# Patient Record
Sex: MALE | Race: WHITE | Employment: OTHER | URBAN - METROPOLITAN AREA
[De-identification: names, ages, dates, MRNs, and addresses within clinical notes are randomized per-mention and may not be internally consistent; named-entity substitution may affect disease eponyms.]

---

## 2017-03-28 ENCOUNTER — OFFICE VISIT (OUTPATIENT)
Dept: FAMILY MEDICINE CLINIC | Age: 67
End: 2017-03-28

## 2017-03-28 VITALS
RESPIRATION RATE: 18 BRPM | TEMPERATURE: 97.9 F | DIASTOLIC BLOOD PRESSURE: 82 MMHG | HEIGHT: 71 IN | HEART RATE: 74 BPM | OXYGEN SATURATION: 97 % | WEIGHT: 222.2 LBS | SYSTOLIC BLOOD PRESSURE: 143 MMHG | BODY MASS INDEX: 31.11 KG/M2

## 2017-03-28 DIAGNOSIS — I10 ESSENTIAL HYPERTENSION: ICD-10-CM

## 2017-03-28 DIAGNOSIS — E78.00 HYPERCHOLESTEROLEMIA: ICD-10-CM

## 2017-03-28 DIAGNOSIS — I25.10 CORONARY ARTERY DISEASE INVOLVING NATIVE CORONARY ARTERY OF NATIVE HEART WITHOUT ANGINA PECTORIS: ICD-10-CM

## 2017-03-28 DIAGNOSIS — N40.1 BENIGN NODULAR PROSTATIC HYPERPLASIA WITH LOWER URINARY TRACT SYMPTOMS: Primary | ICD-10-CM

## 2017-03-28 DIAGNOSIS — R60.0 LOCALIZED EDEMA: ICD-10-CM

## 2017-03-28 DIAGNOSIS — I69.359 CVA, OLD, HEMIPARESIS (HCC): ICD-10-CM

## 2017-03-28 DIAGNOSIS — Z86.73 HISTORY OF STROKE: ICD-10-CM

## 2017-03-28 DIAGNOSIS — E11.9 CONTROLLED TYPE 2 DIABETES MELLITUS WITHOUT COMPLICATION, WITHOUT LONG-TERM CURRENT USE OF INSULIN (HCC): ICD-10-CM

## 2017-03-28 RX ORDER — ASPIRIN 81 MG/1
TABLET ORAL
COMMUNITY
End: 2017-03-28 | Stop reason: SDUPTHER

## 2017-03-28 RX ORDER — TAMSULOSIN HYDROCHLORIDE 0.4 MG/1
0.8 CAPSULE ORAL
COMMUNITY
Start: 2016-12-05 | End: 2017-03-28 | Stop reason: SDUPTHER

## 2017-03-28 RX ORDER — TAMSULOSIN HYDROCHLORIDE 0.4 MG/1
0.8 CAPSULE ORAL DAILY
Qty: 90 CAP | Refills: 0 | Status: SHIPPED | OUTPATIENT
Start: 2017-03-28 | End: 2017-04-15 | Stop reason: SDUPTHER

## 2017-03-28 RX ORDER — CARVEDILOL 12.5 MG/1
12.5 TABLET ORAL 2 TIMES DAILY WITH MEALS
COMMUNITY
Start: 2016-12-05 | End: 2017-03-28 | Stop reason: SDUPTHER

## 2017-03-28 RX ORDER — BUPROPION HYDROCHLORIDE 150 MG/1
150 TABLET, EXTENDED RELEASE ORAL 2 TIMES DAILY
COMMUNITY
Start: 2016-12-05 | End: 2017-04-13 | Stop reason: SDUPTHER

## 2017-03-28 RX ORDER — METFORMIN HYDROCHLORIDE 500 MG/1
500 TABLET ORAL 2 TIMES DAILY WITH MEALS
COMMUNITY
Start: 2016-12-05 | End: 2017-03-28 | Stop reason: SDUPTHER

## 2017-03-28 RX ORDER — FINASTERIDE 5 MG/1
5 TABLET, FILM COATED ORAL
COMMUNITY
Start: 2016-12-05 | End: 2017-03-28 | Stop reason: SDUPTHER

## 2017-03-28 RX ORDER — AMLODIPINE AND BENAZEPRIL HYDROCHLORIDE 5; 20 MG/1; MG/1
CAPSULE ORAL
Qty: 90 CAP | Refills: 0 | Status: SHIPPED | OUTPATIENT
Start: 2017-03-28 | End: 2017-05-23 | Stop reason: SDUPTHER

## 2017-03-28 RX ORDER — HYDROCHLOROTHIAZIDE 25 MG/1
25 TABLET ORAL DAILY
COMMUNITY
Start: 2016-12-05 | End: 2017-04-13 | Stop reason: SDUPTHER

## 2017-03-28 RX ORDER — ATORVASTATIN CALCIUM 20 MG/1
20 TABLET, FILM COATED ORAL
COMMUNITY
Start: 2016-12-05 | End: 2017-05-03 | Stop reason: SDUPTHER

## 2017-03-28 RX ORDER — ATORVASTATIN CALCIUM 20 MG/1
20 TABLET, FILM COATED ORAL
Qty: 90 TAB | Refills: 0 | Status: SHIPPED | OUTPATIENT
Start: 2017-03-28 | End: 2017-05-16 | Stop reason: SDUPTHER

## 2017-03-28 RX ORDER — BISMUTH SUBSALICYLATE 262 MG
TABLET,CHEWABLE ORAL
COMMUNITY
End: 2017-06-23

## 2017-03-28 RX ORDER — CARVEDILOL 12.5 MG/1
12.5 TABLET ORAL 2 TIMES DAILY WITH MEALS
Qty: 180 TAB | Refills: 0 | Status: SHIPPED | OUTPATIENT
Start: 2017-03-28 | End: 2017-05-23 | Stop reason: SDUPTHER

## 2017-03-28 RX ORDER — AMLODIPINE AND BENAZEPRIL HYDROCHLORIDE 5; 20 MG/1; MG/1
CAPSULE ORAL
COMMUNITY
Start: 2016-12-05 | End: 2017-03-28 | Stop reason: SDUPTHER

## 2017-03-28 RX ORDER — FINASTERIDE 5 MG/1
5 TABLET, FILM COATED ORAL DAILY
Qty: 90 TAB | Refills: 0 | Status: SHIPPED | OUTPATIENT
Start: 2017-03-28 | End: 2017-05-23 | Stop reason: SDUPTHER

## 2017-03-28 RX ORDER — METFORMIN HYDROCHLORIDE 500 MG/1
500 TABLET ORAL 2 TIMES DAILY WITH MEALS
Qty: 180 TAB | Refills: 1 | Status: SHIPPED | OUTPATIENT
Start: 2017-03-28 | End: 2017-04-13 | Stop reason: SDUPTHER

## 2017-03-28 RX ORDER — ASPIRIN 81 MG/1
81 TABLET ORAL DAILY
Qty: 30 TAB | Refills: 11
Start: 2017-03-28 | End: 2017-06-27

## 2017-03-28 NOTE — PROGRESS NOTES
HISTORY OF PRESENT ILLNESS  Adria Hopkins is a 74 y.o. male. HPI   States that he just moved to [place] from [employer], hx of stroke in 1997, c/o third toe pain, since 1997, it wasnot exrayed, states that he broke the gr toe in 2016, has no problem with this time,  Stroke effected the left side states that he got his strength back on the upper ext but not on the lft lower ext, state that he also feel weak on the left lower ext  DMtype II with dyslipidmea, not fasting state, no myalgia    Compliant w/ meds, currently on metformin 500mg bid, he is having no diabetic diet, and not able to do doing much of daily exercise, no home glucose monitoring ++ Rf needed for today. Denies any tingling sensation, polyurea and polydipsia, last a1c was not at target 5.9%%. HTN  Today pt present for Bp check and the patient stating that so far there has been a Compliancy w/ the bp meds, needs refill for his bp med  he is having had the low salt diet and try to the best of pt's knowledge to avoid smoked meats, the patient has been active life style and does do the daily walking,  pt states that talib does obtain the bp at home few times a week and the average of 150/85 , today the pt denies Chest Pain, has no lightheadedness,  no legs swelling  Currently on hctz daily since the summer of 2016, has no support hose,  stressed out  the pat has not been feeling anxious, and  Has not been feeling stressed out with the current meds, no S. No H, normal sleep, no Etoh use, ++refill needed  otherwise feeling better since the last visit    Current Outpatient Prescriptions   Medication Sig Dispense Refill    finasteride (PROSCAR) 5 mg tablet 5 mg.  tamsulosin (FLOMAX) 0.4 mg capsule 0.8 mg.      hydroCHLOROthiazide (HYDRODIURIL) 25 mg tablet Take 25 mg by mouth daily.  multivitamin (ONE A DAY) tablet Take by Mouth.  buPROPion SR (WELLBUTRIN SR) 150 mg SR tablet Take 150 mg by mouth two (2) times a day.       amLODIPine-benazepril (LOTREL) 5-20 mg per capsule Take 1 Cap by Mouth Once a Day.  atorvastatin (LIPITOR) 20 mg tablet 20 mg.      carvedilol (COREG) 12.5 mg tablet 12.5 mg two (2) times daily (with meals).  metFORMIN (GLUCOPHAGE) 500 mg tablet 500 mg two (2) times daily (with meals).  aspirin delayed-release 81 mg tablet Take by Mouth.  HYDROcodone-acetaminophen (NORCO) 5-325 mg per tablet Take 1 Tab by mouth every four (4) hours as needed for Pain. Max Daily Amount: 6 Tabs. 20 Tab .0    sildenafil citrate (VIAGRA) 100 mg tablet Take 1 Tab by mouth daily as needed (for erection).  6 Tab 11     No Known Allergies  Past Medical History:   Diagnosis Date    Agoraphobia with panic disorder     Anxiety     BPH (benign prostatic hypertrophy) with urinary obstruction     CAD (coronary artery disease)     Chronic edema     CVD (cardiovascular disease)     Depression     Diabetes ([address]     Erectile dysfunction     GERD (gastroesophageal reflux disease)     Hemiparesis (HCC)     Hypercholesterolemia     Hypertension     Nocturia     Panic disorder     Stroke [facility]      Past Surgical History:   Procedure Laterality Date   [facility] HX COLONOSCOPY      HX LUMBAR DISKECTOMY      HX ORTHOPAEDIC      right finger repair    HX OTHER SURGICAL      lap band    HX OTHER SURGICAL      Lap band     Family History   Problem Relation Age of Onset    Cancer Father     Stroke Father     Hypertension Father     Cancer Mother     Hypertension Sister     Heart Disease Brother      Social History   Substance Use Topics    Smoking status: Never Smoker    Smokeless tobacco: Never Used    Alcohol use Yes      No results found for: WBC, WBCLT, HGBPOC, HGB, HGBP, HGBEXT, HCTPOC, HCT, HCTEXT, PHCT, RBCH, PLT, PLTEXT, MCV, HGBEXT, HCTEXT, PLTEXT    No results found for: HBA1C, WVO9HAGW, HGBE8, GLU, GESTF, GLUCPOC, MCACR, MCA1, MCA2, MCA3, MCA4, UMCA, MCAU, LDL, DLDL, LDLC, DLDLP, CLEO, CREAPOC, MCREA, REFC7, [facility], [address] [address], [facility], REFC4, [id number]   No results found for: CHOL, CHOLX, CHLST, CHOLV, HDL, LDL, DLDL, LDLC, DLDLP, TGL, TGLX, TRIGL, [id number], TRIGP, CHHD, CHHDX      Review of Systems   Constitutional: Negative for chills and fever. HENT: Negative for ear pain and nosebleeds. Eyes: Negative for blurred vision, pain and discharge. Respiratory: Negative for shortness of breath. Cardiovascular: Negative for chest pain and leg swelling. Gastrointestinal: Negative for constipation, diarrhea, nausea and vomiting. Genitourinary: Negative for frequency. Musculoskeletal: Negative for joint pain. Skin: Negative for itching and rash. Neurological: Negative for headaches. Psychiatric/Behavioral: Negative for depression. The patient is not nervous/anxious. Physical Exam   Constitutional: He is oriented to person, place, and time. He appears well-developed and well-nourished. HENT:   Head: Normocephalic and atraumatic. Mouth/Throat: No oropharyngeal exudate. Eyes: Conjunctivae and EOM are normal.   Neck: Normal range of motion. Neck supple. Cardiovascular: Normal rate, regular rhythm and normal heart sounds. No murmur heard. Pulmonary/Chest: Effort normal and breath sounds normal. No respiratory distress. Abdominal: Soft. Bowel sounds are normal. He exhibits no distension. There is no rebound. Musculoskeletal: He exhibits tenderness. He exhibits no edema. Left foot: There is normal range of motion, no tenderness, no bony tenderness, no swelling and normal capillary refill. Nl pulses, nl visual inspection nl monoF   Neurological: He is alert and oriented to person, place, and time. Skin: Skin is warm. No erythema. Psychiatric: He has a normal mood and affect. His behavior is normal.   Nursing note and vitals reviewed. ASSESSMENT and PLAN  Brandon Watt was seen today for establish care.     Diagnoses and all orders for this visit:    Benign nodular prostatic hyperplasia with lower urinary tract symptoms  -     REFERRAL TO UROLOGY  -     CBC W/O DIFF  -     METABOLIC PANEL, COMPREHENSIVE  -     TSH 3RD GENERATION  -     CHOLESTEROL, TOTAL  -     CHOLESTEROL, HDL  -     HEMOGLOBIN A1C WITH EAG    Essential hypertension  -     REFERRAL TO UROLOGY  -     CBC W/O DIFF  -     METABOLIC PANEL, COMPREHENSIVE  -     TSH 3RD GENERATION  -     CHOLESTEROL, TOTAL  -     CHOLESTEROL, HDL  -     HEMOGLOBIN A1C WITH EAG    Hypercholesterolemia  -     REFERRAL TO UROLOGY  -     CBC W/O DIFF  -     METABOLIC PANEL, COMPREHENSIVE  -     TSH 3RD GENERATION  -     CHOLESTEROL, TOTAL  -     CHOLESTEROL, HDL  -     HEMOGLOBIN A1C WITH EAG    Controlled type 2 diabetes mellitus without complication, without long-term current use of insulin (HCC)  -     REFERRAL TO UROLOGY  -     CBC W/O DIFF  -     METABOLIC PANEL, COMPREHENSIVE  -     TSH 3RD GENERATION  -     CHOLESTEROL, TOTAL  -     CHOLESTEROL, HDL  -     HEMOGLOBIN A1C WITH EAG  -     MICROALBUMIN, UR, RAND W/ MICROALBUMIN/CREA RATIO    History of stroke  -     REFERRAL TO UROLOGY  -     CBC W/O DIFF  -     METABOLIC PANEL, COMPREHENSIVE  -     TSH 3RD GENERATION  -     CHOLESTEROL, TOTAL  -     CHOLESTEROL, HDL  -     HEMOGLOBIN A1C WITH EAG  -     MICROALBUMIN, UR, RAND W/ MICROALBUMIN/CREA RATIO    Coronary artery disease involving native coronary artery of native heart without angina pectoris  -     REFERRAL TO UROLOGY  -     REFERRAL TO CARDIOLOGY  -     CBC W/O DIFF  -     METABOLIC PANEL, COMPREHENSIVE  -     TSH 3RD GENERATION  -     CHOLESTEROL, TOTAL  -     CHOLESTEROL, HDL  -     HEMOGLOBIN A1C WITH EAG  -     MICROALBUMIN, UR, RAND W/ MICROALBUMIN/CREA RATIO    Localized edema  -     AMB SUPPLY ORDER  -     CBC W/O DIFF  -     METABOLIC PANEL, COMPREHENSIVE  -     TSH 3RD GENERATION  -     CHOLESTEROL, TOTAL  -     CHOLESTEROL, HDL  -     HEMOGLOBIN A1C WITH EAG    CVA, old, hemiparesis (HCC)  -     REFERRAL TO ORTHOPEDICS    Other orders  - finasteride (PROSCAR) 5 mg tablet; Take 1 Tab by mouth daily. -     tamsulosin (FLOMAX) 0.4 mg capsule; Take 2 Caps by mouth daily. -     amLODIPine-benazepril (LOTREL) 5-20 mg per capsule; Take 1 Cap by Mouth Once a Day. -     carvedilol (COREG) 12.5 mg tablet; Take 1 Tab by mouth two (2) times daily (with meals). -     metFORMIN (GLUCOPHAGE) 500 mg tablet; Take 1 Tab by mouth two (2) times daily (with meals). -     aspirin delayed-release 81 mg tablet; Take 1 Tab by mouth daily. -     atorvastatin (LIPITOR) 20 mg tablet; Take 1 Tab by mouth nightly.

## 2017-03-28 NOTE — PROGRESS NOTES
Chief Complaint   Patient presents with   [address] PCP Dr. Madhavi De Leon. Pt states his records were sent over. Pt saw cardiologist Dr. Alayna Hunt. B/P elevated doctor notified. Please see lab 10/3/2016.

## 2017-03-28 NOTE — MR AVS SNAPSHOT
Visit Information Date & Time Provider Department Dept. Phone Encounter #  
 3/28/2017 12:00 PM Jarrod Lagos MD [address] [facility] OFFICE-ANNEX [phone] [id number] Your Appointments 4/26/2017  3:30 PM  
ROUTINE CARE with Jarrod Lagos MD  
[address] [facility] OFFICE-ANNEX ([address]) Appt Note: New pt est; new patient establish pcp [address] [place] [address] [address] [phone] [address] [phone] Upcoming Health Maintenance Date Due Hepatitis C Screening 1950 DTaP/Tdap/Td series (1 - Tdap) 9/23/1969 FOBT Q 1 YEAR AGE 50-75 9/23/1998 ZOSTER VACCINE AGE 60> 9/23/2008 GLAUCOMA SCREENING Q2Y 9/23/2013 MEDICARE YEARLY EXAM 9/23/2013 INFLUENZA AGE 9 TO ADULT 8/1/2016 Pneumococcal 65+ Low/Medium Risk (2 of 2 - PPSV23) 3/28/2018 Allergies as of 3/28/2017  Review Complete On: 3/28/2017 By: Felicity Nieves LPN No Known Allergies Current Immunizations  Reviewed on 3/28/2017 Name Date Pneumococcal Conjugate (PCV-13) 3/17/2015 Pneumococcal Polysaccharide (PPSV-23) 2/21/2014 Tdap 8/9/2012 Zoster Vaccine, Live 10/28/2016 Reviewed by Jarrod Lagos MD on 3/28/2017 at 12:59 PM  
 Reviewed by Jarrod Lagos MD on 3/28/2017 at 12:59 PM  
You Were Diagnosed With   
  
 Codes Comments Benign nodular prostatic hyperplasia with lower urinary tract symptoms    -  Primary ICD-10-CM: N40.1 ICD-9-CM: 600.10 Essential hypertension     ICD-10-CM: I10 
ICD-9-CM: 401.9 Hypercholesterolemia     ICD-10-CM: E78.00 ICD-9-CM: 272.0 Controlled type 2 diabetes mellitus without complication, without long-term current use of insulin ([facility]     ICD-10-CM: E11.9 ICD-9-CM: 250.00 History of stroke     ICD-10-CM: Z86.73 
ICD-9-CM: V12.54  Coronary artery disease involving native coronary artery of native heart without angina pectoris     ICD-10-CM: I25.10 ICD-9-CM: 414.01 Localized edema     ICD-10-CM: R60.0 ICD-9-CM: 331. 3 Vitals BP Pulse Temp Resp Height(growth percentile) Weight(growth percentile) 143/82 (BP 1 Location: Right arm, BP Patient Position: Sitting) 74 97.9 °F (36.6 °C) (Oral) 18 5' 11\" (1.803 m) 222 lb 3.2 oz (100.8 kg) SpO2 BMI Smoking Status 97% 30.99 kg/m2 Never Smoker Vitals History BMI and BSA Data Body Mass Index Body Surface Area 30.99 kg/m 2 2.25 m 2 Preferred Pharmacy Pharmacy Name Phone 305 [facility], [address] [place] [address] [address] Your Updated Medication List  
  
   
This list is accurate as of: 3/28/17  1:01 PM.  Always use your most recent med list. amLODIPine-benazepril 5-20 mg per capsule Commonly known as:  Rusty Dubin Take 1 Cap by Mouth Once a Day. * aspirin delayed-release 81 mg tablet Take by Mouth. * aspirin delayed-release 81 mg tablet Take 1 Tab by mouth daily. * atorvastatin 20 mg tablet Commonly known as:  LIPITOR 20 mg.  
  
 * atorvastatin 20 mg tablet Commonly known as:  LIPITOR Take 1 Tab by mouth nightly. buPROPion  mg SR tablet Commonly known as:  Zuly [place] Take 150 mg by mouth two (2) times a day. carvedilol 12.5 mg tablet Commonly known as:  Layman Blackwell Take 1 Tab by mouth two (2) times daily (with meals). finasteride 5 mg tablet Commonly known as:  PROSCAR Take 1 Tab by mouth daily. hydroCHLOROthiazide 25 mg tablet Commonly known as:  HYDRODIURIL Take 25 mg by mouth daily. HYDROcodone-acetaminophen 5-325 mg per tablet Commonly known as:  Indu Petri Take 1 Tab by mouth every four (4) hours as needed for Pain. Max Daily Amount: 6 Tabs. metFORMIN 500 mg tablet Commonly known as:  GLUCOPHAGE Take 1 Tab by mouth two (2) times daily (with meals). multivitamin tablet Commonly known as:  ONE A DAY Take by Mouth.  
  
 sildenafil citrate 100 mg tablet Commonly known as:  VIAGRA Take 1 Tab by mouth daily as needed (for erection). tamsulosin 0.4 mg capsule Commonly known as:  FLOMAX Take 2 Caps by mouth daily. * Notice: This list has 4 medication(s) that are the same as other medications prescribed for you. Read the directions carefully, and ask your doctor or other care provider to review them with you. Prescriptions Sent to Pharmacy Refills  
 finasteride (PROSCAR) 5 mg tablet 0 Sig: Take 1 Tab by mouth daily. Class: Normal  
 Pharmacy: [address] [place] [address]. [address] [facility] Ph #: [phone] Route: Oral  
 tamsulosin (FLOMAX) 0.4 mg capsule 0 Sig: Take 2 Caps by mouth daily. Class: Normal  
 Pharmacy: [address] [place] [address]. [address] Ph #: [phone] Route: Oral  
 amLODIPine-benazepril (LOTREL) 5-20 mg per capsule 0 Sig: Take 1 Cap by Mouth Once a Day. Class: Normal  
 Pharmacy: [address] [place] [address]. [address] [facility]  Ph #: [phone]  
 carvedilol (COREG) 12.5 mg tablet 0 Sig: Take 1 Tab by mouth two (2) times daily (with meals). Class: Normal  
 Pharmacy: [address] [place] [address]. [address] [facility] #: [phone] Route: Oral  
 metFORMIN (GLUCOPHAGE) 500 mg tablet 1 Sig: Take 1 Tab by mouth two (2) times daily (with meals). Class: Normal  
 Pharmacy: [address] [place] [address]. [address]  Ph #: [phone] Route: Oral  
 atorvastatin (LIPITOR) 20 mg tablet 0 Sig: Take 1 Tab by mouth nightly. Class: Normal  
 Pharmacy: [address] [place] [address], . [address] [facility]  Ph #: [phone] Route: Oral  
  
We Performed the Following AMB SUPPLY ORDER [[id number] Custom] Comments:  
 Below the knees Bilaterally 20-30 mmhg to be worn daily and off nightly CBC W/O DIFF [24352 CPT(R)] CHOLESTEROL, HDL [id number] CPT(R)] CHOLESTEROL, TOTAL [73101 CPT(R)] HEMOGLOBIN A1C WITH EAG [16381 CPT(R)] METABOLIC PANEL, COMPREHENSIVE [10293 CPT(R)] MICROALBUMIN, UR, RAND W/ MICROALBUMIN/CREA RATIO [id number] CPT(R)] REFERRAL TO CARDIOLOGY [QTV42 Custom] Comments:  
 Please evaluate patient for CAD REFERRAL TO UROLOGY [[id number] Custom] Comments:  
 Please evaluate patient for BPH  
 TSH 3RD GENERATION [46926 CPT(R)] Referral Information Referral ID Referred By Referred To  
  
 [id number] Nehal Santacruz [facility] [address] [facility] [address], [address] [facility] Visits Status Start Date End Date 1 New Request 3/28/17 3/28/18 If your referral has a status of pending review or denied, additional information will be sent to support the outcome of this decision. Referral ID Referred By Referred To  
 [id number] Wejennyfer Labrador Not Available Visits Status Start Date End Date 1 New Request 3/28/17 3/28/18 If your referral has a status of pending review or denied, additional information will be sent to support the outcome of this decision. Introducing [facility]! [employer] introduces [employer] patient portal. Now you can access parts of your medical record, email your doctor's office, and request medication refills online. 1. In your internet browser, go to [email or web address] 2. Click on the First Time User? Click Here link in the Sign In box. You will see the New Member Sign Up page. 3. Enter your [employer] Access Code exactly as it appears below. You will not need to use this code after youve completed the sign-up process. If you do not sign up before the expiration date, you must request a new code. · [employer] Access Code: [email or web address] Expires: 6/26/2017  1:01 PM 
 
4.  Enter the last four digits of your Social Security Number (xxxx) and Date of Birth (mm/dd/yyyy) as indicated and click Submit. You will be taken to the next sign-up page. 5. Create a [employer] ID. This will be your [employer] login ID and cannot be changed, so think of one that is secure and easy to remember. 6. Create a [employer] password. You can change your password at any time. 7. Enter your Password Reset Question and Answer. This can be used at a later time if you forget your password. 8. Enter your e-mail address. You will receive e-mail notification when new information is available in [address] 9. Click Sign Up. You can now view and download portions of your medical record. 10. Click the Download Summary menu link to download a portable copy of your medical information. If you have questions, please visit the Frequently Asked Questions section of the [employer] website. Remember, [employer] is NOT to be used for urgent needs. For medical emergencies, dial 911. Now available from your iPhone and Android! Please provide this summary of care documentation to your next provider. Your primary care clinician is listed as Sharon Melendez. If you have any questions after today's visit, please call [phone].

## 2017-03-29 LAB
ALBUMIN SERPL-MCNC: 4.1 G/DL (ref 3.6–4.8)
ALBUMIN/CREAT UR: 82.2 MG/G CREAT (ref 0–30)
ALBUMIN/GLOB SERPL: 1.5 {RATIO} (ref 1.2–2.2)
ALP SERPL-CCNC: 61 IU/L (ref 39–117)
ALT SERPL-CCNC: 14 IU/L (ref 0–44)
AST SERPL-CCNC: 15 IU/L (ref 0–40)
BILIRUB SERPL-MCNC: 0.5 MG/DL (ref 0–1.2)
BUN SERPL-MCNC: 20 MG/DL (ref 8–27)
BUN/CREAT SERPL: 21 (ref 10–22)
CALCIUM SERPL-MCNC: 9.5 MG/DL (ref 8.6–10.2)
CHLORIDE SERPL-SCNC: 98 MMOL/L (ref 96–106)
CHOLEST SERPL-MCNC: 176 MG/DL (ref 100–199)
CO2 SERPL-SCNC: 28 MMOL/L (ref 18–29)
CREAT SERPL-MCNC: 0.96 MG/DL (ref 0.76–1.27)
CREAT UR-MCNC: 82.1 MG/DL
ERYTHROCYTE [DISTWIDTH] IN BLOOD BY AUTOMATED COUNT: 14 % (ref 12.3–15.4)
EST. AVERAGE GLUCOSE BLD GHB EST-MCNC: 120 MG/DL
GLOBULIN SER CALC-MCNC: 2.7 G/DL (ref 1.5–4.5)
GLUCOSE SERPL-MCNC: 127 MG/DL (ref 65–99)
HBA1C MFR BLD: 5.8 % (ref 4.8–5.6)
HCT VFR BLD AUTO: 38.7 % (ref 37.5–51)
HDLC SERPL-MCNC: 63 MG/DL
HGB BLD-MCNC: 13.2 G/DL (ref 12.6–17.7)
Lab: NORMAL
MCH RBC QN AUTO: 31 PG (ref 26.6–33)
MCHC RBC AUTO-ENTMCNC: 34.1 G/DL (ref 31.5–35.7)
MCV RBC AUTO: 91 FL (ref 79–97)
MICROALBUMIN UR-MCNC: 67.5 UG/ML
PLATELET # BLD AUTO: 242 X10E3/UL (ref 150–379)
POTASSIUM SERPL-SCNC: 3.9 MMOL/L (ref 3.5–5.2)
PROT SERPL-MCNC: 6.8 G/DL (ref 6–8.5)
RBC # BLD AUTO: 4.26 X10E6/UL (ref 4.14–5.8)
SODIUM SERPL-SCNC: 141 MMOL/L (ref 134–144)
TSH SERPL DL<=0.005 MIU/L-ACNC: 1.14 UIU/ML (ref 0.45–4.5)
WBC # BLD AUTO: 8 X10E3/UL (ref 3.4–10.8)

## 2017-04-13 ENCOUNTER — OFFICE VISIT (OUTPATIENT)
Dept: FAMILY MEDICINE CLINIC | Age: 67
End: 2017-04-13

## 2017-04-13 VITALS
HEART RATE: 71 BPM | DIASTOLIC BLOOD PRESSURE: 86 MMHG | TEMPERATURE: 97.3 F | RESPIRATION RATE: 20 BRPM | SYSTOLIC BLOOD PRESSURE: 155 MMHG | BODY MASS INDEX: 31.08 KG/M2 | OXYGEN SATURATION: 98 % | HEIGHT: 71 IN | WEIGHT: 222 LBS

## 2017-04-13 DIAGNOSIS — I10 ESSENTIAL HYPERTENSION: Primary | ICD-10-CM

## 2017-04-13 DIAGNOSIS — I25.10 CORONARY ARTERY DISEASE INVOLVING NATIVE CORONARY ARTERY OF NATIVE HEART WITHOUT ANGINA PECTORIS: ICD-10-CM

## 2017-04-13 RX ORDER — METFORMIN HYDROCHLORIDE 500 MG/1
250 TABLET ORAL 2 TIMES DAILY WITH MEALS
Qty: 90 TAB | Refills: 1
Start: 2017-04-13 | End: 2017-08-15 | Stop reason: ALTCHOICE

## 2017-04-13 RX ORDER — HYDROCHLOROTHIAZIDE 25 MG/1
25 TABLET ORAL DAILY
Qty: 90 TAB | Refills: 3 | Status: SHIPPED | OUTPATIENT
Start: 2017-04-13 | End: 2017-09-13 | Stop reason: SDUPTHER

## 2017-04-13 RX ORDER — BUPROPION HYDROCHLORIDE 150 MG/1
150 TABLET, EXTENDED RELEASE ORAL 2 TIMES DAILY
Qty: 180 TAB | Refills: 3 | Status: SHIPPED | OUTPATIENT
Start: 2017-04-13 | End: 2017-09-13 | Stop reason: SDUPTHER

## 2017-04-13 NOTE — MR AVS SNAPSHOT
Visit Information Date & Time Provider Department Dept. Phone Encounter #  
 4/13/2017  3:45 PM MD Lyly [place] [facility] OFFICE-ANNEX [phone] [id number] Follow-up Instructions Return in about 6 months (around 10/13/2017), or if symptoms worsen or fail to improve. Your Appointments 4/26/2017  3:30 PM  
ROUTINE CARE with MD Lyly [place] OFFICE-ANNEX ([facility]) Appt Note: New pt est; new patient establish pcp [address] [facility] [address] [phone]  
[phone] [address] [phone]  
  
    
 5/3/2017  9:30 AM  
New Patient with Amy aMlave MD  
[facility]) Appt Note: Dr. Millie Licea refd. , CAD, Katina Anthony faxing referral  [address]  
[phone] [address]  
  
    
 6/22/2017  3:45 PM  
Office Visit with Magalis Hall MD  
[facility] OFFICE-ANNEX ([facility]) Appt Note: Medicare Wellness Exam  
 [address] [facility] 7 [phone]  
[phone] [address] [phone] Upcoming Health Maintenance Date Due Hepatitis C Screening 1950 FOOT EXAM Q1 9/23/1958 EYE EXAM RETINAL OR DILATED Q1 9/23/1958 FOBT Q 1 YEAR AGE 50-75 9/23/1998 GLAUCOMA SCREENING Q2Y 9/23/2013 MEDICARE YEARLY EXAM 9/23/2013 HEMOGLOBIN A1C Q6M 9/28/2017 MICROALBUMIN Q1 3/28/2018 LIPID PANEL Q1 3/28/2018 DTaP/Tdap/Td series (2 - Td) 8/9/2022 Allergies as of 4/13/2017  Review Complete On: 3/28/2017 By: Nisha Bauman LPN No Known Allergies Current Immunizations  Reviewed on 3/28/2017 Name Date Pneumococcal Conjugate (PCV-13) 3/17/2015 Pneumococcal Polysaccharide (PPSV-23) 2/21/2014 Tdap 8/9/2012 Zoster Vaccine, Live 10/28/2016 Not reviewed this visit You Were Diagnosed With   
  
 Codes Comments Essential hypertension    -  Primary ICD-10-CM: I10 
ICD-9-CM: 401.9 Coronary artery disease involving native coronary artery of native heart without angina pectoris     ICD-10-CM: I25.10 ICD-9-CM: 414.01 Vitals BP Pulse Temp Resp Height(growth percentile) Weight(growth percentile) 155/86 71 97.3 °F (36.3 °C) (Oral) 20 5' 11\" (1.803 m) 222 lb (100.7 kg) SpO2 BMI Smoking Status 98% 30.96 kg/m2 Never Smoker BMI and BSA Data Body Mass Index Body Surface Area 30.96 kg/m 2 2.25 m 2 Preferred Pharmacy Pharmacy Name Phone 305 [facility], [address] [place] [address] [address] Your Updated Medication List  
  
   
This list is accurate as of: 4/13/17  4:26 PM.  Always use your most recent med list. amLODIPine-benazepril 5-20 mg per capsule Commonly known as:  Ezra Fry Take 1 Cap by Mouth Once a Day. * aspirin delayed-release 81 mg tablet Take by Mouth. * aspirin delayed-release 81 mg tablet Take 1 Tab by mouth daily. * atorvastatin 20 mg tablet Commonly known as:  LIPITOR 20 mg.  
  
 * atorvastatin 20 mg tablet Commonly known as:  LIPITOR Take 1 Tab by mouth nightly. buPROPion  mg SR tablet Commonly known as:  Dominik Comer Take 1 Tab by mouth two (2) times a day. carvedilol 12.5 mg tablet Commonly known as:  Jody Bosworth Take 1 Tab by mouth two (2) times daily (with meals). finasteride 5 mg tablet Commonly known as:  PROSCAR Take 1 Tab by mouth daily. hydroCHLOROthiazide 25 mg tablet Commonly known as:  HYDRODIURIL Take 1 Tab by mouth daily. HYDROcodone-acetaminophen 5-325 mg per tablet Commonly known as:  [place] Take 1 Tab by mouth every four (4) hours as needed for Pain. Max Daily Amount: 6 Tabs. metFORMIN 500 mg tablet Commonly known as:  GLUCOPHAGE Take 0.5 Tabs by mouth two (2) times daily (with meals). multivitamin tablet Commonly known as:  ONE A DAY Take by Mouth.  
  
 sildenafil citrate 100 mg tablet Commonly known as:  VIAGRA Take 1 Tab by mouth daily as needed (for erection). tamsulosin 0.4 mg capsule Commonly known as:  FLOMAX Take 2 Caps by mouth daily. * Notice: This list has 4 medication(s) that are the same as other medications prescribed for you. Read the directions carefully, and ask your doctor or other care provider to review them with you. Prescriptions Sent to Pharmacy Refills  
 hydroCHLOROthiazide (HYDRODIURIL) 25 mg tablet 3 Sig: Take 1 Tab by mouth daily. Class: Normal  
 Pharmacy: [address] [place] [address] Ave[address] [facility]  #: [phone] Route: Oral  
 buPROPion SR (WELLBUTRIN SR) 150 mg SR tablet 3 Sig: Take 1 Tab by mouth two (2) times a day. Class: Normal  
 Pharmacy: [facility] Sarina Hoyos [address] Ph #: [phone] Route: Oral  
  
Follow-up Instructions Return in about 6 months (around 10/13/2017), or if symptoms worsen or fail to improve. Introducing [facility]! Dear Fiona Mins: Thank you for requesting a Endomondo account. Our records indicate that you already have an active [employer]om[employer] account. You can access your account anytime at [email or web address] Did you know that you can access your hospital and ER discharge instructions at any time in Endomondo? You can also review all of your test results from your hospital stay or ER visit. Additional Information If you have questions, please visit the Frequently Asked Questions section of the [employer] website at [email or web address] Remember, [employer] is NOT to be used for urgent needs. For medical emergencies, dial 911. Now available from your iPhone and Android! Please provide this summary of care documentation to your next provider. Your primary care clinician is listed as Nadia Love. If you have any questions after today's visit, please call [phone].

## 2017-04-13 NOTE — PROGRESS NOTES
HISTORY OF PRESENT ILLNESS  Marcia Jarrell is a 74 y.o. male. HPI   DMtype II    Compliant w/ meds, currently on 500mg of metformin bid, he is having diabetic diet, and does some walking with his cane, obtains home glucose monitoring averaging  140's  . No Rf needed for today. Denies any tingling sensation, polyurea and polydipsia, last a1c was not at target 5.8%    . Last podiatry visit 2016   And last eye exam was 2016 Last urine microalbumin 2017 and was abnormal  . Feeling better since the last visit. Current Outpatient Prescriptions   Medication Sig Dispense Refill    hydroCHLOROthiazide (HYDRODIURIL) 25 mg tablet Take 1 Tab by mouth daily. 90 Tab 3    buPROPion SR (WELLBUTRIN SR) 150 mg SR tablet Take 1 Tab by mouth two (2) times a day. 180 Tab 3    metFORMIN (GLUCOPHAGE) 500 mg tablet Take 0.5 Tabs by mouth two (2) times daily (with meals). 90 Tab 1    multivitamin (ONE A DAY) tablet Take by Mouth.  atorvastatin (LIPITOR) 20 mg tablet 20 mg.      finasteride (PROSCAR) 5 mg tablet Take 1 Tab by mouth daily. 90 Tab 0    tamsulosin (FLOMAX) 0.4 mg capsule Take 2 Caps by mouth daily. 90 Cap 0    amLODIPine-benazepril (LOTREL) 5-20 mg per capsule Take 1 Cap by Mouth Once a Day. 90 Cap 0    carvedilol (COREG) 12.5 mg tablet Take 1 Tab by mouth two (2) times daily (with meals). 180 Tab 0    aspirin delayed-release 81 mg tablet Take 1 Tab by mouth daily. 30 Tab 11    atorvastatin (LIPITOR) 20 mg tablet Take 1 Tab by mouth nightly. 90 Tab 0    HYDROcodone-acetaminophen (NORCO) 5-325 mg per tablet Take 1 Tab by mouth every four (4) hours as needed for Pain. Max Daily Amount: 6 Tabs. 20 Tab .0    aspirin delayed-release 81 mg tablet Take by Mouth.  sildenafil citrate (VIAGRA) 100 mg tablet Take 1 Tab by mouth daily as needed (for erection).  6 Tab 11     No Known Allergies  Past Medical History:   Diagnosis Date    Agoraphobia with panic disorder     Anxiety     BPH (benign prostatic hypertrophy) with urinary obstruction     CAD (coronary artery disease)     Chronic edema     Coronary artery disease involving native coronary artery without angina pectoris 3/28/2017    CVD (cardiovascular disease)     Depression     Diabetes ([address]     Erectile dysfunction     Essential hypertension 3/28/2017    GERD (gastroesophageal reflux disease)     Hemiparesis (HCC)     History of stroke 3/28/2017    Hypercholesterolemia     Hypercholesterolemia 3/28/2017    Hypertension     Nocturia     Panic disorder     Stroke [facility])      Past Surgical History:   Procedure Laterality Date    HX COLONOSCOPY      HX LUMBAR DISKECTOMY      HX ORTHOPAEDIC      right finger repair    HX OTHER SURGICAL      lap band    HX OTHER SURGICAL      Lap band     Family History   Problem Relation Age of Onset    Cancer Father     Stroke Father     Hypertension Father     Cancer Mother     Hypertension Sister     Heart Disease Brother      Social History   Substance Use Topics    Smoking status: Never Smoker    Smokeless tobacco: Never Used    Alcohol use Yes      Lab Results  Component Value Date/Time   WBC 8.0 03/28/2017 01:08 PM   HGB 13.2 03/28/2017 01:08 PM   HCT 38.7 03/28/2017 01:08 PM   PLATELET 422 56/52/1301 01:08 PM   MCV 91 03/28/2017 01:08 PM       Lab Results  Component Value Date/Time   Hemoglobin A1c 5.8 03/28/2017 01:08 PM   Glucose 127 03/28/2017 01:08 PM   Microalb/Creat ratio (ug/mg creat.) 82.2 03/28/2017 01:09 PM   Creatinine 0.96 03/28/2017 01:08 PM      Lab Results  Component Value Date/Time   Cholesterol, total 176 03/28/2017 01:08 PM   HDL Cholesterol 63 03/28/2017 01:08 PM       Lab Results  Component Value Date/Time   ALT (SGPT) 14 03/28/2017 01:08 PM   AST (SGOT) 15 03/28/2017 01:08 PM   Alk.  phosphatase 61 03/28/2017 01:08 PM   Bilirubin, total 0.5 03/28/2017 01:08 PM       Lab Results  Component Value Date/Time   GFR est AA 94 03/28/2017 01:08 PM   GFR est non-AA 81 03/28/2017 01:08 PM   Creatinine 0.96 03/28/2017 01:08 PM   BUN 20 03/28/2017 01:08 PM   Sodium 141 03/28/2017 01:08 PM   Potassium 3.9 03/28/2017 01:08 PM   Chloride 98 03/28/2017 01:08 PM   CO2 28 03/28/2017 01:08 PM      Lab Results  Component Value Date/Time   TSH 1.140 03/28/2017 01:08 PM         Review of Systems   Constitutional: Negative for chills and fever. HENT: Negative for ear pain and nosebleeds. Eyes: Negative for blurred vision, pain and discharge. Respiratory: Negative for shortness of breath. Cardiovascular: Negative for chest pain and leg swelling. Gastrointestinal: Negative for constipation, diarrhea, nausea and vomiting. Genitourinary: Negative for frequency. Musculoskeletal: Negative for joint pain. Skin: Negative for itching and rash. Neurological: Negative for headaches. Psychiatric/Behavioral: Negative for depression. The patient is not nervous/anxious. Physical Exam   Constitutional: He is oriented to person, place, and time. He appears well-developed and well-nourished. HENT:   Head: Normocephalic and atraumatic. Mouth/Throat: No oropharyngeal exudate. Eyes: Conjunctivae and EOM are normal.   Neck: Normal range of motion. Neck supple. Cardiovascular: Normal rate, regular rhythm and normal heart sounds. No murmur heard. Pulmonary/Chest: Effort normal and breath sounds normal. No respiratory distress. Abdominal: Soft. Bowel sounds are normal. He exhibits no distension. There is no rebound. Musculoskeletal: He exhibits no edema or tenderness. Neurological: He is alert and oriented to person, place, and time. Skin: Skin is warm. No erythema. Psychiatric: He has a normal mood and affect. His behavior is normal.   Nursing note and vitals reviewed. ASSESSMENT and PLAN  Anice All was seen today for physical.    Diagnoses and all orders for this visit:    Essential hypertension  -     hydroCHLOROthiazide (HYDRODIURIL) 25 mg tablet;  Take 1 Tab by mouth daily.  -     buPROPion SR (WELLBUTRIN SR) 150 mg SR tablet; Take 1 Tab by mouth two (2) times a day. Coronary artery disease involving native coronary artery of native heart without angina pectoris  -     hydroCHLOROthiazide (HYDRODIURIL) 25 mg tablet; Take 1 Tab by mouth daily. -     buPROPion SR (WELLBUTRIN SR) 150 mg SR tablet; Take 1 Tab by mouth two (2) times a day. Other orders  -     metFORMIN (GLUCOPHAGE) 500 mg tablet; Take 0.5 Tabs by mouth two (2) times daily (with meals).

## 2017-04-17 RX ORDER — TAMSULOSIN HYDROCHLORIDE 0.4 MG/1
CAPSULE ORAL
Qty: 90 CAP | Refills: 1 | Status: SHIPPED | OUTPATIENT
Start: 2017-04-17 | End: 2017-06-23

## 2017-05-03 ENCOUNTER — OFFICE VISIT (OUTPATIENT)
Dept: CARDIOLOGY CLINIC | Age: 67
End: 2017-05-03

## 2017-05-03 VITALS
RESPIRATION RATE: 16 BRPM | HEART RATE: 78 BPM | OXYGEN SATURATION: 98 % | WEIGHT: 214.1 LBS | SYSTOLIC BLOOD PRESSURE: 130 MMHG | HEIGHT: 71 IN | DIASTOLIC BLOOD PRESSURE: 80 MMHG | BODY MASS INDEX: 29.97 KG/M2

## 2017-05-03 DIAGNOSIS — E78.00 HYPERCHOLESTEROLEMIA: ICD-10-CM

## 2017-05-03 DIAGNOSIS — I25.10 CORONARY ARTERY DISEASE INVOLVING NATIVE CORONARY ARTERY OF NATIVE HEART WITHOUT ANGINA PECTORIS: ICD-10-CM

## 2017-05-03 DIAGNOSIS — I10 ESSENTIAL HYPERTENSION: Primary | ICD-10-CM

## 2017-05-03 DIAGNOSIS — Z86.73 HISTORY OF STROKE: ICD-10-CM

## 2017-05-03 DIAGNOSIS — E11.9 CONTROLLED TYPE 2 DIABETES MELLITUS WITHOUT COMPLICATION, WITHOUT LONG-TERM CURRENT USE OF INSULIN (HCC): ICD-10-CM

## 2017-05-03 NOTE — PROGRESS NOTES
Subjective/HPI:     Johnna Mcneal is a 74 y.o. male is here for new patient consultation. The patient has medical hx significant for HTN, Hypercholesterolemia, DM, and CVA. The pt presents today to establish care. He moved from [facility]. He has hx of CVA with residual left leg weakness. He had a stress test about 8 years ago that he he was told he had a blockage that had made other blood flow to compensate. He never had a cardiac cath or other investigation of this issue. He denies currently having CP, SOB, palpitations, edema, orthopnea, PND, dizziness, or syncope. Family med hx significant for cancer, HTN, DM, and heart disease.     Patient Active Problem List    Diagnosis Date Noted    Essential hypertension 03/28/2017    Hypercholesterolemia 03/28/2017    Controlled type 2 diabetes mellitus without complication ([facility] 82/72/2061    History of stroke 03/28/2017    Coronary artery disease involving native coronary artery without angina pectoris 03/28/2017    Left low back pain 08/15/2016    Gross hematuria 08/15/2016    Calculus of kidney 04/18/2016    Erectile dysfunction 04/17/2016    Nocturia 07/30/2014    Slowing of urinary stream 07/30/2014    Benign prostatic hyperplasia with lower urinary tract symptoms 06/22/2014    Urinary frequency 06/22/2014    Obesity 06/22/2014      Leisa Arias MD  Past Medical History:   Diagnosis Date    Agoraphobia with panic disorder     Anxiety     BPH (benign prostatic hypertrophy) with urinary obstruction     CAD (coronary artery disease)     no previous MI or stents    Chronic edema     Coronary artery disease involving native coronary artery without angina pectoris 3/28/2017    CVD (cardiovascular disease)     Depression     Diabetes ([address]     Erectile dysfunction     Essential hypertension 3/28/2017    GERD (gastroesophageal reflux disease)     Hemiparesis ([address]     History of stroke 3/28/2017    Hypercholesterolemia     Hypercholesterolemia 3/28/2017    Hypertension     Nocturia     Panic disorder     Stroke [facility])       Past Surgical History:   Procedure Laterality Date    HX COLONOSCOPY      HX LUMBAR DISKECTOMY      HX ORTHOPAEDIC      right finger repair    HX OTHER SURGICAL      lap band    HX OTHER SURGICAL      Lap band     No Known Allergies   Family History   Problem Relation Age of Onset    Cancer Father     Stroke Father     Hypertension Father     Cancer Mother     Hypertension Sister     Heart Disease Brother       Current Outpatient Prescriptions   Medication Sig    tamsulosin (FLOMAX) 0.4 mg capsule Take 2 capsules by mouth  daily    hydroCHLOROthiazide (HYDRODIURIL) 25 mg tablet Take 1 Tab by mouth daily.  buPROPion SR (WELLBUTRIN SR) 150 mg SR tablet Take 1 Tab by mouth two (2) times a day.  metFORMIN (GLUCOPHAGE) 500 mg tablet Take 0.5 Tabs by mouth two (2) times daily (with meals).  multivitamin (ONE A DAY) tablet Take by Mouth.  finasteride (PROSCAR) 5 mg tablet Take 1 Tab by mouth daily.  amLODIPine-benazepril (LOTREL) 5-20 mg per capsule Take 1 Cap by Mouth Once a Day.  carvedilol (COREG) 12.5 mg tablet Take 1 Tab by mouth two (2) times daily (with meals).  aspirin delayed-release 81 mg tablet Take 1 Tab by mouth daily.  atorvastatin (LIPITOR) 20 mg tablet Take 1 Tab by mouth nightly.  HYDROcodone-acetaminophen (NORCO) 5-325 mg per tablet Take 1 Tab by mouth every four (4) hours as needed for Pain. Max Daily Amount: 6 Tabs.  sildenafil citrate (VIAGRA) 100 mg tablet Take 1 Tab by mouth daily as needed (for erection). No current facility-administered medications for this visit.        Vitals:    05/03/17 0915 05/03/17 0930   BP: 130/80 130/80   Pulse: 78    Resp: 16    SpO2: 98%    Weight: 214 lb 1.6 oz (97.1 kg)    Height: 5' 11\" (1.803 m)        I have reviewed the nurses notes, vitals, problem list, allergy list, medical history, family, social history and medications. Review of Symptoms:  General: Pt denies excessive weight gain or loss. Pt is able to conduct ADL's  HEENT: Denies blurred vision, headaches, epistaxis and difficulty swallowing. Respiratory: Denies shortness of breath, YAN, wheezing or stridor. Cardiovascular: Denies precordial pain, palpitations, edema or PND  Gastrointestinal: Denies poor appetite, indigestion, abdominal pain or blood in stool  Urinary: Denies dysuria, pyuria  Musculoskeletal: Denies pain or swelling from muscles or joints  Neurologic: Denies tremor, paresthesias, or sensory motor disturbance  Skin: Denies rash, itching or texture change. Psych: Denies depression        Physical Exam:      General: Well developed, cooperative, alert in no acute distress, appears states age. HEENT: Supple, No carotid bruits, no JVD, trach is midline. PERRL, EOM intact  Heart:  Normal S1/S2 negative S3 or S4. Regular, no murmur, gallop or rub.   Respiratory: Clear bilaterally x 4, no wheezing or rales  Abdomen:   Soft, non-tender, no masses, bowel sounds are active.   Extremities:  No edema, normal cap refill, no cyanosis, atraumatic. Neuro: A&Ox3, speech clear, gait stable. Skin: Skin color is normal. No rashes or lesions.  Non diaphoretic  Vascular: 2+ pulses symmetric in all extremities    Cardiographics    ECG: Sinus  Rhythm  -Short AL syndrome   Prominent R(V1) and left axis   Nonspecific T-abnormality    Cardiology Labs:  Lab Results   Component Value Date/Time    Cholesterol, total 176 03/28/2017 01:08 PM    HDL Cholesterol 63 03/28/2017 01:08 PM       Lab Results   Component Value Date/Time    Sodium 141 03/28/2017 01:08 PM    Potassium 3.9 03/28/2017 01:08 PM    Chloride 98 03/28/2017 01:08 PM    CO2 28 03/28/2017 01:08 PM    Glucose 127 03/28/2017 01:08 PM    BUN 20 03/28/2017 01:08 PM    Creatinine 0.96 03/28/2017 01:08 PM    BUN/Creatinine ratio 21 03/28/2017 01:08 PM    GFR est AA 94 03/28/2017 01:08 PM    GFR est non-AA 81 03/28/2017 01:08 PM    Calcium 9.5 03/28/2017 01:08 PM    AST (SGOT) 15 03/28/2017 01:08 PM    Alk. phosphatase 61 03/28/2017 01:08 PM    Protein, total 6.8 03/28/2017 01:08 PM    Albumin 4.1 03/28/2017 01:08 PM    A-G Ratio 1.5 03/28/2017 01:08 PM    ALT (SGPT) 14 03/28/2017 01:08 PM           Assessment:     Assessment:      Karen Sam was seen today for new patient. Diagnoses and all orders for this visit:    Essential hypertension  -     AMB POC EKG ROUTINE W/ 12 LEADS, INTER & REP    Coronary artery disease involving native coronary artery of native heart without angina pectoris    Hypercholesterolemia    Controlled type 2 diabetes mellitus without complication, without long-term current use of insulin (HCC)    History of stroke      Specialty Problems        Cardiology Problems    Coronary artery disease involving native coronary artery without angina pectoris        Essential hypertension        Hypercholesterolemia              ICD-10-CM ICD-9-CM    1. Essential hypertension I10 401.9 AMB POC EKG ROUTINE W/ 12 LEADS, INTER & REP   2. Coronary artery disease involving native coronary artery of native heart without angina pectoris I25.10 414.01    3. Hypercholesterolemia E78.00 272.0    4. Controlled type 2 diabetes mellitus without complication, without long-term current use of insulin (HCC) E11.9 250.00    5. History of stroke Z86.73 V12.54      Orders Placed This Encounter    AMB POC EKG ROUTINE W/ 12 LEADS, INTER & REP     Order Specific Question:   Reason for Exam:     Answer:   routine       PLAN:    Patient presents today for establishment of cardiac care. He currently denies cardiac complaints. He is in SR, no previous ekg to compare to. BP is normotensive. Lipids and labs managed by PCP. Pt is asymptomatic, return for follow up in a year.     Asher Chua MD

## 2017-05-03 NOTE — PROGRESS NOTES
Marcia Jarrell is a 74 y.o. male  Chief Complaint   Patient presents with    New Patient     recent move to [facility]. PCP referred to cardiologist r/t cardiac history: stroke 1997 Left side weakness Lower Extremity.  Denies chest pain or SOB

## 2017-05-03 NOTE — MR AVS SNAPSHOT
Visit Information Date & Time Provider Department Dept. Phone Encounter #  
 5/3/2017  9:30 AM Michael Calix, [facility] [phone] Your Appointments 6/22/2017  3:45 PM  
Office Visit with Martha Curtis MD  
[facility] ([address]) Appt Note: Medicare Wellness Exam  
 [phone] [phone]  
  
    
 10/16/2017  2:30 PM  
ROUTINE CARE with Martha Curtis MD  
[facility] Appt Note: 6 mnth fu  
 [address] [phone] [phone] Upcoming Health Maintenance Date Due Hepatitis C Screening 1950 EYE EXAM RETINAL OR DILATED Q1 9/23/1958 FOBT Q 1 YEAR AGE 50-75 9/23/1998 GLAUCOMA SCREENING Q2Y 9/23/2013 MEDICARE YEARLY EXAM 9/23/2013 INFLUENZA AGE 9 TO ADULT 8/1/2017 HEMOGLOBIN A1C Q6M 9/28/2017 MICROALBUMIN Q1 3/28/2018 LIPID PANEL Q1 3/28/2018 FOOT EXAM Q1 4/14/2018 DTaP/Tdap/Td series (2 - Td) 8/9/2022 Allergies as of 5/3/2017  Review Complete On: 5/3/2017 By: Kendell Erwin NP No Known Allergies Current Immunizations  Reviewed on 3/28/2017 Name Date Pneumococcal Conjugate (PCV-13) 3/17/2015 Pneumococcal Polysaccharide (PPSV-23) 2/21/2014 Tdap 8/9/2012 Zoster Vaccine, Live 10/28/2016 Not reviewed this visit You Were Diagnosed With   
  
 Codes Comments Essential hypertension    -  Primary ICD-10-CM: I10 
ICD-9-CM: 401.9 Coronary artery disease involving native coronary artery of native heart without angina pectoris     ICD-10-CM: I25.10 ICD-9-CM: 414.01 Hypercholesterolemia     ICD-10-CM: E78.00 ICD-9-CM: 272.0 Controlled type 2 diabetes mellitus without complication, without long-term current use of insulin [facility]     ICD-10-CM: E11.9 ICD-9-CM: 250.00 History of stroke     ICD-10-CM: Z86.73 
ICD-9-CM: V12.54 Vitals BP Pulse Resp Height(growth percentile) Weight(growth percentile) SpO2  
 130/80 (BP 1 Location: Right arm, BP Patient Position: Sitting) 78 16 5' 11\" (1.803 m) 214 lb 1.6 oz (97.1 kg) 98% BMI Smoking Status 29.86 kg/m2 Never Smoker Vitals History BMI and BSA Data Body Mass Index Body Surface Area  
 29.86 kg/m 2 2.21 m 2 Preferred Pharmacy Pharmacy Name Phone [address] [facility], [address] [place] [address] [address] Your Updated Medication List  
  
   
This list is accurate as of: 5/3/17 10:32 AM.  Always use your most recent med list. amLODIPine-benazepril 5-20 mg per capsule Commonly known as:  Brian Malling Take 1 Cap by Mouth Once a Day. aspirin delayed-release 81 mg tablet Take 1 Tab by mouth daily. atorvastatin 20 mg tablet Commonly known as:  LIPITOR Take 1 Tab by mouth nightly. buPROPion  mg SR tablet Commonly known as:  Earma Landau Take 1 Tab by mouth two (2) times a day. carvedilol 12.5 mg tablet Commonly known as:  Schumacher Brod Take 1 Tab by mouth two (2) times daily (with meals). finasteride 5 mg tablet Commonly known as:  PROSCAR Take 1 Tab by mouth daily. hydroCHLOROthiazide 25 mg tablet Commonly known as:  HYDRODIURIL Take 1 Tab by mouth daily. HYDROcodone-acetaminophen 5-325 mg per tablet Commonly known as:  Simmie Blonder Take 1 Tab by mouth every four (4) hours as needed for Pain. Max Daily Amount: 6 Tabs. metFORMIN 500 mg tablet Commonly known as:  GLUCOPHAGE Take 0.5 Tabs by mouth two (2) times daily (with meals). multivitamin tablet Commonly known as:  ONE A DAY Take by Mouth. sildenafil citrate 100 mg tablet Commonly known as:  VIAGRA Take 1 Tab by mouth daily as needed (for erection). tamsulosin 0.4 mg capsule Commonly known as:  FLOMAX Take 2 capsules by mouth  daily We Performed the Following AMB POC EKG ROUTINE W/ 12 LEADS, INTER & REP [98318 CPT(R)] Introducing [facility] SERVICES! Dear Monica Angeles: Thank you for requesting a [employer] account. Our records indicate that you already have an active Get[employer] account. You can access your account anytime at [email or web address] Did you know that you can access your hospital and ER discharge instructions at any time in GetFresh? You can also review all of your test results from your hospital stay or ER visit. Additional Information If you have questions, please visit the Frequently Asked Questions section of the GetFresh website at [email or web address] Remember, GetFresh is NOT to be used for urgent needs. For medical emergencies, dial 911. Now available from your iPhone and Android! Please provide this summary of care documentation to your next provider. Your primary care clinician is listed as Janine Grewal. If you have any questions after today's visit, please call [phone].

## 2017-05-19 ENCOUNTER — HOSPITAL ENCOUNTER (OUTPATIENT)
Dept: GENERAL RADIOLOGY | Age: 67
Discharge: HOME | End: 2017-05-19
Payer: MEDICARE

## 2017-05-19 DIAGNOSIS — N20.0 KIDNEY STONE: ICD-10-CM

## 2017-05-19 PROCEDURE — 74000 XR ABD (KUB): CPT

## 2017-05-20 RX ORDER — ATORVASTATIN CALCIUM 20 MG/1
TABLET, FILM COATED ORAL
Qty: 90 TAB | Refills: 3 | Status: ON HOLD | OUTPATIENT
Start: 2017-05-20 | End: 2017-06-27

## 2017-05-23 RX ORDER — FINASTERIDE 5 MG/1
TABLET, FILM COATED ORAL
Qty: 90 TAB | Refills: 3 | Status: SHIPPED | OUTPATIENT
Start: 2017-05-23 | End: 2017-06-23

## 2017-05-23 RX ORDER — AMLODIPINE AND BENAZEPRIL HYDROCHLORIDE 5; 20 MG/1; MG/1
CAPSULE ORAL
Qty: 90 CAP | Refills: 3 | Status: SHIPPED | OUTPATIENT
Start: 2017-05-23 | End: 2017-08-15 | Stop reason: ALTCHOICE

## 2017-05-23 RX ORDER — CARVEDILOL 12.5 MG/1
TABLET ORAL
Qty: 180 TAB | Refills: 3 | Status: SHIPPED | OUTPATIENT
Start: 2017-05-23 | End: 2017-06-22 | Stop reason: SDUPTHER

## 2017-06-22 ENCOUNTER — OFFICE VISIT (OUTPATIENT)
Dept: FAMILY MEDICINE CLINIC | Age: 67
End: 2017-06-22

## 2017-06-22 VITALS
SYSTOLIC BLOOD PRESSURE: 120 MMHG | HEIGHT: 71 IN | WEIGHT: 217.6 LBS | DIASTOLIC BLOOD PRESSURE: 75 MMHG | HEART RATE: 69 BPM | RESPIRATION RATE: 14 BRPM | TEMPERATURE: 97.1 F | OXYGEN SATURATION: 97 % | BODY MASS INDEX: 30.46 KG/M2

## 2017-06-22 DIAGNOSIS — Z71.89 ADVANCED DIRECTIVES, COUNSELING/DISCUSSION: ICD-10-CM

## 2017-06-22 DIAGNOSIS — Z12.11 SCREEN FOR COLON CANCER: ICD-10-CM

## 2017-06-22 DIAGNOSIS — Z13.39 SCREENING FOR ALCOHOLISM: ICD-10-CM

## 2017-06-22 DIAGNOSIS — Z13.5 SCREENING FOR GLAUCOMA: ICD-10-CM

## 2017-06-22 DIAGNOSIS — Z00.00 ROUTINE GENERAL MEDICAL EXAMINATION AT A HEALTH CARE FACILITY: Primary | ICD-10-CM

## 2017-06-22 DIAGNOSIS — Z23 ENCOUNTER FOR IMMUNIZATION: ICD-10-CM

## 2017-06-22 RX ORDER — CARVEDILOL 12.5 MG/1
TABLET ORAL
Qty: 180 TAB | Refills: 3 | Status: SHIPPED | OUTPATIENT
Start: 2017-06-22 | End: 2017-09-13 | Stop reason: SDUPTHER

## 2017-06-22 NOTE — PATIENT INSTRUCTIONS
Prostate Cancer Screening: Care Instructions  Your Care Instructions    The prostate gland is an organ found just below a man's bladder. It is the size and shape of a walnut. It surrounds the tube that carries urine from the bladder out of the body through the penis. This tube is called the urethra. Prostate cancer is the abnormal growth of cells in the prostate. It is the second most common type of cancer in men. (Skin cancer is the most common.)  Most cases of prostate cancer occur in men older than 72. The disease runs in families. And it's more common in -American men. When it's found and treated early, prostate cancer may be cured. But it is not always treated. This is because prostate cancer may not shorten your life, especially if you are older and the cancer is growing slowly. Follow-up care is a key part of your treatment and safety. Be sure to make and go to all appointments, and call your doctor if you are having problems. It's also a good idea to know your test results and keep a list of the medicines you take. What are the screening tests for prostate cancer? The main screening test for prostate cancer is the prostate-specific antigen (PSA) test. This is a blood test that measures how much PSA is in your blood. A high level may mean that you have an enlargement, an infection, or cancer. Along with the PSA test, you may have a digital rectal exam. The digital (finger) rectal exam checks for anything abnormal in your prostate. To do the exam, the doctor puts a lubricated, gloved finger into your rectum. If these tests suggest cancer, you may need a prostate biopsy. How is prostate cancer diagnosed? In a biopsy, the doctor takes small tissue samples from your prostate gland. Another doctor then looks at the tissue under a microscope to see if there are cancer cells, signs of infection, or other problems. The results help diagnose prostate cancer.   What are the pros and cons of screening? Neither a PSA test nor a digital rectal exam can tell you for sure that you do or do not have cancer. But they can help you decide if you need more tests, such as a prostate biopsy. Screening tests may be useful because most men with prostate cancer don't have symptoms. It can be hard to know if you have cancer until it is more advanced. And then it's harder to treat. But having a PSA test can also cause harm. The test may show high levels of PSA that aren't caused by cancer. So you could have a prostate biopsy you didn't need. Or the PSA test might be normal when there is cancer, so a cancer might not be found early. The test can also find cancers that would never have caused a problem during your lifetime. So you might have treatment that was not needed. Prostate cancer usually develops late in life and grows slowly. For many men, it does not shorten their lives. Some experts advise screening only for men who are at high risk. Talk with your doctor to see if screening is right for you. Where can you learn more? Go to http://jean-pierre-miller.info/. Enter R550 in the search box to learn more about \"Prostate Cancer Screening: Care Instructions. \"  Current as of: July 26, 2016  Content Version: 11.3  © 1891-1117 [employer]. Care instructions adapted under license by [facility] (which disclaims liability or warranty for this information). If you have questions about a medical condition or this instruction, always ask your healthcare professional. [place] [address] any warranty or liability for your use of this information. Learning About Colonoscopy  What is a colonoscopy? A colonoscopy is a test (also called a procedure) that lets a doctor look inside your large intestine. The doctor uses a thin, lighted tube called a colonoscope.  The doctor uses it to look for small growths called polyps, colon or rectal cancer (colorectal cancer), or other problems like bleeding. During the procedure, the doctor can take samples of tissue. The samples can then be checked for cancer or other conditions. The doctor can also take out polyps. How is colonoscopy done? This procedure is done in a doctor's office or a clinic or hospital. You will get medicine to help you relax and not feel pain. Some people find that they do not remember having the test because of the medicine. The doctor gently moves the colonoscope, or scope, through the colon. The scope is also a small video camera. It lets the doctor see the colon and take pictures. A colonoscopy usually takes 30 to 45 minutes. It may take longer if the doctor has to remove polyps. How do you prepare for the procedure? You need to clean out your colon before the procedure so the doctor can see all of your colon. You may start the cleaning process a day or two before the test. This depends on which \"colon prep\" your doctor recommends. To clean your colon, you stop eating solid foods and drink only clear liquids. You can have water, tea, coffee, clear juices, clear broths, flavored ice pops, and gelatin (such as Jell-O). Do not drink anything red or purple, such as grape juice or fruit punch. And do not eat red or purple foods, such as grape ice pops or cherry gelatin. The day or night before the procedure, you drink a large amount of a special liquid. This causes loose, frequent stools. You will go to the bathroom a lot. It is very important to drink all of the colon prep liquid. If you have problems drinking the liquid, call your doctor. For many people, the prep is worse than the test. It may be uncomfortable, and you may feel hungry on the clear liquid diet. Some people do not go to work or do their usual activities on the day of the prep. Arrange to have someone take you home after the test.  What can you expect after a colonoscopy?   The nurses will watch you for 1 to 2 hours until the medicines wear off. Then you can go home. You will need a ride. Your doctor will tell you when you can eat and do your usual activities. Your doctor will talk to you about when you will need your next colonoscopy. The results of your test and your risk for colorectal cancer will help your doctor decide how often you need to be checked. Follow-up care is a key part of your treatment and safety. Be sure to make and go to all appointments, and call your doctor if you are having problems. It's also a good idea to know your test results and keep a list of the medicines you take. Where can you learn more? Go to http://jean-pierre-miller.info/. Enter I042 in the search box to learn more about \"Learning About Colonoscopy. \"  Current as of: July 26, 2016  Content Version: 11.3  © 2211-1429 [employer]. Care instructions adapted under license by [employer] (which disclaims liability or warranty for this information). If you have questions about a medical condition or this instruction, always ask your healthcare professional. [place] [address] any warranty or liability for your use of this information. Statins: Care Instructions  Your Care Instructions  Statins are medicines that lower your cholesterol and your risk for a heart attack and stroke. Cholesterol is a type of fat in your blood. If you have too much cholesterol, it can build up in blood vessels. This raises your risk of heart disease, heart attack, and stroke. Statins lower cholesterol by blocking how much cholesterol your body makes. This prevents cholesterol from building up in your blood vessels. This is called hardening of the arteries. It is the starting point for some heart and blood flow problems, such as heart disease. Statins may also reduce inflammation around the buildup (called plaque). This can lower the risk that the plaque will break apart and lead to a heart attack or stroke.   A heart-healthy lifestyle is important for lowering your risk whether you take statins or not. This includes eating healthy foods, being active, staying at a healthy weight, and not smoking. You must take statins regularly for them to work well. If you stop, your cholesterol and your risk will go back up. Examples of statins include:  · Atorvastatin (Lipitor). · Lovastatin (Mevacor). · Pravastatin (Pravachol). · Simvastatin (Zocor). Statins interact with many medicines. So tell your doctor all of the other medicines that you take. These include prescription medicines, over-the-counter medicines, dietary supplements, and herbal products. Follow-up care is a key part of your treatment and safety. Be sure to make and go to all appointments, and call your doctor if you are having problems. It's also a good idea to know your test results and keep a list of the medicines you take. How can you care for yourself at home? · Take statins exactly as your doctor tells you. High cholesterol has no symptoms. So it is easy to forget to take the pills. Try to make a system that reminds you to take them. · Do not take two or more medicines at the same time unless the doctor told you to. Statins can interact with other medicines. · Always tell your doctor if you think you are having a side effect. If side effects are a problem with one medicine, a different one may be used. · Keep making the lifestyle changes your doctor suggests. Eat heart-healthy foods, be active, don't smoke, and stay at a healthy weight. · Talk to your doctor about avoiding grapefruit juice if you take statins. Grapefruit juice can raise the level of this medicine in your blood. This could increase side effects. When should you call for help? Watch closely for changes in your health, and be sure to contact your doctor if:  · You have side effects of statins. These include:  ¨ Fatigue. ¨ Upset stomach. ¨ Gas. ¨ Constipation.   ¨ Pain or cramps in the belly.  ¨ Muscle aches. · You have any new symptoms or side effects. Where can you learn more? Go to http://jean-peirre-miller.info/. Enter R358 in the search box to learn more about \"Statins: Care Instructions. \"  Current as of: April 3, 2017  Content Version: 11.3  © 2006-2017 [employer]. Care instructions adapted under license by [employer] (which disclaims liability or warranty for this information). If you have questions about a medical condition or this instruction, always ask your healthcare professional. [place] [address] any warranty or liability for your use of this information. Heart-Healthy Diet: Care Instructions  Your Care Instructions    A heart-healthy diet has lots of vegetables, fruits, nuts, beans, and whole grains, and is low in salt. It limits foods that are high in saturated fat, such as meats, cheeses, and fried foods. It may be hard to change your diet, but even small changes can lower your risk of heart attack and heart disease. Follow-up care is a key part of your treatment and safety. Be sure to make and go to all appointments, and call your doctor if you are having problems. It's also a good idea to know your test results and keep a list of the medicines you take. How can you care for yourself at home? Watch your portions  · Learn what a serving is. A \"serving\" and a \"portion\" are not always the same thing. Make sure that you are not eating larger portions than are recommended. For example, a serving of pasta is ½ cup. A serving size of meat is 2 to 3 ounces. A 3-ounce serving is about the size of a deck of cards. Measure serving sizes until you are good at [place]" them. Keep in mind that restaurants often serve portions that are 2 or 3 times the size of one serving. · To keep your energy level up and keep you from feeling hungry, eat often but in smaller portions.   · Eat only the number of calories you need to stay at a healthy weight. If you need to lose weight, eat fewer calories than your body burns (through exercise and other physical activity). Eat more fruits and vegetables  · Eat a variety of fruit and vegetables every day. Dark green, deep orange, red, or yellow fruits and vegetables are especially good for you. Examples include spinach, carrots, peaches, and berries. · Keep carrots, celery, and other veggies handy for snacks. Buy fruit that is in season and store it where you can see it so that you will be tempted to eat it. · Cook dishes that have a lot of veggies in them, such as stir-fries and soups. Limit saturated and trans fat  · Read food labels, and try to avoid saturated and trans fats. They increase your risk of heart disease. Trans fat is found in many processed foods such as cookies and crackers. · Use olive or canola oil when you cook. Try cholesterol-lowering spreads, such as Benecol or Take Control. · Bake, broil, grill, or steam foods instead of frying them. · Choose lean meats instead of high-fat meats such as hot dogs and sausages. Cut off all visible fat when you prepare meat. · Eat fish, skinless poultry, and meat alternatives such as soy products instead of high-fat meats. Soy products, such as tofu, may be especially good for your heart. · Choose low-fat or fat-free milk and dairy products. Eat fish  · Eat at least two servings of fish a week. Certain fish, such as salmon and tuna, contain omega-3 fatty acids, which may help reduce your risk of heart attack. Eat foods high in fiber  · Eat a variety of grain products every day. Include whole-grain foods that have lots of fiber and nutrients. Examples of whole-grain foods include oats, whole wheat bread, and brown rice. · Buy whole-grain breads and cereals, instead of white bread or pastries. Limit salt and sodium  · Limit how much salt and sodium you eat to help lower your blood pressure. · Taste food before you salt it.  Add only a little salt when you think you need it. With time, your taste buds will adjust to less salt. · Eat fewer snack items, fast foods, and other high-salt, processed foods. Check food labels for the amount of sodium in packaged foods. · Choose low-sodium versions of canned goods (such as soups, vegetables, and beans). Limit sugar  · Limit drinks and foods with added sugar. These include candy, desserts, and soda pop. Limit alcohol  · Limit alcohol to no more than 2 drinks a day for men and 1 drink a day for women. Too much alcohol can cause health problems. When should you call for help? Watch closely for changes in your health, and be sure to contact your doctor if:  · You would like help planning heart-healthy meals. Where can you learn more? Go to http://jean-pierre-miller.info/. Enter V137 in the search box to learn more about \"Heart-Healthy Diet: Care Instructions. \"  Current as of: April 3, 2017  Content Version: 11.3  © 2393-6805 [employer]. Care instructions adapted under license by [employer] (which disclaims liability or warranty for this information). If you have questions about a medical condition or this instruction, always ask your healthcare professional. [address] any warranty or liability for your use of this information. Advance Directives: Care Instructions  Your Care Instructions  An advance directive is a legal way to state your wishes at the end of your life. It tells your family and your doctor what to do if you can no longer say what you want. There are two main types of advance directives. You can change them any time that your wishes change. · A living will tells your family and your doctor your wishes about life support and other treatment. · A durable power of  for health care lets you name a person to make treatment decisions for you when you can't speak for yourself.  This person is called a health care agent. If you do not have an advance directive, decisions about your medical care may be made by a doctor or a  who doesn't know you. It may help to think of an advance directive as a gift to the people who care for you. If you have one, they won't have to make tough decisions by themselves. Follow-up care is a key part of your treatment and safety. Be sure to make and go to all appointments, and call your doctor if you are having problems. It's also a good idea to know your test results and keep a list of the medicines you take. How can you care for yourself at home? · Discuss your wishes with your loved ones and your doctor. This way, there are no surprises. · Many states have a unique form. Or you might use a universal form that has been approved by many states. This kind of form can sometimes be completed and stored online. Your electronic copy will then be available wherever you have a connection to the Internet. In most cases, doctors will respect your wishes even if you have a form from a different state. · You don't need a  to do an advance directive. But you may want to get legal advice. · Think about these questions when you prepare an advance directive:  ¨ Who do you want to make decisions about your medical care if you are not able to? Many people choose a family member or close friend. ¨ Do you know enough about life support methods that might be used? If not, talk to your doctor so you understand. ¨ What are you most afraid of that might happen? You might be afraid of having pain, losing your independence, or being kept alive by machines. ¨ Where would you prefer to die? Choices include your home, a hospital, or a nursing home. ¨ Would you like to have information about hospice care to support you and your family? ¨ Do you want to donate organs when you die? ¨ Do you want certain Denominational practices performed before you die?  If so, put your wishes in the advance directive. · Read your advance directive every year, and make changes as needed. When should you call for help? Be sure to contact your doctor if you have any questions. Where can you learn more? Go to http://jean-pierre-miller.info/. Enter R264 in the search box to learn more about \"Advance Directives: Care Instructions. \"  Current as of: November 17, 2016  Content Version: 11.3  © 6181-1845 [employer]. Care instructions adapted under license by [employer] (which disclaims liability or warranty for this information). If you have questions about a medical condition or this instruction, always ask your healthcare professional. [address] any warranty or liability for your use of this information.

## 2017-06-22 NOTE — PROGRESS NOTES
Leilani Santacruz        Name and  verified          Chief Complaint   Patient presents with   [facility] Annual Wellness Visit         Health Maintenance reviewed-discussed with patient. Advance Directives Care Planning Information given, patient acknowledged understanding.

## 2017-06-22 NOTE — MR AVS SNAPSHOT
Visit Information Date & Time Provider Department Dept. Phone Encounter #  
 6/22/2017  3:45 PM Niko Frankel MD  [facility] [phone] [id number] Follow-up Instructions Return in about 6 months (around 12/22/2017). Your Appointments 10/16/2017  2:30 PM  
ROUTINE CARE with Niko Frankel MD  
[facility] ([address]) Appt Note: 6 [address] [place] [address] [phone]  
[phone] [id number] Upcoming Health Maintenance Date Due Hepatitis C Screening 1950 EYE EXAM RETINAL OR DILATED Q1 9/23/1958 FOBT Q 1 YEAR AGE 50-75 9/23/1998 GLAUCOMA SCREENING Q2Y 9/23/2013 MEDICARE YEARLY EXAM 9/23/2013 INFLUENZA AGE 9 TO ADULT 8/1/2017 HEMOGLOBIN A1C Q6M 9/28/2017 MICROALBUMIN Q1 3/28/2018 LIPID PANEL Q1 3/28/2018 FOOT EXAM Q1 4/14/2018 DTaP/Tdap/Td series (2 - Td) 8/9/2022 Allergies as of 6/22/2017  Review Complete On: 5/3/2017 By: Brittaney Jay MD  
 No Known Allergies Current Immunizations  Reviewed on 6/22/2017 Name Date Influenza Vaccine 11/18/2015 12:00 AM  
 Pneumococcal Conjugate (PCV-13) 3/17/2015 Pneumococcal Polysaccharide (PPSV-23) 2/21/2014 Tdap 8/9/2012 Zoster Vaccine, Live 10/28/2016 Reviewed by Niko Frankel MD on 6/22/2017 at  4:02 PM  
 Reviewed by Niko Frankel MD on 6/22/2017 at  4:50 PM  
 Reviewed by Niko Frankel MD on 6/22/2017 at  4:50 PM  
 Reviewed by Niko Frankel MD on 6/22/2017 at  4:51 PM  
 Reviewed by Niko Frankel MD on 6/22/2017 at  4:51 PM  
 Reviewed by Niko Frankel MD on 6/22/2017 at  4:52 PM  
You Were Diagnosed With   
  
 Codes Comments Routine general medical examination at a health care facility    -  Primary ICD-10-CM: Z00.00 ICD-9-CM: V70.0 Screening for alcoholism     ICD-10-CM: Z13.89 ICD-9-CM: V79.1 Encounter for immunization     ICD-10-CM: T70 ICD-9-CM: V03.89 Advanced directives, counseling/discussion     ICD-10-CM: Z71.89 ICD-9-CM: V65.49 Screen for colon cancer     ICD-10-CM: Z12.11 ICD-9-CM: V76.51 Screening for glaucoma     ICD-10-CM: Z13.5 ICD-9-CM: V80.1 Vitals BP Pulse Temp Resp Height(growth percentile) Weight(growth percentile) 120/75 (BP 1 Location: Left arm, BP Patient Position: At rest) 69 97.1 °F (36.2 °C) (Oral) 14 5' 11\" (1.803 m) 217 lb 9.6 oz (98.7 kg) SpO2 BMI Smoking Status 97% 30.35 kg/m2 Never Smoker Vitals History BMI and BSA Data Body Mass Index Body Surface Area  
 30.35 kg/m 2 2.22 m 2 Preferred Pharmacy Pharmacy Name Phone 305 [facility], [address] [place] [address] [address] [facility] 134 Your Updated Medication List  
  
   
This list is accurate as of: 6/22/17  5:00 PM.  Always use your most recent med list. amLODIPine-benazepril 5-20 mg per capsule Commonly known as:  Rosatanesha Mayor Take 1 capsule by mouth  once a day  
  
 aspirin delayed-release 81 mg tablet Take 1 Tab by mouth daily. atorvastatin 20 mg tablet Commonly known as:  LIPITOR Take 1 tablet by mouth  nightly buPROPion  mg SR tablet Commonly known as:  [place] Solar Take 1 Tab by mouth two (2) times a day. carvedilol 12.5 mg tablet Commonly known as:  Karis Vaca Take 1 tablet by mouth two  times daily with meals  
  
 finasteride 5 mg tablet Commonly known as:  PROSCAR Take 1 tablet by mouth  daily  
  
 hydroCHLOROthiazide 25 mg tablet Commonly known as:  HYDRODIURIL Take 1 Tab by mouth daily. HYDROcodone-acetaminophen 5-325 mg per tablet Commonly known as:  Maria Dolores Cruise Take 1 Tab by mouth every four (4) hours as needed for Pain. Max Daily Amount: 6 Tabs. metFORMIN 500 mg tablet Commonly known as:  GLUCOPHAGE  
 Take 0.5 Tabs by mouth two (2) times daily (with meals). multivitamin tablet Commonly known as:  ONE A DAY Take by Mouth.  
  
 sildenafil citrate 100 mg tablet Commonly known as:  VIAGRA Take 1 Tab by mouth daily as needed (for erection). tamsulosin 0.4 mg capsule Commonly known as:  FLOMAX Take 2 capsules by mouth  daily Prescriptions Sent to Pharmacy Refills  
 carvedilol (COREG) 12.5 mg tablet 3 Sig: Take 1 tablet by mouth two  times daily with meals Class: Normal  
 Pharmacy: [facility] Ph #: [phone] We Performed the Following ADVANCE CARE PLANNING FIRST 30 MINS [31253 CPT(R)] HEMOGLOBIN A1C WITH EAG [93257 CPT(R)] HEPATITIS C AB [25555 CPT(R)] OCCULT BLOOD, IMMUNOASSAY (FIT) [id number] CPT(R)] REFERRAL FOR COLONOSCOPY [ZQW006 Custom] Comments:  
 Please evaluate patient for screening REFERRAL TO OPTOMETRY [id number] Custom] Comments:  
 Please evaluate patient for glaucoma. Patient stated will schedule Eye Exam.  
  
Follow-up Instructions Return in about 6 months (around 12/22/2017). Referral Information Referral ID Referred By Referred To  
  
 [id number] YOAN COSTA MD   
   [address] [place], [address] Phone: [phone] Fax: [phone] Visits Status Start Date End Date 1 New Request 6/22/17 6/22/18 If your referral has a status of pending review or denied, additional information will be sent to support the outcome of this decision. Referral ID Referred By Referred To  
 [id number] Wilfred Pfeiffer Not Available Visits Status Start Date End Date 1 New Request 6/22/17 6/22/18 If your referral has a status of pending review or denied, additional information will be sent to support the outcome of this decision. Patient Instructions Prostate Cancer Screening: Care Instructions Your Care Instructions The prostate gland is an organ found just below a man's bladder. It is the size and shape of a walnut. It surrounds the tube that carries urine from the bladder out of the body through the penis. This tube is called the urethra. Prostate cancer is the abnormal growth of cells in the prostate. It is the second most common type of cancer in men. (Skin cancer is the most common.) Most cases of prostate cancer occur in men older than 72. The disease runs in families. And it's more common in -American men. When it's found and treated early, prostate cancer may be cured. But it is not always treated. This is because prostate cancer may not shorten your life, especially if you are older and the cancer is growing slowly. Follow-up care is a key part of your treatment and safety. Be sure to make and go to all appointments, and call your doctor if you are having problems. It's also a good idea to know your test results and keep a list of the medicines you take. What are the screening tests for prostate cancer? The main screening test for prostate cancer is the prostate-specific antigen (PSA) test. This is a blood test that measures how much PSA is in your blood. A high level may mean that you have an enlargement, an infection, or cancer. Along with the PSA test, you may have a digital rectal exam. The digital (finger) rectal exam checks for anything abnormal in your prostate. To do the exam, the doctor puts a lubricated, gloved finger into your rectum. If these tests suggest cancer, you may need a prostate biopsy. How is prostate cancer diagnosed? In a biopsy, the doctor takes small tissue samples from your prostate gland. Another doctor then looks at the tissue under a microscope to see if there are cancer cells, signs of infection, or other problems. The results help diagnose prostate cancer. What are the pros and cons of screening? Neither a PSA test nor a digital rectal exam can tell you for sure that you do or do not have cancer. But they can help you decide if you need more tests, such as a prostate biopsy. Screening tests may be useful because most men with prostate cancer don't have symptoms. It can be hard to know if you have cancer until it is more advanced. And then it's harder to treat. But having a PSA test can also cause harm. The test may show high levels of PSA that aren't caused by cancer. So you could have a prostate biopsy you didn't need. Or the PSA test might be normal when there is cancer, so a cancer might not be found early. The test can also find cancers that would never have caused a problem during your lifetime. So you might have treatment that was not needed. Prostate cancer usually develops late in life and grows slowly. For many men, it does not shorten their lives. Some experts advise screening only for men who are at high risk. Talk with your doctor to see if screening is right for you. Where can you learn more? Go to http://jean-pierre-miller.info/. Enter R550 in the search box to learn more about \"Prostate Cancer Screening: Care Instructions. \" Current as of: July 26, 2016 Content Version: 11.3 © 4420-0409 [employer]. Care instructions adapted under license by [employer] (which disclaims liability or warranty for this information). If you have questions about a medical condition or this instruction, always ask your healthcare professional. [place] [address] any warranty or liability for your use of this information. Learning About Colonoscopy What is a colonoscopy? A colonoscopy is a test (also called a procedure) that lets a doctor look inside your large intestine. The doctor uses a thin, lighted tube called a colonoscope.  The doctor uses it to look for small growths called polyps, colon or rectal cancer (colorectal cancer), or other problems like bleeding. During the procedure, the doctor can take samples of tissue. The samples can then be checked for cancer or other conditions. The doctor can also take out polyps. How is colonoscopy done? This procedure is done in a doctor's office or a clinic or hospital. You will get medicine to help you relax and not feel pain. Some people find that they do not remember having the test because of the medicine. The doctor gently moves the colonoscope, or scope, through the colon. The scope is also a small video camera. It lets the doctor see the colon and take pictures. A colonoscopy usually takes 30 to 45 minutes. It may take longer if the doctor has to remove polyps. How do you prepare for the procedure? You need to clean out your colon before the procedure so the doctor can see all of your colon. You may start the cleaning process a day or two before the test. This depends on which \"colon prep\" your doctor recommends. To clean your colon, you stop eating solid foods and drink only clear liquids. You can have water, tea, coffee, clear juices, clear broths, flavored ice pops, and gelatin (such as Jell-O). Do not drink anything red or purple, such as grape juice or fruit punch. And do not eat red or purple foods, such as grape ice pops or cherry gelatin. The day or night before the procedure, you drink a large amount of a special liquid. This causes loose, frequent stools. You will go to the bathroom a lot. It is very important to drink all of the colon prep liquid. If you have problems drinking the liquid, call your doctor. For many people, the prep is worse than the test. It may be uncomfortable, and you may feel hungry on the clear liquid diet. Some people do not go to work or do their usual activities on the day of the prep. Arrange to have someone take you home after the test. 
What can you expect after a colonoscopy? The nurses will watch you for 1 to 2 hours until the medicines wear off. Then you can go home. You will need a ride. Your doctor will tell you when you can eat and do your usual activities. Your doctor will talk to you about when you will need your next colonoscopy. The results of your test and your risk for colorectal cancer will help your doctor decide how often you need to be checked. Follow-up care is a key part of your treatment and safety. Be sure to make and go to all appointments, and call your doctor if you are having problems. It's also a good idea to know your test results and keep a list of the medicines you take. Where can you learn more? Go to http://jean-pierre-miller.info/. Enter K117 in the search box to learn more about \"Learning About Colonoscopy. \" Current as of: July 26, 2016 Content Version: 11.3 © 6951-4508 [employer]. Care instructions adapted under license by "[employer] (which disclaims liability or warranty for this information). If you have questions about a medical condition or this instruction, always ask your healthcare professional. [address] any warranty or liability for your use of this information. Statins: Care Instructions Your Care Instructions Statins are medicines that lower your cholesterol and your risk for a heart attack and stroke. Cholesterol is a type of fat in your blood. If you have too much cholesterol, it can build up in blood vessels. This raises your risk of heart disease, heart attack, and stroke. Statins lower cholesterol by blocking how much cholesterol your body makes. This prevents cholesterol from building up in your blood vessels. This is called hardening of the arteries. It is the starting point for some heart and blood flow problems, such as heart disease. Statins may also reduce inflammation around the buildup (called plaque).  This can lower the risk that the plaque will break apart and lead to a heart attack or stroke. A heart-healthy lifestyle is important for lowering your risk whether you take statins or not. This includes eating healthy foods, being active, staying at a healthy weight, and not smoking. You must take statins regularly for them to work well. If you stop, your cholesterol and your risk will go back up. Examples of statins include: · Atorvastatin (Lipitor). · Lovastatin (Mevacor). · Pravastatin (Pravachol). · Simvastatin (Zocor). Statins interact with many medicines. So tell your doctor all of the other medicines that you take. These include prescription medicines, over-the-counter medicines, dietary supplements, and herbal products. Follow-up care is a key part of your treatment and safety. Be sure to make and go to all appointments, and call your doctor if you are having problems. It's also a good idea to know your test results and keep a list of the medicines you take. How can you care for yourself at home? · Take statins exactly as your doctor tells you. High cholesterol has no symptoms. So it is easy to forget to take the pills. Try to make a system that reminds you to take them. · Do not take two or more medicines at the same time unless the doctor told you to. Statins can interact with other medicines. · Always tell your doctor if you think you are having a side effect. If side effects are a problem with one medicine, a different one may be used. · Keep making the lifestyle changes your doctor suggests. Eat heart-healthy foods, be active, don't smoke, and stay at a healthy weight. · Talk to your doctor about avoiding grapefruit juice if you take statins. Grapefruit juice can raise the level of this medicine in your blood. This could increase side effects. When should you call for help? Watch closely for changes in your health, and be sure to contact your doctor if: · You have side effects of statins. These include: ¨ Fatigue. ¨ Upset stomach. ¨ Gas. ¨ Constipation. ¨ Pain or cramps in the belly. ¨ Muscle aches. · You have any new symptoms or side effects. Where can you learn more? Go to http://jean-pierre-miller.info/. Enter R358 in the search box to learn more about \"Statins: Care Instructions. \" Current as of: April 3, 2017 Content Version: 11.3 © 3125-5911 "[employer] a [employer]". Care instructions adapted under license by [employer] (which disclaims liability or warranty for this information). If you have questions about a medical condition or this instruction, always ask your healthcare professional. [address] any warranty or liability for your use of this information. Heart-Healthy Diet: Care Instructions Your Care Instructions A heart-healthy diet has lots of vegetables, fruits, nuts, beans, and whole grains, and is low in salt. It limits foods that are high in saturated fat, such as meats, cheeses, and fried foods. It may be hard to change your diet, but even small changes can lower your risk of heart attack and heart disease. Follow-up care is a key part of your treatment and safety. Be sure to make and go to all appointments, and call your doctor if you are having problems. It's also a good idea to know your test results and keep a list of the medicines you take. How can you care for yourself at home? Watch your portions · Learn what a serving is. A \"serving\" and a \"portion\" are not always the same thing. Make sure that you are not eating larger portions than are recommended. For example, a serving of pasta is ½ cup. A serving size of meat is 2 to 3 ounces. A 3-ounce serving is about the size of a deck of cards. Measure serving sizes until you are good at [place]" them. Keep in mind that restaurants often serve portions that are 2 or 3 times the size of one serving. · To keep your energy level up and keep you from feeling hungry, eat often but in smaller portions. · Eat only the number of calories you need to stay at a healthy weight. If you need to lose weight, eat fewer calories than your body burns (through exercise and other physical activity). Eat more fruits and vegetables · Eat a variety of fruit and vegetables every day. Dark green, deep orange, red, or yellow fruits and vegetables are especially good for you. Examples include spinach, carrots, peaches, and berries. · Keep carrots, celery, and other veggies handy for snacks. Buy fruit that is in season and store it where you can see it so that you will be tempted to eat it. · Cook dishes that have a lot of veggies in them, such as stir-fries and soups. Limit saturated and trans fat · Read food labels, and try to avoid saturated and trans fats. They increase your risk of heart disease. Trans fat is found in many processed foods such as cookies and crackers. · Use olive or canola oil when you cook. Try cholesterol-lowering spreads, such as Benecol or Take Control. · Bake, broil, grill, or steam foods instead of frying them. · Choose lean meats instead of high-fat meats such as hot dogs and sausages. Cut off all visible fat when you prepare meat. · Eat fish, skinless poultry, and meat alternatives such as soy products instead of high-fat meats. Soy products, such as tofu, may be especially good for your heart. · Choose low-fat or fat-free milk and dairy products. Eat fish · Eat at least two servings of fish a week. Certain fish, such as salmon and tuna, contain omega-3 fatty acids, which may help reduce your risk of heart attack. Eat foods high in fiber · Eat a variety of grain products every day. Include whole-grain foods that have lots of fiber and nutrients. Examples of whole-grain foods include oats, whole wheat bread, and brown rice. · Buy whole-grain breads and cereals, instead of white bread or pastries. Limit salt and sodium · Limit how much salt and sodium you eat to help lower your blood pressure. · Taste food before you salt it. Add only a little salt when you think you need it. With time, your taste buds will adjust to less salt. · Eat fewer snack items, fast foods, and other high-salt, processed foods. Check food labels for the amount of sodium in packaged foods. · Choose low-sodium versions of canned goods (such as soups, vegetables, and beans). Limit sugar · Limit drinks and foods with added sugar. These include candy, desserts, and soda pop. Limit alcohol · Limit alcohol to no more than 2 drinks a day for men and 1 drink a day for women. Too much alcohol can cause health problems. When should you call for help? Watch closely for changes in your health, and be sure to contact your doctor if: 
· You would like help planning heart-healthy meals. Where can you learn more? Go to http://jean-pierre-miller.info/. Enter V137 in the search box to learn more about \"Heart-Healthy Diet: Care Instructions. \" Current as of: April 3, 2017 Content Version: 11.3 © 9662-9022 [employer]. Care instructions adapted under license by Fastmobile (which disclaims liability or warranty for this information). If you have questions about a medical condition or this instruction, always ask your healthcare professional. [place] [address] any warranty or liability for your use of this information. Advance Directives: Care Instructions Your Care Instructions An advance directive is a legal way to state your wishes at the end of your life. It tells your family and your doctor what to do if you can no longer say what you want. There are two main types of advance directives. You can change them any time that your wishes change. · A living will tells your family and your doctor your wishes about life support and other treatment. · A durable power of  for health care lets you name a person to make treatment decisions for you when you can't speak for yourself. This person is called a health care agent. If you do not have an advance directive, decisions about your medical care may be made by a doctor or a  who doesn't know you. It may help to think of an advance directive as a gift to the people who care for you. If you have one, they won't have to make tough decisions by themselves. Follow-up care is a key part of your treatment and safety. Be sure to make and go to all appointments, and call your doctor if you are having problems. It's also a good idea to know your test results and keep a list of the medicines you take. How can you care for yourself at home? · Discuss your wishes with your loved ones and your doctor. This way, there are no surprises. · Many states have a unique form. Or you might use a universal form that has been approved by many states. This kind of form can sometimes be completed and stored online. Your electronic copy will then be available wherever you have a connection to the Internet. In most cases, doctors will respect your wishes even if you have a form from a different state. · You don't need a  to do an advance directive. But you may want to get legal advice. · Think about these questions when you prepare an advance directive: ¨ Who do you want to make decisions about your medical care if you are not able to? Many people choose a family member or close friend. ¨ Do you know enough about life support methods that might be used? If not, talk to your doctor so you understand. ¨ What are you most afraid of that might happen? You might be afraid of having pain, losing your independence, or being kept alive by machines. ¨ Where would you prefer to die? Choices include your home, a hospital, or a nursing home. ¨ Would you like to have information about hospice care to support you and your family? ¨ Do you want to donate organs when you die? ¨ Do you want certain Presybeterian practices performed before you die? If so, put your wishes in the advance directive. · Read your advance directive every year, and make changes as needed. When should you call for help? Be sure to contact your doctor if you have any questions. Where can you learn more? Go to http://jean-pierre-miller.info/. Enter R264 in the search box to learn more about \"Advance Directives: Care Instructions. \" Current as of: November 17, 2016 Content Version: 11.3 © 1460-0865 [email or web address]. Care instructions adapted under license by Splitforce (which disclaims liability or warranty for this information). If you have questions about a medical condition or this instruction, always ask your healthcare professional. [place] [address] any warranty or liability for your use of this information. Introducing [facility]! Dear Bashir Meléndez: Thank you for requesting a Lopoly account. Our records indicate that you already have an active Lopoly account. You can access your account anytime at [email or web address]. [email or web address] Did you know that you can access your hospital and ER discharge instructions at any time in Lopoly? You can also review all of your test results from your hospital stay or ER visit. Additional Information If you have questions, please visit the Frequently Asked Questions section of the Lopoly website at [email or web address] Remember, Lopoly is NOT to be used for urgent needs. For medical emergencies, dial 911. Now available from your iPhone and Android! Please provide this summary of care documentation to your next provider. Your primary care clinician is listed as Jenny Domínguez. If you have any questions after today's visit, please call [phone].

## 2017-06-22 NOTE — PROGRESS NOTES
This is an Initial Medicare Annual Wellness Exam (AWV) (Performed 12 months after IPPE or effective date of Medicare Part B enrollment, Once in a lifetime)    I have reviewed the patient's medical history in detail and updated the computerized patient record. History     Present for CPE, last Complete Physical exam was few yrs ago , not Up todate w/ all vaccination, last tetanus vaccine was in <5yrs ago,  Last psa exam was abnormal and was in 2017 biopsied with nl results  last colonoscopy was abnormal with few polyps and was 5yrs Ago,   No past surgical hx,  last bone dexa scan was never,    No family hx of breast cancer in a male  no family hx of prostate cancer   no family hx of colon cancer, father 75 yo  of lung cancer, mother yo  of lung cancer as well, ++fhx of no early heart Dz , +++sexaully active and uses Safe sex, +++physically active,  compliant w/ meds, ++Rf needed for today for his meds.      Not depressed no hx of fall, walking with the cane  His A1c of 5.8 % in March his med changed,  Past Medical History:   Diagnosis Date    Agoraphobia with panic disorder     Anxiety     BPH (benign prostatic hypertrophy) with urinary obstruction     CAD (coronary artery disease)     no previous MI or stents    Chronic edema     Coronary artery disease involving native coronary artery without angina pectoris 3/28/2017    CVD (cardiovascular disease)     Depression     Diabetes ([address]     Erectile dysfunction     Essential hypertension 3/28/2017    GERD (gastroesophageal reflux disease)     [facility] [address] [address] [facility]     History of stroke 3/28/2017    Hypercholesterolemia     Hypercholesterolemia 3/28/2017    Hypertension     Nocturia     Panic disorder     Stroke [facility])       Past Surgical History:   Procedure Laterality Date    HX COLONOSCOPY      HX LUMBAR DISKECTOMY      HX ORTHOPAEDIC      right finger repair    HX OTHER SURGICAL      lap band    HX OTHER SURGICAL      Lap band     Current Outpatient Prescriptions   Medication Sig Dispense Refill    amLODIPine-benazepril (LOTREL) 5-20 mg per capsule Take 1 capsule by mouth  once a day 90 Cap 3    finasteride (PROSCAR) 5 mg tablet Take 1 tablet by mouth  daily 90 Tab 3    carvedilol (COREG) 12.5 mg tablet Take 1 tablet by mouth two  times daily with meals 180 Tab 3    atorvastatin (LIPITOR) 20 mg tablet Take 1 tablet by mouth  nightly 90 Tab 3    tamsulosin (FLOMAX) 0.4 mg capsule Take 2 capsules by mouth  daily 90 Cap 1    hydroCHLOROthiazide (HYDRODIURIL) 25 mg tablet Take 1 Tab by mouth daily. 90 Tab 3    buPROPion SR (WELLBUTRIN SR) 150 mg SR tablet Take 1 Tab by mouth two (2) times a day. 180 Tab 3    metFORMIN (GLUCOPHAGE) 500 mg tablet Take 0.5 Tabs by mouth two (2) times daily (with meals). 90 Tab 1    multivitamin (ONE A DAY) tablet Take by Mouth.  aspirin delayed-release 81 mg tablet Take 1 Tab by mouth daily. 30 Tab 11    HYDROcodone-acetaminophen (NORCO) 5-325 mg per tablet Take 1 Tab by mouth every four (4) hours as needed for Pain. Max Daily Amount: 6 Tabs. 20 Tab .0    sildenafil citrate (VIAGRA) 100 mg tablet Take 1 Tab by mouth daily as needed (for erection).  6 Tab 11     No Known Allergies  Family History   Problem Relation Age of Onset    Cancer Father     Stroke Father     Hypertension Father     Cancer Mother     Hypertension Sister     Heart Disease Brother      Social History   Substance Use Topics    Smoking status: Never Smoker    Smokeless tobacco: Never Used    Alcohol use Yes     Patient Active Problem List   Diagnosis Code    Benign prostatic hyperplasia with lower urinary tract symptoms N40.1    Urinary frequency R35.0    Obesity E66.9    Nocturia R35.1    Slowing of urinary stream R39.198    Erectile dysfunction N52.9    Calculus of kidney N20.0    Left low back pain M54.5    Gross hematuria R31.0    Essential hypertension I10    Hypercholesterolemia E78.00    Controlled type 2 diabetes mellitus without complication ([facility]) G88.5    History of stroke Z86.73    Coronary artery disease involving native coronary artery without angina pectoris I25.10         Depression Risk Factor Screening:     PHQ over the last two weeks 3/28/2017   PHQ Not Done Active Diagnosis of Depression or Bipolar Disorder     Alcohol Risk Factor Screening: On any occasion during the past 3 months, have you had more than 4 drinks containing alcohol? No    Do you average more than 14 drinks per week? No      Functional Ability and Level of Safety:     Hearing Loss   normal-to-mild    Activities of Daily Living   Self-care. Requires assistance with: no ADLs    Fall Risk   Fall Risk Assessment, last 12 mths 4/13/2017   Able to walk? Yes   Fall in past 12 months? No   Fall with injury? -   Number of falls in past 12 months -   Fall Risk Score -     Abuse Screen   Patient is not abused    Review of Systems   Review of Systems    Constitutional: Negative for chills and fever, not obese okay body mass index for his age. HENT: Negative for ear head pain and nosebleeds. Eyes: Negative for blurred vision, pain and discharge. Respiratory: Negative for shortness of breath, wheezing cough sore throat. Cardiovascular: Negative for chest pain and leg swelling, racing heart . Gastrointestinal: Negative for constipation, diarrhea, nausea and vomiting. Genitourinary: Negative for frequency. Musculoskeletal: Negative for joint pain. Skin: Negative for itching, pimples or acne rash. Neurological: Negative for headaches. Psychiatric/Behavioral: Negative for depression has normal interest to do things and not depressed the patient is not nervous/anxious.       Physical Examination     No exam data present    Evaluation of Cognitive Function:  Mood/affect:  happy  Appearance: age appropriate, well dressed and within normal Limits  Family member/caregiver input: none        General:  Alert, cooperative, no distress, appears stated age. Head:  Normocephalic, without obvious abnormality, atraumatic. Eyes:  Conjunctivae/corneas clear. PERRL, EOMs intact. Fundi benign   Ears:  Normal TMs and external ear canals both ears. Nose: Nares normal. Septum midline. Mucosa normal. No drainage or sinus tenderness. Throat: Lips, mucosa, and tongue normal. Teeth and gums normal.   Neck: Supple, symmetrical, trachea midline, no adenopathy, thyroid: no enlargement/tenderness/nodules, no carotid bruit and no JVD. Back:   Symmetric, no curvature. ROM normal. No CVA tenderness. Lungs:   Clear to auscultation bilaterally. Chest wall:  No tenderness or deformity. Heart:  Regular rate and rhythm, S1, S2 normal, no murmur, click, rub or gallop. Abdomen:   Soft, non-tender. Bowel sounds normal. No masses,  No organomegaly. Genitalia:  Normal male without lesion, discharge . Rectal:  Pt denies incontinence  Guaiac P stool. Extremities: Extremities normal, atraumatic, no cyanosis or edema. Pulses: 2+ and symmetric all extremities. Skin: Skin color, texture, turgor normal. No rashes or lesions   Lymph nodes: Cervical, supraclavicular, and axillary nodes normal.   Neurologic: CNII-XII intact. Normal strength, sensation and reflexes throughout.        Patient Care Team:  Geovanni Webb MD as PCP - General (Family Practice)  Jeremi Adame MD (Urology)    Advice/Referrals/Counseling   Education and counseling provided:  Are appropriate based on today's review and evaluation  End-of-Life planning (with patient's consent): Initial ACP discussion, pt wants the  Wife as SDM,  Pneumococcal Vaccuptodateine, uptodate  Influenza Vaccine, up todate  Hepatitis B Vaccine, declined  Prostate cancer screening tests not indicated  Colorectal cancer screening tests, ordered today  Cardiovascular screening blood test, addressed  Bone mass measurement (DEXA), not ordered await for approval  Screening for glaucoma, done  Diabetes screening test, done today, ++ hx of it  Medical nutrition therapy for individuals with diabetes or renal disease, addressed info given  Diabetes outpatient self-management training services, informed        Assessment/Plan   Jack Sprague was seen today for annual wellness visit. Diagnoses and all orders for this visit:    Routine general medical examination at a health care facility  -     Cancel: HEPATITIS C AB  -     Cancel: OCCULT BLOOD, IMMUNOASSAY (FIT)  -     REFERRAL TO OPTOMETRY  -     carvedilol (COREG) 12.5 mg tablet; Take 1 tablet by mouth two  times daily with meals  -     Cancel: HEMOGLOBIN A1C WITH EAG    Screening for alcoholism    Encounter for immunization    Advanced directives, counseling/discussion  -     ADVANCE CARE PLANNING FIRST 27 MINS    Screen for colon cancer  -     Cancel: OCCULT BLOOD, IMMUNOASSAY (FIT)  -     REFERRAL FOR COLONOSCOPY    Screening for glaucoma  -     REFERRAL TO OPTOMETRY    . Patient was advised to stay at a healthy weight, which would lower the risk for many problems, such as the current obesity, less chance of worsening the uncontrolled diabetic state, depressing the progress of heart disease, maintaining the target level of a stable blood pressure. In addition patient was also told try to get at least 30 minutes of physical activity on most days of the week, was told a daily brisk walking would be a good choice to start with, and then later one can add and do other harder activities, such as running, swimming, cycling, or playing tennis or team sports for a better outcome. Also informed regarding the risk of sexually transmitted disease at any age was told to try to be in a protective mode as much as possible.     The continuity of the care  Pt was told if for any reason, the pt's future Office appointments, medication refills or any of the patient concerns not addressed, pt should obtain the name of the corresponding tel representatives and call us back or send us a letter for future investigation,     Patient was also for informed regarding the recommended dosage of alcohol intake, and Not to use any of the tobacco smoke, not allowing others to smoke around the patient. Iin addition, lab results and schedule of future lab studies reviewed with patient,    A lean diet also advised in addition of avoiding fast foods, the current medications and their side effects reviewed with the patient, patient acknowledged all understanding and today the patient agreed with all the recommendations.

## 2017-06-22 NOTE — ACP (ADVANCE CARE PLANNING)
Advance Care Planning    Advance Care Planning        Authorized Decision Maker (if patient is incapable of making informed decisions): This person is: Other Legally Authorized Decision Maker which would be his current wife     For Patients with Decision Making Capacity:   Values/Goals: Exploration of values, goals, and preferences if recovery is not expected, even with continued medical treatment in the event of:  Imminent death, Severe, permanent brain injury  \"In these circumstances, what matters most to you? \" patient in the presence of familymember stated that care focused more on comfort and the quality of life than Care focused more on quantity (length) of life and wants to be DNR form given will sign and bring us a copy on the later date

## 2017-06-23 ENCOUNTER — HOSPITAL ENCOUNTER (INPATIENT)
Age: 67
LOS: 4 days | Discharge: NURSING, REHAB OR LONG-TERM CARE | DRG: 065 | End: 2017-06-27
Attending: EMERGENCY MEDICINE | Admitting: INTERNAL MEDICINE
Payer: MEDICARE

## 2017-06-23 ENCOUNTER — APPOINTMENT (OUTPATIENT)
Dept: CT IMAGING | Age: 67
DRG: 065 | End: 2017-06-23
Attending: EMERGENCY MEDICINE
Payer: MEDICARE

## 2017-06-23 DIAGNOSIS — I67.82 CHRONIC CEREBRAL ISCHEMIA: ICD-10-CM

## 2017-06-23 DIAGNOSIS — R29.898 WEAKNESS OF LEFT LOWER EXTREMITY: Primary | ICD-10-CM

## 2017-06-23 DIAGNOSIS — G45.1 HEMISPHERIC CAROTID ARTERY SYNDROME: ICD-10-CM

## 2017-06-23 PROBLEM — G45.9 TIA (TRANSIENT ISCHEMIC ATTACK): Status: ACTIVE | Noted: 2017-06-23

## 2017-06-23 LAB
ALBUMIN SERPL BCP-MCNC: 3.5 G/DL (ref 3.5–5)
ALBUMIN/GLOB SERPL: 0.9 {RATIO} (ref 1.1–2.2)
ALP SERPL-CCNC: 74 U/L (ref 45–117)
ALT SERPL-CCNC: 32 U/L (ref 12–78)
ANION GAP BLD CALC-SCNC: 7 MMOL/L (ref 5–15)
APPEARANCE UR: CLEAR
APTT PPP: 27.8 SEC (ref 22.1–32.5)
AST SERPL W P-5'-P-CCNC: 15 U/L (ref 15–37)
BASOPHILS # BLD AUTO: 0 K/UL (ref 0–0.1)
BASOPHILS # BLD: 1 % (ref 0–1)
BILIRUB SERPL-MCNC: 0.7 MG/DL (ref 0.2–1)
BILIRUB UR QL: NEGATIVE
BUN SERPL-MCNC: 23 MG/DL (ref 6–20)
BUN/CREAT SERPL: 18 (ref 12–20)
CALCIUM SERPL-MCNC: 9.1 MG/DL (ref 8.5–10.1)
CHLORIDE SERPL-SCNC: 105 MMOL/L (ref 97–108)
CO2 SERPL-SCNC: 31 MMOL/L (ref 21–32)
COLOR UR: NORMAL
CREAT SERPL-MCNC: 1.25 MG/DL (ref 0.7–1.3)
EOSINOPHIL # BLD: 0.3 K/UL (ref 0–0.4)
EOSINOPHIL NFR BLD: 3 % (ref 0–7)
ERYTHROCYTE [DISTWIDTH] IN BLOOD BY AUTOMATED COUNT: 13.3 % (ref 11.5–14.5)
GLOBULIN SER CALC-MCNC: 3.9 G/DL (ref 2–4)
GLUCOSE BLD STRIP.AUTO-MCNC: 114 MG/DL (ref 65–100)
GLUCOSE SERPL-MCNC: 105 MG/DL (ref 65–100)
GLUCOSE UR STRIP.AUTO-MCNC: NEGATIVE MG/DL
HCT VFR BLD AUTO: 39.5 % (ref 36.6–50.3)
HGB BLD-MCNC: 13.3 G/DL (ref 12.1–17)
HGB UR QL STRIP: NEGATIVE
INR BLD: <0.9 (ref 0.9–1.2)
INR PPP: 1 (ref 0.9–1.1)
KETONES UR QL STRIP.AUTO: NEGATIVE MG/DL
LEUKOCYTE ESTERASE UR QL STRIP.AUTO: NEGATIVE
LYMPHOCYTES # BLD AUTO: 20 % (ref 12–49)
LYMPHOCYTES # BLD: 1.7 K/UL (ref 0.8–3.5)
MCH RBC QN AUTO: 31.2 PG (ref 26–34)
MCHC RBC AUTO-ENTMCNC: 33.7 G/DL (ref 30–36.5)
MCV RBC AUTO: 92.7 FL (ref 80–99)
MONOCYTES # BLD: 0.8 K/UL (ref 0–1)
MONOCYTES NFR BLD AUTO: 9 % (ref 5–13)
NEUTS SEG # BLD: 5.8 K/UL (ref 1.8–8)
NEUTS SEG NFR BLD AUTO: 67 % (ref 32–75)
NITRITE UR QL STRIP.AUTO: NEGATIVE
PH UR STRIP: 7 [PH] (ref 5–8)
PLATELET # BLD AUTO: 233 K/UL (ref 150–400)
POTASSIUM SERPL-SCNC: 3.3 MMOL/L (ref 3.5–5.1)
PROT SERPL-MCNC: 7.4 G/DL (ref 6.4–8.2)
PROT UR STRIP-MCNC: NEGATIVE MG/DL
PROTHROMBIN TIME: 10.5 SEC (ref 9–11.1)
RBC # BLD AUTO: 4.26 M/UL (ref 4.1–5.7)
SERVICE CMNT-IMP: ABNORMAL
SODIUM SERPL-SCNC: 143 MMOL/L (ref 136–145)
SP GR UR REFRACTOMETRY: 1.02 (ref 1–1.03)
THERAPEUTIC RANGE,PTTT: NORMAL SECS (ref 58–77)
UROBILINOGEN UR QL STRIP.AUTO: 0.2 EU/DL (ref 0.2–1)
WBC # BLD AUTO: 8.6 K/UL (ref 4.1–11.1)

## 2017-06-23 PROCEDURE — 65270000032 HC RM SEMIPRIVATE

## 2017-06-23 PROCEDURE — 93005 ELECTROCARDIOGRAM TRACING: CPT

## 2017-06-23 PROCEDURE — 36415 COLL VENOUS BLD VENIPUNCTURE: CPT | Performed by: EMERGENCY MEDICINE

## 2017-06-23 PROCEDURE — 81003 URINALYSIS AUTO W/O SCOPE: CPT | Performed by: INTERNAL MEDICINE

## 2017-06-23 PROCEDURE — 99285 EMERGENCY DEPT VISIT HI MDM: CPT

## 2017-06-23 PROCEDURE — 85730 THROMBOPLASTIN TIME PARTIAL: CPT | Performed by: EMERGENCY MEDICINE

## 2017-06-23 PROCEDURE — 74011250637 HC RX REV CODE- 250/637: Performed by: INTERNAL MEDICINE

## 2017-06-23 PROCEDURE — 85025 COMPLETE CBC W/AUTO DIFF WBC: CPT | Performed by: EMERGENCY MEDICINE

## 2017-06-23 PROCEDURE — 82962 GLUCOSE BLOOD TEST: CPT

## 2017-06-23 PROCEDURE — 85610 PROTHROMBIN TIME: CPT | Performed by: EMERGENCY MEDICINE

## 2017-06-23 PROCEDURE — 74011250636 HC RX REV CODE- 250/636: Performed by: INTERNAL MEDICINE

## 2017-06-23 PROCEDURE — 80053 COMPREHEN METABOLIC PANEL: CPT | Performed by: EMERGENCY MEDICINE

## 2017-06-23 PROCEDURE — 70450 CT HEAD/BRAIN W/O DYE: CPT

## 2017-06-23 PROCEDURE — 85610 PROTHROMBIN TIME: CPT

## 2017-06-23 RX ORDER — ACETAMINOPHEN 650 MG/1
650 SUPPOSITORY RECTAL
Status: DISCONTINUED | OUTPATIENT
Start: 2017-06-23 | End: 2017-06-27 | Stop reason: HOSPADM

## 2017-06-23 RX ORDER — THERA TABS 400 MCG
1 TAB ORAL DAILY
COMMUNITY
End: 2018-01-23 | Stop reason: SDUPTHER

## 2017-06-23 RX ORDER — TAMSULOSIN HYDROCHLORIDE 0.4 MG/1
0.8 CAPSULE ORAL
COMMUNITY
End: 2017-08-02 | Stop reason: SDUPTHER

## 2017-06-23 RX ORDER — BUPROPION HYDROCHLORIDE 150 MG/1
150 TABLET, EXTENDED RELEASE ORAL 2 TIMES DAILY
Status: DISCONTINUED | OUTPATIENT
Start: 2017-06-23 | End: 2017-06-27 | Stop reason: HOSPADM

## 2017-06-23 RX ORDER — GUAIFENESIN 100 MG/5ML
81 LIQUID (ML) ORAL DAILY
Status: DISCONTINUED | OUTPATIENT
Start: 2017-06-24 | End: 2017-06-23 | Stop reason: SDUPTHER

## 2017-06-23 RX ORDER — SODIUM CHLORIDE 0.9 % (FLUSH) 0.9 %
5-10 SYRINGE (ML) INJECTION AS NEEDED
Status: DISCONTINUED | OUTPATIENT
Start: 2017-06-23 | End: 2017-06-27 | Stop reason: HOSPADM

## 2017-06-23 RX ORDER — CARVEDILOL 12.5 MG/1
12.5 TABLET ORAL 2 TIMES DAILY WITH MEALS
Status: DISCONTINUED | OUTPATIENT
Start: 2017-06-23 | End: 2017-06-27 | Stop reason: HOSPADM

## 2017-06-23 RX ORDER — FINASTERIDE 5 MG/1
5 TABLET, FILM COATED ORAL
Status: DISCONTINUED | OUTPATIENT
Start: 2017-06-23 | End: 2017-06-27 | Stop reason: HOSPADM

## 2017-06-23 RX ORDER — TAMSULOSIN HYDROCHLORIDE 0.4 MG/1
0.4 CAPSULE ORAL DAILY
Status: DISCONTINUED | OUTPATIENT
Start: 2017-06-24 | End: 2017-06-23

## 2017-06-23 RX ORDER — SODIUM CHLORIDE 9 MG/ML
75 INJECTION, SOLUTION INTRAVENOUS CONTINUOUS
Status: DISCONTINUED | OUTPATIENT
Start: 2017-06-23 | End: 2017-06-23

## 2017-06-23 RX ORDER — ATORVASTATIN CALCIUM 20 MG/1
20 TABLET, FILM COATED ORAL
Status: DISCONTINUED | OUTPATIENT
Start: 2017-06-23 | End: 2017-06-24

## 2017-06-23 RX ORDER — FINASTERIDE 5 MG/1
5 TABLET, FILM COATED ORAL
COMMUNITY
End: 2017-09-13 | Stop reason: SDUPTHER

## 2017-06-23 RX ORDER — LISINOPRIL 20 MG/1
20 TABLET ORAL DAILY
Status: DISCONTINUED | OUTPATIENT
Start: 2017-06-24 | End: 2017-06-24

## 2017-06-23 RX ORDER — HYDROCODONE BITARTRATE AND ACETAMINOPHEN 5; 325 MG/1; MG/1
1 TABLET ORAL
Status: DISCONTINUED | OUTPATIENT
Start: 2017-06-23 | End: 2017-06-27 | Stop reason: HOSPADM

## 2017-06-23 RX ORDER — THERA TABS 400 MCG
1 TAB ORAL DAILY
Status: DISCONTINUED | OUTPATIENT
Start: 2017-06-24 | End: 2017-06-27 | Stop reason: HOSPADM

## 2017-06-23 RX ORDER — ACETAMINOPHEN 325 MG/1
650 TABLET ORAL
Status: DISCONTINUED | OUTPATIENT
Start: 2017-06-23 | End: 2017-06-27 | Stop reason: HOSPADM

## 2017-06-23 RX ORDER — ASPIRIN 81 MG/1
81 TABLET ORAL DAILY
Status: DISCONTINUED | OUTPATIENT
Start: 2017-06-24 | End: 2017-06-25

## 2017-06-23 RX ORDER — METFORMIN HYDROCHLORIDE 500 MG/1
250 TABLET ORAL 2 TIMES DAILY WITH MEALS
Status: DISCONTINUED | OUTPATIENT
Start: 2017-06-24 | End: 2017-06-27 | Stop reason: HOSPADM

## 2017-06-23 RX ORDER — TAMSULOSIN HYDROCHLORIDE 0.4 MG/1
0.8 CAPSULE ORAL
Status: DISCONTINUED | OUTPATIENT
Start: 2017-06-23 | End: 2017-06-27 | Stop reason: HOSPADM

## 2017-06-23 RX ORDER — HYDROCHLOROTHIAZIDE 25 MG/1
25 TABLET ORAL DAILY
Status: DISCONTINUED | OUTPATIENT
Start: 2017-06-24 | End: 2017-06-24

## 2017-06-23 RX ORDER — POTASSIUM CHLORIDE 750 MG/1
10 TABLET, FILM COATED, EXTENDED RELEASE ORAL
Status: DISCONTINUED | OUTPATIENT
Start: 2017-06-23 | End: 2017-06-24

## 2017-06-23 RX ORDER — AMLODIPINE BESYLATE 5 MG/1
5 TABLET ORAL DAILY
Status: DISCONTINUED | OUTPATIENT
Start: 2017-06-24 | End: 2017-06-24

## 2017-06-23 RX ADMIN — POTASSIUM CHLORIDE 10 MEQ: 750 TABLET, FILM COATED, EXTENDED RELEASE ORAL at 22:14

## 2017-06-23 RX ADMIN — BUPROPION HYDROCHLORIDE 150 MG: 150 TABLET, EXTENDED RELEASE ORAL at 22:14

## 2017-06-23 RX ADMIN — ATORVASTATIN CALCIUM 20 MG: 20 TABLET, FILM COATED ORAL at 22:14

## 2017-06-23 RX ADMIN — TAMSULOSIN HYDROCHLORIDE 0.8 MG: 0.4 CAPSULE ORAL at 22:14

## 2017-06-23 RX ADMIN — FINASTERIDE 5 MG: 5 TABLET, FILM COATED ORAL at 22:14

## 2017-06-23 RX ADMIN — CARVEDILOL 12.5 MG: 12.5 TABLET, FILM COATED ORAL at 22:18

## 2017-06-23 RX ADMIN — SODIUM CHLORIDE 75 ML/HR: 900 INJECTION, SOLUTION INTRAVENOUS at 22:14

## 2017-06-23 NOTE — PROGRESS NOTES
Admission Medication Reconciliation:    Information obtained from: patient    Significant PMH/Disease States:   Past Medical History:   Diagnosis Date    Agoraphobia with panic disorder     Anxiety     BPH (benign prostatic hypertrophy) with urinary obstruction     CAD (coronary artery disease)     no previous MI or stents    Chronic edema     Coronary artery disease involving native coronary artery without angina pectoris 3/28/2017    CVD (cardiovascular disease)     Depression     [facility]     Erectile dysfunction     Essential hypertension 3/28/2017    GERD (gastroesophageal reflux disease)     [facility]is [facility]     History of stroke 3/28/2017    Hypercholesterolemia     Hypercholesterolemia 3/28/2017    Hypertension     Nocturia     Panic disorder     Stroke [facility])        Chief Complaint for this Admission:  weakness 3    Allergies:  Review of patient's allergies indicates no known allergies. Prior to Admission Medications:   Prior to Admission Medications   Prescriptions Last Dose Informant Patient Reported? Taking? amLODIPine-benazepril (LOTREL) 5-20 mg per capsule 6/23/2017 at am  No Yes   Sig: Take 1 capsule by mouth  once a day   aspirin delayed-release 81 mg tablet 6/23/2017 at am  No Yes   Sig: Take 1 Tab by mouth daily. atorvastatin (LIPITOR) 20 mg tablet 6/22/2017 at hs  No Yes   Sig: Take 1 tablet by mouth  nightly   buPROPion SR (WELLBUTRIN SR) 150 mg SR tablet 6/23/2017 at am  No Yes   Sig: Take 1 Tab by mouth two (2) times a day. carvedilol (COREG) 12.5 mg tablet 6/23/2017 at am  No Yes   Sig: Take 1 tablet by mouth two  times daily with meals   finasteride (PROSCAR) 5 mg tablet 6/22/2017 at hs  Yes Yes   Sig: Take 5 mg by mouth nightly. hydroCHLOROthiazide (HYDRODIURIL) 25 mg tablet 6/23/2017 at am  No Yes   Sig: Take 1 Tab by mouth daily.    metFORMIN (GLUCOPHAGE) 500 mg tablet 6/23/2017 at am  No Yes   Sig: Take 0.5 Tabs by mouth two (2) times daily (with meals). tamsulosin (FLOMAX) 0.4 mg capsule 6/22/2017 at hs  Yes Yes   Sig: Take 0.8 mg by mouth nightly. therapeutic multivitamin (THERA) tablet 6/23/2017 at am  Yes Yes   Sig: Take 1 Tab by mouth daily. Facility-Administered Medications: None         Comments/Recommendations: This medication history was obtained from the patient; (s)he appears to be a decent historian. An RX Query is available. Inpatient orders were reviewed and updated to reflect home administration times. Medications added: none  Medications deleted: hydrocodone-APAP, sildenafil    Thank you for allowing me to participate in the care of this patient. Please contact the pharmacy () or the medication reconciliation pharmacy () with any questions. Adelaida Kaur, Pharm. D., BCPS, BCPPS

## 2017-06-23 NOTE — ED PROVIDER NOTES
HPI Comments: 74 y.o. male with past medical history significant for diabetes, HTN, panic disorder, nocturia, depression, anxiety, chronic edema, stroke, CAD, erectile dysfunction, hemiparesis, CVD, GERD, hypercholesterolemia, BPH, and stroke who presents from home with chief complaint of weakness. Pt complains of increased weakness and numbness in his left leg while he was pumping gas around 1230 this afternoon (4 hours ago). He states that he could not support his weight on his left leg because \"it felt like it was going to give out\". He also reports having trouble forming words and states that he is not \"clear-headed\". Pt reportedly had a stroke in 1997 which left residual left leg numbness. Pt had a general checkup at his PCP yesterday and was told everything was normal except for some \"slight kidney damage\". He denies having any headache, visual disturbance, or trouble swallowing. There are no other acute medical concerns at this time. Social hx: nonsmoker, + EtOH use, no drug use  PCP: Betito Bob MD    Note written by Margarita Kauffman, as dictated by Madhavi Stokes MD 4:22 PM        The history is provided by the patient. No  was used.         Past Medical History:   Diagnosis Date    Agoraphobia with panic disorder     Anxiety     BPH (benign prostatic hypertrophy) with urinary obstruction     CAD (coronary artery disease)     no previous MI or stents    Chronic edema     Coronary artery disease involving native coronary artery without angina pectoris 3/28/2017    CVD (cardiovascular disease)     Depression     [facility]     Erectile dysfunction     Essential hypertension 3/28/2017    GERD (gastroesophageal reflux disease)     [facility]is [facility] [facility]     History of stroke 3/28/2017    Hypercholesterolemia     Hypercholesterolemia 3/28/2017    Hypertension     Nocturia     Panic disorder     [facility]roke [facility])        Past Surgical History:   Procedure Laterality Date    HX COLONOSCOPY      HX LUMBAR DISKECTOMY      HX ORTHOPAEDIC      right finger repair    HX OTHER SURGICAL      lap band    HX OTHER SURGICAL      Lap band         Family History:   Problem Relation Age of Onset    Cancer Father     Stroke Father     Hypertension Father     Cancer Mother     Hypertension Sister     Heart Disease Brother        Social History     Social History    Marital status:      Spouse name: N/A    Number of children: N/A    Years of education: N/A     Occupational History    Not on file. Social History Main Topics    Smoking status: Never Smoker    Smokeless tobacco: Never Used    Alcohol use Yes    Drug use: No    Sexual activity: Yes     Partners: Female     Other Topics Concern    Not on file     Social History Narrative         ALLERGIES: Review of patient's allergies indicates no known allergies. Review of Systems   Constitutional: Negative for activity change, appetite change and fatigue. HENT: Negative for ear pain, facial swelling, sore throat and trouble swallowing. Eyes: Negative for pain, discharge and visual disturbance. Respiratory: Negative for chest tightness, shortness of breath and wheezing. Cardiovascular: Negative for chest pain and palpitations. Gastrointestinal: Negative for abdominal pain, blood in stool, nausea and vomiting. Genitourinary: Negative for difficulty urinating, flank pain and hematuria. Musculoskeletal: Negative for arthralgias, joint swelling, myalgias and neck pain. Skin: Negative for color change and rash. Neurological: Positive for weakness and numbness. Negative for dizziness and headaches. Hematological: Negative for adenopathy. Does not bruise/bleed easily. Psychiatric/Behavioral: Negative for behavioral problems, confusion and sleep disturbance. All other systems reviewed and are negative.       Vitals:    06/23/17 1556   BP: 150/90   Pulse: 80   Resp: 15   Temp: 97.7 °F (36.5 °C)   SpO2: 99%   Weight: 98.4 kg (217 lb)   Height: 5' 11\" (1.803 m)            Physical Exam   Constitutional: He is oriented to person, place, and time. He appears well-developed and well-nourished. No distress. HENT:   Head: Normocephalic and atraumatic. Nose: Nose normal.   Mouth/Throat: Oropharynx is clear and moist.   Eyes: Conjunctivae and EOM are normal. Pupils are equal, round, and reactive to light. No scleral icterus. Neck: Normal range of motion. Neck supple. No JVD present. No tracheal deviation present. No thyromegaly present. No carotid bruits noted. Cardiovascular: Normal rate, regular rhythm, normal heart sounds and intact distal pulses. Exam reveals no gallop and no friction rub. No murmur heard. Pulmonary/Chest: Effort normal and breath sounds normal. No respiratory distress. He has no wheezes. He has no rales. He exhibits no tenderness. Abdominal: Soft. Bowel sounds are normal. He exhibits no distension and no mass. There is no tenderness. There is no rebound and no guarding. Musculoskeletal: Normal range of motion. He exhibits no edema or tenderness. Lymphadenopathy:     He has no cervical adenopathy. Neurological: He is alert and oriented to person, place, and time. He has normal reflexes. No cranial nerve deficit. Coordination normal.   Left leg weakness   Skin: Skin is warm and dry. No rash noted. No erythema. Psychiatric: He has a normal mood and affect. His behavior is normal. Judgment and thought content normal.   Nursing note and vitals reviewed.   Note written by Margarita Bar, as dictated by Shawn Fitch MD 4:22 PM    MDM  Number of Diagnoses or Management Options  Weakness of left lower extremity: new and requires workup     Amount and/or Complexity of Data Reviewed  Clinical lab tests: ordered and reviewed  Tests in the radiology section of CPT®: ordered and reviewed  Decide to obtain previous medical records or to obtain history from someone other than the patient: yes  Review and summarize past medical records: yes  Discuss the patient with other providers: yes  Independent visualization of images, tracings, or specimens: yes    Risk of Complications, Morbidity, and/or Mortality  Presenting problems: high  Diagnostic procedures: high  Management options: high    Patient Progress  Patient progress: stable    ED Course       Procedures  CONSULT NOTE:  5:01 PM Cande English MD spoke with Dr. All Flores, Consult for Neurology. Discussed available diagnostic tests and clinical findings. He is in agreement with care plans as outlined. Dr. All Flores states that patient is outside window for TPA but recommends admission. ED EKG interpretation:  Rhythm: normal sinus rhythm; and regular . Rate (approx.): 69; No ectopy, no ischemic change. Note written by Margarita Lovett, as dictated by Cande English MD 5:22 PM    PROGRESS NOTE:  6:04 PM Discussed and reviewed results with patient and family. Will plan to admit.  Patient does have lacuna infarct

## 2017-06-23 NOTE — ED NOTES
Called lab regarding blood specimens sent up >20 min ago that are not in process. Have been transferred twice in the lab to two departments.  Placed on hold

## 2017-06-23 NOTE — ED NOTES
When questioning of when left leg weakness started he said ~1230.   Will question provider about initiating Code S.

## 2017-06-23 NOTE — ED TRIAGE NOTES
FAUSTO NOTE:  Patient has had a stroke 1997. He has left sided deficients. He has been able to get around with a walker for years just fine and he drives a car. Today , he was pumping gas and states that he had to lean against the car to support his weight because he felt like his left leg was rubber. Patient denies any other symptoms. He went to his PCP yesterday for a check up and got a clean bill of health.

## 2017-06-24 ENCOUNTER — APPOINTMENT (OUTPATIENT)
Dept: MRI IMAGING | Age: 67
DRG: 065 | End: 2017-06-24
Attending: HOSPITALIST
Payer: MEDICARE

## 2017-06-24 ENCOUNTER — APPOINTMENT (OUTPATIENT)
Dept: MRI IMAGING | Age: 67
DRG: 065 | End: 2017-06-24
Attending: PSYCHIATRY & NEUROLOGY
Payer: MEDICARE

## 2017-06-24 PROBLEM — R29.898 LEFT LEG WEAKNESS: Status: ACTIVE | Noted: 2017-06-24

## 2017-06-24 LAB
ALBUMIN SERPL BCP-MCNC: 3.3 G/DL (ref 3.5–5)
ALBUMIN/GLOB SERPL: 1.1 {RATIO} (ref 1.1–2.2)
ALP SERPL-CCNC: 64 U/L (ref 45–117)
ALT SERPL-CCNC: 26 U/L (ref 12–78)
ANION GAP BLD CALC-SCNC: 7 MMOL/L (ref 5–15)
AST SERPL W P-5'-P-CCNC: 13 U/L (ref 15–37)
ATRIAL RATE: 68 BPM
BILIRUB SERPL-MCNC: 0.6 MG/DL (ref 0.2–1)
BUN SERPL-MCNC: 22 MG/DL (ref 6–20)
BUN/CREAT SERPL: 20 (ref 12–20)
CALCIUM SERPL-MCNC: 8.3 MG/DL (ref 8.5–10.1)
CALCULATED P AXIS, ECG09: 31 DEGREES
CALCULATED R AXIS, ECG10: -12 DEGREES
CALCULATED T AXIS, ECG11: -15 DEGREES
CHLORIDE SERPL-SCNC: 104 MMOL/L (ref 97–108)
CHOLEST SERPL-MCNC: 173 MG/DL
CO2 SERPL-SCNC: 28 MMOL/L (ref 21–32)
CREAT SERPL-MCNC: 1.12 MG/DL (ref 0.7–1.3)
DIAGNOSIS, 93000: NORMAL
ERYTHROCYTE [DISTWIDTH] IN BLOOD BY AUTOMATED COUNT: 13 % (ref 11.5–14.5)
EST. AVERAGE GLUCOSE BLD GHB EST-MCNC: 128 MG/DL
GLOBULIN SER CALC-MCNC: 3.1 G/DL (ref 2–4)
GLUCOSE BLD STRIP.AUTO-MCNC: 110 MG/DL (ref 65–100)
GLUCOSE SERPL-MCNC: 118 MG/DL (ref 65–100)
HBA1C MFR BLD: 6.1 % (ref 4.2–6.3)
HCT VFR BLD AUTO: 37 % (ref 36.6–50.3)
HDLC SERPL-MCNC: 59 MG/DL
HDLC SERPL: 2.9 {RATIO} (ref 0–5)
HGB BLD-MCNC: 12.5 G/DL (ref 12.1–17)
LDLC SERPL CALC-MCNC: 81.6 MG/DL (ref 0–100)
LIPID PROFILE,FLP: ABNORMAL
MCH RBC QN AUTO: 31 PG (ref 26–34)
MCHC RBC AUTO-ENTMCNC: 33.8 G/DL (ref 30–36.5)
MCV RBC AUTO: 91.8 FL (ref 80–99)
P-R INTERVAL, ECG05: 122 MS
PLATELET # BLD AUTO: 222 K/UL (ref 150–400)
POTASSIUM SERPL-SCNC: 3.2 MMOL/L (ref 3.5–5.1)
PROT SERPL-MCNC: 6.4 G/DL (ref 6.4–8.2)
Q-T INTERVAL, ECG07: 420 MS
QRS DURATION, ECG06: 90 MS
QTC CALCULATION (BEZET), ECG08: 446 MS
RBC # BLD AUTO: 4.03 M/UL (ref 4.1–5.7)
SERVICE CMNT-IMP: ABNORMAL
SODIUM SERPL-SCNC: 139 MMOL/L (ref 136–145)
TRIGL SERPL-MCNC: 162 MG/DL (ref ?–150)
VENTRICULAR RATE, ECG03: 68 BPM
VLDLC SERPL CALC-MCNC: 32.4 MG/DL
WBC # BLD AUTO: 7.7 K/UL (ref 4.1–11.1)

## 2017-06-24 PROCEDURE — 77030021566 MRA NECK W CONT

## 2017-06-24 PROCEDURE — 74011250637 HC RX REV CODE- 250/637: Performed by: INTERNAL MEDICINE

## 2017-06-24 PROCEDURE — 82962 GLUCOSE BLOOD TEST: CPT

## 2017-06-24 PROCEDURE — 80053 COMPREHEN METABOLIC PANEL: CPT | Performed by: INTERNAL MEDICINE

## 2017-06-24 PROCEDURE — A9585 GADOBUTROL INJECTION: HCPCS | Performed by: HOSPITALIST

## 2017-06-24 PROCEDURE — 83036 HEMOGLOBIN GLYCOSYLATED A1C: CPT | Performed by: INTERNAL MEDICINE

## 2017-06-24 PROCEDURE — 74011250637 HC RX REV CODE- 250/637: Performed by: HOSPITALIST

## 2017-06-24 PROCEDURE — 36415 COLL VENOUS BLD VENIPUNCTURE: CPT | Performed by: INTERNAL MEDICINE

## 2017-06-24 PROCEDURE — 80061 LIPID PANEL: CPT | Performed by: INTERNAL MEDICINE

## 2017-06-24 PROCEDURE — 97161 PT EVAL LOW COMPLEX 20 MIN: CPT

## 2017-06-24 PROCEDURE — G8979 MOBILITY GOAL STATUS: HCPCS

## 2017-06-24 PROCEDURE — 85027 COMPLETE CBC AUTOMATED: CPT | Performed by: INTERNAL MEDICINE

## 2017-06-24 PROCEDURE — 74011250636 HC RX REV CODE- 250/636: Performed by: HOSPITALIST

## 2017-06-24 PROCEDURE — 97116 GAIT TRAINING THERAPY: CPT

## 2017-06-24 PROCEDURE — 70553 MRI BRAIN STEM W/O & W/DYE: CPT

## 2017-06-24 PROCEDURE — 70544 MR ANGIOGRAPHY HEAD W/O DYE: CPT

## 2017-06-24 PROCEDURE — G8978 MOBILITY CURRENT STATUS: HCPCS

## 2017-06-24 PROCEDURE — 65270000029 HC RM PRIVATE

## 2017-06-24 PROCEDURE — 74011000258 HC RX REV CODE- 258: Performed by: HOSPITALIST

## 2017-06-24 RX ORDER — POTASSIUM CHLORIDE 750 MG/1
40 TABLET, FILM COATED, EXTENDED RELEASE ORAL DAILY
Status: COMPLETED | OUTPATIENT
Start: 2017-06-24 | End: 2017-06-26

## 2017-06-24 RX ORDER — ATORVASTATIN CALCIUM 40 MG/1
40 TABLET, FILM COATED ORAL
Status: DISCONTINUED | OUTPATIENT
Start: 2017-06-24 | End: 2017-06-27 | Stop reason: HOSPADM

## 2017-06-24 RX ADMIN — TAMSULOSIN HYDROCHLORIDE 0.8 MG: 0.4 CAPSULE ORAL at 21:42

## 2017-06-24 RX ADMIN — GADOBUTROL 10 ML: 604.72 INJECTION INTRAVENOUS at 15:58

## 2017-06-24 RX ADMIN — SODIUM CHLORIDE 100 ML: 900 INJECTION, SOLUTION INTRAVENOUS at 15:59

## 2017-06-24 RX ADMIN — METFORMIN HYDROCHLORIDE 250 MG: 500 TABLET, FILM COATED ORAL at 06:53

## 2017-06-24 RX ADMIN — ASPIRIN 81 MG: 81 TABLET, COATED ORAL at 09:21

## 2017-06-24 RX ADMIN — CARVEDILOL 12.5 MG: 12.5 TABLET, FILM COATED ORAL at 06:53

## 2017-06-24 RX ADMIN — METFORMIN HYDROCHLORIDE 250 MG: 500 TABLET, FILM COATED ORAL at 17:00

## 2017-06-24 RX ADMIN — METFORMIN HYDROCHLORIDE 250 MG: 500 TABLET, FILM COATED ORAL at 08:00

## 2017-06-24 RX ADMIN — CARVEDILOL 12.5 MG: 12.5 TABLET, FILM COATED ORAL at 08:00

## 2017-06-24 RX ADMIN — BUPROPION HYDROCHLORIDE 150 MG: 150 TABLET, EXTENDED RELEASE ORAL at 09:21

## 2017-06-24 RX ADMIN — FINASTERIDE 5 MG: 5 TABLET, FILM COATED ORAL at 21:42

## 2017-06-24 RX ADMIN — CARVEDILOL 12.5 MG: 12.5 TABLET, FILM COATED ORAL at 17:00

## 2017-06-24 RX ADMIN — POTASSIUM CHLORIDE 10 MEQ: 750 TABLET, FILM COATED, EXTENDED RELEASE ORAL at 11:40

## 2017-06-24 RX ADMIN — BUPROPION HYDROCHLORIDE 150 MG: 150 TABLET, EXTENDED RELEASE ORAL at 21:42

## 2017-06-24 RX ADMIN — POTASSIUM CHLORIDE 40 MEQ: 750 TABLET, FILM COATED, EXTENDED RELEASE ORAL at 17:00

## 2017-06-24 RX ADMIN — ATORVASTATIN CALCIUM 40 MG: 40 TABLET, FILM COATED ORAL at 21:42

## 2017-06-24 RX ADMIN — THERA TABS 1 TABLET: TAB at 09:21

## 2017-06-24 NOTE — PROGRESS NOTES
Primary Nurse Tania Lee RN and Tierney Dailey RN performed a dual skin assessment on this patient No impairment noted  Ilya score is 22.

## 2017-06-24 NOTE — CONSULTS
NEUROLOGY  6/24/2017     Consulted by: Rafael Griggs MD        Patient ID:  Ranjan Magana  [id number]  74 y.o.  1950    Chief Complaint   Patient presents with    Leg Pain       HPI    Mr. Ranjan Magana is a 78-year-old gentleman with hypertension, diabetes, and a history of stroke in 1997 with residual left leg weakness here for worsening left leg weakness. He tells me yesterday he was at the gas station and noticed that when he got out of the car he had to lean on the vehicle for support as his left leg felt weaker than normal.  He denies any upper extremity symptoms or vision change or mentation change. Symptoms continue to persist and he came to the hospital.  He is on aspirin 81 mg and Lipitor daily. Head CT showed an old right MCA infarct with bilateral basal ganglia infarcts as well. Today he thinks he is approaching his baseline. Still no additional symptoms which is reassuring. No headache. No recent changes. Review of Systems   Neurological: Positive for focal weakness. All other systems reviewed and are negative.       Past Medical History:   Diagnosis Date    Agoraphobia with panic disorder     Anxiety     BPH (benign prostatic hypertrophy) with urinary obstruction     CAD (coronary artery disease)     no previous MI or stents    Chronic edema     Coronary artery disease involving native coronary artery without angina pectoris 3/28/2017    CVD (cardiovascular disease)     Depression     Diabetes ([address]     Erectile dysfunction     Essential hypertension 3/28/2017    GERD (gastroesophageal reflux disease)     Hemiparesis (HCC)     History of stroke 3/28/2017    Hypercholesterolemia     Hypercholesterolemia 3/28/2017    Hypertension     Nocturia     Panic disorder     Stroke [facility])      Family History   Problem Relation Age of Onset    Cancer Father     Stroke Father     Hypertension Father     Cancer Mother     Hypertension Sister     Heart Disease Brother Social History     Social History    Marital status:      Spouse name: N/A    Number of children: N/A    Years of education: N/A     Occupational History    Not on file. Social History Main Topics    Smoking status: Never Smoker    Smokeless tobacco: Never Used    Alcohol use Yes    Drug use: No    Sexual activity: Yes     Partners: Female     Other Topics Concern    Not on file     Social History Narrative     Current Facility-Administered Medications   Medication Dose Route Frequency    atorvastatin (LIPITOR) tablet 40 mg  40 mg Oral QHS    carvedilol (COREG) tablet 12.5 mg  12.5 mg Oral BID WITH MEALS    finasteride (PROSCAR) tablet 5 mg  5 mg Oral QHS    buPROPion SR (WELLBUTRIN SR) tablet 150 mg  150 mg Oral BID    metFORMIN (GLUCOPHAGE) tablet 250 mg  250 mg Oral BID WITH MEALS    aspirin delayed-release tablet 81 mg  81 mg Oral DAILY    therapeutic multivitamin (THERAGRAN) tablet 1 Tab  1 Tab Oral DAILY    HYDROcodone-acetaminophen (NORCO) 5-325 mg per tablet 1 Tab  1 Tab Oral Q4H PRN    tamsulosin (FLOMAX) capsule 0.8 mg  0.8 mg Oral QHS    sodium chloride (NS) flush 5-10 mL  5-10 mL IntraVENous PRN    acetaminophen (TYLENOL) tablet 650 mg  650 mg Oral Q4H PRN    Or    acetaminophen (TYLENOL) solution 650 mg  650 mg Per NG tube Q4H PRN    Or    acetaminophen (TYLENOL) suppository 650 mg  650 mg Rectal Q4H PRN    meperidine (DEMEROL) injection 12.5 mg  12.5 mg IntraVENous Q4H PRN    potassium chloride SR (KLOR-CON 10) tablet 10 mEq  10 mEq Oral DAILY AFTER BREAKFAST     No Known Allergies    Visit Vitals    /81 (BP 1 Location: Left arm, BP Patient Position: At rest)    Pulse 60    Temp 97.5 °F (36.4 °C)    Resp 18    Ht 5' 11\" (1.803 m)    Wt 98.4 kg (217 lb)    SpO2 94%    BMI 30.27 kg/m2     Physical Exam   Constitutional: He is oriented to person, place, and time. He appears well-developed and well-nourished. Cardiovascular: Normal rate. Pulmonary/Chest: Effort normal.   Neurological: He is oriented to person, place, and time. Skin: Skin is warm and dry. Psychiatric: His speech is normal.   Vitals reviewed. Neurologic Exam     Mental Status   Oriented to person, place, and time. Attention: normal.   Speech: speech is normal   Level of consciousness: alert    Cranial Nerves   Cranial nerves II through XII intact. CN VII   Left facial weakness: central    Motor Exam   Muscle bulk: normal  Left arm tone: normal  Right arm pronator drift: absent  Left arm pronator drift: absent  Left leg tone: increased       Left leg with chronic atrophy secondary to weakness. 4+ motor strength     Sensory Exam   Light touch normal.     Gait, Coordination, and Reflexes     Gait  Gait: (Wide and cautious)    Tremor   Resting tremor: absent           Lab Results  Component Value Date/Time   WBC 7.7 06/24/2017 05:59 AM   HGB 12.5 06/24/2017 05:59 AM   HCT 37.0 06/24/2017 05:59 AM   PLATELET 995 96/52/7401 05:59 AM   MCV 91.8 06/24/2017 05:59 AM     Lab Results  Component Value Date/Time   Hemoglobin A1c 6.1 06/24/2017 05:59 AM   Hemoglobin A1c 5.8 03/28/2017 01:08 PM   Glucose 118 06/24/2017 05:59 AM   Glucose (POC) 110 06/24/2017 06:39 AM   Microalb/Creat ratio (ug/mg creat.) 82.2 03/28/2017 01:09 PM   LDL, calculated 81.6 06/24/2017 05:59 AM   Creatinine 1.12 06/24/2017 05:59 AM             CT Results (maximum last 3): Results from [place] encounter on 06/23/17   CT HEAD WO CONT   Narrative EXAM:  CT HEAD WITHOUT CONTRAST  INDICATION: Leg weakness. COMPARISON: None. CONTRAST: None. TECHNIQUE: Unenhanced CT of the head was performed using 5 mm images. Brain and  bone windows were generated. Sagittal and coronal reformations were generated. CT dose reduction was achieved through use of a standardized protocol tailored  for this examination and automatic exposure control for dose modulation.  CT dose  reduction was achieved through use of a standardized protocol tailored for this  examination and automatic exposure control for dose modulation. Adaptive  statistical iterative reconstruction (ASIR) was utilized for this examination. FINDINGS:  The ventricles and sulci are normal in size, shape and configuration and  midline. There is arthroscopic calcification of the vertebral and carotid  arteries and there is patchy decreased attenuation in the periventricular white  matter which is nonspecific but consistent with small vessel disease. There are  small low attenuation basal ganglia lacunar infarcts. There is an area of  encephalomalacia in the right frontoparietal white matter consistent with an old  infarct. There is no intracranial hemorrhage. There is no extra-axial  collection, mass, mass effect or midline shift. The basilar cisterns are open. No acute infarct is identified. The bone windows demonstrate no abnormalities. The visualized portions of the paranasal sinuses and mastoid air cells are  clear. Impression IMPRESSION: Atherosclerosis with microvascular disease, small basal ganglia  lacunar infarcts and old right frontoparietal infarct. No acute intracranial  normality. Results from [facility] encounter on 08/15/16   CT ABD PELV WO CONT   Narrative [id number] [facility] II: Cat Scan  [place] [id number]  [phone]      Imaging Result     Name:     Joana Rowley ([id number])  Sex: Althea Tarangonox :  1950  77year old    Patient Class: Outpatient Private  Diagnosis:  Low back pain [M54.5 (ICD-10-CM)]  Kidney stone [N20.0 (ICD-10-CM)]               Procedures Performed:  Exam Date/Time:  Reason for Exam:   CT ABDOMEN/PELVIS W/O CONTRAST  [id number]   08/15/2016  3:06 PM    None Specified             INDICATION: Low back pain; kidney stone    .  COMPARISON: Renal ultrasound 2016. No prior CT is available.   .  TECHNIQUE: Thin-section images were obtained of the abdomen and pelvis without oral or intravenous contrast. Supplemental 2D reformatted images were generated and reviewed as needed.       Automated exposure control adjustment of mA and Kv according to patient size and/or iterative reconstruction technique were used for this exam. DLP = 1635.54 mGy-cm     LIMITATIONS: Lack of intravenous contrast reduces the sensitivity of CT for the detection of abnormalities in all organs and vascular structures. GI tract evaluation limited by lack of enteric contrast.     FINDINGS:  LOWER CHEST:  The lung bases are clear except for a bandlike scar or atelectasis in the left lower lobe. No basilar pleural effusions or pericardial effusion. Normal heart size. Calcifications of the coronary arteries and included descending thoracic aorta.     ABDOMEN:  .  Liver: No acute findings. Normal attenuation and contours. Numerous subcentimeter hypodensities in both hepatic lobes are too small to accurately characterize. A larger well circumscribed hypoattenuating lesion in the posterior right hepatic lobe on image 27 measures 1.5 x 1.4 cm and 6 Hounsfield units. While suboptimally characterized without IV contrast, this is most likely a benign cyst.  .  Gallbladder/biliary: Within normal limits. .  Pancreas: Within normal limits. .  Spleen: Within normal limits. .  Adrenals: No masses. .  Kidneys: Hypoattenuating lesions in the lateral interpolar left kidney correlating with immediately adjacent cysts seen on the ultrasound. The more inferior of these may have a tiny calcification on image 52. There is also a small cyst in the left lower pole that was also seen on the previous ultrasound. No hydronephrosis. Bilateral renal vascular calcifications. There also a couple of punctate nonobstructing stones in the left kidney. Larger stone burden in the right kidney, especially in the lower pole.  Partially branching dominant calculus in this region about 1.9 x 1.2 cm with a couple adjacent smaller fragments. 4 x 6 mm stone also seen in posterior interpolar right kidney. .  Peritoneum/mesenteries: Within normal limits. .  Extraperitoneum: Within normal limits.    .  Gastrointestinal tract: No acute findings. No evidence of acute appendicitis. Tiny appendicolith. Gastric band is unremarkable. Saida Prows PELVIS:  .  Peritoneum: Within normal limits. .  Extraperitoneum: Within normal limits. .  Ureters: No stones, dilatation, or significant stranding. .  Bladder: Tiny calcification along posterior aspect of the bladder image 104. .  Reproductive System: Prostate calcifications. Prostate borderline enlarged. .  MSK: Severe disc space narrowing at L4-5 with slight retrolisthesis. Mild to moderate (about 40%) height loss of L2 superior endplate with central and anterior depression. Correlating with coronal reformats this is probably acute or subacute. A compression fracture of L1 superior endplate also noted without the same degree of height loss. No bony retropulsion or significant kyphotic deformity. No posterior element fractures. There is a minimal concave depression of the L3 inferior endplate which is age indeterminate. No suspicious osteolytic or blastic lesions. Osteoarthritis of the bilateral hips. Possible calcified loose body medial aspect left hip joint.     VASCULAR: Moderate atherosclerotic calcification without abdominal aortic aneurysm. Ectasia of the celiac artery.     Additional comments: Results called to Dr. Erlin Priest. Patient was discharged home per his instruction and instructed to followup with his primary care physician. Dr. Christophe Shannon (covering for Dr. Ruelas Or, his PCP) was notified about the pertinent findings as well. IMPRESSION  IMPRESSION:  1. No ureteral stone or hydronephrosis. Bilateral nephrolithiasis, with dominant partially branching calculus right lower pole.   2. Tiny calcification along dependent aspect of urinary bladder in midline.    3. L1 and L2 compression fractures, favor acute or subacute given history of recent fall and back pain. Minimal depression of L3 inferior endplate also noted that is age-indeterminate. MRI could further evaluate acuity if clinically indicated.       Dictated by: Litzy Fisher on Mon Aug 15, 2016  3:19:39 PM EDT     Signed By:Raoul Chavarria DO 8/15/2016  3:49 PM        ~~This report was copied and pasted from the servicing EHR system. ~~                Assessment and Plan        53-year-old gentleman with history of stroke and residual left leg weakness having worsening symptoms transiently. Unclear etiology as typically new ischemic events do not occur with the same exact presentation. Nonetheless given his risk factors and presentation, it is appropriate to obtain the MRI and MRA. Head CT does show old infarcts bilaterally. Continue aspirin and Lipitor for now. Rehab needs. Complete the stroke workup. If his imaging is benign I anticipate discharge with continued physical therapy. Neurology will follow up on the imaging if it is abnormal.     During this evaluation, we also discussed stroke education to include signs and symptoms of stroke and TIA.        [address] [place] [address]  NEUROLOGIST  Diplomate ABPN  6/24/2017

## 2017-06-24 NOTE — PROGRESS NOTES
Hospitalist Progress Note  Sis Cruz MD  Office: [phone]        Date of Service:  2017  NAME:  Elis Sahu  :  1950  MRN:  [id number]      Admission Summary: This is a 53-year-old male with a history of hypertension, diabetes,hemorrhagic stroke with residual left sided hemiparesis,legs more than legs presented with worsening weakness and incoordination with having trouble with gait on the left side. Otherwise negative for dysarthria,facial droop,diplopia,headache. Past Medical History:   Diagnosis Date    Agoraphobia with panic disorder     Anxiety     BPH (benign prostatic hypertrophy) with urinary obstruction     CAD (coronary artery disease)     no previous MI or stents    Chronic edema     Coronary artery disease involving native coronary artery without angina pectoris 3/28/2017    Depression     Diabetes ([address]     Erectile dysfunction     Essential hypertension 3/28/2017    GERD (gastroesophageal reflux disease)     Hemiparesis (HCC)     History of stroke 3/28/2017    Hypercholesterolemia      Interval history / Subjective:     Left leg still weaker than usual.     Assessment & Plan:   Worsening weakness and incoordiantion of the left leg concerning for a new event.  -History of hemorrhagic stroke with residual left hemiparesis in . He normally walks with or without cane  -Head CT negative  -On asa,increased Lipitor  -MRI brain,MRA H&N pending. Seen by neurologist.  -Physical therapy evaluated and recommended rehab. Patient would prefer to return home to continue with his out patient PT he is doing now. Muscular atrophy of the left leg: due to old stroke. Hypertension:fair. Continue current regimen  DM II on metformin. A1c is excellent. Continue  -Accucheks and Humalog SS while inpatient. H/o CAD: on asa, Lipitor,coreg,benazepril  H/o depression: on Wellbutrin    Hypokalemia: replace. Monitor in Am Code status: full  DVT prophylaxis: scd  Care Plan discussed with: Patient/Family  Disposition: TBD    Hospital Problems  Date Reviewed: 5/3/2017          Codes Class Noted POA    Left leg weakness ICD-10-CM: R29.898  ICD-9-CM: 729.89  6/24/2017 Unknown        * (Principal)TIA (transient ischemic attack) ICD-10-CM: G45.9  ICD-9-CM: 435.9  6/23/2017 Unknown                Review of Systems:   A comprehensive review of systems was negative except for that written in the HPI. Vital Signs:    Last 24hrs VS reviewed since prior progress note. Most recent are:  Visit Vitals    /85 (BP 1 Location: Left arm, BP Patient Position: Sitting)    Pulse 72    Temp 98 °F (36.7 °C)    Resp 16    Ht 5' 11\" (1.803 m)    Wt 98.4 kg (217 lb)    SpO2 97%    BMI 30.27 kg/m2         Intake/Output Summary (Last 24 hours) at 06/24/17 1454  Last data filed at 06/24/17 1217   Gross per 24 hour   Intake              550 ml   Output                0 ml   Net              550 ml        Physical Examination:             Constitutional:  No acute distress, cooperative, pleasant    ENT:  Oral mucous moist, oropharynx benign. Neck supple,    Resp:  CTA bilaterally. No wheezing/rhonchi/rales. No accessory muscle use   CV:  Regular rhythm, normal rate, no murmurs, gallops, rubs    GI:  Soft, non distended, non tender. normoactive bowel sounds, no hepatosplenomegaly     Musculoskeletal:  No edema, warm, 2+ pulses throughout    Neurologic:    ·  Mental status: alert and oriented x4  · Cranial nerves grossly normal  · Motor exam: LUE 4+/5,LLE 3/5  · Sensation : light touch sensation symmetrical  · Gait not tested:discoordinated per physical therapist  · Meningeal signs negative. Psych:  Good insight, Not anxious nor agitated.        Data Review:    Review and/or order of clinical lab test  Review and/or order of tests in the radiology section of CPT  Review and/or order of tests in the medicine section of CPT      Labs:     Recent Labs      06/24/17   0559  06/23/17   1737   WBC  7.7  8.6   HGB  12.5  13.3   HCT  37.0  39.5   PLT  222  233     Recent Labs      06/24/17   0559  06/23/17   1737   NA  139  143   K  3.2*  3.3*   CL  104  105   CO2  28  31   BUN  22*  23*   CREA  1.12  1.25   GLU  118*  105*   CA  8.3*  9.1     Recent Labs      06/24/17   0559  06/23/17   1737   SGOT  13*  15   ALT  26  32   AP  64  74   TBILI  0.6  0.7   TP  6.4  7.4   ALB  3.3*  3.5   GLOB  3.1  3.9     Recent Labs      06/23/17   1737  06/23/17   1650   INR  1.0  <0.9   PTP  10.5   --    APTT  27.8   --       No results for input(s): FE, TIBC, PSAT, FERR in the last 72 hours. No results found for: FOL, RBCF   No results for input(s): PH, PCO2, PO2 in the last 72 hours. No results for input(s): CPK, CKNDX, TROIQ in the last 72 hours.     No lab exists for component: CPKMB  Lab Results   Component Value Date/Time    Cholesterol, total 173 06/24/2017 05:59 AM    HDL Cholesterol 59 06/24/2017 05:59 AM    LDL, calculated 81.6 06/24/2017 05:59 AM    Triglyceride 162 06/24/2017 05:59 AM    CHOL/HDL Ratio 2.9 06/24/2017 05:59 AM     Lab Results   Component Value Date/Time    Glucose (POC) 110 06/24/2017 06:39 AM    Glucose (POC) 114 06/23/2017 04:47 PM     Lab Results   Component Value Date/Time    Color YELLOW/STRAW 06/23/2017 11:19 PM    Appearance CLEAR 06/23/2017 11:19 PM    Specific gravity 1.018 06/23/2017 11:19 PM    pH (UA) 7.0 06/23/2017 11:19 PM    Protein NEGATIVE  06/23/2017 11:19 PM    Glucose NEGATIVE  06/23/2017 11:19 PM    Ketone NEGATIVE  06/23/2017 11:19 PM    Bilirubin NEGATIVE  06/23/2017 11:19 PM    Urobilinogen 0.2 06/23/2017 11:19 PM    Nitrites NEGATIVE  06/23/2017 11:19 PM    Leukocyte Esterase NEGATIVE  06/23/2017 11:19 PM         Medications Reviewed:     Current Facility-Administered Medications   Medication Dose Route Frequency    atorvastatin (LIPITOR) tablet 40 mg  40 mg Oral QHS    carvedilol (COREG) tablet 12.5 mg  12.5 mg Oral BID WITH MEALS    finasteride (PROSCAR) tablet 5 mg  5 mg Oral QHS    buPROPion SR (WELLBUTRIN SR) tablet 150 mg  150 mg Oral BID    metFORMIN (GLUCOPHAGE) tablet 250 mg  250 mg Oral BID WITH MEALS    aspirin delayed-release tablet 81 mg  81 mg Oral DAILY    therapeutic multivitamin (THERAGRAN) tablet 1 Tab  1 Tab Oral DAILY    HYDROcodone-acetaminophen (NORCO) 5-325 mg per tablet 1 Tab  1 Tab Oral Q4H PRN    tamsulosin (FLOMAX) capsule 0.8 mg  0.8 mg Oral QHS    sodium chloride (NS) flush 5-10 mL  5-10 mL IntraVENous PRN    acetaminophen (TYLENOL) tablet 650 mg  650 mg Oral Q4H PRN    Or    acetaminophen (TYLENOL) solution 650 mg  650 mg Per NG tube Q4H PRN    Or    acetaminophen (TYLENOL) suppository 650 mg  650 mg Rectal Q4H PRN    meperidine (DEMEROL) injection 12.5 mg  12.5 mg IntraVENous Q4H PRN    potassium chloride SR (KLOR-CON 10) tablet 10 mEq  10 mEq Oral DAILY AFTER BREAKFAST     ______________________________________________________________________  EXPECTED LENGTH OF STAY: - - -  ACTUAL LENGTH OF STAY:          1                 Johnita Cockayne, MD

## 2017-06-24 NOTE — PROGRESS NOTES
Spiritual Care Partner Volunteer visited patient in 2N on 6.24.17.     Documented by:  Isidro Del Castillo, Staff   Please call [phone] to page  if needed

## 2017-06-24 NOTE — ED NOTES
TRANSFER - OUT REPORT:    Verbal report given to Theresa Story RN on Dago Acevedo  being transferred to [address] [place] [address] (unit) for routine progression of care       Report consisted of patients Situation, Background, Assessment and   Recommendations(SBAR). Information from the following report(s) ED Summary was reviewed with the receiving nurse. Lines:   Peripheral IV 06/23/17 Right Antecubital (Active)        Opportunity for questions and clarification was provided.       Patient transported with:  Transport

## 2017-06-24 NOTE — H&P
[address] [facility] [place] [address]   HISTORY AND PHYSICAL       Name:  Carlee Ferreira   MR#:  [id number]   :  1950   Account #:  [de-identified]        Date of Adm:  2017       CHIEF COMPLAINT: Weakness of the left lower extremity since this   a.m. HISTORY OF PRESENT ILLNESS: The patient is a 40-year-old male   with a history of hypertension, diabetes, also has a history of stroke in    which has got paresis in the left lower extremity. The patient was   doing fine until this morning when tried to get out of his car, felt   increased heaviness and weakness of the left leg and also had gait   instability. In the patient's left foot the weakness was more pronounced   than Rt. . The patient drove to the home and was brought to the ER by   his spouse. At this time, the patient claims the slight weakness he   had experienced this morning has persisted. The CT scan that was   done in the ER of the brain did not show any bleed or new   stroke. Denies complaints of any chest pain, chest pressure, shortness   of breath. Denies having any dizziness, nausea, vomiting, diarrhea. Denies   having urgency, frequency or incontinence of urination. PAST MEDICAL HISTORY: History of TIA as mentioned above. History of BPH, history of erectile dysfunction. Past medical history of   renal stones, history of diabetes, history of hypertension, . PAST SURGICAL HISTORY: History of lumbar diskectomy. Also has a   history of Lap-Band, history of colonoscopy. ALLERGIES: NO KNOWN ALLERGIES. MEDICATION LIST   As follows, the patient is on:    1. Lotrel 5/20 mg p.o. daily. 2. Aspirin 81 mg p.o. daily. 3. Lipitor 20 mg p.o. daily. 4. Wellbutrin  mg 2 twice a day. 5. Carvedilol 12.5 mg b.i.d.    6. Proscar 5 mg p.o. at bedtime. 7. Hydrochlorothiazide 25 mg p.o. daily. 8. Metformin 500 mg tablet 1/2 tablet p.o. b.i.d.    9. Flomax 0.4 mg p.o. daily.     10. Multivitamin. SOCIAL HISTORY: Nonsmoker, no alcohol use. FAMILY HISTORY: The patient's brother has had a history of coronary   artery disease, father  of cancer and had hypertension and stroke. Mother also had cancer. REVIEW OF SYSTEMS   CONSTITUTIONAL: Negative fevers, negative chills, negative body   aches, negative weight loss. HEENT: Negative visual disturbance, negative photophobia, negative   phonophobia, negative headache. PULMONARY: Negative wheezing. Negative pleuritic chest pain. CARDIOVASCULAR: Negative chest pain. Negative shortness of   breath. Negative paroxysmal nocturnal dyspnea. GASTROINTESTINAL: Negative vomiting. Negative diarrhea. Negative   hematochezia. GENITOURINARY: Positive frequency of urination. SKIN: Negative rash. MUSCULOSKELETAL: Negative joint aches. Negative arthralgias. PHYSICAL EXAMINATION   VITAL SIGNS: The latest vitals were as follows: Pulse rate of 61, blood   pressure of 150/78, respirations of 15, saturation of 98%. HEENT: Pupils were equal, react to light. No pallor, no icterus. Extraocular movement was intact. NECK: No JVD was noted, neck was supple. LUNGS: Clear. CARDIOVASCULAR: S1, S2 audible. No S3 or S4. ABDOMEN: Soft and nontender. No guarding, no rigidity, no rebound. CRANIAL NERVES: Cranial nerves 2 through 12 was intact. Strength   5/5 bilateral upper extremities. On the left lower extremity, the strength   was about 3/5; right lower extremity 5/5. Reflexes was mildly +2   refluxes all 4 extremities. Plantars were bilaterally downgoing. LABS: Sodium of 143, potassium of 3.3, chloride of 105, anion gap of   7, glucose of 105, BUN of 23, creatinine 1.25, calcium of 9.1. ALT,   AST, alkaline phosphatase was within normal limits. Albumin and   globulin was also within normal limits. INR was normal, APPT was   normal. CBC was within normal limits.  CT scan of the head without   contrast showed atherosclerosis with microvascular disease; there is a   small basal ganglia lacunar infarct; and old right frontoparietal infarct;   no acute intracranial abnormality was noted. ASSESSMENT AND PLAN   1. Possible transient ischemic attack. The patient will be   observed, aspirin will be continued. Neurology consult has been placed. Will also order MRI and MRA scan in the morning. 2. Prior history of coronary artery disease. No history of  CABG or stents. Continue with the carvedilol, statins at this time. 3. History of depression. Continue with bupropion. 4. Diabetes. Overall well-controlled. Continue with the metformin. 5. Erectile dysfunction. Currently on Viagra, will hold for now. 6. Hypertension. Continue with the Lotrel and the carvedilol for now. 7. History of panic disorder. Continue with the bupropion for now.         MD JEN Benton / LEON   D:  06/23/2017   19:43   T:  06/23/2017   22:38   Job #:  [id number]

## 2017-06-24 NOTE — PROGRESS NOTES
Bedside shift change report given to Sean Rosado RN (oncoming nurse) by Ginette Castellanos RN (offgoing nurse). Report included the following information SBAR, Kardex, Intake/Output, MAR and Recent Results.

## 2017-06-24 NOTE — PROGRESS NOTES
Problem: Mobility Impaired (Adult and Pediatric)  Goal: *Acute Goals and Plan of Care (Insert Text)  Physical Therapy Goals  Initiated 6/24/2017  1. Patient will move from supine to sit and sit to supine , scoot up and down and roll side to side in bed with independence within 7 day(s). 2. Patient will transfer from bed to chair and chair to bed with modified independence using the least restrictive device within 7 day(s). 3. Patient will perform sit to stand with modified independence within 7 day(s). 4. Patient will ambulate with supervision/set-up for 100 feet with the least restrictive device within 7 day(s). 5. Patient will improve Haas Balance score by 7 points within 7 days. PHYSICAL THERAPY EVALUATION- NEURO POPULATION     Patient: Nancy Garvey (2 y.o. male)  Date: 6/24/2017  Primary Diagnosis: TIA (transient ischemic attack)  Precautions:   Fall (old CVAs) with L sided weakness      ASSESSMENT :  Based on the objective data described below, the patient presents with impaired balance, increased fall risk, decreased insight into safety and deficits, baseline L LE weakness, poor coordination of L LE, and need for increased assist with gait. Patient received supine in bed, agreeable to therapy. Upon MMT of L LE, patient ~3+/5, with increased weakness proximally and inability to DF L LE (although he states this is baseline). Need for increased assist for sit>stand from low bed surface and increased posterior lean on standing. During gait trial with his SPC, patient required mod-max A to steady and was essentially unable to clear L LE from floor during swing phase. Able to use RW for gait, however still required mod A to steady and max cueing for sequencing and slowing pace for safety.  Tendency to take narrowed steps, close to scissoring with L LE and able to briefly follow cues to over exaggerate widening SHADIA when stepping with L. Gait is highly impaired at this time, therefore recommend urinal use and stand pivot with assist x2 to [facility]. Patient is at [address] as evidenced by Junior Foster Balance Test score 5/56. Highly recommend inpatient rehab for continued balance and gait training to increase patient's likelihood of returning to baseline. Not safe for d/c home at this time as he would require moderate assist x1 person to assist with gait to prevent falls. Patient will benefit from skilled intervention to address the above impairments. Patients rehabilitation potential is considered to be Good  Factors which may influence rehabilitation potential include:   [ ]           None noted  [ ]           Mental ability/status  [X]           Medical condition  [ ]           Home/family situation and support systems  [X]           Safety awareness  [ ]           Pain tolerance/management  [ ]           Other:        PLAN :  Recommendations and Planned Interventions:  [X]             Bed Mobility Training             [X]      Neuromuscular Re-Education  [X]             Transfer Training                   [ ]      Orthotic/Prosthetic Training  [X]             Gait Training                         [ ]      Modalities  [X]             Therapeutic Exercises           [ ]      Edema Management/Control  [X]             Therapeutic Activities            [X]      Patient and Family Training/Education  [ ]             Other (comment):  Frequency/Duration: Patient will be followed by physical therapy 5 times a week to address goals. Discharge Recommendations: Inpatient Rehab  Further Equipment Recommendations for Discharge: TBD at rehab       SUBJECTIVE:   Patient stated I don't really think I need that.  re: inpatient rehab      OBJECTIVE DATA SUMMARY:   HISTORY:    Past Medical History:   Diagnosis Date    Agoraphobia with panic disorder      Anxiety      BPH (benign prostatic hypertrophy) with urinary obstruction      CAD (coronary artery disease)       no previous MI or stents    Chronic edema      Coronary artery disease involving native coronary artery without angina pectoris 3/28/2017    CVD (cardiovascular disease)      Depression      Diabetes ([facility] [address]      Erectile dysfunction      Essential hypertension 3/28/2017    GERD (gastroesophageal reflux disease)      Hemiparesis (HCC)      History of stroke 3/28/2017    Hypercholesterolemia      Hypercholesterolemia 3/28/2017    Hypertension      Nocturia      Panic disorder      Stroke [facility])       Past Surgical History:   Procedure Laterality Date    HX COLONOSCOPY        HX LUMBAR DISKECTOMY        HX ORTHOPAEDIC         right finger repair    HX OTHER SURGICAL         lap band    HX OTHER SURGICAL         Lap band     Prior Level of Function/Home Situation: Ambulates with cane; was going to outpatient PT PTA for L LE strengthening and gait mechanics (knee hyperextension, and increased supination on L); driving; mod I; lives with wife  Personal factors and/or comorbidities impacting plan of care: PMH     Home Situation  Home Environment: Apartment  # Steps to Enter: 0  One/Two Story Residence: Two story  # of Interior Steps: 0  Living Alone: No  Support Systems: Spouse/Significant Other/Partner  Patient Expects to be Discharged to[de-identified] Apartment  Current DME Used/Available at Home: Cane, straight     EXAMINATION/PRESENTATION/DECISION MAKING:   Critical Behavior:  Neurologic State: Alert  Orientation Level: Oriented X4  Cognition: Appropriate decision making, Appropriate for age attention/concentration, Appropriate safety awareness, Follows commands  Hearing: Auditory  Auditory Impairment: None  Range Of Motion:  AROM: Within functional limits  Strength:    Strength: Generally decreased, functional (L LE> R)  Tone & Sensation:   Tone: Normal  Sensation: Intact  Coordination:  Coordination: Generally decreased, functional (L LE)  Functional Mobility:  Bed Mobility:  Supine to Sit: Supervision; Additional time  Scooting: Supervision  Transfers:  Sit to Stand: Moderate assistance;Assist x1;Additional time  Stand to Sit: Moderate assistance;Assist x1;Additional time  Balance:   Sitting: Impaired; Without support  Sitting - Static: Good (unsupported)  Sitting - Dynamic: Fair (occasional)  Standing: Impaired; With support  Standing - Static: Fair  Standing - Dynamic : Fair  Ambulation/Gait Training:  Distance (ft): 35 Feet (ft)  Assistive Device: Walker, rolling;Gait belt;Cane, straight (attempted gait x3 ft with SPC)  Ambulation - Level of Assistance: Minimal assistance; Moderate assistance;Assist x1  Gait Abnormalities: Decreased step clearance; Step to gait;Scissoring (L LE; foot flat, stiff LE gait on L; poor coordination of L )  Base of Support: Shift to right;Narrowed  Stance: Left decreased;Right increased  Speed/Alexia: Slow  Step Length: Left shortened  Swing Pattern: Left asymmetrical     Functional Measure  Haas Balance Test:      Sitting to Standin  Standing Unsupported: 0  Sitting with Back Unsupported: 4  Standing to Sittin  Transfers: 1  Standing Unsupported with Eyes Closed: 0  Standing Unsupported with Feet Together: 0  Reach Forward with Outstretched Arm: 0   Object: 0  Turn to Look Over Shoulders: 0  Turn 360 Degrees: 0  Alternate Foot on Step/Stool: 0  Standing Unsupported One Foot in Front: 0  Stand on One Le  Total: 5             56=Maximum possible score;   0-20=High fall risk  21-40=Moderate fall risk   41-56=Low fall risk      Haas Balance Test and G-code impairment scale:  Percentage of Impairment CH     0%    CI     1-19% CJ     20-39% CK     40-59% CL     60-79% CM     80-99% CN      100%   Haas   Score 0-56 56 45-55 34-44 23-33 12-22 1-11 0         G codes: In compliance with CMSs Claims Based Outcome Reporting, the following G-code set was chosen for this patient based on their primary functional limitation being treated:      The outcome measure chosen to determine the severity of the functional limitation was the Maybeury Balance Test with a score of 5/56 which was correlated with the impairment scale. · Mobility - Walking and Moving Around:               - CURRENT STATUS:    CM - 80%-99% impaired, limited or restricted               - GOAL STATUS:           CL - 60%-79% impaired, limited or restricted               - D/C STATUS:                       ---------------To be determined---------------       Physical Therapy Evaluation Charge Determination   History Examination Presentation Decision-Making   HIGH Complexity :3+ comorbidities / personal factors will impact the outcome/ POC  MEDIUM Complexity : 3 Standardized tests and measures addressing body structure, function, activity limitation and / or participation in recreation  LOW Complexity : Stable, uncomplicated  Other outcome measures Haas Balance Test  HIGH       Based on the above components, the patient evaluation is determined to be of the following complexity level: LOW   Pain:  Pain Scale 1: Numeric (0 - 10)  Pain Intensity 1: 0  Activity Tolerance:   Good  Please refer to the flowsheet for vital signs taken during this treatment. After treatment:   [ ]     Patient left in no apparent distress sitting up in chair  [X]     Patient left in no apparent distress in bed  [X]     Call bell left within reach  [X]     Nursing notified  [ ]     Caregiver present  [ ]     Bed alarm activated      COMMUNICATION/EDUCATION:   The patients plan of care was discussed with: Registered Nurse and Physician. Patient was educated regarding His deficit(s) of impaired coordination of L LE as this relates to His diagnosis of CVA? TIA. He demonstrated Fair understanding as evidenced by discussion about d/c planning. Patient and/or family was verbally educated on the BE FAST acronym for signs/symptoms of CVA and TIA. BE FAST was written on patient's communication board  for visual education and reinforcement.   All questions answered with patient indicating good understanding. Written on patient's whiteboard     [X]  Fall prevention education was provided and the patient/caregiver indicated understanding. [X]  Patient/family have participated as able in goal setting and plan of care. [X]  Patient/family agree to work toward stated goals and plan of care. [ ]  Patient understands intent and goals of therapy, but is neutral about his/her participation. [ ]  Patient is unable to participate in goal setting and plan of care.      Thank you for this referral.  Fritz Fritz, PT, DPT   Time Calculation: 22 mins

## 2017-06-25 PROBLEM — I67.82 CHRONIC CEREBRAL ISCHEMIA: Status: ACTIVE | Noted: 2017-06-25

## 2017-06-25 PROBLEM — G45.1 HEMISPHERIC CAROTID ARTERY SYNDROME: Status: ACTIVE | Noted: 2017-06-23

## 2017-06-25 LAB
ANION GAP BLD CALC-SCNC: 7 MMOL/L (ref 5–15)
BUN SERPL-MCNC: 19 MG/DL (ref 6–20)
BUN/CREAT SERPL: 18 (ref 12–20)
CALCIUM SERPL-MCNC: 8.7 MG/DL (ref 8.5–10.1)
CHLORIDE SERPL-SCNC: 108 MMOL/L (ref 97–108)
CK SERPL-CCNC: 76 U/L (ref 39–308)
CO2 SERPL-SCNC: 27 MMOL/L (ref 21–32)
CREAT SERPL-MCNC: 1.06 MG/DL (ref 0.7–1.3)
GLUCOSE SERPL-MCNC: 119 MG/DL (ref 65–100)
POTASSIUM SERPL-SCNC: 3.6 MMOL/L (ref 3.5–5.1)
SODIUM SERPL-SCNC: 142 MMOL/L (ref 136–145)
TSH SERPL DL<=0.05 MIU/L-ACNC: 1.75 UIU/ML (ref 0.36–3.74)

## 2017-06-25 PROCEDURE — 97165 OT EVAL LOW COMPLEX 30 MIN: CPT

## 2017-06-25 PROCEDURE — 80048 BASIC METABOLIC PNL TOTAL CA: CPT | Performed by: HOSPITALIST

## 2017-06-25 PROCEDURE — 82550 ASSAY OF CK (CPK): CPT | Performed by: HOSPITALIST

## 2017-06-25 PROCEDURE — 84443 ASSAY THYROID STIM HORMONE: CPT | Performed by: HOSPITALIST

## 2017-06-25 PROCEDURE — 74011250637 HC RX REV CODE- 250/637: Performed by: INTERNAL MEDICINE

## 2017-06-25 PROCEDURE — 74011250637 HC RX REV CODE- 250/637: Performed by: HOSPITALIST

## 2017-06-25 PROCEDURE — 65270000029 HC RM PRIVATE

## 2017-06-25 PROCEDURE — 36415 COLL VENOUS BLD VENIPUNCTURE: CPT | Performed by: HOSPITALIST

## 2017-06-25 RX ORDER — CLOPIDOGREL BISULFATE 75 MG/1
75 TABLET ORAL DAILY
Status: DISCONTINUED | OUTPATIENT
Start: 2017-06-26 | End: 2017-06-27 | Stop reason: HOSPADM

## 2017-06-25 RX ADMIN — CARVEDILOL 12.5 MG: 12.5 TABLET, FILM COATED ORAL at 17:17

## 2017-06-25 RX ADMIN — CARVEDILOL 12.5 MG: 12.5 TABLET, FILM COATED ORAL at 06:35

## 2017-06-25 RX ADMIN — BUPROPION HYDROCHLORIDE 150 MG: 150 TABLET, EXTENDED RELEASE ORAL at 22:27

## 2017-06-25 RX ADMIN — Medication 10 ML: at 06:36

## 2017-06-25 RX ADMIN — METFORMIN HYDROCHLORIDE 250 MG: 500 TABLET, FILM COATED ORAL at 17:17

## 2017-06-25 RX ADMIN — THERA TABS 1 TABLET: TAB at 09:17

## 2017-06-25 RX ADMIN — POTASSIUM CHLORIDE 40 MEQ: 750 TABLET, FILM COATED, EXTENDED RELEASE ORAL at 09:16

## 2017-06-25 RX ADMIN — ATORVASTATIN CALCIUM 40 MG: 40 TABLET, FILM COATED ORAL at 22:27

## 2017-06-25 RX ADMIN — TAMSULOSIN HYDROCHLORIDE 0.8 MG: 0.4 CAPSULE ORAL at 22:27

## 2017-06-25 RX ADMIN — METFORMIN HYDROCHLORIDE 250 MG: 500 TABLET, FILM COATED ORAL at 08:00

## 2017-06-25 RX ADMIN — METFORMIN HYDROCHLORIDE 250 MG: 500 TABLET, FILM COATED ORAL at 06:35

## 2017-06-25 RX ADMIN — FINASTERIDE 5 MG: 5 TABLET, FILM COATED ORAL at 22:27

## 2017-06-25 RX ADMIN — BUPROPION HYDROCHLORIDE 150 MG: 150 TABLET, EXTENDED RELEASE ORAL at 09:16

## 2017-06-25 RX ADMIN — ASPIRIN 81 MG: 81 TABLET, COATED ORAL at 09:16

## 2017-06-25 RX ADMIN — Medication 10 ML: at 22:27

## 2017-06-25 RX ADMIN — CARVEDILOL 12.5 MG: 12.5 TABLET, FILM COATED ORAL at 08:00

## 2017-06-25 NOTE — ROUTINE PROCESS
Bedside and Verbal shift change report given to NAYA [facility] (oncoming nurse) by Liza Barry RN (offgoing nurse). Report included the following information SBAR, Kardex and MAR.

## 2017-06-25 NOTE — PROGRESS NOTES
Spiritual Care Assessment/Progress Notes    Leilani Santacruz [id number]  [id number]    1950  74 y.o.  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    Essential hypertension 03/28/2017    Hypercholesterolemia 03/28/2017    Controlled type 2 diabetes mellitus without complication ([facility] [address] 19/46/2256    History of stroke 03/28/2017    Coronary artery disease involving native coronary artery without angina pectoris 03/28/2017    Left low back pain 08/15/2016    Gross hematuria 08/15/2016    Calculus of kidney 04/18/2016    Erectile dysfunction 04/17/2016    Nocturia 07/30/2014    Slowing of urinary stream 07/30/2014    Benign prostatic hyperplasia with lower urinary tract symptoms 06/22/2014    Urinary frequency 06/22/2014    Obesity 06/22/2014        Date: 6/25/2017       Level of Sabianism/Spiritual Activity:  [x]         Involved in jonathan tradition/spiritual practice    []         Not involved in jonathan tradition/spiritual practice  []         Spiritually oriented    []         Claims no spiritual orientation    []         seeking spiritual identity  []         Feels alienated from Amish practice/tradition  []         Feels angry about Amish practice/tradition  []         Spirituality/Amish tradition may be a resource for coping at this time.   []         Not able to assess due to medical condition    Services Provided Today:  []         crisis intervention    []         reading Scriptures  [x]         spiritual assessment    []         prayer  [x]         empathic listening/emotional support  [] rites and rituals (cite in comments)  []         life review     []         Amish support  []         theological development   []         advocacy  []         ethical dialog     []         blessing  []         bereavement support    []         support to family  []         anticipatory grief support   []         help with AMD  []         spiritual guidance    []         meditation      Spiritual Care Needs  []         Emotional Support  []         Spiritual/Jewish Care  []         Loss/Adjustment  []         Advocacy/Referral                /Ethics  [x]         No needs expressed at               this time  []         Other: (note in               comments)  [address]  []         Follow up visits with               pt/family  []         Provide materials  []         Schedule sacraments  []         Contact Community               Clergy  [x]         Follow up as needed  []         Other: (note in               comments)     Comments: Initial visit with patient in 217 per referral of Spiritual Care Partner (SCP). Provided empathetic listening as patient vented and expressed his frustration with his hospital stay as related to logistics such as his lack of privacy when previously in a shared room and feeling as though the unit he was on was \"old and broken. \" Helped patient to process how these feelings impacted his overall experience and emotions related to the medical event. Patient also shared his feelings about his upcoming discharge to inpatient rehab and his future goals. Patient remains hopeful and is coping by reminding himself that he has recovered from a similar experience before and believes he can again. Visit concluded when patient's physician entered with a discharge plan. Assured patient of continued support as needed and desired. Chaplain Jeffy Catalan M.Div.    Paging Service [phone])

## 2017-06-25 NOTE — PROGRESS NOTES
Bedside shift change report given to Jose Angel Bunch RN (oncoming nurse) by Elo Price RN (offgoing nurse). Report included the following information SBAR, Kardex, Intake/Output, MAR and Recent Results.

## 2017-06-25 NOTE — PROGRESS NOTES
Hospitalist Progress Note  Rafael Griggs MD  Office: [phone]        Date of Service:  2017  NAME:  Ranjan Magana  :  1950  MRN:  [id number]      Admission Summary: This is a 78-year-old male with a history of hypertension, diabetes,hemorrhagic stroke with residual left sided hemiparesis,legs more than legs presented with worsening weakness and incoordination with having trouble with gait on the left side. Otherwise negative for dysarthria,facial droop,diplopia,headache. Past Medical History:   Diagnosis Date    Agoraphobia with panic disorder     Anxiety     BPH (benign prostatic hypertrophy) with urinary obstruction     CAD (coronary artery disease)     no previous MI or stents    Chronic edema     Coronary artery disease involving native coronary artery without angina pectoris 3/28/2017    Depression     Diabetes ([address]     Erectile dysfunction     Essential hypertension 3/28/2017    GERD (gastroesophageal reflux disease)     Hemiparesis ([address]     History of stroke 3/28/2017    Hypercholesterolemia      Interval history / Subjective:     He said he worked with PT and did better     Assessment & Plan:   Worsening weakness and incoordiantion of the left leg concerning for a new event.  -History of hemorrhagic stroke with residual left hemiparesis in . He normally walks with or without cane  -Head CT negative  -Switch asa to plavix,increased Lipitor  -MRI brain,                                        Tiny focus of restricted diffusion in the left basal ganglia may represent an acute infarct? infarct                      Old hemorrhagic right parietal infarct with encephalomalacia. Severe supratentorial white matter hyperintensity consistent with microvascular changes related to aging. Multifocal old basal ganglia lacunar infarcts versus prominent perivascular spaces.   -MRA H&N:                                     Stenotic left posterior cerebral artery.       -Physical therapy evaluated and recommended rehab    Muscular atrophy of the left leg: due to old stroke. Hypertension:fair. Continue current regimen  DM II on metformin. A1c is excellent. Continue  -Accucheks and Humalog SS while inpatient. H/o CAD: on asa, Lipitor,coreg,benazepril  H/o depression: on Wellbutrin    Hypokalemia: replace. Monitor in Am     Code status: full  DVT prophylaxis: scd  Care Plan discussed with: Patient/Family  Disposition:inpatient rehab    Hospital Problems  Date Reviewed: 5/3/2017          Codes Class Noted POA    Chronic cerebral ischemia ICD-10-CM: I67.82  ICD-9-CM: 437.1  6/25/2017 Unknown        Left leg weakness ICD-10-CM: R29.898  ICD-9-CM: 729.89  6/24/2017 Unknown        * (Principal)Hemispheric carotid artery syndrome ICD-10-CM: G45.1  ICD-9-CM: 435.8  6/23/2017 Unknown                Review of Systems:   A comprehensive review of systems was negative except for that written in the HPI. Vital Signs:    Last 24hrs VS reviewed since prior progress note. Most recent are:  Visit Vitals    BP (!) 175/93 (BP 1 Location: Right arm, BP Patient Position: Sitting)    Pulse 69    Temp 98.2 °F (36.8 °C)    Resp 20    Ht 5' 11\" (1.803 m)    Wt 98.4 kg (217 lb)    SpO2 98%    BMI 30.27 kg/m2         Intake/Output Summary (Last 24 hours) at 06/25/17 1710  Last data filed at 06/25/17 1208   Gross per 24 hour   Intake              720 ml   Output                0 ml   Net              720 ml        Physical Examination:             Constitutional:  No acute distress, cooperative, pleasant    ENT:  Oral mucous moist, oropharynx benign. Neck supple,    Resp:  CTA bilaterally. No wheezing/rhonchi/rales. No accessory muscle use   CV:  Regular rhythm, normal rate, no murmurs, gallops, rubs    GI:  Soft, non distended, non tender.  normoactive bowel sounds, no hepatosplenomegaly     Musculoskeletal: No edema, warm, 2+ pulses throughout    Neurologic:    ·  Mental status: alert and oriented x4  · Cranial nerves grossly normal  · Motor exam: LUE 4+/5,LLE 3/5  · Sensation : light touch sensation symmetrical  · Gait not tested:discoordinated per physical therapist  · Meningeal signs negative. Psych:  Good insight, Not anxious nor agitated. Data Review:    Review and/or order of clinical lab test  Review and/or order of tests in the radiology section of CPT  Review and/or order of tests in the medicine section of CPT      Labs:     Recent Labs      06/24/17   0559  06/23/17   1737   WBC  7.7  8.6   HGB  12.5  13.3   HCT  37.0  39.5   PLT  222  233     Recent Labs      06/25/17   0639  06/24/17   0559  06/23/17   1737   NA  142  139  143   K  3.6  3.2*  3.3*   CL  108  104  105   CO2  27  28  31   BUN  19  22*  23*   CREA  1.06  1.12  1.25   GLU  119*  118*  105*   CA  8.7  8.3*  9.1     Recent Labs      06/24/17   0559  06/23/17   1737   SGOT  13*  15   ALT  26  32   AP  64  74   TBILI  0.6  0.7   TP  6.4  7.4   ALB  3.3*  3.5   GLOB  3.1  3.9     Recent Labs      06/23/17   1737  06/23/17   1650   INR  1.0  <0.9   PTP  10.5   --    APTT  27.8   --       No results for input(s): FE, TIBC, PSAT, FERR in the last 72 hours. No results found for: FOL, RBCF   No results for input(s): PH, PCO2, PO2 in the last 72 hours.   Recent Labs      06/25/17   0639   CPK  76     Lab Results   Component Value Date/Time    Cholesterol, total 173 06/24/2017 05:59 AM    HDL Cholesterol 59 06/24/2017 05:59 AM    LDL, calculated 81.6 06/24/2017 05:59 AM    Triglyceride 162 06/24/2017 05:59 AM    CHOL/HDL Ratio 2.9 06/24/2017 05:59 AM     Lab Results   Component Value Date/Time    Glucose (POC) 110 06/24/2017 06:39 AM    Glucose (POC) 114 06/23/2017 04:47 PM     Lab Results   Component Value Date/Time    Color YELLOW/STRAW 06/23/2017 11:19 PM    Appearance CLEAR 06/23/2017 11:19 PM    Specific gravity 1.018 06/23/2017 11:19 PM pH (UA) 7.0 06/23/2017 11:19 PM    Protein NEGATIVE  06/23/2017 11:19 PM    Glucose NEGATIVE  06/23/2017 11:19 PM    Ketone NEGATIVE  06/23/2017 11:19 PM    Bilirubin NEGATIVE  06/23/2017 11:19 PM    Urobilinogen 0.2 06/23/2017 11:19 PM    Nitrites NEGATIVE  06/23/2017 11:19 PM    Leukocyte Esterase NEGATIVE  06/23/2017 11:19 PM         Medications Reviewed:     Current Facility-Administered Medications   Medication Dose Route Frequency    atorvastatin (LIPITOR) tablet 40 mg  40 mg Oral QHS    potassium chloride SR (KLOR-CON 10) tablet 40 mEq  40 mEq Oral DAILY    carvedilol (COREG) tablet 12.5 mg  12.5 mg Oral BID WITH MEALS    finasteride (PROSCAR) tablet 5 mg  5 mg Oral QHS    buPROPion SR (WELLBUTRIN SR) tablet 150 mg  150 mg Oral BID    metFORMIN (GLUCOPHAGE) tablet 250 mg  250 mg Oral BID WITH MEALS    aspirin delayed-release tablet 81 mg  81 mg Oral DAILY    therapeutic multivitamin (THERAGRAN) tablet 1 Tab  1 Tab Oral DAILY    HYDROcodone-acetaminophen (NORCO) 5-325 mg per tablet 1 Tab  1 Tab Oral Q4H PRN    tamsulosin (FLOMAX) capsule 0.8 mg  0.8 mg Oral QHS    sodium chloride (NS) flush 5-10 mL  5-10 mL IntraVENous PRN    acetaminophen (TYLENOL) tablet 650 mg  650 mg Oral Q4H PRN    Or    acetaminophen (TYLENOL) solution 650 mg  650 mg Per NG tube Q4H PRN    Or    acetaminophen (TYLENOL) suppository 650 mg  650 mg Rectal Q4H PRN    meperidine (DEMEROL) injection 12.5 mg  12.5 mg IntraVENous Q4H PRN     ______________________________________________________________________  EXPECTED LENGTH OF STAY: - - -  ACTUAL LENGTH OF STAY:          2                 Artemio Branch MD

## 2017-06-25 NOTE — CONSULTS
Neurology Progress Note    Patient ID:  Sari Allen  [id number]  54 y.o.  1950    Chief Complaint: Left leg weakness    Subjective:     55-year-old gentleman with a history of remote stroke and chronic left leg weakness who presented yesterday with worsening left leg weakness. Today he feels continued improvement but not his baseline. MRI brain was done and there is question of a small left basal ganglia infarct. Objective:       ROS:  Per HPI  All other 12 pt ROS negative    Meds:  Current Facility-Administered Medications   Medication Dose Route Frequency    atorvastatin (LIPITOR) tablet 40 mg  40 mg Oral QHS    potassium chloride SR (KLOR-CON 10) tablet 40 mEq  40 mEq Oral DAILY    carvedilol (COREG) tablet 12.5 mg  12.5 mg Oral BID WITH MEALS    finasteride (PROSCAR) tablet 5 mg  5 mg Oral QHS    buPROPion SR (WELLBUTRIN SR) tablet 150 mg  150 mg Oral BID    metFORMIN (GLUCOPHAGE) tablet 250 mg  250 mg Oral BID WITH MEALS    aspirin delayed-release tablet 81 mg  81 mg Oral DAILY    therapeutic multivitamin (THERAGRAN) tablet 1 Tab  1 Tab Oral DAILY    HYDROcodone-acetaminophen (NORCO) 5-325 mg per tablet 1 Tab  1 Tab Oral Q4H PRN    tamsulosin (FLOMAX) capsule 0.8 mg  0.8 mg Oral QHS    sodium chloride (NS) flush 5-10 mL  5-10 mL IntraVENous PRN    acetaminophen (TYLENOL) tablet 650 mg  650 mg Oral Q4H PRN    Or    acetaminophen (TYLENOL) solution 650 mg  650 mg Per NG tube Q4H PRN    Or    acetaminophen (TYLENOL) suppository 650 mg  650 mg Rectal Q4H PRN    meperidine (DEMEROL) injection 12.5 mg  12.5 mg IntraVENous Q4H PRN       MRI Results (maximum last 3): Results from [place] encounter on 06/23/17   MRA NECK W CONT   Narrative *PRELIMINARY REPORT*    No acute thrombosis or significant stenosis. Preliminary report was provided by Dr. Marcelo Fitzgerald, the on-call radiologist, at 5:20  PM.    Final report to follow. *END PRELIMINARY REPORT*    HISTORY: Stroke.     PROCEDURE: MRA was performed to evaluate the cervical arterial vasculature. Intravenous gadolinium was administered. FINDINGS: Images of the major branch vessels of the thoracic aortic arch, reveal  patency of the right innominate, left common carotid, and left subclavian  arteries. The common carotid arteries are patent bilaterally. The vertebral arteries appear to be grossly patent. The left vertebral artery is  dominant. There is mild stenosis at the origin of the right vertebral artery. Images of the carotid bifurcations reveal no significant flow-limiting stenosis  involving the cervical internal carotid arteries bilaterally. Percentage of stenosis of right internal carotid artery:  10 %. Percentage of stenosis of left internal carotid artery:  10 %. The external carotid arteries are patent. Impression IMPRESSION: Unremarkable MRA of the cervical vasculature. MRA BRAIN WO CONT   Narrative *PRELIMINARY REPORT*    No acute thrombosis or significant intracranial stenosis. Preliminary report was provided by Dr. Saúl Silva, the on-call radiologist, at 5:19  PM.    Final report to follow. *END PRELIMINARY REPORT*    INDICATION: Stroke. EXAM: MRA of the intracranial vasculature was performed. FINDINGS:    The intracranial internal carotid arteries demonstrate normal signal. The middle  and anterior cerebral vessels demonstrate normal signal without major occlusive  change or obvious aneurysm or vascular malformation. The vertebrobasilar system is patent. The right posterior cerebral artery demonstrates normal signal. The left  posterior cerebral artery is highly stenotic. Impression IMPRESSION:    No definite major occlusive changes involving the intracranial vasculature. No definite aneurysm or vascular malformation. Stenotic left posterior cerebral artery. MRI BRAIN W WO CONT   Addendum Addendum: Nurse Melvi Best notified by telephone at [phone]. Erika Shah MD 6/24/2017  5:38 PM             Narrative *PRELIMINARY REPORT*    No acute infarct, acute intracranial hemorrhage, or intracranial mass. Old  hemorrhagic infarct in the posterior right parietal lobe. Preliminary report was provided by Dr. Kishor Troncoso, the on-call radiologist, at 5:18  PM.    Final report to follow. *END PRELIMINARY REPORT*    INDICATION: Ataxia. EXAM: Sagittal and axial and coronal images were obtained through the brain in  varying sequences. T1-weighted, T2-weighted, FLAIR, GRE, Diffusion-weighted  sequences may be utilized. Images were obtained before and after IV contrast administration. FINDINGS:    Comparison study: None available. Sagittal images demonstrate signal void to be present in the cavernous carotid  arteries. The cerebellar tonsils are in normal position. Axial and coronal images demonstrate multifocal and confluent hyperintensity in  the periventricular deep white matter and centrum semiovale of both cerebral  hemispheres. Multiple old lacunar infarcts versus prominent perivascular spaces  in the basal ganglion bilaterally. .    No confluent infarct or hemorrhage or mass effect. Old hemorrhagic parietal infarct with residual hemosiderin and encephalomalacia. Postcontrast images demonstrate no specific abnormal enhancement    Diffusion-weighted images demonstrate tiny focus of restricted diffusion in the  left basal ganglia. Impression IMPRESSION:    Tiny focus of restricted diffusion in the left basal ganglia may represent an  acute infarct. Old hemorrhagic right parietal infarct with encephalomalacia. Severe supratentorial white matter hyperintensity consistent with microvascular  changes related to aging. Multifocal old basal ganglia lacunar infarcts versus prominent perivascular  spaces.                   Lab Review   Recent Results (from the past 24 hour(s))   METABOLIC PANEL, BASIC    Collection Time: 06/25/17  6:39 AM   Result Value Ref Range    Sodium 142 136 - 145 mmol/L    Potassium 3.6 3.5 - 5.1 mmol/L    Chloride 108 97 - 108 mmol/L    CO2 27 21 - 32 mmol/L    Anion gap 7 5 - 15 mmol/L    Glucose 119 (H) 65 - 100 mg/dL    BUN 19 6 - 20 MG/DL    Creatinine 1.06 0.70 - 1.30 MG/DL    BUN/Creatinine ratio 18 12 - 20      GFR est AA >60 >60 ml/min/1.73m2    GFR est non-AA >60 >60 ml/min/1.73m2    Calcium 8.7 8.5 - 10.1 MG/DL   TSH 3RD GENERATION    Collection Time: 06/25/17  6:39 AM   Result Value Ref Range    TSH 1.75 0.36 - 3.74 uIU/mL   CK    Collection Time: 06/25/17  6:39 AM   Result Value Ref Range    CK 76 39 - 308 U/L       Additional comments:I reviewed the patients new imaging test results. Patient Vitals for the past 8 hrs:   BP Temp Pulse Resp SpO2   06/25/17 0710 158/82 98.6 °F (37 °C) 86 17 96 %   06/25/17 0425 129/77 97.3 °F (36.3 °C) 79 17 96 %          06/23 1901 - 06/25 0700  In: 550 [P.O.:550]  Out: -     Exam:  Visit Vitals    /82 (BP 1 Location: Left arm, BP Patient Position: At rest)    Pulse 86    Temp 98.6 °F (37 °C)    Resp 17    Ht 5' 11\" (1.803 m)    Wt 98.4 kg (217 lb)    SpO2 96%    BMI 30.27 kg/m2     Gen: Well developed  CV: RRR  Lungs: non labored breathing  Abd: soft, non distended  Neuro: A&O x 3, no dysarthria or aphasia  CN II-XII: PERRL, EOMI, face symmetric, tongue/palate midline  Motor: Left leg atrophic which is baseline.   4+ strength  Sensory: intact to LT  DTRs: symmetric  COOR: no limb/truncal ataxia  Gait: wide    PROBLEM LIST:     Patient Active Problem List   Diagnosis Code    Benign prostatic hyperplasia with lower urinary tract symptoms N40.1    Urinary frequency R35.0    Obesity E66.9    Nocturia R35.1    Slowing of urinary stream R39.198    Erectile dysfunction N52.9    Calculus of kidney N20.0    Left low back pain M54.5    Gross hematuria R31.0    Essential hypertension I10    Hypercholesterolemia E78.00    Controlled type 2 diabetes mellitus without complication ([facility]) E96.5    History of stroke Z86.73    Coronary artery disease involving native coronary artery without angina pectoris I25.10    Hemispheric carotid artery syndrome G45.1    Left leg weakness R29.898    Chronic cerebral ischemia I67.82       Assessment/Plan:      67-year-old gentleman with history of stroke and risk factors for stroke who has an MRI suggested for a new small infarct. I personally reviewed imaging. Difficult to know if this is artifact or not. It does not correlate with his presentation. Regardless, I do think it is reasonable to escalate aspirin to Plavix monotherapy. Physical therapy is recommending inpatient rehabilitation which I think is appropriate. He does have significant gait impairment. Please consult [facility] liaison tomorrow. I discussed the MRI findings with him And the plan discussed above. Signing off. Please call with any questions. During this evaluation, we also discussed stroke education to include signs and symptoms of stroke and TIA.        Signed:  [address] [facility] [place],   6/25/2017  10:59 AM

## 2017-06-26 PROCEDURE — 97112 NEUROMUSCULAR REEDUCATION: CPT

## 2017-06-26 PROCEDURE — 97116 GAIT TRAINING THERAPY: CPT

## 2017-06-26 PROCEDURE — 74011250637 HC RX REV CODE- 250/637: Performed by: HOSPITALIST

## 2017-06-26 PROCEDURE — 65270000029 HC RM PRIVATE

## 2017-06-26 PROCEDURE — 74011250637 HC RX REV CODE- 250/637: Performed by: INTERNAL MEDICINE

## 2017-06-26 RX ADMIN — BUPROPION HYDROCHLORIDE 150 MG: 150 TABLET, EXTENDED RELEASE ORAL at 08:50

## 2017-06-26 RX ADMIN — METFORMIN HYDROCHLORIDE 250 MG: 500 TABLET, FILM COATED ORAL at 17:18

## 2017-06-26 RX ADMIN — BUPROPION HYDROCHLORIDE 150 MG: 150 TABLET, EXTENDED RELEASE ORAL at 21:00

## 2017-06-26 RX ADMIN — Medication 10 ML: at 21:01

## 2017-06-26 RX ADMIN — TAMSULOSIN HYDROCHLORIDE 0.8 MG: 0.4 CAPSULE ORAL at 21:00

## 2017-06-26 RX ADMIN — THERA TABS 1 TABLET: TAB at 08:50

## 2017-06-26 RX ADMIN — CARVEDILOL 12.5 MG: 12.5 TABLET, FILM COATED ORAL at 17:18

## 2017-06-26 RX ADMIN — CLOPIDOGREL BISULFATE 75 MG: 75 TABLET ORAL at 08:50

## 2017-06-26 RX ADMIN — POTASSIUM CHLORIDE 40 MEQ: 750 TABLET, FILM COATED, EXTENDED RELEASE ORAL at 08:50

## 2017-06-26 RX ADMIN — CARVEDILOL 12.5 MG: 12.5 TABLET, FILM COATED ORAL at 07:21

## 2017-06-26 RX ADMIN — METFORMIN HYDROCHLORIDE 250 MG: 500 TABLET, FILM COATED ORAL at 07:20

## 2017-06-26 RX ADMIN — FINASTERIDE 5 MG: 5 TABLET, FILM COATED ORAL at 21:00

## 2017-06-26 RX ADMIN — ATORVASTATIN CALCIUM 40 MG: 40 TABLET, FILM COATED ORAL at 21:00

## 2017-06-26 NOTE — PROGRESS NOTES
Bedside shift change report given to [address] [place] [address] Avenue (oncoming nurse) by Uma Pineda RN (offgoing nurse). Report included the following information SBAR, Kardex, Intake/Output, MAR and Recent Results.

## 2017-06-26 NOTE — PROGRESS NOTES
Bedside and Verbal shift change report given to Lenard (oncoming nurse) by Katelin Ventura (offgoing nurse). Report included the following information SBAR, Kardex and MAR.

## 2017-06-26 NOTE — PROGRESS NOTES
Problem: Mobility Impaired (Adult and Pediatric)  Goal: *Acute Goals and Plan of Care (Insert Text)  Physical Therapy Goals  Initiated 6/24/2017  1. Patient will move from supine to sit and sit to supine , scoot up and down and roll side to side in bed with independence within 7 day(s). 2. Patient will transfer from bed to chair and chair to bed with modified independence using the least restrictive device within 7 day(s). 3. Patient will perform sit to stand with modified independence within 7 day(s). 4. Patient will ambulate with supervision/set-up for 100 feet with the least restrictive device within 7 day(s). 5. Patient will improve Haas Balance score by 7 points within 7 days. PHYSICAL THERAPY TREATMENT  Patient: Elmer Macias (8 y.o. male)  Date: 6/26/2017  Diagnosis: TIA (transient ischemic attack) Hemispheric carotid artery syndrome  Precautions: Fall      ASSESSMENT:  Patient received sitting up on EOB with wife present. Motivated for therapy and hopeful to d/c to rehab. Patient participated in gait training, various dynamic balance exercises, weightshifting, and strengthening exercises as below. Gait improved from assessment on Saturday, as today he was able to ambulate with his cane and CGA-min Ax1 for dynamic balance. Patient required assist to correct trunk sway to R, as he was hesitant to weightshift to L. He also uses increased trunk rotation to advance his L LE, likely due to proximal weakness at hip. Reported recent falls on stairs and that he struggles with completing stairs when handrail is not on the R. Wife present and supports this statement, as well as voices her concerns about his balance recently and ability to stand from a chair. Patient is highly motivated, progressing well with acute therapy and will be an excellent inpatient rehab candidate.    Progression toward goals:  [X]       Improving appropriately and progressing toward goals  [ ]       Improving slowly and progressing toward goals  [ ]       Not making progress toward goals and plan of care will be adjusted       PLAN:  Patient continues to benefit from skilled intervention to address the above impairments. Continue treatment per established plan of care. Discharge Recommendations:  Inpatient Rehab  Further Equipment Recommendations for Discharge:  TBD at rehab       SUBJECTIVE:   Patient stated I always don't know where to put my hands when the railing is on the left.  re: stairs      OBJECTIVE DATA SUMMARY:   Critical Behavior:  Neurologic State: Alert  Orientation Level: Appropriate for age  Cognition: Appropriate decision making  Safety/Judgement: Awareness of environment  Functional Mobility Training:  Transfers:  Sit to Stand: Contact guard assistance;Minimum assistance (min A to steady upon standing)  Stand to Sit: Contact guard assistance;Minimum assistance (verbal cues for hand placement)  Balance:  Sitting: Intact  Sitting - Static: Good (unsupported)  Sitting - Dynamic: Good (unsupported)  Standing: Impaired  Standing - Static: Good  Standing - Dynamic : Fair  Ambulation/Gait Training:  Distance (ft): 60 Feet (ft) (x2)  Assistive Device: Gait belt;Cane, straight  Ambulation - Level of Assistance: Minimal assistance;Assist x1;Additional time (CGA at best)  Gait Abnormalities: Ataxic;Decreased step clearance; Step to gait (L LE foot flat gait; increased trunk rotation on L )  Base of Support: Shift to right;Narrowed  Stance: Left decreased;Right increased  Speed/Alexia: Slow  Step Length: Left shortened  Swing Pattern: Left asymmetrical  Neuro Re-Education:  Toe tapping to target on the ground B LEs x10; emphasis on stability at hip and active hip abduction during stance with proper posture  Mini squats at counter with emphasis to weight shift to L before squat to increase WB through L LE  Reaching outside SHADIA for object in standing x15 various directions  Pain:  Pain Scale 1: Numeric (0 - 10)  Pain Intensity 1: 0  Activity Tolerance:   good  Please refer to the flowsheet for vital signs taken during this treatment. After treatment:   [X] Patient left in no apparent distress sitting up EOB  [ ] Patient left in no apparent distress in bed  [X] Call bell left within reach  [X] Nursing notified  [X] Caregiver present  [ ] Bed alarm activated      COMMUNICATION/EDUCATION:   The patients plan of care was discussed with: Registered Nurse and . Patient was educated regarding His deficit(s) of L LE ataxia and weakness as this relates to His diagnosis of CVA? [place] He demonstrated Good understanding as evidenced by discussion about need for rehab. [X]  Fall prevention education was provided and the patient/caregiver indicated understanding. [X]  Patient/family have participated as able in goal setting and plan of care. [X]  Patient/family agree to work toward stated goals and plan of care. [ ]  Patient understands intent and goals of therapy, but is neutral about his/her participation. [ ]  Patient is unable to participate in goal setting and plan of care.      Thank you for this referral.  Saji Cruz, PT, DPT   Time Calculation: 33 mins

## 2017-06-26 NOTE — PROGRESS NOTES
Problem: Self Care Deficits Care Plan (Adult)  Goal: *Acute Goals and Plan of Care (Insert Text)  Occupational Therapy Goals  Initiated 6/25/2017  1. Patient will perform ADLs standing 5 mins without fatigue or LOB with modified independence within 7 day(s). 2. Patient will perform lower body ADLs with modified independence within 7 day(s). 3. Patient will perform bathing with modified independence within 7 day(s). 4. Patient will perform toilet transfers with modified independence within 7 day(s). 5. Patient will perform all aspects of toileting with independence within 7 day(s). 6. Patient will participate in upper extremity therapeutic exercise/activities to increase independence with ADLs with independence for 5 minutes within 7 day(s). OCCUPATIONAL THERAPY TREATMENT  Patient: Leilani Santacruz (6 y.o. male)  Date: 6/26/2017  Diagnosis: TIA (transient ischemic attack) Hemispheric carotid artery syndrome       Precautions: Fall (old CVAs)      ASSESSMENT:  Pt continues to present with L LE weakness and impaired sensation in distal LE. In addition pt is limited in completing ADLs/IADLs due to impaired dynamic standing. Pt cleared by nursing for therapy and received sitting EOB with wife present. Pt alert and agreeable to work with therapy. Pt completed multiple dynamic standing exercises UE/LE to work on weight-shifting and neuro re-eduction. Recommend continuing UE/LE functional exercises for next session to increase dynamic standing balance and tolerance needed for completing funtional tasks. Pt left in NAD sitting EOB with wife present and nursing notified. Patient completed balance tasks over 10 mins continuous in standing. Patient educated on neuroplasticity with excellent understanding from previous stroke with rehab over 20 years ago. Pt continues to make good progress and is able to tolerate 3 hours of therapy in inpatient rehab.      Progression toward goals:  [X]       Improving appropriately and progressing toward goals  [ ]       Improving slowly and progressing toward goals  [ ]       Not making progress toward goals and plan of care will be adjusted       PLAN:  Patient continues to benefit from skilled intervention to address the above impairments. Continue treatment per established plan of care. Discharge Recommendations:  Inpatient Rehab  Further Equipment Recommendations for Discharge:  TBD rehab       SUBJECTIVE:   Patient stated Yeah that sounds good.       OBJECTIVE DATA SUMMARY:   Cognitive/Behavioral Status:  Neurologic State: Alert  Orientation Level: Appropriate for age  Cognition: Appropriate decision making  Perception: Appears intact  Perseveration: No perseveration noted  Safety/Judgement: Awareness of environment     Functional Mobility and Transfers for ADLs:     Transfers:  Sit to Stand: Contact guard assistance        Balance:  Sitting: Intact  Sitting - Static: Good (unsupported)  Sitting - Dynamic: Good (unsupported)  Standing: Impaired  Standing - Static: Good  Standing - Dynamic : Fair     ADL Intervention:     Cognitive Retraining  Safety/Judgement: Awareness of environment     Neuro Re-Education:  Completed multiple ROM UE/LE exercises standing in front of the bed to focus on increasing dynamic balance. Completed multiple UE ROM stretches in static standing with good static balance and CGA. Noted good bilateral  strength and generally good symmetrical bilateral arm movement during shoulder exercises. Facilitated dynamic weight-shifting, balance in standing to facilitate muscle activation, fall prevention, and stability needed to complete functional tasks. Facilitated repetition with improvement noted throughout reps. Noted L foot drop with pt aware and demonstrating compensation technique of additional support from cane and hip and knee pattern. Pt stated that he used to use an AFO, but no longer wears it.       Therapeutic Exercises:     EXERCISE   Sets Reps   Active Active Assist   Passive   Comments   Shoulder flexion 1 10 [X]           [ ]           [ ]           Static standing, CGA    Shoulder abduction 1 10 [X]           [ ]           [ ]           Static standing, CGA   Bicep curls  1 10 [X]           [ ]           [ ]           Static standing, CGA    Anterior reciprocal stepping BLE  1 10 [X]           [ ]           [ ]           Dynamic standing, min A with SPC    Lateral stepping BLE  1 10 [X]           [ ]           [ ]           Dynamic standing, min A with SPC             Pain:  Pain Scale 1: Numeric (0 - 10)  Pain Intensity 1: 0      Activity Tolerance:   Pt motivated and tolerated session well. Pt reported no c/o increased pain or dizziness throughout session and completed all exercises with NAD. Please refer to the flowsheet for vital signs taken during this treatment. After treatment:   [X] Patient left in no apparent distress sitting EOB  [ ] Patient left in no apparent distress in bed  [X] Call bell left within reach  [X] Nursing notified  [X] Caregiver present  [ ] Bed alarm activated      COMMUNICATION/COLLABORATION:   The patients plan of care was discussed with: Registered Nurse, Patient and wife present in [facility].  Luna  Time Calculation: 20 mins

## 2017-06-26 NOTE — PROGRESS NOTES
Care Management Interventions  PCP Verified by CM: Yes  Mode of Transport at Discharge: Other (see comment) (private vehicle)  Discharge Durable Medical Equipment: No  Physical Therapy Consult: No  Occupational Therapy Consult: No  Speech Therapy Consult: No  Current Support Network: Lives with Spouse  Confirm Follow Up Transport: Family  Plan discussed with Pt/Family/Caregiver: Yes  Freedom of Choice Offered: Yes  Discharge Location  Discharge Placement: Rehab hospital/unit acute    CM reviewed chart and received inpatient rehab orders. CM met with patient to introduce self and discuss discharge planning. Pt lives with his wife in a private residence. Pt has a cane that he uses to assist with ambulation. Pt's PCP is Dr. Rhiannon Orr, whom he just saw the previous week, and he gets his prescriptions filled at his [email or web address] and through [facility] Order. CM discussed inpatient rehabs and provided a list of rehabs to view. Pt stated that he prefers [address] in [facility]. CM sent referral through [employer] to [address] Pt's family will be able to transport. CM will continue to follow. [facility] is out of Network with [facility], pt's next choice is [facility], Bharat Matrimony sent referral through [employer] to [facility].  CM will continue to follow.   Yovany Freedman, JUAREZ, ACM

## 2017-06-27 VITALS
TEMPERATURE: 98.7 F | DIASTOLIC BLOOD PRESSURE: 87 MMHG | SYSTOLIC BLOOD PRESSURE: 160 MMHG | WEIGHT: 217 LBS | RESPIRATION RATE: 16 BRPM | BODY MASS INDEX: 30.38 KG/M2 | OXYGEN SATURATION: 99 % | HEART RATE: 76 BPM | HEIGHT: 71 IN

## 2017-06-27 PROBLEM — G45.1 HEMISPHERIC CAROTID ARTERY SYNDROME: Status: RESOLVED | Noted: 2017-06-23 | Resolved: 2017-06-27

## 2017-06-27 PROBLEM — R29.898 LEFT LEG WEAKNESS: Status: RESOLVED | Noted: 2017-06-24 | Resolved: 2017-06-27

## 2017-06-27 PROBLEM — I67.82 CHRONIC CEREBRAL ISCHEMIA: Status: RESOLVED | Noted: 2017-06-25 | Resolved: 2017-06-27

## 2017-06-27 PROCEDURE — 97116 GAIT TRAINING THERAPY: CPT

## 2017-06-27 PROCEDURE — 74011250637 HC RX REV CODE- 250/637: Performed by: INTERNAL MEDICINE

## 2017-06-27 PROCEDURE — 97535 SELF CARE MNGMENT TRAINING: CPT

## 2017-06-27 PROCEDURE — 74011250637 HC RX REV CODE- 250/637: Performed by: HOSPITALIST

## 2017-06-27 RX ORDER — FINASTERIDE 5 MG/1
5 TABLET, FILM COATED ORAL
Status: DISCONTINUED | OUTPATIENT
Start: 2017-06-27 | End: 2017-06-27 | Stop reason: SDUPTHER

## 2017-06-27 RX ORDER — THERA TABS 400 MCG
1 TAB ORAL DAILY
Status: DISCONTINUED | OUTPATIENT
Start: 2017-06-28 | End: 2017-06-27 | Stop reason: SDUPTHER

## 2017-06-27 RX ORDER — CLOPIDOGREL BISULFATE 75 MG/1
75 TABLET ORAL DAILY
Qty: 30 TAB | Refills: 0 | Status: SHIPPED | OUTPATIENT
Start: 2017-06-27 | End: 2017-08-07 | Stop reason: SDUPTHER

## 2017-06-27 RX ORDER — TAMSULOSIN HYDROCHLORIDE 0.4 MG/1
0.8 CAPSULE ORAL
Status: DISCONTINUED | OUTPATIENT
Start: 2017-06-27 | End: 2017-06-27 | Stop reason: SDUPTHER

## 2017-06-27 RX ORDER — ATORVASTATIN CALCIUM 20 MG/1
40 TABLET, FILM COATED ORAL DAILY
Qty: 90 TAB | Refills: 3 | Status: SHIPPED | OUTPATIENT
Start: 2017-06-27 | End: 2017-09-13 | Stop reason: SDUPTHER

## 2017-06-27 RX ORDER — HYDROCHLOROTHIAZIDE 25 MG/1
25 TABLET ORAL DAILY
Status: DISCONTINUED | OUTPATIENT
Start: 2017-06-27 | End: 2017-06-27 | Stop reason: HOSPADM

## 2017-06-27 RX ORDER — AMLODIPINE BESYLATE 5 MG/1
5 TABLET ORAL DAILY
Status: DISCONTINUED | OUTPATIENT
Start: 2017-06-27 | End: 2017-06-27 | Stop reason: HOSPADM

## 2017-06-27 RX ORDER — LISINOPRIL 20 MG/1
20 TABLET ORAL DAILY
Status: DISCONTINUED | OUTPATIENT
Start: 2017-06-27 | End: 2017-06-27 | Stop reason: HOSPADM

## 2017-06-27 RX ADMIN — METFORMIN HYDROCHLORIDE 250 MG: 500 TABLET, FILM COATED ORAL at 16:45

## 2017-06-27 RX ADMIN — CLOPIDOGREL BISULFATE 75 MG: 75 TABLET ORAL at 10:07

## 2017-06-27 RX ADMIN — METFORMIN HYDROCHLORIDE 250 MG: 500 TABLET, FILM COATED ORAL at 07:26

## 2017-06-27 RX ADMIN — AMLODIPINE BESYLATE 5 MG: 5 TABLET ORAL at 14:43

## 2017-06-27 RX ADMIN — BUPROPION HYDROCHLORIDE 150 MG: 150 TABLET, EXTENDED RELEASE ORAL at 10:07

## 2017-06-27 RX ADMIN — LISINOPRIL 20 MG: 20 TABLET ORAL at 14:43

## 2017-06-27 RX ADMIN — HYDROCHLOROTHIAZIDE 25 MG: 25 TABLET ORAL at 14:43

## 2017-06-27 RX ADMIN — CARVEDILOL 12.5 MG: 12.5 TABLET, FILM COATED ORAL at 16:45

## 2017-06-27 RX ADMIN — CARVEDILOL 12.5 MG: 12.5 TABLET, FILM COATED ORAL at 07:25

## 2017-06-27 RX ADMIN — THERA TABS 1 TABLET: TAB at 10:07

## 2017-06-27 NOTE — PROGRESS NOTES
Problem: Self Care Deficits Care Plan (Adult)  Goal: *Acute Goals and Plan of Care (Insert Text)  Occupational Therapy Goals  Initiated 6/25/2017  1. Patient will perform ADLs standing 5 mins without fatigue or LOB with modified independence within 7 day(s). 2. Patient will perform lower body ADLs with modified independence within 7 day(s). 3. Patient will perform bathing with modified independence within 7 day(s). 4. Patient will perform toilet transfers with modified independence within 7 day(s). 5. Patient will perform all aspects of toileting with independence within 7 day(s). 6. Patient will participate in upper extremity therapeutic exercise/activities to increase independence with ADLs with independence for 5 minutes within 7 day(s). OCCUPATIONAL THERAPY TREATMENT: NEURO  Patient: Best Islas (68 y.o. male)  Date: 6/27/2017  Diagnosis: TIA (transient ischemic attack) Hemispheric carotid artery syndrome       Precautions: Fall; L foot drop x 20 yrs \"My ankle rolls. \"      ASSESSMENT: Chart reviewed; cleared for tx; received in chair with wife present for training. Patient trained in adaptations for L LE dressing to maximize fuctional I with self care, decrease fall risks with foot drop and rebuild functional endurance post TIA. Willing to work despite feeling frustrated regarding disposition. Mod/Min A functional mobility limited by L LE weakness/hemiparesis and foot drop increasing fall risks. Patient demonstrated limitations in L LE dressing including hemiparesis and visual apraxia: \"It's hard to tell which side of my leg my arm is on so it doesn't know what to do very well. Good ability to follow 1 step commands and safety recommendations. Patient will benefit from inpatient rehab prior to discharge to home to maximize safety and I with self care and decrease burden of care on caregiver.      Progression toward goals:  [X]          Improving appropriately and progressing toward goals  [ ] Improving slowly and progressing toward goals  [ ]          Not making progress toward goals and plan of care will be adjusted       PLAN:  Patient continues to benefit from skilled intervention to address the above impairments. Continue treatment per established plan of care. Discharge Recommendations:  Inpatient Rehab  Further Equipment Recommendations for Discharge:  TBA       SUBJECTIVE:   Patient stated I am about 30-50% different in my walking than what is normal for me since my stroke 20 yrs ago.       OBJECTIVE DATA SUMMARY:   Cognitive/Behavioral Status:  Neurologic State: Alert  Orientation Level: Oriented X4  Cognition: Appropriate decision making; Appropriate for age attention/concentration; Appropriate safety awareness; Follows commands (recommend MoCA)  Perception: Cues to attend to left side of body;Verbal  Perseveration: No perseveration noted  Safety/Judgement: Fall prevention;Home safety; Insight into deficits  Functional Mobility and Transfers for ADLs:  Bed Mobility:     Supine to Sit: Supervision; Additional time           Transfers:  Sit to Stand: Contact guard assistance  Functional Transfers  Bathroom Mobility: Minimum assistance; Moderate assistance     Balance:  Sitting: Impaired  Sitting - Static: Good (unsupported)  Sitting - Dynamic: Fair (occasional)  Standing: Impaired  Standing - Static: Good  Standing - Dynamic : Fair  ADL Intervention:        Grooming  Grooming Assistance: Supervision/set up (not tolerated in standing; increased time)                 Upper Body Dressing Assistance  Dressing Assistance: Supervision/set-up (increased time and difficulty with fasteners)     Lower Body Dressing Assistance  Dressing Assistance: Minimum assistance  Socks: Minimum assistance  Shoes with Cloth Laces: Minimum assistance  Leg Crossed Method Used: No  Position Performed: Seated in chair  Adaptive Equipment Used:  (may benefit from elastic laces)  May benefit from Klaus-med foot drop brace which was demonstrated to patient and spouse     Toileting  Toileting Assistance: Minimum assistance; Moderate assistance     Cognitive Retraining  Attention to Task: Distractibility; Single task  Maintains Attention For (Time): Greater than 10 minutes  Following Commands: Follows one step commands/directions  Safety/Judgement: Fall prevention;Home safety; Insight into deficits                       Pain:  Pain Scale 1: Numeric (0 - 10)  Pain Intensity 1: 0              Activity Tolerance:   improving  Please refer to the flowsheet for vital signs taken during this treatment. After treatment:   [X] Patient left in no apparent distress sitting up in chair  [ ] Patient left in no apparent distress in bed  [X] Call bell left within reach  [X] Nursing notified  [X] Caregiver present  [ ] Bed alarm activated      COMMUNICATION/COLLABORATION:   The patients plan of care was discussed with: Physical Therapist, Registered Nurse and   Patient was educated regarding His deficit(s) of L UE and L LE hemiparesis as this relates to His diagnosis of TIA. He demonstrated Good understanding as evidenced by ability to follow directions, functional endurance level tolerant of therapy, supportive family present.      Oli Rodriguez OTR/L  Time Calculation: 19 mins

## 2017-06-27 NOTE — PROGRESS NOTES
Hospitalist Progress Note  Endy Almonte MD  Office: [phone]        Date of Service:  2017  NAME:  Mikey Christy  :  1950  MRN:  [id number]      Admission Summary: This is a 38-year-old male with a history of hypertension, diabetes,hemorrhagic stroke with residual left sided hemiparesis,legs more than legs presented with worsening weakness and incoordination with having trouble with gait on the left side. Otherwise negative for dysarthria,facial droop,diplopia,headache. Past Medical History:   Diagnosis Date    Agoraphobia with panic disorder     Anxiety     BPH (benign prostatic hypertrophy) with urinary obstruction     CAD (coronary artery disease)     no previous MI or stents    Chronic edema     Coronary artery disease involving native coronary artery without angina pectoris 3/28/2017    Depression     Diabetes ([address]     Erectile dysfunction     Essential hypertension 3/28/2017    GERD (gastroesophageal reflux disease)     Hemiparesis (HCC)     History of stroke 3/28/2017    Hypercholesterolemia      Interval history / Subjective:     He is showing progress physical therapy. Assessment & Plan:   Worsening weakness and incoordiantion of the left leg concerning for a new event.  -History of hemorrhagic stroke with residual left hemiparesis in . He normally walks with or without cane  -Head CT negative  -Switch asa to plavix,increased Lipitor  -MRI brain,                                        Tiny focus of restricted diffusion in the left basal ganglia may represent an acute infarct? infarct                      Old hemorrhagic right parietal infarct with encephalomalacia. Severe supratentorial white matter hyperintensity consistent with microvascular changes related to aging. Multifocal old basal ganglia lacunar infarcts versus prominent perivascular spaces.   -MRA H&N:                                     Stenotic left posterior cerebral artery.       -Physical therapy evaluated and recommended rehab    Muscular atrophy of the left leg: due to old stroke. Hypertension:fair. Continue current regimen  DM II on metformin. A1c is excellent. Continue  -Accucheks and Humalog SS while inpatient. H/o CAD: on asa, Lipitor,coreg,benazepril  H/o depression: on Wellbutrin    Hypokalemia: replaced. Improved. Code status: full  DVT prophylaxis: scd  Care Plan discussed with: Patient/Family  Disposition:inpatient rehab,ready for discharge,awaiitng acceptance. Hospital Problems  Date Reviewed: 5/3/2017          Codes Class Noted POA    Chronic cerebral ischemia ICD-10-CM: I67.82  ICD-9-CM: 437.1  6/25/2017 Unknown        Left leg weakness ICD-10-CM: R29.898  ICD-9-CM: 729.89  6/24/2017 Unknown        * (Principal)Hemispheric carotid artery syndrome ICD-10-CM: G45.1  ICD-9-CM: 435.8  6/23/2017 Unknown                Review of Systems:   A comprehensive review of systems was negative except for that written in the HPI. Vital Signs:    Last 24hrs VS reviewed since prior progress note. Most recent are:  Visit Vitals    /84 (BP 1 Location: Left arm)    Pulse 67    Temp 98.2 °F (36.8 °C)    Resp 16    Ht 5' 11\" (1.803 m)    Wt 98.4 kg (217 lb)    SpO2 97%    BMI 30.27 kg/m2         Intake/Output Summary (Last 24 hours) at 06/26/17 0172  Last data filed at 06/26/17 1456   Gross per 24 hour   Intake              240 ml   Output                0 ml   Net              240 ml        Physical Examination:             Constitutional:  No acute distress, cooperative, pleasant    ENT:  Oral mucous moist, oropharynx benign. Neck supple,    Resp:  CTA bilaterally. No wheezing/rhonchi/rales. No accessory muscle use   CV:  Regular rhythm, normal rate, no murmurs, gallops, rubs    GI:  Soft, non distended, non tender.  normoactive bowel sounds, no hepatosplenomegaly     Musculoskeletal: No edema, warm, 2+ pulses throughout    Neurologic:    ·  Mental status: alert and oriented x4  · Cranial nerves grossly normal  · Motor exam: LUE 4+/5,LLE 3/5  · Sensation : light touch sensation symmetrical  · Gait not tested:discoordinated per physical therapist  · Meningeal signs negative. Psych:  Good insight, Not anxious nor agitated. Data Review:    Review and/or order of clinical lab test  Review and/or order of tests in the radiology section of CPT  Review and/or order of tests in the medicine section of CPT      Labs:     Recent Labs      06/24/17   0559   WBC  7.7   HGB  12.5   HCT  37.0   PLT  222     Recent Labs      06/25/17   0639  06/24/17   0559   NA  142  139   K  3.6  3.2*   CL  108  104   CO2  27  28   BUN  19  22*   CREA  1.06  1.12   GLU  119*  118*   CA  8.7  8.3*     Recent Labs      06/24/17   0559   SGOT  13*   ALT  26   AP  64   TBILI  0.6   TP  6.4   ALB  3.3*   GLOB  3.1     No results for input(s): INR, PTP, APTT in the last 72 hours. No lab exists for component: INREXT, INREXT   No results for input(s): FE, TIBC, PSAT, FERR in the last 72 hours. No results found for: FOL, RBCF   No results for input(s): PH, PCO2, PO2 in the last 72 hours.   Recent Labs      06/25/17   0639   CPK  76     Lab Results   Component Value Date/Time    Cholesterol, total 173 06/24/2017 05:59 AM    HDL Cholesterol 59 06/24/2017 05:59 AM    LDL, calculated 81.6 06/24/2017 05:59 AM    Triglyceride 162 06/24/2017 05:59 AM    CHOL/HDL Ratio 2.9 06/24/2017 05:59 AM     Lab Results   Component Value Date/Time    Glucose (POC) 110 06/24/2017 06:39 AM    Glucose (POC) 114 06/23/2017 04:47 PM     Lab Results   Component Value Date/Time    Color YELLOW/STRAW 06/23/2017 11:19 PM    Appearance CLEAR 06/23/2017 11:19 PM    Specific gravity 1.018 06/23/2017 11:19 PM    pH (UA) 7.0 06/23/2017 11:19 PM    Protein NEGATIVE  06/23/2017 11:19 PM    Glucose NEGATIVE  06/23/2017 11:19 PM    Ketone NEGATIVE 06/23/2017 11:19 PM    Bilirubin NEGATIVE  06/23/2017 11:19 PM    Urobilinogen 0.2 06/23/2017 11:19 PM    Nitrites NEGATIVE  06/23/2017 11:19 PM    Leukocyte Esterase NEGATIVE  06/23/2017 11:19 PM         Medications Reviewed:     Current Facility-Administered Medications   Medication Dose Route Frequency    clopidogrel (PLAVIX) tablet 75 mg  75 mg Oral DAILY    atorvastatin (LIPITOR) tablet 40 mg  40 mg Oral QHS    carvedilol (COREG) tablet 12.5 mg  12.5 mg Oral BID WITH MEALS    finasteride (PROSCAR) tablet 5 mg  5 mg Oral QHS    buPROPion SR (WELLBUTRIN SR) tablet 150 mg  150 mg Oral BID    metFORMIN (GLUCOPHAGE) tablet 250 mg  250 mg Oral BID WITH MEALS    therapeutic multivitamin (THERAGRAN) tablet 1 Tab  1 Tab Oral DAILY    HYDROcodone-acetaminophen (NORCO) 5-325 mg per tablet 1 Tab  1 Tab Oral Q4H PRN    tamsulosin (FLOMAX) capsule 0.8 mg  0.8 mg Oral QHS    sodium chloride (NS) flush 5-10 mL  5-10 mL IntraVENous PRN    acetaminophen (TYLENOL) tablet 650 mg  650 mg Oral Q4H PRN    Or    acetaminophen (TYLENOL) solution 650 mg  650 mg Per NG tube Q4H PRN    Or    acetaminophen (TYLENOL) suppository 650 mg  650 mg Rectal Q4H PRN     ______________________________________________________________________  EXPECTED LENGTH OF STAY: 3d 7h  ACTUAL LENGTH OF STAY:          3                 Rufino Dexter MD

## 2017-06-27 NOTE — PROGRESS NOTES
Bedside and Verbal shift change report given to Lenard (oncoming nurse) by Joseph Brink (offgoing nurse). Report included the following information SBAR, Kardex and MAR.

## 2017-06-27 NOTE — PROGRESS NOTES
Bedside shift change report given to [address] [place] [address] Avenue (oncoming nurse) by Kinga De Leon RN (offgoing nurse). Report included the following information SBAR, Kardex, Intake/Output, MAR and Recent Results.

## 2017-06-27 NOTE — PROGRESS NOTES
Problem: Mobility Impaired (Adult and Pediatric)  Goal: *Acute Goals and Plan of Care (Insert Text)  Physical Therapy Goals  Initiated 6/24/2017  1. Patient will move from supine to sit and sit to supine , scoot up and down and roll side to side in bed with independence within 7 day(s). 2. Patient will transfer from bed to chair and chair to bed with modified independence using the least restrictive device within 7 day(s). 3. Patient will perform sit to stand with modified independence within 7 day(s). 4. Patient will ambulate with supervision/set-up for 100 feet with the least restrictive device within 7 day(s). 5. Patient will improve Haas Balance score by 7 points within 7 days. PHYSICAL THERAPY TREATMENT  Patient: Martinez Tong (51 y.o. male)  Date: 6/27/2017  Diagnosis: TIA (transient ischemic attack) Hemispheric carotid artery syndrome       Precautions: Fall  Chart, physical therapy assessment, plan of care and goals were reviewed. ASSESSMENT:  Pt eager to improve mobility. Pt discussed PTA unsteadiness with + falls and \"catching\" toe. Pt does not utilize AFO due to comfort. Pt needs shoes to ambulate increase fall risk with bare feet. Pt does have decrease knee and ankle stability. During gait pt had one LOB requiring min A to recover. Pt is below baseline and a fall risk. Pt can tolerate 3hrs of therapy. Pt could benefit from inpt rehab to progress mobility, balance and safety with upright mobility   Progression toward goals:  [X]      Improving appropriately and progressing toward goals  [ ]      Improving slowly and progressing toward goals  [ ]      Not making progress toward goals and plan of care will be adjusted       PLAN:  Patient continues to benefit from skilled intervention to address the above impairments. Continue treatment per established plan of care.   Discharge Recommendations:  Inpatient Rehab  Further Equipment Recommendations for Discharge:  TBD       SUBJECTIVE:   Patient stated What do you want to do.       OBJECTIVE DATA SUMMARY:   Critical Behavior:  Neurologic State: Alert  Orientation Level: Oriented X4  Cognition: Appropriate decision making, Appropriate for age attention/concentration, Appropriate safety awareness, Follows commands  Safety/Judgement: Awareness of environment  Functional Mobility Training:  Bed Mobility:                                Transfers:  Sit to Stand: Contact guard assistance  Stand to Sit: Contact guard assistance                             Balance:  Sitting: Intact  Standing: Impaired  Standing - Static: Good  Standing - Dynamic : Fair  Ambulation/Gait Training:  Distance (ft): 80 Feet (ft)  Assistive Device: Cane, straight;Gait belt  Ambulation - Level of Assistance: Contact guard assistance;Minimal assistance + LOB        Gait Abnormalities: Decreased step clearance; Foot drop        Base of Support: Center of gravity altered;Shift to right     Speed/Alexia: Pace decreased (<100 feet/min)                                Decrease L quad control with hyperextention in stance phase. Pt ambulates on lateral aspect of left foot foot  with periodic \"catching \" left toes. Stairs:              Neuro Re-Education:     Therapeutic Exercises:      Pain:  Pain Scale 1: Numeric (0 - 10)  Pain Intensity 1: 0              Activity Tolerance:   limited   Please refer to the flowsheet for vital signs taken during this treatment.   After treatment:   [X] Patient left in no apparent distress sitting up in chair  [ ] Patient left in no apparent distress in bed  [X] Call bell left within reach  [X] Nursing notified  [X] Caregiver present  [ ] Bed alarm activated      COMMUNICATION/COLLABORATION:   The patients plan of care was discussed with: Registered Nurse     Mihaela Pearson PTA   Time Calculation: 16 mins

## 2017-06-27 NOTE — DISCHARGE SUMMARY
Discharge Summary         PATIENT ID: Sohan Flowers  MRN: [id number]   YOB: 1950    DATE OF ADMISSION: 6/23/2017  3:58 PM    DATE OF DISCHARGE: 6/27/2017    PRIMARY CARE PROVIDER: Lilliam Arzola MD       ATTENDING PHYSICIAN: Melany Hester MD  DISCHARGING PROVIDER: Melany Hester MD     To contact this individual call [phone] and ask the  to page. If unavailable ask to be transferred the Adult Hospitalist Department. CONSULTATIONS: ED CONSULT TO SENIOR SERVICES PHYSICAL THERAPY  IP CONSULT TO NEUROLOGY    PROCEDURES/SURGERIES: * No surgery found *    ADMITTING [address] [place] [address] COURSE:      Admission Summary: This is a 78-year-old male with a history of hypertension, diabetes,hemorrhagic stroke with residual left sided hemiparesis,legs more than legs presented with worsening weakness and incoordination with having trouble with gait on the left side. Otherwise negative for dysarthria,facial droop,diplopia,headache. Past Medical History:   Diagnosis Date    Agoraphobia with panic disorder      Anxiety      BPH (benign prostatic hypertrophy) with urinary obstruction      CAD (coronary artery disease)       no previous MI or stents    Chronic edema      Coronary artery disease involving native coronary artery without angina pectoris 3/28/2017    Depression      Diabetes ([address]      Erectile dysfunction      Essential hypertension 3/28/2017    GERD (gastroesophageal reflux disease)      Hemiparesis (HCC)      History of stroke 3/28/2017    Hypercholesterolemia              Assessment & Plan:   Worsening weakness and incoordiantion of the left leg concerning for a new event.  -History of hemorrhagic stroke with residual left hemiparesis in 1997. He normally walks with or without cane  -Head CT negative  -Switch asa to plavix,increased Lipitor  -MRI brain,                          Tiny focus of restricted diffusion in the left basal ganglia may represent an acute infarct? infarct                      Old hemorrhagic right parietal infarct with encephalomalacia. Severe supratentorial white matter hyperintensity consistent with microvascular changes related to aging. Multifocal old basal ganglia lacunar infarcts versus prominent perivascular spaces. -MRA H&N:                     Stenotic left posterior cerebral artery.        -Physical therapy evaluated and recommended rehab     Muscular atrophy of the left leg: due to old stroke. Hypertension:fair. Continue current regimen,BP medications were held initially for permissive hypertension as acute stroke was suspected. DM II on metformin. A1c is excellent. Continue  -Accucheks and Humalog SS while inpatient. H/o CAD: on plavix, Lipitor,coreg,benazepril  H/o depression: on Wellbutrin     Hypokalemia: replaced. Improved. Discharged in stable condition. DISCHARGE DIAGNOSES / PLAN:      See above       PENDING TEST RESULTS:   At the time of discharge the following test results are still pending: none    FOLLOW UP APPOINTMENTS:    Follow-up Information     Follow up With Details Comments [address], [place]   [address] [place] [address]  [phone]             ADDITIONAL CARE RECOMMENDATIONS:     DIET: Cardiac Diet and Diabetic Diet    TUBE FEEDING INSTRUCTIONS: NA    OXYGEN / BiPAP SETTINGS: NA    ACTIVITY: Activity as tolerated and PT/OT Eval and Treat    WOUND CARE: NA    EQUIPMENT needed: NA      DISCHARGE MEDICATIONS:   See Medication Reconciliation Form      NOTIFY YOUR PHYSICIAN FOR ANY OF THE FOLLOWING:   Fever over 101 degrees for 24 hours. Chest pain, shortness of breath, fever, chills, nausea, vomiting, diarrhea, change in mentation, falling, weakness, bleeding. Severe pain or pain not relieved by medications. Or, any other signs or symptoms that you may have questions about.     DISPOSITION:    Home With:   [facility]      x Inpatient Rehab:[facility]    Independent/assisted living    Hospice    Other:       PATIENT CONDITION AT DISCHARGE:     Functional status   x Poor    x Deconditioned     Independent      Cognition    x Lucid     Forgetful     Dementia      Catheters/lines (plus indication)    Hamm     PICC     PEG    x None      Code status    x Full code     DNR      PHYSICAL EXAMINATION AT DISCHARGE:        Constitutional:  No acute distress, cooperative, pleasant    ENT:  Oral mucous moist, oropharynx benign. Neck supple,    Resp:  CTA bilaterally. No wheezing/rhonchi/rales. No accessory muscle use   CV:  Regular rhythm, normal rate, no murmurs, gallops, rubs    GI:  Soft, non distended, non tender. normoactive bowel sounds, no hepatosplenomegaly     Musculoskeletal:  No edema, warm, 2+ pulses throughout    Neurologic:     ·  Mental status: alert and oriented x4  · Cranial nerves grossly normal  · Motor exam: LUE 4+/5,LLE 3/5  · Sensation : light touch sensation symmetrical  · Gait not tested:discoordinated per physical therapist  · Meningeal signs negative. Psych:  Good insight, Not anxious nor agitated.             CHRONIC MEDICAL DIAGNOSES:  Problem List as of 6/27/2017  Date Reviewed: 5/3/2017          Codes Class Noted - Resolved    Essential hypertension ICD-10-CM: I10  ICD-9-CM: 401.9  3/28/2017 - Present        Hypercholesterolemia ICD-10-CM: E78.00  ICD-9-CM: 272.0  3/28/2017 - Present        Controlled type 2 diabetes mellitus without complication ([address] [facility] CME-82-NC: E11.9  ICD-9-CM: 250.00  3/28/2017 - Present        History of stroke ICD-10-CM: Z86.73  ICD-9-CM: V12.54  3/28/2017 - Present        Coronary artery disease involving native coronary artery without angina pectoris ICD-10-CM: I25.10  ICD-9-CM: 414.01  3/28/2017 - Present        Left low back pain ICD-10-CM: M54.5  ICD-9-CM: 724.2  8/15/2016 - Present        Gross hematuria ICD-10-CM: R31.0  ICD-9-CM: 599.71  8/15/2016 - Present        Calculus of kidney ICD-10-CM: N20.0  ICD-9-CM: 592.0  4/18/2016 - Present        Erectile dysfunction ICD-10-CM: N52.9  ICD-9-CM: 607.84  4/17/2016 - Present        Nocturia ICD-10-CM: R35.1  ICD-9-CM: 788.43  7/30/2014 - Present        Slowing of urinary stream ICD-10-CM: R39.198  ICD-9-CM: 788.62  7/30/2014 - Present        Benign prostatic hyperplasia with lower urinary tract symptoms ICD-10-CM: N40.1  ICD-9-CM: 600.01  6/22/2014 - Present        Urinary frequency ICD-10-CM: R35.0  ICD-9-CM: 788.41  6/22/2014 - Present        Obesity ICD-10-CM: E66.9  ICD-9-CM: 278.00  6/22/2014 - Present        RESOLVED: Chronic cerebral ischemia ICD-10-CM: I67.82  ICD-9-CM: 437.1  6/25/2017 - 6/27/2017        RESOLVED: Left leg weakness ICD-10-CM: R29.898  ICD-9-CM: 729.89  6/24/2017 - 6/27/2017        * (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 Qty: 30 Tab, Refills: 0         CONTINUE these medications which have CHANGED    Details   atorvastatin (LIPITOR) 20 mg tablet Take 2 Tabs by mouth daily. Qty: 90 Tab, Refills: 3         CONTINUE these medications which have NOT CHANGED    Details   finasteride (PROSCAR) 5 mg tablet Take 5 mg by mouth nightly. therapeutic multivitamin (THERA) tablet Take 1 Tab by mouth daily. tamsulosin (FLOMAX) 0.4 mg capsule Take 0.8 mg by mouth nightly.       carvedilol (COREG) 12.5 mg tablet Take 1 tablet by mouth two  times daily with meals  Qty: 180 Tab, Refills: 3    Associated Diagnoses: Routine general medical examination at a health care facility      amLODIPine-benazepril (LOTREL) 5-20 mg per capsule Take 1 capsule by mouth  once a day  Qty: 90 Cap, Refills: 3 hydroCHLOROthiazide (HYDRODIURIL) 25 mg tablet Take 1 Tab by mouth daily. Qty: 90 Tab, Refills: 3    Associated Diagnoses: Essential hypertension; Coronary artery disease involving native coronary artery of native heart without angina pectoris      buPROPion SR (WELLBUTRIN SR) 150 mg SR tablet Take 1 Tab by mouth two (2) times a day. Qty: 180 Tab, Refills: 3    Associated Diagnoses: Essential hypertension; Coronary artery disease involving native coronary artery of native heart without angina pectoris      metFORMIN (GLUCOPHAGE) 500 mg tablet Take 0.5 Tabs by mouth two (2) times daily (with meals). Qty: 90 Tab, Refills: 1         STOP taking these medications: These are changed or were not prior to admission emdications       aspirin delayed-release 81 mg tablet Comments:   Reason for Stopping:         multivitamin (ONE A DAY) tablet Comments:   Reason for Stopping:         HYDROcodone-acetaminophen (NORCO) 5-325 mg per tablet Comments:   Reason for Stopping:         sildenafil citrate (VIAGRA) 100 mg tablet Comments:   Reason for Stopping:               Greater than 30 minutes were spent with the patient on counseling and coordination of care    Signed:    Artemio Branch MD  6/27/2017  4:11 PM

## 2017-06-27 NOTE — DISCHARGE INSTRUCTIONS
Discharge SNF/Rehab Instructions/LTAC       PATIENT ID: Elmer Macias  MRN: [id number]   YOB: 1950    DATE OF ADMISSION: 6/23/2017  3:58 PM    DATE OF DISCHARGE: 6/27/2017    PRIMARY CARE PROVIDER: Canelo Mclaughlin MD       ATTENDING PHYSICIAN: Artemio Branch MD  DISCHARGING PROVIDER: Artemio Branch MD     To contact this individual call [phone] and ask the  to page. If unavailable ask to be transferred the Adult Hospitalist Department. CONSULTATIONS: ED CONSULT TO SENIOR SERVICES PHYSICAL THERAPY  IP CONSULT TO NEUROLOGY    PROCEDURES/SURGERIES: * No surgery found *    ADMITTING [address] [place] [address] COURSE:      Admission Summary: This is a 25-year-old male with a history of hypertension, diabetes,hemorrhagic stroke with residual left sided hemiparesis,legs more than legs presented with worsening weakness and incoordination with having trouble with gait on the left side. Otherwise negative for dysarthria,facial droop,diplopia,headache. Past Medical History:   Diagnosis Date    Agoraphobia with panic disorder      Anxiety      BPH (benign prostatic hypertrophy) with urinary obstruction      CAD (coronary artery disease)       no previous MI or stents    Chronic edema      Coronary artery disease involving native coronary artery without angina pectoris 3/28/2017    Depression      Diabetes ([facility]      Erectile dysfunction      Essential hypertension 3/28/2017    GERD (gastroesophageal reflux disease)      Hemiparesis (HCC)      History of stroke 3/28/2017    Hypercholesterolemia              Assessment & Plan:   Worsening weakness and incoordiantion of the left leg concerning for a new event.  -History of hemorrhagic stroke with residual left hemiparesis in 1997. He normally walks with or without cane  -Head CT negative  -Switch asa to plavix,increased Lipitor  -MRI brain,                          Tiny focus of restricted diffusion in the left basal ganglia may represent an acute infarct? infarct                      Old hemorrhagic right parietal infarct with encephalomalacia. Severe supratentorial white matter hyperintensity consistent with microvascular changes related to aging. Multifocal old basal ganglia lacunar infarcts versus prominent perivascular spaces. -MRA H&N:                     Stenotic left posterior cerebral artery.        -Physical therapy evaluated and recommended rehab     Muscular atrophy of the left leg: due to old stroke. Hypertension:fair. Continue current regimen,BP medications were held initially for permissive hypertension as acute stroke was suspected. DM II on metformin. A1c is excellent. Continue  -Accucheks and Humalog SS while inpatient. H/o CAD: on plavix, Lipitor,coreg,benazepril  H/o depression: on Wellbutrin     Hypokalemia: replaced. Improved. Discharged in stable condition. DISCHARGE DIAGNOSES / PLAN:      See above       PENDING TEST RESULTS:   At the time of discharge the following test results are still pending: none    FOLLOW UP APPOINTMENTS:    Follow-up Information     Follow up With Details Comments [address], [place]   [address] [place] [address]  [phone]             ADDITIONAL CARE RECOMMENDATIONS:     DIET: Cardiac Diet and Diabetic Diet    TUBE FEEDING INSTRUCTIONS: NA    OXYGEN / BiPAP SETTINGS: NA    ACTIVITY: Activity as tolerated and PT/OT Eval and Treat    WOUND CARE: NA    EQUIPMENT needed: NA      DISCHARGE MEDICATIONS:   See Medication Reconciliation Form      NOTIFY YOUR PHYSICIAN FOR ANY OF THE FOLLOWING:   Fever over 101 degrees for 24 hours. Chest pain, shortness of breath, fever, chills, nausea, vomiting, diarrhea, change in mentation, falling, weakness, bleeding. Severe pain or pain not relieved by medications. Or, any other signs or symptoms that you may have questions about.     DISPOSITION: Home With:   OT  PT  HH  RN      x [facility]patient Rehab:[facility]    Independent/assisted living    Hospice    Other:       PATIENT CONDITION AT DISCHARGE:     Functional status   x Poor    x Deconditioned     Independent      Cognition    x Lucid     Forgetful     Dementia      Catheters/lines (plus indication)    Hamm     PICC     PEG    x None      Code status    x Full code     DNR      PHYSICAL EXAMINATION AT DISCHARGE:        Constitutional:  No acute distress, cooperative, pleasant    ENT:  Oral mucous moist, oropharynx benign. Neck supple,    Resp:  CTA bilaterally. No wheezing/rhonchi/rales. No accessory muscle use   CV:  Regular rhythm, normal rate, no murmurs, gallops, rubs    GI:  Soft, non distended, non tender. normoactive bowel sounds, no hepatosplenomegaly     Musculoskeletal:  No edema, warm, 2+ pulses throughout    Neurologic:     ·  Mental status: alert and oriented x4  · Cranial nerves grossly normal  · Motor exam: LUE 4+/5,LLE 3/5  · Sensation : light touch sensation symmetrical  · Gait not tested:discoordinated per physical therapist  · Meningeal signs negative. Psych:  Good insight, Not anxious nor agitated.             CHRONIC MEDICAL DIAGNOSES:  Problem List as of 6/27/2017  Date Reviewed: 5/3/2017          Codes Class Noted - Resolved    Essential hypertension ICD-10-CM: I10  ICD-9-CM: 401.9  3/28/2017 - Present        Hypercholesterolemia ICD-10-CM: E78.00  ICD-9-CM: 272.0  3/28/2017 - Present        Controlled type 2 diabetes mellitus without complication ([facility] EIH-96-KI: E11.9  ICD-9-CM: 250.00  3/28/2017 - Present        History of stroke ICD-10-CM: Z86.73  ICD-9-CM: V12.54  3/28/2017 - Present        Coronary artery disease involving native coronary artery without angina pectoris ICD-10-CM: I25.10  ICD-9-CM: 414.01  3/28/2017 - Present        Left low back pain ICD-10-CM: M54.5  ICD-9-CM: 724.2  8/15/2016 - Present        Gross hematuria ICD-10-CM: R31.0  ICD-9-CM: 599.71  8/15/2016 - Present        Calculus of kidney ICD-10-CM: N20.0  ICD-9-CM: 592.0  4/18/2016 - Present        Erectile dysfunction ICD-10-CM: N52.9  ICD-9-CM: 607.84  4/17/2016 - Present        Nocturia ICD-10-CM: R35.1  ICD-9-CM: 788.43  7/30/2014 - Present        Slowing of urinary stream ICD-10-CM: R39.198  ICD-9-CM: 788.62  7/30/2014 - Present        Benign prostatic hyperplasia with lower urinary tract symptoms ICD-10-CM: N40.1  ICD-9-CM: 600.01  6/22/2014 - Present        Urinary frequency ICD-10-CM: R35.0  ICD-9-CM: 788.41  6/22/2014 - Present        Obesity ICD-10-CM: E66.9  ICD-9-CM: 278.00  6/22/2014 - Present        RESOLVED: Chronic cerebral ischemia ICD-10-CM: I67.82  ICD-9-CM: 437.1  6/25/2017 - 6/27/2017        RESOLVED: Left leg weakness ICD-10-CM: R29.898  ICD-9-CM: 729.89  6/24/2017 - 6/27/2017        * (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

## 2017-06-28 ENCOUNTER — PATIENT OUTREACH (OUTPATIENT)
Dept: FAMILY MEDICINE CLINIC | Age: 67
End: 2017-06-28

## 2017-07-17 ENCOUNTER — PATIENT OUTREACH (OUTPATIENT)
Dept: FAMILY MEDICINE CLINIC | Age: 67
End: 2017-07-17

## 2017-07-18 ENCOUNTER — HOSPITAL ENCOUNTER (EMERGENCY)
Age: 67
Discharge: HOME | End: 2017-07-18
Attending: EMERGENCY MEDICINE
Payer: MEDICARE

## 2017-07-18 ENCOUNTER — APPOINTMENT (OUTPATIENT)
Dept: GENERAL RADIOLOGY | Age: 67
End: 2017-07-18
Attending: EMERGENCY MEDICINE
Payer: MEDICARE

## 2017-07-18 VITALS
SYSTOLIC BLOOD PRESSURE: 121 MMHG | WEIGHT: 217 LBS | RESPIRATION RATE: 14 BRPM | HEIGHT: 71 IN | BODY MASS INDEX: 30.38 KG/M2 | HEART RATE: 68 BPM | DIASTOLIC BLOOD PRESSURE: 75 MMHG | TEMPERATURE: 97.6 F | OXYGEN SATURATION: 100 %

## 2017-07-18 DIAGNOSIS — S22.41XA CLOSED FRACTURE OF MULTIPLE RIBS OF RIGHT SIDE, INITIAL ENCOUNTER: Primary | ICD-10-CM

## 2017-07-18 LAB
APPEARANCE UR: CLEAR
BACTERIA URNS QL MICRO: NEGATIVE /HPF
BILIRUB UR QL: NEGATIVE
COLOR UR: ABNORMAL
EPITH CASTS URNS QL MICRO: ABNORMAL /LPF
GLUCOSE UR STRIP.AUTO-MCNC: NEGATIVE MG/DL
HGB UR QL STRIP: NEGATIVE
KETONES UR QL STRIP.AUTO: NEGATIVE MG/DL
LEUKOCYTE ESTERASE UR QL STRIP.AUTO: NEGATIVE
NITRITE UR QL STRIP.AUTO: NEGATIVE
PH UR STRIP: 5.5 [PH] (ref 5–8)
PROT UR STRIP-MCNC: 30 MG/DL
RBC #/AREA URNS HPF: ABNORMAL /HPF (ref 0–5)
SP GR UR REFRACTOMETRY: 1.02 (ref 1–1.03)
UROBILINOGEN UR QL STRIP.AUTO: 0.2 EU/DL (ref 0.2–1)
WBC URNS QL MICRO: ABNORMAL /HPF (ref 0–4)

## 2017-07-18 PROCEDURE — 77030005563 HC CATH URETH INT MMGH -A

## 2017-07-18 PROCEDURE — G8978 MOBILITY CURRENT STATUS: HCPCS

## 2017-07-18 PROCEDURE — 81001 URINALYSIS AUTO W/SCOPE: CPT | Performed by: PHYSICIAN ASSISTANT

## 2017-07-18 PROCEDURE — G8980 MOBILITY D/C STATUS: HCPCS

## 2017-07-18 PROCEDURE — 97116 GAIT TRAINING THERAPY: CPT

## 2017-07-18 PROCEDURE — 96374 THER/PROPH/DIAG INJ IV PUSH: CPT

## 2017-07-18 PROCEDURE — G8979 MOBILITY GOAL STATUS: HCPCS

## 2017-07-18 PROCEDURE — 74011250636 HC RX REV CODE- 250/636: Performed by: PHYSICIAN ASSISTANT

## 2017-07-18 PROCEDURE — 51701 INSERT BLADDER CATHETER: CPT

## 2017-07-18 PROCEDURE — 96376 TX/PRO/DX INJ SAME DRUG ADON: CPT

## 2017-07-18 PROCEDURE — 71100 X-RAY EXAM RIBS UNI 2 VIEWS: CPT

## 2017-07-18 PROCEDURE — 97161 PT EVAL LOW COMPLEX 20 MIN: CPT

## 2017-07-18 PROCEDURE — 99285 EMERGENCY DEPT VISIT HI MDM: CPT

## 2017-07-18 PROCEDURE — 96361 HYDRATE IV INFUSION ADD-ON: CPT

## 2017-07-18 PROCEDURE — 71020 XR CHEST PA LAT: CPT

## 2017-07-18 RX ORDER — ONDANSETRON 2 MG/ML
4 INJECTION INTRAMUSCULAR; INTRAVENOUS
Status: COMPLETED | OUTPATIENT
Start: 2017-07-18 | End: 2017-07-18

## 2017-07-18 RX ORDER — MORPHINE SULFATE 4 MG/ML
4 INJECTION, SOLUTION INTRAMUSCULAR; INTRAVENOUS
Status: COMPLETED | OUTPATIENT
Start: 2017-07-18 | End: 2017-07-18

## 2017-07-18 RX ORDER — HYDROCODONE BITARTRATE AND ACETAMINOPHEN 5; 325 MG/1; MG/1
1 TABLET ORAL
Qty: 15 TAB | Refills: 0 | Status: SHIPPED | OUTPATIENT
Start: 2017-07-18 | End: 2017-07-26 | Stop reason: SDUPTHER

## 2017-07-18 RX ORDER — HYDROMORPHONE HYDROCHLORIDE 1 MG/ML
0.5 INJECTION, SOLUTION INTRAMUSCULAR; INTRAVENOUS; SUBCUTANEOUS
Status: COMPLETED | OUTPATIENT
Start: 2017-07-18 | End: 2017-07-18

## 2017-07-18 RX ADMIN — SODIUM CHLORIDE 1000 ML: 900 INJECTION, SOLUTION INTRAVENOUS at 13:43

## 2017-07-18 RX ADMIN — Medication 4 MG: at 13:44

## 2017-07-18 RX ADMIN — HYDROMORPHONE HYDROCHLORIDE 0.5 MG: 1 INJECTION, SOLUTION INTRAMUSCULAR; INTRAVENOUS; SUBCUTANEOUS at 15:52

## 2017-07-18 RX ADMIN — SODIUM CHLORIDE 1000 ML: 900 INJECTION, SOLUTION INTRAVENOUS at 15:52

## 2017-07-18 RX ADMIN — ONDANSETRON 4 MG: 2 INJECTION INTRAMUSCULAR; INTRAVENOUS at 13:43

## 2017-07-18 NOTE — ED PROVIDER NOTES
HPI Comments: 74year old hx recent admission for CVA male presenting for fall. Pt notes that he was getting out of bed at about 8AM this morning when he lost his balance and fell, thinks he may have tripped over the cane that he was using at the time, struck his right posterior ribs on the corner of a piece of furniture. Pt reports a sharp, severe pain at the site of impact, non-radiating, worse with any movement or deep inspiration. No CP or SOB. Denies abdominal pain. Pt notes that he does not think he was dizzy before he fell. Wife notes that he also has a \"foot problem\" which has made him somewhat unsteady. Wife gave pt a hydrocodone at home with no relief. While in XR, pt notes that pain intensified with movement and he came pale, sweaty, lightheaded, and nauseated, notes that those symptoms have since resolved. No CP, SOB, HA, or other concerns. PMHx: DM, HTN, panic disorder, depression, anxiety, CVA, CAD, ED, hemiparesis, CVD, GERD, high cholesterol, BPH, HTN, BPH  Social: non-smoker    Patient is a 74 y.o. male presenting with fall. The history is provided by the patient and the spouse. Fall   Pertinent negatives include no fever, no abdominal pain and no vomiting.         Past Medical History:   Diagnosis Date    Agoraphobia with panic disorder     Anxiety     BPH (benign prostatic hypertrophy) with urinary obstruction     CAD (coronary artery disease)     no previous MI or stents    Chronic edema     Coronary artery disease involving native coronary artery without angina pectoris 3/28/2017    CVD (cardiovascular disease)     Depression     Diabetes ([facility]     Erectile dysfunction     Essential hypertension 3/28/2017    GERD (gastroesophageal reflux disease)     Hemiparesis ([facility]     History of stroke 3/28/2017    Hypercholesterolemia     Hypercholesterolemia 3/28/2017    Hypertension     Nocturia     Panic disorder     Stroke [facility])        Past Surgical History:   Procedure Laterality Date    HX COLONOSCOPY      HX LUMBAR DISKECTOMY      HX ORTHOPAEDIC      right finger repair    HX OTHER SURGICAL      lap band    HX OTHER SURGICAL      Lap band         Family History:   Problem Relation Age of Onset    Cancer Father     Stroke Father     Hypertension Father     Cancer Mother     Hypertension Sister     Heart Disease Brother        Social History     Social History    Marital status:      Spouse name: N/A    Number of children: N/A    Years of education: N/A     Occupational History    Not on file. Social History Main Topics    Smoking status: Never Smoker    Smokeless tobacco: Never Used    Alcohol use Yes    Drug use: No    Sexual activity: Yes     Partners: Female     Other Topics Concern    Not on file     Social History Narrative         ALLERGIES: Review of patient's allergies indicates no known allergies. Review of Systems   Constitutional: Negative for fever. HENT: Negative for congestion. Eyes: Negative for visual disturbance. Respiratory: Negative for cough and shortness of breath. Cardiovascular: Negative for chest pain. Gastrointestinal: Negative for abdominal pain and vomiting. Genitourinary: Positive for flank pain. Musculoskeletal: Negative for neck pain. Skin: Positive for wound (abrasion). Neurological: Negative for seizures. All other systems reviewed and are negative. Vitals:    07/18/17 1222   BP: 132/88   Pulse: 79   Resp: 20   Temp: 97.6 °F (36.4 °C)   SpO2: 95%   Weight: 98.4 kg (217 lb)   Height: 5' 11\" (1.803 m)            Physical Exam   Constitutional: He is oriented to person, place, and time. He appears well-developed and well-nourished. He appears distressed. Pleasant WM, moderate pain distress   HENT:   Head: Normocephalic and atraumatic. Right Ear: External ear normal.   Left Ear: External ear normal.   Eyes: Conjunctivae are normal. No scleral icterus. Neck: Neck supple.  No tracheal deviation present. Cardiovascular: Normal rate, regular rhythm and normal heart sounds. Exam reveals no gallop and no friction rub. No murmur heard. Pulmonary/Chest: Effort normal and breath sounds normal. No stridor. No respiratory distress. He has no wheezes. He exhibits tenderness. Abdominal: Soft. He exhibits no distension. There is no tenderness. There is no rebound and no guarding. Musculoskeletal: Normal range of motion. Neurological: He is alert and oriented to person, place, and time. Skin: Skin is warm and dry. Psychiatric: He has a normal mood and affect. His behavior is normal.   Nursing note and vitals reviewed. MDM  Number of Diagnoses or Management Options  Closed fracture of multiple ribs of right side, initial encounter:   Diagnosis management comments: 74year old male presenting to the ED for RIGHT posterior rib pain after mechanical fall at home. + abrasion, TTP over the right posterior ribs, fractures of rib 11 and 12 on XR. Pt much improved with pain medicine in the ED. Ambulated in the ED with PT. No gross hematuria c/f renal contusion. Discussed with pt use of pain medicine, IS at home. Return precautions discussed. Amount and/or Complexity of Data Reviewed  Clinical lab tests: ordered and reviewed  Tests in the radiology section of CPT®: ordered and reviewed  Discuss the patient with other providers: yes (Dr. Monika Cardenas, ED attending)      ED Course       Procedures      Pt reassessed. Overall feeling better, still unable to urinate, pain 6/10. Would like additional pain medicine.   VANCE Collins  3:45 PM

## 2017-07-18 NOTE — PROGRESS NOTES
Care Management-Patient to ED today with rib fractures as a result of a fall today. Patient was admitted to [facility] 6-23 to 6-27 for worsening weakness and incoordination of left leg. Patient was discharged to [facility] for inpatient rehab. Patient last saw his PCP 6-22-17. Met with patient and his wife in the room. Patient has [employer] Medicare Complete. Patient lives with his wife. At baseline he walks with a cane as he has residual deficit on left due to bleed. Patient said that he was at [facility] for about 10 days. He fell today because he lost his balance. Patient last saw his PCP (Dr. Christina Narayanan) on 6-22-17. His next appointment is 7-26-17. He has a prescription to go to outpatient physical therapy but he has not been yet. Before he left rehab, the physician gave him a shot in his leg to help with the pain. He said that he may have delayed pain for up to 2 weeks. Patient began with the pain last week. They had concern about his ability to get into the home with the foot and rib pain. They asked about a wheelchair. Later patient walked with his cane with PT and did well. She recommended home with no additional equipment and follow up with outpatient PT as previously ordered by inpatient rehab physician. They have access to a wheelchair if necessary. Care Management Interventions  PCP Verified by CM: Yes (Dr. Christina Narayanan)  Last Visit to PCP: 06/22/17  Mode of Transport at Discharge:  Other (see comment) (family)  Discharge Durable Medical Equipment: No  Health Maintenance Reviewed: Yes  Physical Therapy Consult: Yes  Occupational Therapy Consult: No  Speech Therapy Consult: No  Current Support Network: Lives with Spouse (Fabian Morales [phone] )  Confirm Follow Up Transport: Family (wife)  Plan discussed with Pt/Family/Caregiver: Yes  Freedom of Choice Offered: Yes  Discharge Location  Discharge Placement: [facility]

## 2017-07-18 NOTE — SENIOR SERVICES NOTE
74year old male presents to ER after fall. Wife at bedside. Pt \"grunting\" in pain. HOB adjusted and readjusted without relief. Nurse in to medicate. Wife reports that patient was in hospital recently and discharged to rehab at [facility] and set up for outpatient therapy. Wife reports that stroke was 20 years ago and patient has some right leg weakness from that. They have been managing well prior to fall today. Wife declines information about getting additional help in the home.     Would recommend  PT and nursing if patient is discharged home today

## 2017-07-18 NOTE — ED NOTES
Pt given discharge instructions and prescription, verbalized understanding. Pt wheeled out of ER to wife who is driving pt home.

## 2017-07-18 NOTE — DISCHARGE INSTRUCTIONS
We hope that we have addressed all of your medical concerns. The examination and treatment you received in the Emergency Department were for an emergent problem and were not intended as complete care. It is important that you follow up with your healthcare provider(s) for ongoing care. If your symptoms worsen or do not improve as expected, and you are unable to reach your usual health care provider(s), you should return to the Emergency Department. Today's healthcare is undergoing tremendous change, and patient satisfaction surveys are one of the many tools to assess the quality of medical care. You may receive a survey from the [employer] regarding your experience in the Emergency Department. I hope that your experience has been completely positive, particularly the medical care that I provided. As such, please participate in the survey; anything less than excellent does not meet my expectations or intentions. [facility] and [facility] participate in nationally recognized quality of care measures. If your blood pressure is greater than 120/80, as reported below, we urge that you seek medical care to address the potential of high blood pressure, commonly known as hypertension. Hypertension can be hereditary or can be caused by certain medical conditions, pain, stress, or \"white coat syndrome. \"       Please make an appointment with your health care provider(s) for follow up of your Emergency Department visit.        VITALS:   Patient Vitals for the past 8 hrs:   Temp Pulse Resp BP SpO2   07/18/17 1600 - 68 - 121/75 100 %   07/18/17 1545 - 85 - 139/84 99 %   07/18/17 1515 - 72 14 128/78 96 %   07/18/17 1505 - 75 14 - 97 %   07/18/17 1500 - 82 19 130/75 -   07/18/17 1453 - - - 136/80 -   07/18/17 1451 - 76 16 - 99 %   07/18/17 1445 - 69 17 137/85 97 %   07/18/17 1430 - 67 18 142/89 97 %   07/18/17 1415 - 70 20 139/82 97 %   07/18/17 1400 - 65 16 131/78 96 %   07/18/17 1345 - 62 24 135/83 99 %   07/18/17 1330 - (!) 58 15 120/74 98 %   07/18/17 1316 - (!) 59 21 - 96 %   07/18/17 1315 - - - 127/68 -   07/18/17 1314 - - - 130/69 -   07/18/17 1222 97.6 °F (36.4 °C) 79 20 132/88 95 %          Thank you for allowing us to provide you with medical care today. We realize that you have many choices for your emergency care needs. Please choose us in the future for any continued health care needs. [facility], [address] [place], [place] [address]y 20.   Office: [phone]            Recent Results (from the past 24 hour(s))   URINALYSIS W/MICROSCOPIC    Collection Time: 07/18/17  5:29 PM   Result Value Ref Range    Color YELLOW/STRAW      Appearance CLEAR CLEAR      Specific gravity 1.022 1.003 - 1.030      pH (UA) 5.5 5.0 - 8.0      Protein 30 (A) NEG mg/dL    Glucose NEGATIVE  NEG mg/dL    Ketone NEGATIVE  NEG mg/dL    Bilirubin NEGATIVE  NEG      Blood NEGATIVE  NEG      Urobilinogen 0.2 0.2 - 1.0 EU/dL    Nitrites NEGATIVE  NEG      Leukocyte Esterase NEGATIVE  NEG      WBC PENDING /hpf    RBC PENDING /hpf    Epithelial cells PENDING /lpf    Bacteria PENDING /hpf       Xr Chest Pa Lat    Result Date: 7/18/2017  EXAM:  CR chest PA lateral INDICATION:  Right chest pain with inspiration after fall COMPARISON: None. TECHNIQUE: Frontal and lateral chest views and 3 views of the right ribs. FINDINGS: The cardiomediastinal contours are within normal limits. The lungs and pleural spaces are clear. There is no pneumothorax. There are degenerative changes in the spine. There is an acute nondisplaced fracture of the posterior right 11th rib and possibly 12th rib. The upper abdomen is unremarkable. IMPRESSION: Acute nondisplaced fracture of the posterior right 11th rib and possibly 12th rib. No pneumothorax. Clear lungs.      Xr Ribs Rt Uni 2 V    Result Date: 7/18/2017  EXAM:  CR chest PA lateral INDICATION:  Right chest pain with inspiration after fall COMPARISON: None. TECHNIQUE: Frontal and lateral chest views and 3 views of the right ribs. FINDINGS: The cardiomediastinal contours are within normal limits. The lungs and pleural spaces are clear. There is no pneumothorax. There are degenerative changes in the spine. There is an acute nondisplaced fracture of the posterior right 11th rib and possibly 12th rib. The upper abdomen is unremarkable. IMPRESSION: Acute nondisplaced fracture of the posterior right 11th rib and possibly 12th rib. No pneumothorax. Clear lungs. Broken Rib: Care Instructions  Your Care Instructions    A broken rib is a crack or break in one of the bones of the rib cage. Breathing can be very painful because the muscles used for breathing pull on the rib. In most cases, a broken rib will heal on its own. You can take pain medicine while the rib mends. Pain relief allows you to take deep breaths. In the past, doctors recommended taping or wrapping broken ribs. This is no longer done because taping makes it hard for you to take deep breaths. You need to take deep breaths at least once an hour to prevent pneumonia or a partial collapse of a lung. Your rib will heal in about 6 weeks. You heal best when you take good care of yourself. Eat a variety of healthy foods, and don't smoke. Follow-up care is a key part of your treatment and safety. Be sure to make and go to all appointments, and call your doctor if you are having problems. It's also a good idea to know your test results and keep a list of the medicines you take. How can you care for yourself at home? · Be safe with medicines. Read and follow all instructions on the label. ¨ If the doctor gave you a prescription medicine for pain, take it as prescribed. ¨ If you are not taking a prescription pain medicine, ask your doctor if you can take an over-the-counter medicine.   · Even if it hurts, cough or take the deepest breath you can at least once every hour. This will get air deeply into your lungs and reduce your chance of getting pneumonia or a partial collapse of a lung. Hold a pillow against your chest to make this less painful. · Put ice or a cold pack on the area for 10 to 20 minutes at a time. Put a thin cloth between the ice and your skin. When should you call for help? Call 911 anytime you think you may need emergency care. For example, call if:  · You have severe trouble breathing. Call your doctor now or seek immediate medical care if:  · You have some trouble breathing. · You have a fever. · You have a new or worse cough. Watch closely for changes in your health, and be sure to contact your doctor if:  · You have pain even after taking your medicine. · You do not get better as expected. Where can you learn more? Go to http://jean-pierre-miller.info/. Enter M135 in the search box to learn more about \"Broken Rib: Care Instructions. \"  Current as of: March 21, 2017  Content Version: 11.3  © 6989-4462 [employer]. Care instructions adapted under license by [employer] (which disclaims liability or warranty for this information). If you have questions about a medical condition or this instruction, always ask your healthcare professional. [address] any warranty or liability for your use of this information.

## 2017-07-18 NOTE — SENIOR SERVICES NOTE
physical Therapy Emergency Department EVALUATION/DISCHARGE with CMS G codes  Patient: Anjel Gomez (38 y.o. male)  Date: 7/18/2017  Primary Diagnosis: There are no admission diagnoses documented for this encounter. Precautions:      ASSESSMENT :  Chart reviewed. Patient cleared to be seen by nursing staff. Pt sustained a fall this AM, falling posteriorly and hitting the dresser on his right side. X-ray reveals rib fracture of 11th and 12th rib on the right side. Patient had a previous CVA resulting in LLE weakness. Pt had a shot to address spacticity resulting in paraesthesias on the LLE. Pt receiving pain medication 40 minutes prior to treatment. Pain has resolved significantly. Pt needing min assist supine to sit, rolling to the left side. Pt sit to stand, CGA, needing min assist to steady upon initial standing. Once steadied, patient ambulating CGA out in hallway with the cane. Suspect that patient is approaching functional baseline. Pt will be using a wheelchair to get into condo this PM and has family present to assist as needed. Pt will benefit from continued neuro outpatient as Tinetti balance score is 16/28, indicating high fall risk. CM and PA notified. Further acute physical therapy is not indicated at this time. PLAN :  Discharge Recommendations:     [x]   Home with family  []   Skilled nursing facility  []   Admission to hospital with rehab likely needed  []   Inpatient rehab referral  [x]   Outpatient physical therapy referral  []   Other:    Further Equipment Recommendations for Discharge:  None has cane at home. Wife has access to a wheelchair for transport into condo today  []   Rolling walker with 5\" wheels  []   Crutches   []   [employer]   []   Wheelchair   []   Other:     COMMUNICATION/EDUCATION:   Communication/Collaboration:  [x]   Fall prevention education was provided and the patient/caregiver indicated understanding.   [x]   Patient/family have participated as able and agree with findings and recommendations. []   Patient is unable to participate in plan of care at this time. Findings and recommendations were discussed with: MD physician and Registered Nurse and        SUBJECTIVE:   Patient stated I think the medicine is working now.     OBJECTIVE DATA SUMMARY:     Past Medical History:   Diagnosis Date    Agoraphobia with panic disorder     Anxiety     BPH (benign prostatic hypertrophy) with urinary obstruction     CAD (coronary artery disease)     no previous MI or stents    Chronic edema     Coronary artery disease involving native coronary artery without angina pectoris 3/28/2017    CVD (cardiovascular disease)     Depression     Diabetes ([facility] [address]     Erectile dysfunction     Essential hypertension 3/28/2017    GERD (gastroesophageal reflux disease)     Hemiparesis ([facility] [address]     History of stroke 3/28/2017    Hypercholesterolemia     Hypercholesterolemia 3/28/2017    Hypertension     Nocturia     Panic disorder     Stroke [facility])      Past Surgical History:   Procedure Laterality Date    HX COLONOSCOPY      HX LUMBAR DISKECTOMY      HX ORTHOPAEDIC      right finger repair    HX OTHER SURGICAL      lap band    HX OTHER SURGICAL      Lap band     Prior Level of Function/Home Situation: Pt lives with his wife on a condo with elevator access. Patiend D/C from inpatient rehab x 2-3 weeks and had not set up outpatient PT yet. Pt ambulating mod I with a cane within condo. No falls prior to fall that led to admission and rib fractures.    Home Situation  Home Environment: Private residence ([facility])  # Steps to Enter: 0  One/Two Story Residence: One story  Living Alone: No  Support Systems: Spouse/Significant Other/Partner  Patient Expects to be Discharged to[de-identified] Private residence  Current DME Used/Available at Home: Cane, straight    Strength:    Strength: Generally decreased, functional (LLE weakness at baseline)        Tone & Sensation:   Tone: Abnormal (increased tone LLE)  Sensation: Intact       Coordination:  Coordination: Generally decreased, functional    Functional Mobility:  Bed Mobility:  Supine to Sit: Minimum assistance;Assist x1;Additional time     Transfers:  Sit to Stand: Contact guard assistance  Stand to Sit: Contact guard assistance  Bed to Chair: Contact guard assistance     Balance:   Sitting: Intact  Standing: Impaired  Standing - Static: Good  Standing - Dynamic : Fair  Ambulation/Gait Training:  Distance (ft): 80 Feet (ft)  Assistive Device: Gait belt;Cane, straight  Ambulation - Level of Assistance: Contact guard assistance  Gait Abnormalities: Circumduction;Trunk sway increased;Decreased step clearance  Base of Support: Narrowed  Speed/Alexia: Slow  Step Length: Right shortened;Left shortened    Functional Measure:  Tinetti test:    Sitting Balance: 1  Arises: 1  Attempts to Rise: 1  Immediate Standing Balance: 1  Standing Balance: 1  Nudged: 1  Eyes Closed: 1  Turn 360 Degrees - Continuous/Discontinuous: 1  Turn 360 Degrees - Steady/Unsteady: 1  Sitting Down: 1  Balance Score: 10  Indication of Gait: 1  R Step Length/Height: 0  L Step Length/Height: 0  R Foot Clearance: 1  L Foot Clearance: 1  Step Symmetry: 0  Step Continuity: 1  Path: 1  Trunk: 0  Walking Time: 1  Gait Score: 6  Total Score: 16       Tinetti Test and G-code impairment scale:  Percentage of Impairment CH    0%   CI    1-19% CJ    20-39% CK    40-59% CL    60-79% CM    80-99% CN     100%   Tinetti  Score 0-28 28 23-27 17-22 12-16 6-11 1-5 0       Tinetti Tool Score Risk of Falls  <19 = High Fall Risk  19-24 = Moderate Fall Risk  25-28 = Low Fall Risk  Tinetti ME. Performance-Oriented Assessment of Mobility Problems in Elderly Patients. [facility]; [id number].  (Scoring Description: PT Bulletin Feb. 10, 1993)    Older adults: Annia Chilel et al, 2009; n = 1000 [facility] elderly evaluated with ABC, KERRI, ADL, and IADL)  · Mean KERRI score for males aged 69-68 years = 26.21(3.40)  · Mean KERRI score for females age 69-68 years = 25.16(4.30)  · Mean KERRI score for males over 80 years = 23.29(6.02)  · Mean KERRI score for females over 80 years = 17.20(8.32)       In compliance with CMSs Claims Based Outcome Reporting, the following G-code set was chosen for this patient based on their primary functional limitation being treated: The outcome measure chosen to determine the severity of the functional limitation was the Tinetti with a score of 16/28 which was correlated with the impairment scale. ? Mobility - Walking and Moving Around:     - CURRENT STATUS: CK - 40%-59% impaired, limited or restricted    - GOAL STATUS: CK - 40%-59% impaired, limited or restricted    - D/C STATUS:  CK - 40%-59% impaired, limited or restricted     Pain:Pain Scale 1: Numeric (0 - 10)  Pain Intensity 1: 10  Pain Location 1: Rib cage  Pain Orientation 1: Lateral  Pain Description 1: Aching;Constant;Cramping     Activity Tolerance: On RA. BP stable   Please refer to the flowsheet for vital signs taken during this treatment.   After treatment:   [x]         Patient left in no apparent distress sitting up in chair  []         Patient left in no apparent distress in bed  [x]         Call bell left within reach  [x]         Nursing notified  [x]         Caregiver present  []         Bed alarm activated        Thank you for this referral.  Brooke Luong PT, DPT   Time Calculation: 38 mins

## 2017-07-18 NOTE — ED TRIAGE NOTES
Patient arrives from home after getting his cane caught and falling into the dresser. Reports hitting right upper flank. Reports nausea. Denies LOC. Reports pain with inspiration.

## 2017-07-18 NOTE — ED NOTES
Pt arrived from xray diaphoretic and pale, tech stated pt went out for a few seconds, pt had just completed xray

## 2017-07-26 ENCOUNTER — OFFICE VISIT (OUTPATIENT)
Dept: FAMILY MEDICINE CLINIC | Age: 67
End: 2017-07-26

## 2017-07-26 VITALS
RESPIRATION RATE: 14 BRPM | SYSTOLIC BLOOD PRESSURE: 125 MMHG | TEMPERATURE: 96.1 F | OXYGEN SATURATION: 96 % | WEIGHT: 215.2 LBS | HEIGHT: 71 IN | DIASTOLIC BLOOD PRESSURE: 79 MMHG | HEART RATE: 64 BPM | BODY MASS INDEX: 30.13 KG/M2

## 2017-07-26 DIAGNOSIS — S22.41XD CLOSED FRACTURE OF MULTIPLE RIBS OF RIGHT SIDE WITH ROUTINE HEALING, SUBSEQUENT ENCOUNTER: Primary | ICD-10-CM

## 2017-07-26 PROBLEM — S22.41XA FRACTURE OF MULTIPLE RIBS OF RIGHT SIDE: Status: ACTIVE | Noted: 2017-07-26

## 2017-07-26 RX ORDER — FINASTERIDE 5 MG/1
5 TABLET, FILM COATED ORAL
COMMUNITY
Start: 2016-12-05 | End: 2017-07-26 | Stop reason: SDUPTHER

## 2017-07-26 RX ORDER — BUPROPION HYDROCHLORIDE 150 MG/1
150 TABLET, EXTENDED RELEASE ORAL
COMMUNITY
Start: 2016-12-05 | End: 2017-08-15 | Stop reason: SDUPTHER

## 2017-07-26 RX ORDER — BISACODYL 5 MG
10 TABLET, DELAYED RELEASE (ENTERIC COATED) ORAL
Qty: 10 TAB | Refills: 0
Start: 2017-07-26 | End: 2017-07-26

## 2017-07-26 RX ORDER — TAMSULOSIN HYDROCHLORIDE 0.4 MG/1
0.8 CAPSULE ORAL
COMMUNITY
Start: 2016-12-05 | End: 2017-07-26 | Stop reason: SDUPTHER

## 2017-07-26 RX ORDER — GABAPENTIN 100 MG/1
CAPSULE ORAL
COMMUNITY
Start: 2017-07-17 | End: 2017-08-15 | Stop reason: ALTCHOICE

## 2017-07-26 RX ORDER — ASPIRIN 81 MG/1
TABLET ORAL
Refills: 0 | COMMUNITY
Start: 2017-07-06 | End: 2017-08-15 | Stop reason: ALTCHOICE

## 2017-07-26 RX ORDER — FAMOTIDINE 20 MG/1
TABLET, FILM COATED ORAL
Refills: 0 | COMMUNITY
Start: 2017-07-06 | End: 2017-08-15 | Stop reason: ALTCHOICE

## 2017-07-26 RX ORDER — HYDROCODONE BITARTRATE AND ACETAMINOPHEN 5; 325 MG/1; MG/1
1 TABLET ORAL
Qty: 40 TAB | Refills: 0 | Status: SHIPPED | OUTPATIENT
Start: 2017-07-26 | End: 2017-08-09

## 2017-07-26 RX ORDER — METFORMIN HYDROCHLORIDE 500 MG/1
500 TABLET ORAL
COMMUNITY
Start: 2016-12-05 | End: 2017-08-15 | Stop reason: ALTCHOICE

## 2017-07-26 RX ORDER — CARVEDILOL 12.5 MG/1
12.5 TABLET ORAL
COMMUNITY
Start: 2016-12-05 | End: 2017-08-15 | Stop reason: SDUPTHER

## 2017-07-26 RX ORDER — CIPROFLOXACIN 500 MG/1
TABLET ORAL
COMMUNITY
Start: 2017-05-05 | End: 2017-08-15 | Stop reason: ALTCHOICE

## 2017-07-26 RX ORDER — AMLODIPINE AND BENAZEPRIL HYDROCHLORIDE 5; 20 MG/1; MG/1
CAPSULE ORAL
COMMUNITY
Start: 2016-12-05 | End: 2017-08-15 | Stop reason: SDUPTHER

## 2017-07-26 RX ORDER — AMLODIPINE BESYLATE 5 MG/1
TABLET ORAL
Refills: 0 | COMMUNITY
Start: 2017-07-06 | End: 2017-08-15 | Stop reason: ALTCHOICE

## 2017-07-26 RX ORDER — HYDROCHLOROTHIAZIDE 25 MG/1
25 TABLET ORAL
COMMUNITY
Start: 2016-12-05 | End: 2017-08-15 | Stop reason: SDUPTHER

## 2017-07-26 RX ORDER — ATORVASTATIN CALCIUM 20 MG/1
20 TABLET, FILM COATED ORAL
COMMUNITY
Start: 2016-12-05 | End: 2017-08-15 | Stop reason: SDUPTHER

## 2017-07-26 NOTE — PROGRESS NOTES
HISTORY OF PRESENT ILLNESS  Niki Hammond is a 74 y.o. male. HPI   This is a 53-year-old male with a history of hypertension, diabetes, with hx of hemorrhagic stroke with some weakness on the left side, lost his balance and it was at dark tripped on the dresser, but no LOC, s/p fall, he was taken to ER  By his wife, was hospitalized for 3 days was sent to rehab, having a RX for outpt PT,  Otherwise has no trouble with speech and has no diplopia,headache, last time seen the cardilogist was in March of 2017, had a nl work up and was Giuseppe Ashley f/u,  Not seen a Neurologist, refusing to go see one at this time, the pain is at 9/10, worsening w/ breathing, was given some opioid based meds helped greatly but ran out, requesting to get more  xrays results are a follow:  IMPRESSION: Acute nondisplaced fracture of the posterior right 11th rib and  possibly 12th rib. No pneumothorax. Clear lungs.          Current Outpatient Prescriptions   Medication Sig Dispense Refill    amLODIPine-benazepril (LOTREL) 5-20 mg per capsule Take 1 Cap by Mouth Once a Day.  atorvastatin (LIPITOR) 20 mg tablet 20 mg.      buPROPion SR (WELLBUTRIN SR) 150 mg SR tablet 150 mg.      carvedilol (COREG) 12.5 mg tablet 12.5 mg.      hydroCHLOROthiazide (HYDRODIURIL) 25 mg tablet 25 mg.      aspirin delayed-release 81 mg tablet TK 1 T PO QD  0    famotidine (PEPCID) 20 mg tablet TK 1 T PO BID  0    gabapentin (NEURONTIN) 100 mg capsule       HYDROcodone-acetaminophen (NORCO) 5-325 mg per tablet Take 1 Tab by mouth every four (4) hours as needed for Pain. Max Daily Amount: 6 Tabs. 15 Tab 0    clopidogrel (PLAVIX) 75 mg tab Take 1 Tab by mouth daily. 30 Tab 0    atorvastatin (LIPITOR) 20 mg tablet Take 2 Tabs by mouth daily. 90 Tab 3    finasteride (PROSCAR) 5 mg tablet Take 5 mg by mouth nightly.  therapeutic multivitamin (THERA) tablet Take 1 Tab by mouth daily.       tamsulosin (FLOMAX) 0.4 mg capsule Take 0.8 mg by mouth nightly.  carvedilol (COREG) 12.5 mg tablet Take 1 tablet by mouth two  times daily with meals 180 Tab 3    amLODIPine-benazepril (LOTREL) 5-20 mg per capsule Take 1 capsule by mouth  once a day 90 Cap 3    hydroCHLOROthiazide (HYDRODIURIL) 25 mg tablet Take 1 Tab by mouth daily. 90 Tab 3    buPROPion SR (WELLBUTRIN SR) 150 mg SR tablet Take 1 Tab by mouth two (2) times a day. 180 Tab 3    metFORMIN (GLUCOPHAGE) 500 mg tablet 500 mg.      amLODIPine (NORVASC) 5 mg tablet TK 1 T PO QD  0    ciprofloxacin HCl (CIPRO) 500 mg tablet       metFORMIN (GLUCOPHAGE) 500 mg tablet Take 0.5 Tabs by mouth two (2) times daily (with meals).  80 Tab 1     No Known Allergies  Past Medical History:   Diagnosis Date    Agoraphobia with panic disorder     Anxiety     BPH (benign prostatic hypertrophy) with urinary obstruction     CAD (coronary artery disease)     no previous MI or stents    Chronic edema     Coronary artery disease involving native coronary artery without angina pectoris 3/28/2017    CVD (cardiovascular disease)     Depression     [facility] [address]     Erectile dysfunction     Essential hypertension 3/28/2017    GERD (gastroesophageal reflux disease)     Hemiparesis [facility] [address]     History of stroke 3/28/2017    Hypercholesterolemia     Hypercholesterolemia 3/28/2017    Hypertension     Nocturia     Panic disorder     Stroke [facility])      Past Surgical History:   Procedure Laterality Date    HX COLONOSCOPY      HX LUMBAR DISKECTOMY      HX ORTHOPAEDIC      right finger repair    HX OTHER SURGICAL      lap band    HX OTHER SURGICAL      Lap band     Family History   Problem Relation Age of Onset    Cancer Father     Stroke Father     Hypertension Father     Cancer Mother     Hypertension Sister     Heart Disease Brother      Social History   Substance Use Topics    Smoking status: Never Smoker    Smokeless tobacco: Never Used    Alcohol use Yes      Lab Results  Component Value Date/Time   WBC 7.7 06/24/2017 05:59 AM   HGB 12.5 06/24/2017 05:59 AM   HCT 37.0 06/24/2017 05:59 AM   PLATELET 600 24/08/8679 05:59 AM   MCV 91.8 06/24/2017 05:59 AM     Lab Results  Component Value Date/Time   GFR est non-AA >60 06/25/2017 06:39 AM   GFR est AA >60 06/25/2017 06:39 AM   Creatinine 1.06 06/25/2017 06:39 AM   BUN 19 06/25/2017 06:39 AM   Sodium 142 06/25/2017 06:39 AM   Potassium 3.6 06/25/2017 06:39 AM   Chloride 108 06/25/2017 06:39 AM   CO2 27 06/25/2017 06:39 AM        Review of Systems   Constitutional: Negative for chills and fever. HENT: Negative for ear pain and nosebleeds. Eyes: Negative for blurred vision, pain and discharge. Respiratory: Negative for shortness of breath. Cardiovascular: Negative for chest pain and leg swelling. Gastrointestinal: Negative for constipation, diarrhea, nausea and vomiting. Genitourinary: Positive for flank pain. Negative for frequency. Musculoskeletal: Positive for joint pain and myalgias. Skin: Negative for itching and rash. Neurological: Negative for headaches. Psychiatric/Behavioral: Negative for depression. The patient is not nervous/anxious. Physical Exam   Constitutional: He is oriented to person, place, and time. He appears well-developed and well-nourished. HENT:   Head: Normocephalic and atraumatic. Mouth/Throat: No oropharyngeal exudate. Eyes: Conjunctivae and EOM are normal.   Neck: Normal range of motion. Neck supple. Cardiovascular: Normal rate, regular rhythm and normal heart sounds. No murmur heard. Pulmonary/Chest: Effort normal and breath sounds normal. No respiratory distress. Abdominal: Soft. Bowel sounds are normal. He exhibits no distension. There is no rebound. Musculoskeletal: He exhibits tenderness. He exhibits no edema. Neurological: He is alert and oriented to person, place, and time. Skin: Skin is warm. No erythema. Psychiatric: He has a normal mood and affect.  His behavior is normal.   Nursing note and vitals reviewed. ASSESSMENT and PLAN  Diagnoses and all orders for this visit:    1. Closed fracture of multiple ribs of right side with routine healing, subsequent encounter  -     HYDROcodone-acetaminophen (NORCO) 5-325 mg per tablet; Take 1 Tab by mouth every eight (8) hours as needed for Pain for up to 14 days. Max Daily Amount: 3 Tabs. -     REFERRAL TO PHYSICAL THERAPY  -     bisacodyl (DULCOLAX) 5 mg EC tablet; Take 2 Tabs by mouth now for 1 dose. Patient medications were refilled after signing the contract consent and no harm documentations and again was told to lessen the amount of opioid-based medication continue with weight reduction do some massage therapy chiropractor exercise therapy such as resistance banding avoid heavy lifting heavy pushing at this time include ibuprofen and Tylenol over-the-counter topical cream for  day views of concerns patient was told to take some Tums or over-the-counter PPI if abdominal upset ice therapy he therapy side effect of current opioid-based medication significantly explained in detail patient acknowledged understanding and agreed with recommendation  in addition, pt was told to avoid machinary operation and driving while on any opioid based medications that will cause dizziness, drowsiness, and sleepiness. Dependency and tolerancy were also addressed,  meds side effects and compliancy advised,  Call or rtc if worsens, radiology results and schedule of future radiology studies reviewed with patient. Pt agreed with today's recommendations.

## 2017-07-26 NOTE — MR AVS SNAPSHOT
Visit Information Date & Time Provider Department Dept. Phone Encounter #  
 7/26/2017  9:00 AM Jose Eduardo Landaverde MD [address] [place] [address] OFFICE-ANNEX [phone] [id number] Your Appointments 10/16/2017  2:30 PM  
ROUTINE CARE with Jose Eduardo Landaverde MD  
[facility] OFFICE-ANNEX ([address]) Appt Note: 6 [facility]  
 [address] [place] [phone] [address] [place] [address] Upcoming Health Maintenance Date Due Hepatitis C Screening 1950 EYE EXAM RETINAL OR DILATED Q1 9/23/1958 FOBT Q 1 YEAR AGE 50-75 9/23/1998 GLAUCOMA SCREENING Q2Y 9/23/2013 INFLUENZA AGE 9 TO ADULT 8/1/2017 HEMOGLOBIN A1C Q6M 12/24/2017 MICROALBUMIN Q1 3/28/2018 FOOT EXAM Q1 4/14/2018 MEDICARE YEARLY EXAM 6/23/2018 LIPID PANEL Q1 6/24/2018 DTaP/Tdap/Td series (2 - Td) 8/9/2022 Allergies as of 7/26/2017  Review Complete On: 7/18/2017 By: Joey Conte RN No Known Allergies Current Immunizations  Reviewed on 6/22/2017 Name Date Influenza Vaccine 11/18/2015 12:00 AM  
 Pneumococcal Conjugate (PCV-13) 3/17/2015 Pneumococcal Polysaccharide (PPSV-23) 2/21/2014 Tdap 8/9/2012 Zoster Vaccine, Live 10/28/2016 Not reviewed this visit You Were Diagnosed With   
  
 Codes Comments Closed fracture of multiple ribs of right side with routine healing, subsequent encounter    -  Primary ICD-10-CM: S22.41XD ICD-9-CM: V54.19 Vitals BP Pulse Temp Resp Height(growth percentile) Weight(growth percentile) 125/79 (BP 1 Location: Left arm, BP Patient Position: At rest) 64 96.1 °F (35.6 °C) (Oral) 14 5' 11\" (1.803 m) 215 lb 3.2 oz (97.6 kg) SpO2 BMI Smoking Status 96% 30.01 kg/m2 Never Smoker Vitals History BMI and BSA Data Body Mass Index Body Surface Area 30.01 kg/m 2 2.21 m 2 Preferred Pharmacy Pharmacy Name Phone [address] [facility], [address] [facility] [address] Herbert Beck Your Updated Medication List  
  
   
This list is accurate as of: 7/26/17  9:59 AM.  Always use your most recent med list. amLODIPine 5 mg tablet Commonly known as:  Watson Railing TK 1 T PO QD  
  
 * amLODIPine-benazepril 5-20 mg per capsule Commonly known as:  [place] Pride Take 1 Cap by Mouth Once a Day. * amLODIPine-benazepril 5-20 mg per capsule Commonly known as:  Raymon Pride Take 1 capsule by mouth  once a day  
  
 aspirin delayed-release 81 mg tablet TK 1 T PO QD  
  
 * atorvastatin 20 mg tablet Commonly known as:  LIPITOR 20 mg.  
  
 * atorvastatin 20 mg tablet Commonly known as:  LIPITOR Take 2 Tabs by mouth daily. bisacodyl 5 mg EC tablet Commonly known as:  DULCOLAX Take 2 Tabs by mouth now for 1 dose. * buPROPion  mg SR tablet Commonly known as:  WELLBUTRIN SR  
150 mg.  
  
 * buPROPion  mg SR tablet Commonly known as:  Cherise Lack Take 1 Tab by mouth two (2) times a day. * carvedilol 12.5 mg tablet Commonly known as:  COREG  
12.5 mg.  
  
 * carvedilol 12.5 mg tablet Commonly known as:  Reynaldo Beets Take 1 tablet by mouth two  times daily with meals  
  
 ciprofloxacin HCl 500 mg tablet Commonly known as:  CIPRO clopidogrel 75 mg Tab Commonly known as:  PLAVIX Take 1 Tab by mouth daily. famotidine 20 mg tablet Commonly known as:  PEPCID TK 1 T PO BID  
  
 finasteride 5 mg tablet Commonly known as:  PROSCAR Take 5 mg by mouth nightly.  
  
 gabapentin 100 mg capsule Commonly known as:  NEURONTIN  
  
 * hydroCHLOROthiazide 25 mg tablet Commonly known as:  HYDRODIURIL  
25 mg.  
  
 * hydroCHLOROthiazide 25 mg tablet Commonly known as:  HYDRODIURIL Take 1 Tab by mouth daily. HYDROcodone-acetaminophen 5-325 mg per tablet Commonly known as:  Alfreda Cox  
 Take 1 Tab by mouth every eight (8) hours as needed for Pain for up to 14 days. Max Daily Amount: 3 Tabs. * metFORMIN 500 mg tablet Commonly known as:  GLUCOPHAGE  
500 mg.  
  
 * metFORMIN 500 mg tablet Commonly known as:  GLUCOPHAGE Take 0.5 Tabs by mouth two (2) times daily (with meals). tamsulosin 0.4 mg capsule Commonly known as:  FLOMAX Take 0.8 mg by mouth nightly. THERA tablet Generic drug:  therapeutic multivitamin Take 1 Tab by mouth daily. * Notice: This list has 12 medication(s) that are the same as other medications prescribed for you. Read the directions carefully, and ask your doctor or other care provider to review them with you. Prescriptions Printed Refills HYDROcodone-acetaminophen (NORCO) 5-325 mg per tablet 0 Sig: Take 1 Tab by mouth every eight (8) hours as needed for Pain for up to 14 days. Max Daily Amount: 3 Tabs. Class: Print Route: Oral  
  
We Performed the Following REFERRAL TO PHYSICAL THERAPY [NXT77 Custom] Comments:  
 Pt has an appointment Referral Information Referral ID Referred By Referred To  
  
 [id number] Hguo Moore Not Available Visits Status Start Date End Date 1 New Request 7/26/17 7/26/18 If your referral has a status of pending review or denied, additional information will be sent to support the outcome of this decision. Patient Instructions Preventing Falls: Care Instructions Your Care Instructions Getting around your home safely can be a challenge if you have injuries or health problems that make it easy for you to fall. Loose rugs and furniture in walkways are among the dangers for many older people who have problems walking or who have poor eyesight. People who have conditions such as arthritis, osteoporosis, or dementia also have to be careful not to fall. You can make your home safer with a few simple measures. Follow-up care is a key part of your treatment and safety. Be sure to make and go to all appointments, and call your doctor if you are having problems. It's also a good idea to know your test results and keep a list of the medicines you take. How can you care for yourself at home? Taking care of yourself · You may get dizzy if you do not drink enough water. To prevent dehydration, drink plenty of fluids, enough so that your urine is light yellow or clear like water. Choose water and other caffeine-free clear liquids. If you have kidney, heart, or liver disease and have to limit fluids, talk with your doctor before you increase the amount of fluids you drink. · Exercise regularly to improve your strength, muscle tone, and balance. Walk if you can. Swimming may be a good choice if you cannot walk easily. · Have your vision and hearing checked each year or any time you notice a change. If you have trouble seeing and hearing, you might not be able to avoid objects and could lose your balance. · Know the side effects of the medicines you take. Ask your doctor or pharmacist whether the medicines you take can affect your balance. Sleeping pills or sedatives can affect your balance. · Limit the amount of alcohol you drink. Alcohol can impair your balance and other senses. · Ask your doctor whether calluses or corns on your feet need to be removed. If you wear loose-fitting shoes because of calluses or corns, you can lose your balance and fall. · Talk to your doctor if you have numbness in your feet. Preventing falls at home · Remove raised doorway thresholds, throw rugs, and clutter. Repair loose carpet or raised areas in the floor. · Move furniture and electrical cords to keep them out of walking paths. · Use nonskid floor wax, and wipe up spills right away, especially on ceramic tile floors. · If you use a walker or cane, put rubber tips on it.  If you use crutches, clean the bottoms of them regularly with an abrasive pad, such as steel wool. · Keep your house well lit, especially [place], and outside walkways. Use night-lights in areas such as hallways and bathrooms. Add extra light switches or use remote switches (such as switches that go on or off when you clap your hands) to make it easier to turn lights on if you have to get up during the night. · Install sturdy handrails on stairways. · Move items in your cabinets so that the things you use a lot are on the lower shelves (about waist level). · Keep a cordless phone and a flashlight with new batteries by your bed. If possible, put a phone in each of the main rooms of your house, or carry a cell phone in case you fall and cannot reach a phone. Or, you can wear a device around your neck or wrist. You push a button that sends a signal for help. · Wear low-heeled shoes that fit well and give your feet good support. Use footwear with nonskid soles. Check the heels and soles of your shoes for wear. Repair or replace worn heels or soles. · Do not wear socks without shoes on wood floors. · Walk on the grass when the sidewalks are slippery. If you live in an area that gets snow and ice in the winter, sprinkle salt on slippery steps and sidewalks. Preventing falls in the bath · Install grab bars and nonskid mats inside and outside your shower or tub and near the toilet and sinks. · Use shower chairs and bath benches. · Use a hand-held shower head that will allow you to sit while showering. · Get into a tub or shower by putting the weaker leg in first. Get out of a tub or shower with your strong side first. 
· Repair loose toilet seats and consider installing a raised toilet seat to make getting on and off the toilet easier. · Keep your bathroom door unlocked while you are in the shower. Where can you learn more? Go to http://jean-pierre-miller.info/. Enter [id number] [id number] in the search box to learn more about \"Preventing Falls: Care Instructions. \" Current as of: August 4, 2016 Content Version: 11.3 © 3793-0440 [employer]. Care instructions adapted under license by [employer] (which disclaims liability or warranty for this information). If you have questions about a medical condition or this instruction, always ask your healthcare professional. [address] any warranty or liability for your use of this information. How to Get Up Safely After a Fall: Care Instructions Your Care Instructions If you have injuries, health problems, or other reasons that may make it easy for you to fall at home, it is a good idea to learn how to get up safely after a fall. Learning how to get up correctly can help you avoid making an injury worse. Also, knowing what to do if you cannot get up can help you stay safe until help arrives. Follow-up care is a key part of your treatment and safety. Be sure to make and go to all appointments, and call your doctor if you are having problems. It's also a good idea to know your test results and keep a list of the medicines you take. How can you care for yourself after a fall? If you think you can get up First lie still for a few minutes and think about how you feel. If your body feels okay and you think you can get up safely, follow the rest of the steps below: 1. Look for a chair or other piece of furniture that is close to you. 2. Roll onto your side and rest. Roll by turning your head in the direction you want to roll, move your shoulder and arm, then hip and leg in the same direction. 3. Lie still for a moment to let your blood pressure adjust. 
4. Slowly push your upper body up, lift your head, and take a moment to rest. 
5. Slowly get up on your hands and knees, and crawl to the chair or other stable piece of furniture. 6. Put your hands on the chair. 7. Move one foot forward, and place it flat on the floor. Your other leg should be bent with the knee on the floor. 8. Rise slowly, turn your body, and sit in the chair. Stay seated for a bit and think about how you feel. Call for help. Even if you feel okay, let someone know what happened to you. You might not know that you have a serious injury. If you cannot get up 1. If you think you are injured after a fall or you cannot get up, try not to panic. 2. Call out for help. 3. If you have a phone within reach or you have an emergency call device, use it to call for help. 4. If you do not have a phone within reach, try to slide yourself toward it. If you cannot get to the phone, try to slide toward a door or window or a place where you think you can be heard. 5. [place] or use an object to make noise so someone might hear you. 6. If you can reach something that you can use for a pillow, place it under your head. Try to stay warm by covering yourself with a blanket or clothing while you wait for help. When should you call for help? Call 911 anytime you think you may need emergency care. For example, call if: 
· You passed out (lost consciousness). · You cannot get up after a fall. · You have severe pain. Call your doctor now or seek immediate medical care if: 
· You have new or worse pain. · You are dizzy or lightheaded. · You hit your head. Watch closely for changes in your health, and be sure to contact your doctor if: 
· You do not get better as expected. Where can you learn more? Go to http://jean-pierre-miller.info/. Enter L690 in the search box to learn more about \"How to Get Up Safely After a Fall: Care Instructions. \" Current as of: August 4, 2016 Content Version: 11.3 © 9751-0624 [employer].  Care instructions adapted under license by [employer] (which disclaims liability or warranty for this information). If you have questions about a medical condition or this instruction, always ask your healthcare professional. [address] any warranty or liability for your use of this information. Introducing [facility]! Dear Meeta Curran: Thank you for requesting a PÃºbliKo account. Our records indicate that you already have an active PÃºbliKo account. You can access your account anytime at [email or web address] Did you know that you can access your hospital and ER discharge instructions at any time in PÃºbliKo? You can also review all of your test results from your hospital stay or ER visit. Additional Information If you have questions, please visit the Frequently Asked Questions section of the PÃºbliKo website at [email or web address] Remember, PÃºbliKo is NOT to be used for urgent needs. For medical emergencies, dial 911. Now available from your iPhone and Android! Please provide this summary of care documentation to your next provider. Your primary care clinician is listed as Silvestre Le. If you have any questions after today's visit, please call [phone].

## 2017-07-26 NOTE — PATIENT INSTRUCTIONS
Preventing Falls: Care Instructions  Your Care Instructions  Getting around your home safely can be a challenge if you have injuries or health problems that make it easy for you to fall. Loose rugs and furniture in walkways are among the dangers for many older people who have problems walking or who have poor eyesight. People who have conditions such as arthritis, osteoporosis, or dementia also have to be careful not to fall. You can make your home safer with a few simple measures. Follow-up care is a key part of your treatment and safety. Be sure to make and go to all appointments, and call your doctor if you are having problems. It's also a good idea to know your test results and keep a list of the medicines you take. How can you care for yourself at home? Taking care of yourself  · You may get dizzy if you do not drink enough water. To prevent dehydration, drink plenty of fluids, enough so that your urine is light yellow or clear like water. Choose water and other caffeine-free clear liquids. If you have kidney, heart, or liver disease and have to limit fluids, talk with your doctor before you increase the amount of fluids you drink. · Exercise regularly to improve your strength, muscle tone, and balance. Walk if you can. Swimming may be a good choice if you cannot walk easily. · Have your vision and hearing checked each year or any time you notice a change. If you have trouble seeing and hearing, you might not be able to avoid objects and could lose your balance. · Know the side effects of the medicines you take. Ask your doctor or pharmacist whether the medicines you take can affect your balance. Sleeping pills or sedatives can affect your balance. · Limit the amount of alcohol you drink. Alcohol can impair your balance and other senses. · Ask your doctor whether calluses or corns on your feet need to be removed.  If you wear loose-fitting shoes because of calluses or corns, you can lose your balance and fall. · Talk to your doctor if you have numbness in your feet. Preventing falls at home  · Remove raised doorway thresholds, throw rugs, and clutter. Repair loose carpet or raised areas in the floor. · Move furniture and electrical cords to keep them out of walking paths. · Use nonskid floor wax, and wipe up spills right away, especially on ceramic tile floors. · If you use a walker or cane, put rubber tips on it. If you use crutches, clean the bottoms of them regularly with an abrasive pad, such as steel wool. · Keep your house well lit, especially [place], and outside walkways. Use night-lights in areas such as hallways and bathrooms. Add extra light switches or use remote switches (such as switches that go on or off when you clap your hands) to make it easier to turn lights on if you have to get up during the night. · Install sturdy handrails on stairways. · Move items in your cabinets so that the things you use a lot are on the lower shelves (about waist level). · Keep a cordless phone and a flashlight with new batteries by your bed. If possible, put a phone in each of the main rooms of your house, or carry a cell phone in case you fall and cannot reach a phone. Or, you can wear a device around your neck or wrist. You push a button that sends a signal for help. · Wear low-heeled shoes that fit well and give your feet good support. Use footwear with nonskid soles. Check the heels and soles of your shoes for wear. Repair or replace worn heels or soles. · Do not wear socks without shoes on wood floors. · Walk on the grass when the sidewalks are slippery. If you live in an area that gets snow and ice in the winter, sprinkle salt on slippery steps and sidewalks. Preventing falls in the bath  · Install grab bars and nonskid mats inside and outside your shower or tub and near the toilet and sinks. · Use shower chairs and bath benches.   · Use a hand-held shower head that will allow you to sit while showering. · Get into a tub or shower by putting the weaker leg in first. Get out of a tub or shower with your strong side first.  · Repair loose toilet seats and consider installing a raised toilet seat to make getting on and off the toilet easier. · Keep your bathroom door unlocked while you are in the shower. Where can you learn more? Go to http://jean-pierre-miller.info/. Enter [id number] [id number] in the search box to learn more about \"Preventing Falls: Care Instructions. \"  Current as of: August 4, 2016  Content Version: 11.3  © 9564-5215 [employer]. Care instructions adapted under license by [employer] (which disclaims liability or warranty for this information). If you have questions about a medical condition or this instruction, always ask your healthcare professional. [place] [address] any warranty or liability for your use of this information. How to Get Up Safely After a Fall: Care Instructions  Your Care Instructions  If you have injuries, health problems, or other reasons that may make it easy for you to fall at home, it is a good idea to learn how to get up safely after a fall. Learning how to get up correctly can help you avoid making an injury worse. Also, knowing what to do if you cannot get up can help you stay safe until help arrives. Follow-up care is a key part of your treatment and safety. Be sure to make and go to all appointments, and call your doctor if you are having problems. It's also a good idea to know your test results and keep a list of the medicines you take. How can you care for yourself after a fall? If you think you can get up  First lie still for a few minutes and think about how you feel. If your body feels okay and you think you can get up safely, follow the rest of the steps below:  1. Look for a chair or other piece of furniture that is close to you.   2. Roll onto your side and rest. Roll by turning your head in the direction you want to roll, move your shoulder and arm, then hip and leg in the same direction. 3. Lie still for a moment to let your blood pressure adjust.  4. Slowly push your upper body up, lift your head, and take a moment to rest.  5. Slowly get up on your hands and knees, and crawl to the chair or other stable piece of furniture. 6. Put your hands on the chair. 7. Move one foot forward, and place it flat on the floor. Your other leg should be bent with the knee on the floor. 8. Rise slowly, turn your body, and sit in the chair. Stay seated for a bit and think about how you feel. Call for help. Even if you feel okay, let someone know what happened to you. You might not know that you have a serious injury. If you cannot get up  1. If you think you are injured after a fall or you cannot get up, try not to panic. 2. Call out for help. 3. If you have a phone within reach or you have an emergency call device, use it to call for help. 4. If you do not have a phone within reach, try to slide yourself toward it. If you cannot get to the phone, try to slide toward a door or window or a place where you think you can be heard. 5. [place] or use an object to make noise so someone might hear you. 6. If you can reach something that you can use for a pillow, place it under your head. Try to stay warm by covering yourself with a blanket or clothing while you wait for help. When should you call for help? Call 911 anytime you think you may need emergency care. For example, call if:  · You passed out (lost consciousness). · You cannot get up after a fall. · You have severe pain. Call your doctor now or seek immediate medical care if:  · You have new or worse pain. · You are dizzy or lightheaded. · You hit your head. Watch closely for changes in your health, and be sure to contact your doctor if:  · You do not get better as expected. Where can you learn more?   Go to http://jean-pierre-miller.info/. Enter W131 in the search box to learn more about \"How to Get Up Safely After a Fall: Care Instructions. \"  Current as of: August 4, 2016  Content Version: 11.3  © 8243-4051 [employer]. Care instructions adapted under license by Zmqnw.[employer]cn (which disclaims liability or warranty for this information). If you have questions about a medical condition or this instruction, always ask your healthcare professional. [place] [address] any warranty or liability for your use of this information.

## 2017-08-02 RX ORDER — METFORMIN HYDROCHLORIDE 500 MG/1
TABLET ORAL
Qty: 180 TAB | Refills: 1 | Status: SHIPPED | OUTPATIENT
Start: 2017-08-02 | End: 2017-08-15 | Stop reason: SDUPTHER

## 2017-08-02 RX ORDER — TAMSULOSIN HYDROCHLORIDE 0.4 MG/1
CAPSULE ORAL
Qty: 180 CAP | Refills: 1 | Status: SHIPPED | OUTPATIENT
Start: 2017-08-02 | End: 2017-09-13 | Stop reason: SDUPTHER

## 2017-08-07 ENCOUNTER — PATIENT OUTREACH (OUTPATIENT)
Dept: FAMILY MEDICINE CLINIC | Age: 67
End: 2017-08-07

## 2017-08-07 RX ORDER — CLOPIDOGREL BISULFATE 75 MG/1
75 TABLET ORAL DAILY
Qty: 30 TAB | Refills: 0 | Status: SHIPPED | OUTPATIENT
Start: 2017-08-07 | End: 2017-09-13 | Stop reason: SDUPTHER

## 2017-08-07 NOTE — PROGRESS NOTES
Spoke with patient after he called in asking questions about his medications -   Patient is taking both amlodipine/benazepril combo and amlodipine, pressure is normal and no symptoms. Patient is to continue all medications including the above until he comes in for appt to see Dr. Anitha Alexander on 8/15 at 7:45 - patient will call [facility] if any questions or concerns. Refill request for Plavix given to PCP staff, Lipitor will be addressed by PCP at appt.

## 2017-08-15 ENCOUNTER — OFFICE VISIT (OUTPATIENT)
Dept: FAMILY MEDICINE CLINIC | Age: 67
End: 2017-08-15

## 2017-08-15 VITALS
HEIGHT: 71 IN | DIASTOLIC BLOOD PRESSURE: 69 MMHG | OXYGEN SATURATION: 97 % | RESPIRATION RATE: 14 BRPM | HEART RATE: 67 BPM | WEIGHT: 214.2 LBS | TEMPERATURE: 97.5 F | SYSTOLIC BLOOD PRESSURE: 116 MMHG | BODY MASS INDEX: 29.99 KG/M2

## 2017-08-15 DIAGNOSIS — Z13.5 SCREENING FOR GLAUCOMA: ICD-10-CM

## 2017-08-15 DIAGNOSIS — I10 ESSENTIAL HYPERTENSION: Primary | ICD-10-CM

## 2017-08-15 DIAGNOSIS — Z12.11 SCREEN FOR COLON CANCER: ICD-10-CM

## 2017-08-15 DIAGNOSIS — Z11.59 NEED FOR HEPATITIS C SCREENING TEST: ICD-10-CM

## 2017-08-15 DIAGNOSIS — E78.00 HYPERCHOLESTEROLEMIA: ICD-10-CM

## 2017-08-15 RX ORDER — AMLODIPINE AND BENAZEPRIL HYDROCHLORIDE 5; 20 MG/1; MG/1
CAPSULE ORAL
Qty: 90 CAP | Refills: 2
Start: 2017-08-15 | End: 2017-09-13 | Stop reason: SDUPTHER

## 2017-08-15 NOTE — PROGRESS NOTES
HISTORY OF PRESENT ILLNESS  Mliss Scale ii a 74 y.o. male. HPI   Present stating that he has a lot of questions and concerns about all of his questions ie for his   Lipitor, pt wih hx of CVA with hemipareseis and using the cane for ambulation wanted to know why he has to take this med, in addition, he was also put on daily Plavix and pt has no skin discoloration no hx of nose bleed, and has no personal hx of blood in the stool, also wanted to know if he still has to take his metformin  The cardiologist put him on coreg and again wanted to know why he has to take it, his concerns are to not take any meds  Also taking amlodipine 5mg daily in addtion to lotrel 5/20mg daily stating that he has been only taking his lotrel, no leg swellings, having low salt diet  Also has other ????? About the followings  Hctz, proscar,  wellbutrin  Not taking pepcid, not metformin, amlodipine  Refusing to get lab done, pt is aware that meds have side effects, and that Is he gets his lab done but he refusing to do so, was told that if this cont on with his refusal some of his meds may not get refilled and he may have to change PCP , pt's would be responsible for meds side effects by refusing the [facility]ATHLEEN PRECIADO JOS[facility] medical advise    Current Outpatient Prescriptions   Medication Sig Dispense Refill    clopidogrel (PLAVIX) 75 mg tab Take 1 Tab by mouth daily. 30 Tab 0    tamsulosin (FLOMAX) 0.4 mg capsule Take 2 capsules by mouth  daily 180 Cap 1    atorvastatin (LIPITOR) 20 mg tablet Take 2 Tabs by mouth daily. 90 Tab 3    finasteride (PROSCAR) 5 mg tablet Take 5 mg by mouth nightly.  carvedilol (COREG) 12.5 mg tablet Take 1 tablet by mouth two  times daily with meals 180 Tab 3    hydroCHLOROthiazide (HYDRODIURIL) 25 mg tablet Take 1 Tab by mouth daily. 90 Tab 3    buPROPion SR (WELLBUTRIN SR) 150 mg SR tablet Take 1 Tab by mouth two (2) times a day. 180 Tab 3    therapeutic multivitamin (THERA) tablet Take 1 Tab by mouth daily.        No Known Allergies  Past Medical History:   Diagnosis Date    Agoraphobia with panic disorder     Anxiety     BPH (benign prostatic hypertrophy) with urinary obstruction     CAD (coronary artery disease)     no previous MI or stents    Chronic edema     Coronary artery disease involving native coronary artery without angina pectoris 3/28/2017    CVD (cardiovascular disease)     Depression     Diabetes ([facility] [address]     Erectile dysfunction     Essential hypertension 3/28/2017    Fracture of multiple ribs of right side 7/26/2017    GERD (gastroesophageal reflux disease)     Hemiparesis ([address]     History of stroke 3/28/2017    Hypercholesterolemia     Hypercholesterolemia 3/28/2017    Hypertension     Nocturia     Panic disorder     Stroke [facility])      Past Surgical History:   Procedure Laterality Date    HX COLONOSCOPY      HX LUMBAR DISKECTOMY      HX ORTHOPAEDIC      right finger repair    HX OTHER SURGICAL      lap band    HX OTHER SURGICAL      Lap band     Family History   Problem Relation Age of Onset    Cancer Father     Stroke Father     Hypertension Father     Cancer Mother     Hypertension Sister     Heart Disease Brother      Social History   Substance Use Topics    Smoking status: Never Smoker    Smokeless tobacco: Never Used    Alcohol use Yes      Lab Results  Component Value Date/Time   WBC 7.7 06/24/2017 05:59 AM   HGB 12.5 06/24/2017 05:59 AM   HCT 37.0 06/24/2017 05:59 AM   PLATELET 510 10/40/1806 05:59 AM   MCV 91.8 06/24/2017 05:59 AM     Lab Results  Component Value Date/Time   Hemoglobin A1c 6.1 06/24/2017 05:59 AM   Hemoglobin A1c 5.8 03/28/2017 01:08 PM   Glucose 119 06/25/2017 06:39 AM   Glucose (POC) 110 06/24/2017 06:39 AM   Microalb/Creat ratio (ug/mg creat.) 82.2 03/28/2017 01:09 PM   LDL, calculated 81.6 06/24/2017 05:59 AM   Creatinine 1.06 06/25/2017 06:39 AM      Lab Results  Component Value Date/Time   Cholesterol, total 173 06/24/2017 05:59 AM   HDL Cholesterol 59 06/24/2017 05:59 AM   LDL, calculated 81.6 06/24/2017 05:59 AM   Triglyceride 162 06/24/2017 05:59 AM   CHOL/HDL Ratio 2.9 06/24/2017 05:59 AM   Lab Results  Component Value Date/Time   GFR est non-AA >60 06/25/2017 06:39 AM   GFR est AA >60 06/25/2017 06:39 AM   Creatinine 1.06 06/25/2017 06:39 AM   BUN 19 06/25/2017 06:39 AM   Sodium 142 06/25/2017 06:39 AM   Potassium 3.6 06/25/2017 06:39 AM   Chloride 108 06/25/2017 06:39 AM   CO2 27 06/25/2017 06:39 AM              Review of Systems   Constitutional: Negative for chills and fever. HENT: Negative for ear pain and nosebleeds. Eyes: Negative for blurred vision, pain and discharge. Respiratory: Negative for shortness of breath. Cardiovascular: Negative for chest pain and leg swelling. Gastrointestinal: Negative for constipation, diarrhea, nausea and vomiting. Genitourinary: Negative for frequency. Musculoskeletal: Negative for joint pain. Skin: Negative for itching and rash. Neurological: Negative for headaches. Psychiatric/Behavioral: Negative for depression. The patient is not nervous/anxious. Physical Exam   Constitutional: He is oriented to person, place, and time. He appears well-developed and well-nourished. HENT:   Head: Normocephalic and atraumatic. Mouth/Throat: No oropharyngeal exudate. Eyes: Conjunctivae and EOM are normal.   Neck: Normal range of motion. Neck supple. Cardiovascular: Normal rate, regular rhythm and normal heart sounds. No murmur heard. Pulmonary/Chest: Effort normal and breath sounds normal. No respiratory distress. Abdominal: Soft. Bowel sounds are normal. He exhibits no distension. There is no rebound. Musculoskeletal: He exhibits no edema or tenderness. Neurological: He is alert and oriented to person, place, and time. Skin: Skin is warm. No erythema. Psychiatric: He has a normal mood and affect. His behavior is normal.   Nursing note and vitals reviewed.       ASSESSMENT and PLAN  Diagnoses and all orders for this visit:    1. Essential hypertension  -     amLODIPine-benazepril (LOTREL) 5-20 mg per capsule; Take 1 Cap by Mouth Once a Day. 2. Screening for glaucoma  -     REFERRAL TO OPTOMETRY    3. Screen for colon cancer    4. Need for hepatitis C screening test    5. Hypercholesterolemia  -     amLODIPine-benazepril (LOTREL) 5-20 mg per capsule; Take 1 Cap by Mouth Once a Day. At this time patient was told to lose weight, so that his body mass index would get into a normal level between 20-25,  increase physical activity, limit alcohol consumption, stop secondhand tobacco exposure    In addition the patient was told to start an active life style modifications, for which includes creating a an interesting delightful to do list,  such as start of a light physical activity with a brisk daily walking 30 minutes most days of the week, most likely to total of 150 minutes per week, then the patient was told to try to avoid fatty fast foods, have a low-fat low-cholesterol diet, include seafood such as adding fatty fish such as Wyline Costello, Mackerel, [place] to the diet, increase vegetables and fruits, nuts 3-4 times per week and finally have a low-salt and K rich food intake for a good 4-6 months possibly for ever for the best outcome,   All mentioned recommendations, have to be done at least most days of the weeks for the best result,  Routine labs ordered, and the needed abnormal labs will be discussed soon and they can be repeated in 3-6 months. In addition relevant handouts were given to the patient for a better understanding,    patient was told to call if any problems.   Patient acknowledged understanding and     Patient agreed with today's recommendation

## 2017-08-15 NOTE — PROGRESS NOTES
Sebastián Spicer          Name and  verified        Chief Complaint   Patient presents with    Follow-up     Discharged form Inpatient Rehab due to falls          Health Maintenance reviewed-discussed with patient. Patient decline retinal exam due to upcoming eye exam in .

## 2017-08-15 NOTE — MR AVS SNAPSHOT
Visit Information Date & Time Provider Department Dept. Phone Encounter #  
 8/15/2017  7:45 AM Kilo Addison MD [facility] [phone] [id number] Follow-up Instructions Return in about 3 months (around 11/15/2017), or if symptoms worsen or fail to improve. Your Appointments 10/16/2017 11:00 AM  
ROUTINE CARE with Kilo Addison MD  
[address] [facility] ([address]) Appt Note: [address]; [address] [facility] [id number]  
[phone] [address] [id number] Upcoming Health Maintenance Date Due Hepatitis C Screening 1950 EYE EXAM RETINAL OR DILATED Q1 9/23/1958 FOBT Q 1 YEAR AGE 50-75 9/23/1998 GLAUCOMA SCREENING Q2Y 9/23/2013 HEMOGLOBIN A1C Q6M 12/24/2017 MICROALBUMIN Q1 3/28/2018 FOOT EXAM Q1 4/14/2018 MEDICARE YEARLY EXAM 6/23/2018 LIPID PANEL Q1 6/24/2018 DTaP/Tdap/Td series (2 - Td) 8/9/2022 Allergies as of 8/15/2017  Review Complete On: 8/15/2017 By: Ralph Gallardo LPN No Known Allergies Current Immunizations  Reviewed on 6/22/2017 Name Date Influenza Vaccine 11/18/2015 12:00 AM  
 Pneumococcal Conjugate (PCV-13) 3/17/2015 Pneumococcal Polysaccharide (PPSV-23) 2/21/2014 Tdap 8/9/2012 Zoster Vaccine, Live 10/28/2016 Not reviewed this visit You Were Diagnosed With   
  
 Codes Comments Essential hypertension    -  Primary ICD-10-CM: I10 
ICD-9-CM: 401.9 Screening for glaucoma     ICD-10-CM: Z13.5 ICD-9-CM: V80.1 Screen for colon cancer     ICD-10-CM: Z12.11 ICD-9-CM: V76.51 Need for hepatitis C screening test     ICD-10-CM: Z11.59 
ICD-9-CM: V73.89 Hypercholesterolemia     ICD-10-CM: E78.00 ICD-9-CM: 272.0 Vitals BP Pulse Temp Resp Height(growth percentile) Weight(growth percentile) 116/69 (BP 1 Location: Left arm, BP Patient Position: At rest) 67 97.5 °F (36.4 °C) (Oral) 14 5' 11\" (1.803 m) 214 lb 3.2 oz (97.2 kg) SpO2 BMI Smoking Status 97% 29.87 kg/m2 Never Smoker Vitals History BMI and BSA Data Body Mass Index Body Surface Area  
 29.87 kg/m 2 2.21 m 2 Preferred Pharmacy Pharm[facility] [phone] Your Updated Medication List  
  
   
This list is accurate as of: 8/15/17  8:22 AM.  Always use your most recent med list. amLODIPine-benazepril 5-20 mg per capsule Commonly known as:  Emmalene Gallon Take 1 Cap by Mouth Once a Day. atorvastatin 20 mg tablet Commonly known as:  LIPITOR Take 2 Tabs by mouth daily. buPROPion  mg SR tablet Commonly known as:  Lavella Bugler Take 1 Tab by mouth two (2) times a day. carvedilol 12.5 mg tablet Commonly known as:  Janalyn Lyubov Take 1 tablet by mouth two  times daily with meals  
  
 clopidogrel 75 mg Tab Commonly known as:  PLAVIX Take 1 Tab by mouth daily. finasteride 5 mg tablet Commonly known as:  PROSCAR Take 5 mg by mouth nightly. hydroCHLOROthiazide 25 mg tablet Commonly known as:  HYDRODIURIL Take 1 Tab by mouth daily. tamsulosin 0.4 mg capsule Commonly known as:  FLOMAX Take 2 capsules by mouth  daily THERA tablet Generic drug:  therapeutic multivitamin Take 1 Tab by mouth daily. We Performed the Following HEPATITIS C AB [54187 CPT(R)] OCCULT BLOOD, IMMUNOASSAY (FIT) [id number] CPT(R)] REFERRAL TO OPTOMETRY [id number] Custom] Comments:  
 Please evaluate patient for glaucoma. Follow-up Instructions Return in about 3 months (around 11/15/2017), or if symptoms worsen or fail to improve. Referral Information  Referral ID Referred By Referred To  
  
 [id number] Lavonia Schilder, MD   
 [address], [address] Phone: [phone] Fax: [phone] Visits Status Start Date End Date 1 New Request 8/15/17 8/15/18 If your referral has a status of pending review or denied, additional information will be sent to support the outcome of this decision. Introducing [facility] [place] [facility]! Dear Asha Stover: Thank you for requesting a [employer] account. Our records indicate that you already have an active [employer] account. You can access your account anytime at [email or web address] Did you know that you can access your hospital and ER discharge instructions at any time in [employer]? You can also review all of your test results from your hospital stay or ER visit. Additional Information If you have questions, please visit the Frequently Asked Questions section of the [employer] website at [email or web address] Remember, [employer] is NOT to be used for urgent needs. For medical emergencies, dial 911. Now available from your iPhone and Android! Please provide this summary of care documentation to your next provider. Your primary care clinician is listed as Darolyn Severance. If you have any questions after today's visit, please call [phone].

## 2017-09-13 DIAGNOSIS — I10 ESSENTIAL HYPERTENSION: ICD-10-CM

## 2017-09-13 DIAGNOSIS — I25.10 CORONARY ARTERY DISEASE INVOLVING NATIVE CORONARY ARTERY OF NATIVE HEART WITHOUT ANGINA PECTORIS: ICD-10-CM

## 2017-09-13 DIAGNOSIS — Z00.00 ROUTINE GENERAL MEDICAL EXAMINATION AT A HEALTH CARE FACILITY: ICD-10-CM

## 2017-09-13 DIAGNOSIS — E78.00 HYPERCHOLESTEROLEMIA: ICD-10-CM

## 2017-09-13 NOTE — TELEPHONE ENCOUNTER
Returned call to pt. Requesting refills on all meds 90 day supply. To [facility].    meds pended and sent to Dr Christina Louise

## 2017-09-13 NOTE — TELEPHONE ENCOUNTER
Pt states that medication that should be refilled are not and there is a dispcrenpency with his meds.  Please call him at [phone] also Atorvastatin was denied

## 2017-09-14 RX ORDER — CLOPIDOGREL BISULFATE 75 MG/1
75 TABLET ORAL DAILY
Qty: 90 TAB | Refills: 0 | Status: SHIPPED | OUTPATIENT
Start: 2017-09-14 | End: 2017-11-02 | Stop reason: SDUPTHER

## 2017-09-14 RX ORDER — ATORVASTATIN CALCIUM 20 MG/1
20 TABLET, FILM COATED ORAL DAILY
Qty: 90 TAB | Refills: 3 | Status: SHIPPED | OUTPATIENT
Start: 2017-09-14 | End: 2018-01-23 | Stop reason: SDUPTHER

## 2017-09-14 RX ORDER — BUPROPION HYDROCHLORIDE 150 MG/1
150 TABLET, EXTENDED RELEASE ORAL 2 TIMES DAILY
Qty: 180 TAB | Refills: 3 | Status: SHIPPED | OUTPATIENT
Start: 2017-09-14 | End: 2018-01-23 | Stop reason: SDUPTHER

## 2017-09-14 RX ORDER — HYDROCHLOROTHIAZIDE 25 MG/1
25 TABLET ORAL DAILY
Qty: 90 TAB | Refills: 3 | Status: SHIPPED | OUTPATIENT
Start: 2017-09-14 | End: 2018-01-23 | Stop reason: SDUPTHER

## 2017-09-14 RX ORDER — CARVEDILOL 12.5 MG/1
TABLET ORAL
Qty: 180 TAB | Refills: 3 | Status: SHIPPED | OUTPATIENT
Start: 2017-09-14 | End: 2018-01-23 | Stop reason: SDUPTHER

## 2017-09-14 RX ORDER — TAMSULOSIN HYDROCHLORIDE 0.4 MG/1
CAPSULE ORAL
Qty: 180 CAP | Refills: 1 | Status: SHIPPED | OUTPATIENT
Start: 2017-09-14 | End: 2018-01-18 | Stop reason: SDUPTHER

## 2017-09-14 RX ORDER — AMLODIPINE AND BENAZEPRIL HYDROCHLORIDE 5; 20 MG/1; MG/1
CAPSULE ORAL
Qty: 90 CAP | Refills: 2 | Status: SHIPPED | OUTPATIENT
Start: 2017-09-14 | End: 2018-01-23 | Stop reason: SDUPTHER

## 2017-09-14 RX ORDER — FINASTERIDE 5 MG/1
5 TABLET, FILM COATED ORAL
Qty: 90 TAB | Refills: 0 | Status: SHIPPED | OUTPATIENT
Start: 2017-09-14 | End: 2017-11-02 | Stop reason: SDUPTHER

## 2017-11-06 RX ORDER — FINASTERIDE 5 MG/1
TABLET, FILM COATED ORAL
Qty: 90 TAB | Refills: 1 | Status: SHIPPED | OUTPATIENT
Start: 2017-11-06 | End: 2018-01-23 | Stop reason: SDUPTHER

## 2017-11-06 RX ORDER — CLOPIDOGREL BISULFATE 75 MG/1
TABLET ORAL
Qty: 90 TAB | Refills: 1 | Status: SHIPPED | OUTPATIENT
Start: 2017-11-06 | End: 2018-01-23 | Stop reason: SDUPTHER

## 2018-01-16 ENCOUNTER — LAB ONLY (OUTPATIENT)
Dept: FAMILY MEDICINE CLINIC | Age: 68
End: 2018-01-16

## 2018-01-16 DIAGNOSIS — E11.9 CONTROLLED TYPE 2 DIABETES MELLITUS WITHOUT COMPLICATION, WITHOUT LONG-TERM CURRENT USE OF INSULIN (HCC): Primary | ICD-10-CM

## 2018-01-16 DIAGNOSIS — I10 ESSENTIAL HYPERTENSION: ICD-10-CM

## 2018-01-16 DIAGNOSIS — E78.00 HYPERCHOLESTEROLEMIA: ICD-10-CM

## 2018-01-16 DIAGNOSIS — N40.1 BENIGN PROSTATIC HYPERPLASIA WITH LOWER URINARY TRACT SYMPTOMS, SYMPTOM DETAILS UNSPECIFIED: ICD-10-CM

## 2018-01-16 LAB — HBA1C MFR BLD HPLC: 6.1 %

## 2018-01-16 NOTE — PROGRESS NOTES
Order placed for routine labs, per Verbal Order from Dr. Micheal Aggarwal on 1/16/2018 due to need for routine labs

## 2018-01-17 LAB
ALBUMIN SERPL-MCNC: 4 G/DL (ref 3.6–4.8)
ALBUMIN/GLOB SERPL: 1.7 {RATIO} (ref 1.2–2.2)
ALP SERPL-CCNC: 72 IU/L (ref 39–117)
ALT SERPL-CCNC: 22 IU/L (ref 0–44)
AST SERPL-CCNC: 18 IU/L (ref 0–40)
BILIRUB SERPL-MCNC: 0.5 MG/DL (ref 0–1.2)
BUN SERPL-MCNC: 14 MG/DL (ref 8–27)
BUN/CREAT SERPL: 12 (ref 10–24)
CALCIUM SERPL-MCNC: 9.1 MG/DL (ref 8.6–10.2)
CHLORIDE SERPL-SCNC: 102 MMOL/L (ref 96–106)
CHOLEST SERPL-MCNC: 201 MG/DL (ref 100–199)
CO2 SERPL-SCNC: 27 MMOL/L (ref 18–29)
CREAT SERPL-MCNC: 1.16 MG/DL (ref 0.76–1.27)
ERYTHROCYTE [DISTWIDTH] IN BLOOD BY AUTOMATED COUNT: 13.7 % (ref 12.3–15.4)
GLOBULIN SER CALC-MCNC: 2.4 G/DL (ref 1.5–4.5)
GLUCOSE SERPL-MCNC: 143 MG/DL (ref 65–99)
HCT VFR BLD AUTO: 40.3 % (ref 37.5–51)
HDLC SERPL-MCNC: 61 MG/DL
HGB BLD-MCNC: 13.4 G/DL (ref 13–17.7)
LDLC SERPL CALC-MCNC: 116 MG/DL (ref 0–99)
MCH RBC QN AUTO: 31.5 PG (ref 26.6–33)
MCHC RBC AUTO-ENTMCNC: 33.3 G/DL (ref 31.5–35.7)
MCV RBC AUTO: 95 FL (ref 79–97)
PLATELET # BLD AUTO: 252 X10E3/UL (ref 150–379)
POTASSIUM SERPL-SCNC: 4.2 MMOL/L (ref 3.5–5.2)
PROT SERPL-MCNC: 6.4 G/DL (ref 6–8.5)
RBC # BLD AUTO: 4.25 X10E6/UL (ref 4.14–5.8)
SODIUM SERPL-SCNC: 143 MMOL/L (ref 134–144)
TRIGL SERPL-MCNC: 119 MG/DL (ref 0–149)
VLDLC SERPL CALC-MCNC: 24 MG/DL (ref 5–40)
WBC # BLD AUTO: 7.4 X10E3/UL (ref 3.4–10.8)

## 2018-01-18 RX ORDER — TAMSULOSIN HYDROCHLORIDE 0.4 MG/1
CAPSULE ORAL
Qty: 180 CAP | Refills: 2 | Status: SHIPPED | OUTPATIENT
Start: 2018-01-18 | End: 2018-01-23 | Stop reason: SDUPTHER

## 2018-01-23 ENCOUNTER — OFFICE VISIT (OUTPATIENT)
Dept: FAMILY MEDICINE CLINIC | Age: 68
End: 2018-01-23

## 2018-01-23 VITALS
OXYGEN SATURATION: 98 % | WEIGHT: 215.6 LBS | BODY MASS INDEX: 30.18 KG/M2 | SYSTOLIC BLOOD PRESSURE: 121 MMHG | TEMPERATURE: 96.9 F | HEIGHT: 71 IN | DIASTOLIC BLOOD PRESSURE: 76 MMHG | HEART RATE: 67 BPM | RESPIRATION RATE: 16 BRPM

## 2018-01-23 DIAGNOSIS — I25.10 CORONARY ARTERY DISEASE INVOLVING NATIVE CORONARY ARTERY OF NATIVE HEART WITHOUT ANGINA PECTORIS: ICD-10-CM

## 2018-01-23 DIAGNOSIS — Z86.73 HISTORY OF STROKE: ICD-10-CM

## 2018-01-23 DIAGNOSIS — Z00.00 ROUTINE GENERAL MEDICAL EXAMINATION AT A HEALTH CARE FACILITY: ICD-10-CM

## 2018-01-23 DIAGNOSIS — N40.1 BENIGN PROSTATIC HYPERPLASIA (BPH) WITH STRAINING ON URINATION: ICD-10-CM

## 2018-01-23 DIAGNOSIS — I10 ESSENTIAL HYPERTENSION: Primary | ICD-10-CM

## 2018-01-23 DIAGNOSIS — E78.00 HYPERCHOLESTEROLEMIA: ICD-10-CM

## 2018-01-23 DIAGNOSIS — R39.198 SLOWING OF URINARY STREAM: ICD-10-CM

## 2018-01-23 DIAGNOSIS — R39.16 BENIGN PROSTATIC HYPERPLASIA (BPH) WITH STRAINING ON URINATION: ICD-10-CM

## 2018-01-23 PROBLEM — E11.21 TYPE 2 DIABETES MELLITUS WITH NEPHROPATHY (HCC): Status: ACTIVE | Noted: 2018-01-23

## 2018-01-23 RX ORDER — AMLODIPINE AND BENAZEPRIL HYDROCHLORIDE 5; 20 MG/1; MG/1
CAPSULE ORAL
Qty: 90 CAP | Refills: 2 | Status: SHIPPED | OUTPATIENT
Start: 2018-01-23 | End: 2018-10-03 | Stop reason: SDUPTHER

## 2018-01-23 RX ORDER — CLOPIDOGREL BISULFATE 75 MG/1
TABLET ORAL
Qty: 30 TAB | Refills: 0 | Status: SHIPPED | OUTPATIENT
Start: 2018-01-23 | End: 2018-02-12

## 2018-01-23 RX ORDER — CARVEDILOL 12.5 MG/1
TABLET ORAL
Qty: 180 TAB | Refills: 3 | Status: SHIPPED | OUTPATIENT
Start: 2018-01-23 | End: 2018-10-03 | Stop reason: SDUPTHER

## 2018-01-23 RX ORDER — TAMSULOSIN HYDROCHLORIDE 0.4 MG/1
CAPSULE ORAL
Qty: 30 CAP | Refills: 1 | Status: SHIPPED | OUTPATIENT
Start: 2018-01-23 | End: 2018-05-17 | Stop reason: SDUPTHER

## 2018-01-23 RX ORDER — THERA TABS 400 MCG
1 TAB ORAL DAILY
Qty: 90 TAB | Refills: 1 | Status: SHIPPED | OUTPATIENT
Start: 2018-01-23

## 2018-01-23 RX ORDER — BUPROPION HYDROCHLORIDE 150 MG/1
150 TABLET, EXTENDED RELEASE ORAL 2 TIMES DAILY
Qty: 180 TAB | Refills: 1 | Status: SHIPPED | OUTPATIENT
Start: 2018-01-23 | End: 2018-07-09 | Stop reason: SDUPTHER

## 2018-01-23 RX ORDER — HYDROCHLOROTHIAZIDE 25 MG/1
25 TABLET ORAL DAILY
Qty: 90 TAB | Refills: 3 | Status: SHIPPED | OUTPATIENT
Start: 2018-01-23 | End: 2018-10-03 | Stop reason: SDUPTHER

## 2018-01-23 RX ORDER — FINASTERIDE 5 MG/1
TABLET, FILM COATED ORAL
Qty: 30 TAB | Refills: 0 | Status: SHIPPED | OUTPATIENT
Start: 2018-01-23 | End: 2018-04-13 | Stop reason: SDUPTHER

## 2018-01-23 RX ORDER — ATORVASTATIN CALCIUM 40 MG/1
40 TABLET, FILM COATED ORAL DAILY
Qty: 90 TAB | Refills: 1 | Status: SHIPPED | OUTPATIENT
Start: 2018-01-23 | End: 2018-07-09 | Stop reason: SDUPTHER

## 2018-01-23 NOTE — PROGRESS NOTES
HISTORY OF PRESENT ILLNESS  Bri Garrett is a 69 y.o. male.   HPI    Patient present with significant comorbid medical history of coronary artery disease cerebrovascular accident benign prostatic hypertrophy with obstruction depression and panic attack compliant with all her medication requesting a refill for all of his medication in a 90 day supply patient has not seen a cardiologist for a while and also has not been seen a prostate doctor as well not compliant with follow-up regarding his specialist visit requesting primary care physician to refill all the other physicians prescribed medication for which patient was told  he is being better off to get those medication from the prescribed doctor secondary to unknown duration of the therapy patient denies any complaint at this time, walking with a cane has difficulty maintaining his balance has not seen a neurologist as well since the stroke patient was put on Plavix since then last visit he was told that this is not a correct medication for him regardless patient requesting a continued refill for Plavix and does not want to see a neurologist at this time patient was told that current primary care physician will give you medication for 30 day supply regardless for the refill need to be prescribed by the neurologist urologist and his cardiologist so that patient can go and follow-up with them as well, denies any nitroglycerin use stating that his depression and anxiety state is better with the current medication denies any suicidal homicidal ideation has no illusion hallucination patient has a better night of sleep and better concentration is very alert and oriented and currently functional except for residual ataxia since the stroke that he had, otherwise patient was informed regarding his current BMI which is >30 which could add another risk factor to his current medical condition, patient state that he is having a low-salt diet and is trying to be high as active as possible, with the current BPH medication he denies any urinary problem at this time  Current Outpatient Prescriptions   Medication Sig Dispense Refill    tamsulosin (FLOMAX) 0.4 mg capsule TAKE 2 CAPSULES BY MOUTH  DAILY 180 Cap 2    finasteride (PROSCAR) 5 mg tablet TAKE 1 TABLET BY MOUTH  NIGHTLY 90 Tab 1    clopidogrel (PLAVIX) 75 mg tab TAKE 1 TABLET BY MOUTH  DAILY 90 Tab 1    atorvastatin (LIPITOR) 20 mg tablet Take 1 Tab by mouth daily. 90 Tab 3    carvedilol (COREG) 12.5 mg tablet Take 1 tablet by mouth two  times daily with meals 180 Tab 3    buPROPion SR (WELLBUTRIN SR) 150 mg SR tablet Take 1 Tab by mouth two (2) times a day. 180 Tab 3    hydroCHLOROthiazide (HYDRODIURIL) 25 mg tablet Take 1 Tab by mouth daily. 90 Tab 3    amLODIPine-benazepril (LOTREL) 5-20 mg per capsule Take 1 Cap by Mouth Once a Day. 90 Cap 2    therapeutic multivitamin (THERA) tablet Take 1 Tab by mouth daily.        No Known Allergies  Past Medical History:   Diagnosis Date    Agoraphobia with panic disorder     Anxiety     BPH (benign prostatic hypertrophy) with urinary obstruction     CAD (coronary artery disease)     no previous MI or stents    Chronic edema     Coronary artery disease involving native coronary artery without angina pectoris 3/28/2017    CVD (cardiovascular disease)     Depression     Diabetes ([facility] [address]     Erectile dysfunction     Essential hypertension 3/28/2017    Fracture of multiple ribs of right side 7/26/2017    GERD (gastroesophageal reflux disease)     Hemiparesis ([facility])     History of stroke 3/28/2017    Hypercholesterolemia     Hypercholesterolemia 3/28/2017    Hypertension     Nocturia     Panic disorder     Stroke [facility])      Past Surgical History:   Procedure Laterality Date    HX COLONOSCOPY      HX LUMBAR DISKECTOMY      HX ORTHOPAEDIC      right finger repair    HX OTHER SURGICAL      lap band    HX OTHER SURGICAL      Lap band     Family History   Problem Relation Age of Onset    Cancer Father     Stroke Father     Hypertension Father     Cancer Mother     Hypertension Sister     Heart Disease Brother      Social History   Substance Use Topics    Smoking status: Never Smoker    Smokeless tobacco: Never Used    Alcohol use Yes      Lab Results  Component Value Date/Time   WBC 7.4 01/16/2018 08:53 AM   HGB 13.4 01/16/2018 08:53 AM   HCT 40.3 01/16/2018 08:53 AM   PLATELET 308 32/37/1077 08:53 AM   MCV 95 01/16/2018 08:53 AM     Lab Results  Component Value Date/Time   TSH 1.75 06/25/2017 06:39 AM         Review of Systems   Constitutional: Negative for chills and fever. HENT: Negative for nosebleeds. Eyes: Negative for pain. Respiratory: Negative for cough and wheezing. Cardiovascular: Negative for chest pain and leg swelling. Gastrointestinal: Negative for constipation, diarrhea and nausea. Genitourinary: Negative for frequency. Musculoskeletal: Negative for joint pain and myalgias. Skin: Negative for rash. Neurological: Negative for loss of consciousness. Endo/Heme/Allergies: Does not bruise/bleed easily. Psychiatric/Behavioral: Negative for depression. The patient is not nervous/anxious and does not have insomnia. All other systems reviewed and are negative. Physical Exam   Constitutional: He is oriented to person, place, and time. He appears well-developed and well-nourished. HENT:   Head: Normocephalic and atraumatic. Mouth/Throat: No oropharyngeal exudate. Eyes: Conjunctivae and EOM are normal.   Neck: Normal range of motion. Neck supple. Cardiovascular: Normal rate, regular rhythm and normal heart sounds. No murmur heard. Pulmonary/Chest: Effort normal and breath sounds normal. No respiratory distress. Abdominal: Soft. Bowel sounds are normal. He exhibits no distension. There is no rebound. Musculoskeletal: He exhibits no edema or tenderness. Neurological: He is alert and oriented to person, place, and time. Skin: Skin is warm. No erythema. Psychiatric: He has a normal mood and affect. His behavior is normal.   Nursing note and vitals reviewed. ASSESSMENT and PLAN  Diagnoses and all orders for this visit:    1. Essential hypertension  -     buPROPion SR (WELLBUTRIN SR) 150 mg SR tablet; Take 1 Tab by mouth two (2) times a day. -     hydroCHLOROthiazide (HYDRODIURIL) 25 mg tablet; Take 1 Tab by mouth daily. -     amLODIPine-benazepril (LOTREL) 5-20 mg per capsule; Take 1 Cap by Mouth Once a Day. 2. Routine general medical examination at a health care facility  -     carvedilol (COREG) 12.5 mg tablet; Take 1 tablet by mouth two  times daily with meals    3. Coronary artery disease involving native coronary artery of native heart without angina pectoris  -     buPROPion SR (WELLBUTRIN SR) 150 mg SR tablet; Take 1 Tab by mouth two (2) times a day. -     hydroCHLOROthiazide (HYDRODIURIL) 25 mg tablet; Take 1 Tab by mouth daily.  -     REFERRAL TO CARDIOLOGY    4. Hypercholesterolemia  -     amLODIPine-benazepril (LOTREL) 5-20 mg per capsule; Take 1 Cap by Mouth Once a Day. 5. Benign prostatic hyperplasia (BPH) with straining on urination  -     REFERRAL TO UROLOGY    6. History of stroke  -     [facility]    7. Slowing of urinary stream  -     REFERRAL TO UROLOGY    Other orders  -     tamsulosin (FLOMAX) 0.4 mg capsule; TAKE 2 CAPSULES BY MOUTH  DAILY  -     finasteride (PROSCAR) 5 mg tablet; TAKE 1 TABLET BY MOUTH  NIGHTLY  -     clopidogrel (PLAVIX) 75 mg tab; TAKE 1 TABLET BY MOUTH  DAILY  -     atorvastatin (LIPITOR) 40 mg tablet; Take 1 Tab by mouth daily. -     therapeutic multivitamin (THERA) tablet; Take 1 Tab by mouth daily.       At this time patient was told to lose weight, so that his body mass index would get into a normal level between 20-25,  increase physical activity, limit alcohol consumption, stop secondhand tobacco exposure    In addition the patient was told to start an active life style modifications, for which includes creating a an interesting delightful to do list,  such as start of a light physical activity with a brisk daily walking 30 minutes most days of the week, most likely to total of 150 minutes per week, then the patient was told to try to avoid fatty fast foods, have a low-fat low-cholesterol diet, include seafood such as adding fatty fish such as Severo Sil[place], Mackerel, [place] to the diet, increase vegetables and fruits, nuts 3-4 times per week and finally have a low-salt and K rich food intake for a good 4-6 months possibly for ever for the best outcome,   All mentioned recommendations, have to be done at least most days of the weeks for the best result,  Routine labs ordered, and the needed abnormal labs will be discussed soon and they can be repeated in 3-6 months. In addition relevant handouts were given to the patient for a better understanding,    patient was told to call if any problems.   Patient acknowledged understanding and     Patient agreed with today's recommendation

## 2018-01-23 NOTE — PATIENT INSTRUCTIONS

## 2018-01-23 NOTE — MR AVS SNAPSHOT
[facility] [address] 
[phone] Patient: Randy Johnson MRN: [id number] Visit Information Date & Time Provider Department Dept. Phone Encounter #  
 1/23/2018  4:30 PM Julianne Nur MD [address] [place] [address] OFFICE-ANNEX [phone] [id number] Upcoming Health Maintenance Date Due Hepatitis C Screening 1950 FOBT Q 1 YEAR AGE 50-75 9/23/1998 MICROALBUMIN Q1 3/28/2018 FOOT EXAM Q1 4/14/2018 MEDICARE YEARLY EXAM 6/23/2018 HEMOGLOBIN A1C Q6M 7/16/2018 EYE EXAM RETINAL OR DILATED Q1 9/7/2018 LIPID PANEL Q1 1/16/2019 GLAUCOMA SCREENING Q2Y 9/7/2019 DTaP/Tdap/Td series (2 - Td) 8/9/2022 Allergies as of 1/23/2018  Review Complete On: 1/23/2018 By: Linda Magana LPN No Known Allergies Current Immunizations  Reviewed on 6/22/2017 Name Date Influenza High Dose Vaccine PF 11/7/2017 Influenza Vaccine 11/18/2015 12:00 AM  
 Pneumococcal Conjugate (PCV-13) 3/17/2015 Pneumococcal Polysaccharide (PPSV-23) 2/21/2014 12:00 AM  
 Tdap 8/9/2012 Zoster Vaccine, Live 10/28/2016 Not reviewed this visit You Were Diagnosed With   
  
 Codes Comments Essential hypertension    -  Primary ICD-10-CM: I10 
ICD-9-CM: 401.9 Routine general medical examination at a health care facility     ICD-10-CM: Z00.00 ICD-9-CM: V70.0 Coronary artery disease involving native coronary artery of native heart without angina pectoris     ICD-10-CM: I25.10 ICD-9-CM: 414.01 Hypercholesterolemia     ICD-10-CM: E78.00 ICD-9-CM: 272.0 Benign prostatic hyperplasia (BPH) with straining on urination     ICD-10-CM: N40.1, R39.16 ICD-9-CM: 600.01, 788.65 History of stroke     ICD-10-CM: Z86.73 
ICD-9-CM: V12.54 Slowing of urinary stream     ICD-10-CM: R39.198 ICD-9-CM: 171.30 Vitals BP Pulse Temp Resp Height(growth percentile) Weight(growth percentile) 121/76 (BP 1 Location: Left arm, BP Patient Position: Sitting) 67 96.9 °F (36.1 °C) (Oral) 16 5' 11\" (1.803 m) 215 lb 9.6 oz (97.8 kg) SpO2 BMI Smoking Status 98% 30.07 kg/m2 Never Smoker Vitals History BMI and BSA Data Body Mass Index Body Surface Area 30.07 kg/m 2 2.21 m 2 Preferred Pharmacy Pharmacy Name Phone [id number] [address] [phone] Your Updated Medication List  
  
   
This list is accurate as of: 1/23/18  5:12 PM.  Always use your most recent med list. amLODIPine-benazepril 5-20 mg per capsule Commonly known as:  [place] Brawn Take 1 Cap by Mouth Once a Day. atorvastatin 40 mg tablet Commonly known as:  LIPITOR Take 1 Tab by mouth daily. buPROPion  mg SR tablet Commonly known as:  Sohan Jayleen Take 1 Tab by mouth two (2) times a day. carvedilol 12.5 mg tablet Commonly known as:  [place] Shaggy Take 1 tablet by mouth two  times daily with meals  
  
 clopidogrel 75 mg Tab Commonly known as:  PLAVIX TAKE 1 TABLET BY MOUTH  DAILY  
  
 finasteride 5 mg tablet Commonly known as:  PROSCAR  
TAKE 1 TABLET BY MOUTH  NIGHTLY  
  
 hydroCHLOROthiazide 25 mg tablet Commonly known as:  HYDRODIURIL Take 1 Tab by mouth daily. tamsulosin 0.4 mg capsule Commonly known as:  FLOMAX TAKE 2 CAPSULES BY MOUTH  DAILY therapeutic multivitamin tablet Commonly known as:  THERA Take 1 Tab by mouth daily. Prescriptions Sent to Pharmacy Refills  
 tamsulosin (FLOMAX) 0.4 mg capsule 1 Sig: TAKE 2 CAPSULES BY MOUTH  DAILY Class: Normal  
 Pharmacy: [facility] [id number] [facility] [address]  #: [phone]  
 finasteride (PROSCAR) 5 mg tablet 0 Sig: TAKE 1 TABLET BY MOUTH  NIGHTLY Class: Normal  
 Pharmacy: [address] Ph #: [phone]  
 clopidogrel (PLAVIX) 75 mg tab 0 Sig: TAKE 1 TABLET BY MOUTH  DAILY Class: Normal  
 Pharmacy: [address] Ph #: [phone]  
 atorvastatin (LIPITOR) 40 mg tablet 1 Sig: Take 1 Tab by mouth daily. Class: Normal  
 Pharmacy: [address] Ph #: [phone] Route: Oral  
 carvedilol (COREG) 12.5 mg tablet 3 Sig: Take 1 tablet by mouth two  times daily with meals Class: Normal  
 Pharmacy: [address] Ph #: [phone]  
 buPROPion SR [facility] SR) 150 mg SR tablet 1 Sig: Take 1 Tab by mouth two (2) times a day. Class: Normal  
 Pharmacy: [address] Ph #: [phone] Route: Oral  
 hydroCHLOROthiazide (HYDRODIURIL) 25 mg tablet 3 Sig: Take 1 Tab by mouth daily. Class: Normal  
 Pharmacy: [address] Ph #: [phone] Route: Oral  
 amLODIPine-benazepril (LOTREL) 5-20 mg per capsule 2 Sig: Take 1 Cap by Mouth Once a Day. Class: Normal  
 Pharmacy: [address] Ph #: [phone] therapeutic multivitamin (THERA) tablet 1 Sig: Take 1 Tab by mouth daily. Class: Normal  
 Pharmacy: [address] Ph #: [phone] Route: Oral  
  
We Performed the Following REFERRAL TO CARDIOLOGY [CDY44 Custom] Comments:  
 Please evaluate patient for CAD REFERRAL TO NEUROLOGY [TMV85 Custom] Comments: S/p stroke still on plavix REFERRAL TO UROLOGY [PYN221 Custom] Referral Information Referral ID Referred By Referred To  
  
 [id number] YOAN COSTA MD   
   [address], [address] Phone: [phone] Fax: [phone] Visits Status Start Date End Date 1 New Request 1/23/18 1/23/19 If your referral has a status of pending review or denied, additional information will be sent to support the outcome of this decision. Referral ID Referred By Referred To  
 [id number] Beverley Pollard Not Available Visits Status Start Date End Date 1 New Request 1/23/18 1/23/19 If your referral has a status of pending review or denied, additional information will be sent to support the outcome of this decision. Referral ID Referred By Referred To  
 [id number] Beverley Pollard Not Available Visits Status Start Date End Date 1 New Request 1/23/18 1/23/19 If your referral has a status of pending review or denied, additional information will be sent to support the outcome of this decision. Patient Instructions Heart-Healthy Diet: Care Instructions Your Care Instructions A heart-healthy diet has lots of vegetables, fruits, nuts, beans, and whole grains, and is low in salt. It limits foods that are high in saturated fat, such as meats, cheeses, and fried foods. It may be hard to change your diet, but even small changes can lower your risk of heart attack and heart disease. Follow-up care is a key part of your treatment and safety. Be sure to make and go to all appointments, and call your doctor if you are having problems. It's also a good idea to know your test results and keep a list of the medicines you take. How can you care for yourself at home? Watch your portions · Learn what a serving is. A \"serving\" and a \"portion\" are not always the same thing.  Make sure that you are not eating larger portions than are recommended. For example, a serving of pasta is ½ cup. A serving size of meat is 2 to 3 ounces. A 3-ounce serving is about the size of a deck of cards. Measure serving sizes until you are good at [place]" them. Keep in mind that restaurants often serve portions that are 2 or 3 times the size of one serving. · To keep your energy level up and keep you from feeling hungry, eat often but in smaller portions. · Eat only the number of calories you need to stay at a healthy weight. If you need to lose weight, eat fewer calories than your body burns (through exercise and other physical activity). Eat more fruits and vegetables · Eat a variety of fruit and vegetables every day. Dark green, deep orange, red, or yellow fruits and vegetables are especially good for you. Examples include spinach, carrots, peaches, and berries. · Keep carrots, celery, and other veggies handy for snacks. Buy fruit that is in season and store it where you can see it so that you will be tempted to eat it. · Cook dishes that have a lot of veggies in them, such as stir-fries and soups. Limit saturated and trans fat · Read food labels, and try to avoid saturated and trans fats. They increase your risk of heart disease. Trans fat is found in many processed foods such as cookies and crackers. · Use olive or canola oil when you cook. Try cholesterol-lowering spreads, such as Benecol or Take Control. · Bake, broil, grill, or steam foods instead of frying them. · Choose lean meats instead of high-fat meats such as hot dogs and sausages. Cut off all visible fat when you prepare meat. · Eat fish, skinless poultry, and meat alternatives such as soy products instead of high-fat meats. Soy products, such as tofu, may be especially good for your heart. · Choose low-fat or fat-free milk and dairy products. Eat fish · Eat at least two servings of fish a week.  Certain fish, such as salmon and tuna, contain omega-3 fatty acids, which may help reduce your risk of heart attack. Eat foods high in fiber · Eat a variety of grain products every day. Include whole-grain foods that have lots of fiber and nutrients. Examples of whole-grain foods include oats, whole wheat bread, and brown rice. · Buy whole-grain breads and cereals, instead of white bread or pastries. Limit salt and sodium · Limit how much salt and sodium you eat to help lower your blood pressure. · Taste food before you salt it. Add only a little salt when you think you need it. With time, your taste buds will adjust to less salt. · Eat fewer snack items, fast foods, and other high-salt, processed foods. Check food labels for the amount of sodium in packaged foods. · Choose low-sodium versions of canned goods (such as soups, vegetables, and beans). Limit sugar · Limit drinks and foods with added sugar. These include candy, desserts, and soda pop. Limit alcohol · Limit alcohol to no more than 2 drinks a day for men and 1 drink a day for women. Too much alcohol can cause health problems. When should you call for help? Watch closely for changes in your health, and be sure to contact your doctor if: 
? · You would like help planning heart-healthy meals. Where can you learn more? Go to http://jean-pierre-miller.info/. Enter V137 in the search box to learn more about \"Heart-Healthy Diet: Care Instructions. \" Current as of: September 21, 2016 Content Version: 11.4 © 7247-3554 Healthwise, [employer]. Care instructions adapted under license by [employer] (which disclaims liability or warranty for this information). If you have questions about a medical condition or this instruction, always ask your healthcare professional. [place] [address] any warranty or liability for your use of this information. Introducing [facility]! Dear Enoc Torrez: Thank you for requesting a [employer] account. Our records indicate that you already have an active [employer] account. You can access your account anytime at [email or web address] Did you know that you can access your hospital and ER discharge instructions at any time in [employer]? You can also review all of your test results from your hospital stay or ER visit. Additional Information If you have questions, please visit the Frequently Asked Questions section of the [employer] website at [email or web address] Remember, [employer] is NOT to be used for urgent needs. For medical emergencies, dial 911. Now available from your iPhone and Android! Please provide this summary of care documentation to your next provider. Your primary care clinician is listed as Rajesh Arredondo. If you have any questions after today's visit, please call [phone].

## 2018-01-29 LAB — HEMOCCULT STL QL IA: POSITIVE

## 2018-02-05 ENCOUNTER — DOCUMENTATION ONLY (OUTPATIENT)
Dept: FAMILY MEDICINE CLINIC | Age: 68
End: 2018-02-05

## 2018-02-08 ENCOUNTER — OFFICE VISIT (OUTPATIENT)
Dept: NEUROLOGY | Age: 68
End: 2018-02-08

## 2018-02-08 VITALS
HEIGHT: 71 IN | HEART RATE: 79 BPM | OXYGEN SATURATION: 97 % | BODY MASS INDEX: 30.66 KG/M2 | SYSTOLIC BLOOD PRESSURE: 142 MMHG | DIASTOLIC BLOOD PRESSURE: 80 MMHG | WEIGHT: 219 LBS

## 2018-02-08 DIAGNOSIS — I25.10 CORONARY ARTERY DISEASE INVOLVING NATIVE CORONARY ARTERY OF NATIVE HEART WITHOUT ANGINA PECTORIS: ICD-10-CM

## 2018-02-08 DIAGNOSIS — I10 ESSENTIAL HYPERTENSION: ICD-10-CM

## 2018-02-08 DIAGNOSIS — E78.01 FAMILIAL HYPERCHOLESTEROLEMIA: ICD-10-CM

## 2018-02-08 DIAGNOSIS — F34.1 DYSTHYMIA: ICD-10-CM

## 2018-02-08 DIAGNOSIS — Z86.79 HISTORY OF INTRACRANIAL HEMORRHAGE: Primary | ICD-10-CM

## 2018-02-08 NOTE — PATIENT INSTRUCTIONS
[facility]   Statement to Patients  April 1, 2014      In an effort to ensure the large volume of patient prescription refills is processed in the most efficient and expeditious manner, we are asking our patients to assist us by calling your Pharmacy for all prescription refills, this will include also your  Mail Order Pharmacy. The pharmacy will contact our office electronically to continue the refill process. Please do not wait until the last minute to call your pharmacy. We need at least 48 hours (2days) to fill prescriptions. We also encourage you to call your pharmacy before going to  your prescription to make sure it is ready. With regard to controlled substance prescription refill requests (narcotic refills) that need to be picked up at our office, we ask your cooperation by providing us with at least 72 hours (3days) notice that you will need a refill. We will not refill narcotic prescription refill requests after 4:00pm on any weekday, Monday through Thursday, or after 2:00pm on Fridays, or on the weekends. We encourage everyone to explore another way of getting your prescription refill request processed using [employer], our patient web portal through our electronic medical record system. [employer] is an efficient and effective way to communicate your medication request directly to the office and  downloadable as an jeannette on your smart phone . [employer] also features a review functionality that allows you to view your medication list as well as leave messages for your physician. Are you ready to get connected? If so please review the attatched instructions or speak to any of our staff to get you set up right away! Thank you so much for your cooperation. Should you have any questions please contact our Practice Administrator.     The Physicians and Staff,  [address] [place] [address] Neurology Clinic     Please be advised there is a $25 fee for all paperwork to be completed from our  providers. This is to be paid by the patient prior to picking up the completed forms. A Healthy Lifestyle: Care Instructions  Your Care Instructions    A healthy lifestyle can help you feel good, stay at a healthy weight, and have plenty of energy for both work and play. A healthy lifestyle is something you can share with your whole family. A healthy lifestyle also can lower your risk for serious health problems, such as high blood pressure, heart disease, and diabetes. You can follow a few steps listed below to improve your health and the health of your family. Follow-up care is a key part of your treatment and safety. Be sure to make and go to all appointments, and call your doctor if you are having problems. It's also a good idea to know your test results and keep a list of the medicines you take. How can you care for yourself at home? · Do not eat too much sugar, fat, or fast foods. You can still have dessert and treats now and then. The goal is moderation. · Start small to improve your eating habits. Pay attention to portion sizes, drink less juice and soda pop, and eat more fruits and vegetables. ¨ Eat a healthy amount of food. A 3-ounce serving of meat, for example, is about the size of a deck of cards. Fill the rest of your plate with vegetables and whole grains. ¨ Limit the amount of soda and sports drinks you have every day. Drink more water when you are thirsty. ¨ Eat at least 5 servings of fruits and vegetables every day. It may seem like a lot, but it is not hard to reach this goal. A serving or helping is 1 piece of fruit, 1 cup of vegetables, or 2 cups of leafy, raw vegetables. Have an apple or some carrot sticks as an afternoon snack instead of a candy bar. Try to have fruits and/or vegetables at every meal.  · Make exercise part of your daily routine. You may want to start with simple activities, such as walking, bicycling, or slow swimming.  Try to be active 30 to 60 minutes every day. You do not need to do all 30 to 60 minutes all at once. For example, you can exercise 3 times a day for 10 or 20 minutes. Moderate exercise is safe for most people, but it is always a good idea to talk to your doctor before starting an exercise program.  · Keep moving. Tracie Nam the lawn, work in the garden, or [place]. Take the stairs instead of the elevator at work. · If you smoke, quit. People who smoke have an increased risk for heart attack, stroke, cancer, and other lung illnesses. Quitting is hard, but there are ways to boost your chance of quitting tobacco for good. ¨ Use nicotine gum, patches, or lozenges. ¨ Ask your doctor about stop-smoking programs and medicines. ¨ Keep trying. In addition to reducing your risk of diseases in the future, you will notice some benefits soon after you stop using tobacco. If you have shortness of breath or asthma symptoms, they will likely get better within a few weeks after you quit. · Limit how much alcohol you drink. Moderate amounts of alcohol (up to 2 drinks a day for men, 1 drink a day for women) are okay. But drinking too much can lead to liver problems, high blood pressure, and other health problems. Family health  If you have a family, there are many things you can do together to improve your health. · Eat meals together as a family as often as possible. · Eat healthy foods. This includes fruits, vegetables, lean meats and dairy, and whole grains. · Include your family in your fitness plan. Most people think of activities such as jogging or tennis as the way to fitness, but there are many ways you and your family can be more active. Anything that makes you breathe hard and gets your heart pumping is exercise. Here are some tips:  ¨ Walk to do errands or to take your child to school or the bus. ¨ Go for a family bike ride after dinner instead of watching TV. Where can you learn more?   Go to http://jean-pierre-miller.info/. Enter H287 in the search box to learn more about \"A Healthy Lifestyle: Care Instructions. \"  Current as of: May 12, 2017  Content Version: 11.4  © 1668-6663 Healthwise, [employer]. Care instructions adapted under license by [employer] (which disclaims liability or warranty for this information). If you have questions about a medical condition or this instruction, always ask your healthcare professional. [address] any warranty or liability for your use of this information.

## 2018-02-08 NOTE — PROGRESS NOTES
NEUROLOGY HISTORY AND PHYSICAL    Name Sola Biggs Age 69 y.o. MRN [id number]  1950     Referring Physician:   Chief Complaint:  Brain bleed. This is a 69 y.o. male with a medical history of hyperlipidemia. He experienced a spontaneous brain bleed. He is unsure of the mechanism of the bleed. We discussed the risk of stroke and heart attacks and the importance of mitigating these risks. We also discussed the risk of bleeding associated with anticoagulants and until I see the imaging study I advised against anticoagulation. Assessment and Plan   1. History of intracranial hemorrhage  Will get the information  Dr. Katheryn Corcoran at [address] [facility] in [facility]  Stop plavix   Start aspirin  Discussed rationale with patient and wife    2. Depression  continue wellbutrin    3. Essential hypertension  Continue HCTZ    4. Coronary artery disease involving native coronary artery of native heart without angina pectoris  continue carvedilol     5. Hyperlipidemia  contineu atorvastatin        Patient Allergies    Review of patient's allergies indicates no known allergies. Current Outpatient Prescriptions   Medication Sig    tamsulosin (FLOMAX) 0.4 mg capsule TAKE 2 CAPSULES BY MOUTH  DAILY    finasteride (PROSCAR) 5 mg tablet TAKE 1 TABLET BY MOUTH  NIGHTLY    clopidogrel (PLAVIX) 75 mg tab TAKE 1 TABLET BY MOUTH  DAILY    atorvastatin (LIPITOR) 40 mg tablet Take 1 Tab by mouth daily.  carvedilol (COREG) 12.5 mg tablet Take 1 tablet by mouth two  times daily with meals    buPROPion SR (WELLBUTRIN SR) 150 mg SR tablet Take 1 Tab by mouth two (2) times a day.  hydroCHLOROthiazide (HYDRODIURIL) 25 mg tablet Take 1 Tab by mouth daily.  amLODIPine-benazepril (LOTREL) 5-20 mg per capsule Take 1 Cap by Mouth Once a Day.  therapeutic multivitamin (THERA) tablet Take 1 Tab by mouth daily. No current facility-administered medications for this visit.         Past Medical History:   Diagnosis Date  Agoraphobia with panic disorder     Anxiety     BPH (benign prostatic hypertrophy) with urinary obstruction     CAD (coronary artery disease)     no previous MI or stents    Chronic edema     Coronary artery disease involving native coronary artery without angina pectoris 3/28/2017    CVD (cardiovascular disease)     Depression     Diabetes ([address]     Erectile dysfunction     Essential hypertension 3/28/2017    Falls     Fracture of multiple ribs of right side 7/26/2017    GERD (gastroesophageal reflux disease)     Hemiparesis (HCC)     History of stroke 3/28/2017    Hypercholesterolemia     Hypercholesterolemia 3/28/2017    Hypertension     Joint pain     Muscle pain     Muscle weakness     Nocturia     Panic disorder     Ringing in the ears     Stroke [facility])        Social History   Substance Use Topics    Smoking status: Never Smoker    Smokeless tobacco: Never Used    Alcohol use Yes       Family History   Problem Relation Age of Onset    Cancer Father     Stroke Father     Hypertension Father     Cancer Mother     Hypertension Sister     Heart Disease Brother      Review of Systems   Constitutional: Negative for chills and fever. HENT: Negative for ear pain. Eyes: Negative for pain and discharge. Respiratory: Negative for cough and hemoptysis. Cardiovascular: Negative for chest pain and claudication. Gastrointestinal: Negative for constipation and diarrhea. Genitourinary: Negative for flank pain and hematuria. Musculoskeletal: Positive for back pain, joint pain and myalgias. Skin: Negative for itching and rash. Neurological: Positive for speech change and focal weakness. Negative for headaches. Endo/Heme/Allergies: Negative for environmental allergies. Does not bruise/bleed easily. Psychiatric/Behavioral: Positive for depression. Negative for hallucinations.          Visit Vitals    /80    Pulse 79    Ht 5' 11\" (1.803 m)    Wt 219 lb (99.3 kg)  SpO2 97%    BMI 30.54 kg/m2      General: Well developed, well nourished. Patient in no apparent distress   Head: Normocephalic, atraumatic, anicteric sclera   Neck Normal ROM, No thyromegally   Lungs:  Clear to auscultation bilaterally, No wheezes or rubs   Cardiac: Regular rate and rhythm with no murmurs. Abd: Bowel sounds were audible. No tenderness on palpation   Ext: No pedal edema   Skin: Supple no rash     NeurologicExam:  Mental Status: Alert and oriented to person place and time   Speech: Fluent with some word finding difficulties. Cranial Nerves:  II - XII Intact   Motor:  Full and symmetric strength of upper and lower proximal and distal muscles. Normal bulk and tone with notable right hemiparesis more leg than arm. Reflexes:   Deep tendon reflexes brisk on left   Sensory:   Symmetric and intact with no perceived deficits modalities involving small or large fibers. Gait:  Gait is balanced circumducts left leg. Tremor:   No tremor noted. Cerebellar:  Coordination intact. Neurovascular: No carotid bruits. No JVD       Imaging    MRI Results (most recent):    Results from Hospital Encounter encounter on 06/23/17   MRA NECK W CONT   Narrative *PRELIMINARY REPORT*    No acute thrombosis or significant stenosis. Preliminary report was provided by Dr. Lisa Alexandre, the on-call radiologist, at 5:20  PM.    Final report to follow. *END PRELIMINARY REPORT*    HISTORY: Stroke. PROCEDURE: MRA was performed to evaluate the cervical arterial vasculature. Intravenous gadolinium was administered. FINDINGS: Images of the major branch vessels of the thoracic aortic arch, reveal  patency of the right innominate, left common carotid, and left subclavian  arteries. The common carotid arteries are patent bilaterally. The vertebral arteries appear to be grossly patent. The left vertebral artery is  dominant. There is mild stenosis at the origin of the right vertebral artery.         Images of the carotid bifurcations reveal no significant flow-limiting stenosis  involving the cervical internal carotid arteries bilaterally. Percentage of stenosis of right internal carotid artery:  10 %. Percentage of stenosis of left internal carotid artery:  10 %. The external carotid arteries are patent. Impression IMPRESSION: Unremarkable MRA of the cervical vasculature. CT Results (most recent):    Results from Hospital Encounter encounter on 06/23/17   CT HEAD WO CONT   Narrative EXAM:  CT HEAD WITHOUT CONTRAST  INDICATION: Leg weakness. COMPARISON: None. CONTRAST: None. TECHNIQUE: Unenhanced CT of the head was performed using 5 mm images. Brain and  bone windows were generated. Sagittal and coronal reformations were generated. CT dose reduction was achieved through use of a standardized protocol tailored  for this examination and automatic exposure control for dose modulation. CT dose  reduction was achieved through use of a standardized protocol tailored for this  examination and automatic exposure control for dose modulation. Adaptive  statistical iterative reconstruction (ASIR) was utilized for this examination. FINDINGS:  The ventricles and sulci are normal in size, shape and configuration and  midline. There is arthroscopic calcification of the vertebral and carotid  arteries and there is patchy decreased attenuation in the periventricular white  matter which is nonspecific but consistent with small vessel disease. There are  small low attenuation basal ganglia lacunar infarcts. There is an area of  encephalomalacia in the right frontoparietal white matter consistent with an old  infarct. There is no intracranial hemorrhage. There is no extra-axial  collection, mass, mass effect or midline shift. The basilar cisterns are open. No acute infarct is identified. The bone windows demonstrate no abnormalities.    The visualized portions of the paranasal sinuses and mastoid air cells are  clear. Impression IMPRESSION: Atherosclerosis with microvascular disease, small basal ganglia  lacunar infarcts and old right frontoparietal infarct. No acute intracranial  normality.           Lab Review  Lab Results   Component Value Date/Time    WBC 7.4 01/16/2018 08:53 AM    HCT 40.3 01/16/2018 08:53 AM    HGB 13.4 01/16/2018 08:53 AM    PLATELET 458 86/80/1649 08:53 AM     Lab Results   Component Value Date/Time    Sodium 143 01/16/2018 08:53 AM    Potassium 4.2 01/16/2018 08:53 AM    Chloride 102 01/16/2018 08:53 AM    CO2 27 01/16/2018 08:53 AM    Glucose 143 (H) 01/16/2018 08:53 AM    BUN 14 01/16/2018 08:53 AM    Creatinine 1.16 01/16/2018 08:53 AM    Calcium 9.1 01/16/2018 08:53 AM       Lab Results   Component Value Date/Time    LDL, calculated 116 (H) 01/16/2018 08:53 AM     Lab Results   Component Value Date/Time    Hemoglobin A1c 6.1 06/24/2017 05:59 AM    Hemoglobin A1c (POC) 6.1 01/16/2018 08:53 AM           60  minutes spent more than 50% spent in face to face  examination and discussion of treatment options and approach

## 2018-02-08 NOTE — MR AVS SNAPSHOT
[address], 
[id number], [address] [facility] 
[phone] Patient: Dallin Raymundo MRN: [id number] AYA:[id number] Visit Information Date & Time Provider Department Dept. Phone Encounter #  
 2/8/2018  2:00 PM Jesse Rodriguez MD Neurology Clinic at [facility] [phone] [id number] Follow-up Instructions Return if symptoms worsen or fail to improve. Your Appointments 2/12/2018  9:15 AM  
ESTABLISHED PATIENT with Benedicto De Leon MD  
[facility]) Appt Note: Dr. Castellanos, Dr. Rosita Luque asked for pt to see Dr. Castellanos again for same [address] [facility]  
[phone] [address] [facility] [address] [id number]  
  
    
 7/23/2018  4:00 PM  
ROUTINE CARE with Otilia June MD  
[facility] OFFICE-ANNEX ([address]) Appt Note: 6 mn fu  
 [address] [facility] [address] [id number]  
[phone] [address] [place] [address] Upcoming Health Maintenance Date Due Hepatitis C Screening 1950 MICROALBUMIN Q1 3/28/2018 FOOT EXAM Q1 4/14/2018 MEDICARE YEARLY EXAM 6/23/2018 HEMOGLOBIN A1C Q6M 7/16/2018 EYE EXAM RETINAL OR DILATED Q1 9/7/2018 LIPID PANEL Q1 1/16/2019 FOBT Q 1 YEAR AGE 50-75 1/23/2019 GLAUCOMA SCREENING Q2Y 9/7/2019 DTaP/Tdap/Td series (2 - Td) 8/9/2022 Allergies as of 2/8/2018  Review Complete On: 2/8/2018 By: Jesse Rodriguez MD  
 No Known Allergies Current Immunizations  Reviewed on 6/22/2017 Name Date Influenza High Dose Vaccine PF 11/7/2017 Influenza Vaccine 11/18/2015 12:00 AM  
 Pneumococcal Conjugate (PCV-13) 3/17/2015 Pneumococcal Polysaccharide (PPSV-23) 2/21/2014 12:00 AM  
 Tdap 8/9/2012 Zoster Vaccine, Live 10/28/2016 Not reviewed this visit You Were Diagnosed With   
 Codes Comments History of intracranial hemorrhage    -  Primary ICD-10-CM: Z86.79 
ICD-9-CM: V12.59 Type 2 diabetes mellitus with nephropathy (HCC)     ICD-10-CM: E11.21 
ICD-9-CM: 250.40, 583.81 Essential hypertension     ICD-10-CM: I10 
ICD-9-CM: 401.9 Coronary artery disease involving native coronary artery of native heart without angina pectoris     ICD-10-CM: I25.10 ICD-9-CM: 414.01 Acute left-sided low back pain without sciatica     ICD-10-CM: M54.5 ICD-9-CM: 724.2 Vitals BP Pulse Height(growth percentile) Weight(growth percentile) SpO2 BMI  
 142/80 79 5' 11\" (1.803 m) 219 lb (99.3 kg) 97% 30.54 kg/m2 Smoking Status Never Smoker Vitals History BMI and BSA Data Body Mass Index Body Surface Area 30.54 kg/m 2 2.23 m 2 Preferred Pharmacy Pharmacy Name Phone [id number], [place] - [address] [phone] Your Updated Medication List  
  
   
This list is accurate as of: 2/8/18  3:02 PM.  Always use your most recent med list. amLODIPine-benazepril 5-20 mg per capsule Commonly known as:  Wanna Sartorius Take 1 Cap by Mouth Once a Day. atorvastatin 40 mg tablet Commonly known as:  LIPITOR Take 1 Tab by mouth daily. buPROPion  mg SR tablet Commonly known as:  Luann Delacruz Take 1 Tab by mouth two (2) times a day. carvedilol 12.5 mg tablet Commonly known as:  Daryle Rubins Take 1 tablet by mouth two  times daily with meals  
  
 clopidogrel 75 mg Tab Commonly known as:  PLAVIX TAKE 1 TABLET BY MOUTH  DAILY  
  
 finasteride 5 mg tablet Commonly known as:  PROSCAR  
TAKE 1 TABLET BY MOUTH  NIGHTLY  
  
 hydroCHLOROthiazide 25 mg tablet Commonly known as:  HYDRODIURIL Take 1 Tab by mouth daily. tamsulosin 0.4 mg capsule Commonly known as:  FLOMAX TAKE 2 CAPSULES BY MOUTH  DAILY therapeutic multivitamin tablet Commonly known as:  THERA Take 1 Tab by mouth daily. Follow-up Instructions Return if symptoms worsen or fail to improve. Patient Instructions PRESCRIPTION REFILL POLICY [facility] Statement to Patients April 1, 2014 In an effort to ensure the large volume of patient prescription refills is processed in the most efficient and expeditious manner, we are asking our patients to assist us by calling your Pharmacy for all prescription refills, this will include also your  Mail Order Pharmacy. The pharmacy will contact our office electronically to continue the refill process. Please do not wait until the last minute to call your pharmacy. We need at least 48 hours (2days) to fill prescriptions. We also encourage you to call your pharmacy before going to  your prescription to make sure it is ready. With regard to controlled substance prescription refill requests (narcotic refills) that need to be picked up at our office, we ask your cooperation by providing us with at least 72 hours (3days) notice that you will need a refill. We will not refill narcotic prescription refill requests after 4:00pm on any weekday, Monday through Thursday, or after 2:00pm on Fridays, or on the weekends. We encourage everyone to explore another way of getting your prescription refill request processed using [employer], our patient web portal through our electronic medical record system. [employer] is an efficient and effective way to communicate your medication request directly to the office and  downloadable as an jeannette on your smart phone . [employer] also features a review functionality that allows you to view your medication list as well as leave messages for your physician. Are you ready to get connected? If so please review the attatched instructions or speak to any of our staff to get you set up right away! Thank you so much for your cooperation. Should you have any questions please contact our Practice Administrator. The Physicians and Staff,  [facility] Please be advised there is a $25 fee for all paperwork to be completed from our  providers. This is to be paid by the patient prior to picking up the completed forms. A Healthy Lifestyle: Care Instructions Your Care Instructions A healthy lifestyle can help you feel good, stay at a healthy weight, and have plenty of energy for both work and play. A healthy lifestyle is something you can share with your whole family. A healthy lifestyle also can lower your risk for serious health problems, such as high blood pressure, heart disease, and diabetes. You can follow a few steps listed below to improve your health and the health of your family. Follow-up care is a key part of your treatment and safety. Be sure to make and go to all appointments, and call your doctor if you are having problems. It's also a good idea to know your test results and keep a list of the medicines you take. How can you care for yourself at home? · Do not eat too much sugar, fat, or fast foods. You can still have dessert and treats now and then. The goal is moderation. · Start small to improve your eating habits. Pay attention to portion sizes, drink less juice and soda pop, and eat more fruits and vegetables. ¨ Eat a healthy amount of food. A 3-ounce serving of meat, for example, is about the size of a deck of cards. Fill the rest of your plate with vegetables and whole grains. ¨ Limit the amount of soda and sports drinks you have every day. Drink more water when you are thirsty. ¨ Eat at least 5 servings of fruits and vegetables every day. It may seem like a lot, but it is not hard to reach this goal. A serving or helping is 1 piece of fruit, 1 cup of vegetables, or 2 cups of leafy, raw vegetables. Have an apple or some carrot sticks as an afternoon snack instead of a candy bar. Try to have fruits and/or vegetables at every meal. 
· Make exercise part of your daily routine. You may want to start with simple activities, such as walking, bicycling, or slow swimming. Try to be active 30 to 60 minutes every day. You do not need to do all 30 to 60 minutes all at once. For example, you can exercise 3 times a day for 10 or 20 minutes. Moderate exercise is safe for most people, but it is always a good idea to talk to your doctor before starting an exercise program. 
· Keep moving. Asuncion Nila the lawn, work in the garden, or [employer]. Take the stairs instead of the elevator at work. · If you smoke, quit. People who smoke have an increased risk for heart attack, stroke, cancer, and other lung illnesses. Quitting is hard, but there are ways to boost your chance of quitting tobacco for good. ¨ Use nicotine gum, patches, or lozenges. ¨ Ask your doctor about stop-smoking programs and medicines. ¨ Keep trying. In addition to reducing your risk of diseases in the future, you will notice some benefits soon after you stop using tobacco. If you have shortness of breath or asthma symptoms, they will likely get better within a few weeks after you quit. · Limit how much alcohol you drink. Moderate amounts of alcohol (up to 2 drinks a day for men, 1 drink a day for women) are okay. But drinking too much can lead to liver problems, high blood pressure, and other health problems. Family health If you have a family, there are many things you can do together to improve your health. · Eat meals together as a family as often as possible. · Eat healthy foods. This includes fruits, vegetables, lean meats and dairy, and whole grains. · Include your family in your fitness plan.  Most people think of activities such as jogging or tennis as the way to fitness, but there are many ways you and your family can be more active. Anything that makes you breathe hard and gets your heart pumping is exercise. Here are some tips: 
¨ Walk to do errands or to take your child to school or the bus. ¨ Go for a family bike ride after dinner instead of watching TV. Where can you learn more? Go to http://jean-pierre-miller.info/. Enter V998 in the search box to learn more about \"A Healthy Lifestyle: Care Instructions. \" Current as of: May 12, 2017 Content Version: 11.4 © 5105-0564 [employer]. Care instructions adapted under license by [employer] (which disclaims liability or warranty for this information). If you have questions about a medical condition or this instruction, always ask your healthcare professional. [address] any warranty or liability for your use of this information. Introducing [facility]! Dear Braden Christian: Thank you for requesting a [employer] account. Our records indicate that you already have an active [employer] account. You can access your account anytime at [email or web address] [email or web address] Did you know that you can access your hospital and ER discharge instructions at any time in [employer]? You can also review all of your test results from your hospital stay or ER visit. Additional Information If you have questions, please visit the Frequently Asked Questions section of the [employer] website at [email or web address] Remember, [employer] is NOT to be used for urgent needs. For medical emergencies, dial 911. Now available from your iPhone and Android! Please provide this summary of care documentation to your next provider. Your primary care clinician is listed as Nick Tubbs. If you have any questions after today's visit, please call [phone].

## 2018-02-12 ENCOUNTER — OFFICE VISIT (OUTPATIENT)
Dept: CARDIOLOGY CLINIC | Age: 68
End: 2018-02-12

## 2018-02-12 VITALS
HEART RATE: 81 BPM | SYSTOLIC BLOOD PRESSURE: 130 MMHG | DIASTOLIC BLOOD PRESSURE: 70 MMHG | HEIGHT: 71 IN | WEIGHT: 221.4 LBS | BODY MASS INDEX: 31 KG/M2 | OXYGEN SATURATION: 96 % | RESPIRATION RATE: 20 BRPM

## 2018-02-12 DIAGNOSIS — I25.10 CORONARY ARTERY DISEASE INVOLVING NATIVE CORONARY ARTERY OF NATIVE HEART WITHOUT ANGINA PECTORIS: Primary | ICD-10-CM

## 2018-02-12 DIAGNOSIS — E78.00 HYPERCHOLESTEROLEMIA: ICD-10-CM

## 2018-02-12 DIAGNOSIS — I10 ESSENTIAL HYPERTENSION: ICD-10-CM

## 2018-02-12 RX ORDER — EZETIMIBE 10 MG/1
10 TABLET ORAL DAILY
Qty: 90 TAB | Refills: 3 | Status: SHIPPED | OUTPATIENT
Start: 2018-02-12 | End: 2018-11-09 | Stop reason: SDUPTHER

## 2018-02-12 RX ORDER — ASPIRIN 81 MG/1
TABLET ORAL DAILY
COMMUNITY
End: 2019-04-23

## 2018-02-12 NOTE — PROGRESS NOTES
NAME:  Argenis Tomlinson   :   1950   MRN:   [id number]   PCP:  Pepe Rizzo MD           Subjective: The patient is a 69y.o. year old male  who returns for a routine follow-up with hx significant for HTN, Hypercholesterolemia, DM, and CVA. Since the last visit, patient reports no change in exercise tolerance, chest pain, edema, medication intolerance, palpitations, shortness of breath, PND/orthopnea wheezing, sputum, syncope, dizziness or light headedness. Doing well. Past Medical History:   Diagnosis Date    Agoraphobia with panic disorder     Anxiety     BPH (benign prostatic hypertrophy) with urinary obstruction     CAD (coronary artery disease)     no previous MI or stents    Chronic edema     Coronary artery disease involving native coronary artery without angina pectoris 3/28/2017    CVD (cardiovascular disease)     Depression     Diabetes ([address]     Erectile dysfunction     Essential hypertension 3/28/2017    Falls     Fracture of multiple ribs of right side 2017    GERD (gastroesophageal reflux disease)     Hemiparesis (HCC)     History of stroke 3/28/2017    Hypercholesterolemia     Hypercholesterolemia 3/28/2017    Hypertension     Joint pain     Muscle pain     Muscle weakness     Nocturia     Panic disorder     Ringing in the ears     Stroke [facility])        Social History   Substance Use Topics    Smoking status: Never Smoker    Smokeless tobacco: Never Used    Alcohol use Yes      Family History   Problem Relation Age of Onset    Cancer Father     Stroke Father     Hypertension Father     Cancer Mother     Hypertension Sister     Heart Disease Brother         Review of Systems  Constitutional: Negative for fever, chills, and diaphoresis. Respiratory: Negative for cough, hemoptysis, sputum production, shortness of breath and wheezing. Cardiovascular: Negative for chest pain, palpitations, orthopnea, claudication, leg swelling and PND. Gastrointestinal: Negative for heartburn, nausea, vomiting, blood in stool and melena. Genitourinary: Negative for dysuria and flank pain. Musculoskeletal: Negative for joint pain and back pain. Skin: Negative for rash. Neurological: Negative for focal weakness, seizures, loss of consciousness, weakness and headaches. Endo/Heme/Allergies: Does not bruise/bleed easily. Psychiatric/Behavioral: Negative for memory loss. The patient does not have insomnia. Objective:       Vitals:    02/12/18 0913 02/12/18 0924   BP: 110/72 130/70   Pulse: 81    Resp: 20    SpO2: 96%    Weight: 221 lb 6.4 oz (100.4 kg)    Height: 5' 11\" (1.803 m)     Body mass index is 30.88 kg/(m^2). General PE    Gen: NAD     Mental Status - Alert. General Appearance - Not in acute distress. Neck - no JVD     Chest and Lung Exam     Inspection: Accessory muscles - No use of accessory muscles in breathing. Auscultation:   Breath sounds: - Normal.     Cardiovascular   Inspection: Jugular vein - Bilateral - Inspection Normal.   Palpation/Percussion:   Apical Impulse: - Normal.   Auscultation: Rhythm - Regular. Heart Sounds - S1 WNL and S2 WNL. No S3 or S4. Murmurs & Other Heart Sounds: Auscultation of the heart reveals - No Murmurs. Peripheral Vascular   Upper Extremity: Inspection - Bilateral - No Cyanotic nailbeds or Digital clubbing. Lower Extremity:   Palpation: Edema - Bilateral - No edema. Abdomen: Soft, non-tender, bowel sounds are active. Neuro: A&O times 3, CN and motor grossly WNL      Data Review:     EKG -  Sinus  Rhythm  -Short MT syndrome   Tiago = 110  -Prominent R(V1) and left axis -nonspecific  -Seen with pulmonary disease -possible anterior fascicular block. Allergies reviewed  No Known Allergies    Medications reviewed  Current Outpatient Prescriptions   Medication Sig    aspirin delayed-release 81 mg tablet Take  by mouth daily.     ezetimibe (ZETIA) 10 mg tablet Take 1 Tab by mouth daily.    tamsulosin (FLOMAX) 0.4 mg capsule TAKE 2 CAPSULES BY MOUTH  DAILY    finasteride (PROSCAR) 5 mg tablet TAKE 1 TABLET BY MOUTH  NIGHTLY    atorvastatin (LIPITOR) 40 mg tablet Take 1 Tab by mouth daily.  carvedilol (COREG) 12.5 mg tablet Take 1 tablet by mouth two  times daily with meals    buPROPion SR (WELLBUTRIN SR) 150 mg SR tablet Take 1 Tab by mouth two (2) times a day.  hydroCHLOROthiazide (HYDRODIURIL) 25 mg tablet Take 1 Tab by mouth daily.  amLODIPine-benazepril (LOTREL) 5-20 mg per capsule Take 1 Cap by Mouth Once a Day.  therapeutic multivitamin (THERA) tablet Take 1 Tab by mouth daily. No current facility-administered medications for this visit. Assessment:       ICD-10-CM ICD-9-CM    1. Coronary artery disease involving native coronary artery of native heart without angina pectoris I25.10 414.01 AMB POC EKG ROUTINE W/ 12 LEADS, INTER & REP      2D ECHO COMPLETE ADULT (TTE) W OR WO CONTR   2. Hypercholesterolemia E78.00 272.0 2D ECHO COMPLETE ADULT (TTE) W OR WO CONTR   3. Essential hypertension I10 401.9 2D ECHO COMPLETE ADULT (TTE) W OR WO CONTR   4. BMI 30.0-30.9,adult Z68.30 V85.30 2D ECHO COMPLETE ADULT (TTE) W OR WO CONTR        Orders Placed This Encounter    AMB POC EKG ROUTINE W/ 12 LEADS, INTER & REP     Order Specific Question:   Reason for Exam:     Answer:   routine    2D ECHO COMPLETE ADULT (TTE) W OR WO CONTR     Standing Status:   Future     Standing Expiration Date:   8/12/2018     Order Specific Question:   Reason for Exam:     Answer:   HTN, CAD, edema     Order Specific Question:   Contrast Enhancement (Bubble Study, Definity, Optison) may be used if criteria listed in established evidence-based protocol has been identified. Answer: Yes    aspirin delayed-release 81 mg tablet     Sig: Take  by mouth daily.  ezetimibe (ZETIA) 10 mg tablet     Sig: Take 1 Tab by mouth daily.      Dispense:  90 Tab     Refill:  3       Patient Active Problem List   Diagnosis Code    Benign prostatic hyperplasia with lower urinary tract symptoms N40.1    Urinary frequency R35.0    Obesity E66.9    Nocturia R35.1    Slowing of urinary stream R39.198    Erectile dysfunction N52.9    Calculus of kidney N20.0    Left low back pain M54.5    Gross hematuria R31.0    Essential hypertension I10    Hypercholesterolemia E78.00    Controlled type 2 diabetes mellitus without complication (HCC) U86.7    History of stroke Z86.73    Coronary artery disease involving native coronary artery without angina pectoris I25.10    Fracture of multiple ribs of right side S22.41XA    Type 2 diabetes mellitus with nephropathy ([facility] [address] E11.21       Plan:     Patient presents for follow up, feeling well and stable from cardiac standpoint. Hx of 95% occlusion on circumflex with collaterals per cath many years ago. His last LDL was 116 (goal  < 70). Add zetia. May need PCSK9. Labs in 3 mo. Will check echo. Follow up in 6 mo.      Diana Bush MD

## 2018-02-12 NOTE — MR AVS SNAPSHOT
[address] [facility] 
[phone] Patient: Cameron Gautam MRN: [id number] MIREILLE:[birth year] Visit Information Date & Time Provider Department Dept. Phone Encounter #  
 2/12/2018  9:15 AM Niels Schuler, [address] [facility] [id number] Your Appointments 3/7/2018 11:00 AM  
ECHO CARDIOGRAMS 2D with ECHO, [facility] [address] [place] [address]) Appt Note: Jolene Acosta Self  2D ECHO COMPLETE ADULT (TTE) W OR WO CONTR [[id number]] (Order [id number]) ,[facility]  
[phone] [address] [address]  
  
    
 7/23/2018  4:00 PM  
ROUTINE CARE with Minus Oppenheim, MD  
[facility] OFFICE-ANNEX ([address] [facility]) Appt Note: 6 [facility]  
 [address] [facility] [id number] [phone] [phone]  
  
    
 7/25/2018 10:15 AM  
ESTABLISHED PATIENT with Niels Schuler MD  
[facility] [address] [place] [address]) Appt Note: Dr Andrés Scott  
 [address]  
[phone] [address] Upcoming Health Maintenance Date Due Hepatitis C Screening 1950 MICROALBUMIN Q1 3/28/2018 FOOT EXAM Q1 4/14/2018 MEDICARE YEARLY EXAM 6/23/2018 HEMOGLOBIN A1C Q6M 7/16/2018 EYE EXAM RETINAL OR DILATED Q1 9/7/2018 LIPID PANEL Q1 1/16/2019 FOBT Q 1 YEAR AGE 50-75 1/23/2019 GLAUCOMA SCREENING Q2Y 9/7/2019 DTaP/Tdap/Td series (2 - Td) 8/9/2022 Allergies as of 2/12/2018  Review Complete On: 2/12/2018 By: Dequan Elliott LPN No Known Allergies Current Immunizations  Reviewed on 6/22/2017 Name Date Influenza High Dose Vaccine PF 11/7/2017  Influenza Vaccine 11/18/2015 12:00 AM  
 Pneumococcal Conjugate (PCV-13) 3/17/2015 Pneumococcal Polysaccharide (PPSV-23) 2/21/2014 12:00 AM  
 Tdap 8/9/2012 Zoster Vaccine, Live 10/28/2016 Not reviewed this visit You Were Diagnosed With   
  
 Codes Comments Coronary artery disease involving native coronary artery of native heart without angina pectoris    -  Primary ICD-10-CM: I25.10 ICD-9-CM: 414.01 Hypercholesterolemia     ICD-10-CM: E78.00 ICD-9-CM: 272.0 Essential hypertension     ICD-10-CM: I10 
ICD-9-CM: 401.9 BMI 30.0-30.9,adult     ICD-10-CM: Z68.30 ICD-9-CM: V85.30 Vitals BP Pulse Resp Height(growth percentile) Weight(growth percentile) SpO2  
 130/70 (BP 1 Location: Right arm, BP Patient Position: Sitting) 81 20 5' 11\" (1.803 m) 221 lb 6.4 oz (100.4 kg) 96% BMI Smoking Status 30.88 kg/m2 Never Smoker Vitals History BMI and BSA Data Body Mass Index Body Surface Area  
 30.88 kg/m 2 2.24 m 2 Preferred Pharmacy Pharmacy Name Phone [id number]- [place], [place] - [address] [phone] Your Updated Medication List  
  
   
This list is accurate as of: 2/12/18 10:17 AM.  Always use your most recent med list. amLODIPine-benazepril 5-20 mg per capsule Commonly known as:  Wyvonne Satchel Take 1 Cap by Mouth Once a Day. aspirin delayed-release 81 mg tablet Take  by mouth daily. atorvastatin 40 mg tablet Commonly known as:  LIPITOR Take 1 Tab by mouth daily. buPROPion  mg SR tablet Commonly known as:  Lena Bran Take 1 Tab by mouth two (2) times a day. carvedilol 12.5 mg tablet Commonly known as:  [place] Dine Take 1 tablet by mouth two  times daily with meals  
  
 ezetimibe 10 mg tablet Commonly known as:  Idelle Lev Take 1 Tab by mouth daily. finasteride 5 mg tablet Commonly known as:  PROSCAR  
TAKE 1 TABLET BY MOUTH  NIGHTLY hydroCHLOROthiazide 25 mg tablet Commonly known as:  HYDRODIURIL Take 1 Tab by mouth daily. tamsulosin 0.4 mg capsule Commonly known as:  FLOMAX TAKE 2 CAPSULES BY MOUTH  DAILY therapeutic multivitamin tablet Commonly known as:  THERA Take 1 Tab by mouth daily. Prescriptions Sent to Pharmacy Refills  
 ezetimibe (ZETIA) 10 mg tablet 3 Sig: Take 1 Tab by mouth daily. Class: Normal  
 Pharmacy: [facility]/pharmacy [address] [facility] [address] Ph #: [phone] Route: Oral  
  
We Performed the Following AMB POC EKG ROUTINE W/ 12 LEADS, INTER & REP [86898 CPT(R)] To-Do List   
 Around 02/12/2018 ECHO:  2D ECHO COMPLETE ADULT (TTE) W OR WO CONTR Introducing [facility]! Dear Tripp Montiel: Thank you for requesting a [employer] account. Our records indicate that you already have an active [employer] account. You can access your account anytime at [email or web address] Did you know that you can access your hospital and ER discharge instructions at any time in [employer]? You can also review all of your test results from your hospital stay or ER visit. Additional Information If you have questions, please visit the Frequently Asked Questions section of the [employer] website at [email or web address] Remember, [employer] is NOT to be used for urgent needs. For medical emergencies, dial 911. Now available from your iPhone and Android! Please provide this summary of care documentation to your next provider. Your primary care clinician is listed as Julianne Nur. If you have any questions after today's visit, please call [phone].

## 2018-02-12 NOTE — PROGRESS NOTES
1. Have you been to the ER, urgent care clinic since your last visit? Hospitalized since your last visit? YES, [facility]. 2. Have you seen or consulted any other health care providers outside of the [address] [place] [address] since your last visit? Include any pap smears or colon screening. NO    C/O SWELLING IN LEFT LEG, SOB WITH EXERTION.

## 2018-02-13 ENCOUNTER — DOCUMENTATION ONLY (OUTPATIENT)
Dept: FAMILY MEDICINE CLINIC | Age: 68
End: 2018-02-13

## 2018-02-13 NOTE — PROGRESS NOTES
. Birdie Logan called to my phone today asking for assistance re: insurance. He stated he has been seeing Dr. Timothy Linares but he now has [employer] and noted that Dr. Timothy Linares was not listed as provider and he was concerned. I let him know I would send his call to one of our staff who could assist with what insurances our providers do cover to see if there might be an issue with that particular payor.

## 2018-02-20 ENCOUNTER — DOCUMENTATION ONLY (OUTPATIENT)
Dept: FAMILY MEDICINE CLINIC | Age: 68
End: 2018-02-20

## 2018-02-20 DIAGNOSIS — Z12.11 SCREEN FOR COLON CANCER: Primary | ICD-10-CM

## 2018-02-20 NOTE — PROGRESS NOTES
Patient phoned requested Referral for GI for colonoscopy. Order placed for Referral GI per Verbal Order from Dr. Natali Benson on 2/20/2018 due to colonoscopy.

## 2018-03-07 ENCOUNTER — CLINICAL SUPPORT (OUTPATIENT)
Dept: CARDIOLOGY CLINIC | Age: 68
End: 2018-03-07

## 2018-03-07 DIAGNOSIS — I25.10 CORONARY ARTERY DISEASE INVOLVING NATIVE CORONARY ARTERY OF NATIVE HEART WITHOUT ANGINA PECTORIS: ICD-10-CM

## 2018-03-07 DIAGNOSIS — I10 ESSENTIAL HYPERTENSION: ICD-10-CM

## 2018-03-07 DIAGNOSIS — E78.00 HYPERCHOLESTEROLEMIA: ICD-10-CM

## 2018-04-16 RX ORDER — FINASTERIDE 5 MG/1
TABLET, FILM COATED ORAL
Qty: 90 TAB | Refills: 0 | Status: SHIPPED | OUTPATIENT
Start: 2018-04-16 | End: 2020-03-18 | Stop reason: SDUPTHER

## 2018-04-18 ENCOUNTER — OFFICE VISIT (OUTPATIENT)
Dept: FAMILY MEDICINE CLINIC | Age: 68
End: 2018-04-18

## 2018-04-18 VITALS
BODY MASS INDEX: 31.05 KG/M2 | WEIGHT: 221.8 LBS | TEMPERATURE: 97.2 F | OXYGEN SATURATION: 96 % | HEART RATE: 69 BPM | RESPIRATION RATE: 16 BRPM | SYSTOLIC BLOOD PRESSURE: 119 MMHG | HEIGHT: 71 IN | DIASTOLIC BLOOD PRESSURE: 63 MMHG

## 2018-04-18 DIAGNOSIS — E11.9 DIABETES MELLITUS WITHOUT COMPLICATION (HCC): ICD-10-CM

## 2018-04-18 DIAGNOSIS — Z13.5 SCREENING FOR GLAUCOMA: ICD-10-CM

## 2018-04-18 DIAGNOSIS — Z11.59 NEED FOR HEPATITIS C SCREENING TEST: Primary | ICD-10-CM

## 2018-04-18 RX ORDER — CLOPIDOGREL BISULFATE 75 MG/1
TABLET ORAL
Refills: 0 | COMMUNITY
Start: 2018-01-23 | End: 2018-05-03

## 2018-04-18 RX ORDER — METHYLPREDNISOLONE 4 MG/1
TABLET ORAL
Qty: 1 DOSE PACK | Refills: 0 | Status: SHIPPED | OUTPATIENT
Start: 2018-04-18 | End: 2018-05-03

## 2018-04-18 RX ORDER — BACLOFEN 10 MG/1
10 TABLET ORAL 3 TIMES DAILY
Qty: 18 TAB | Refills: 0 | Status: SHIPPED | OUTPATIENT
Start: 2018-04-18 | End: 2018-04-24

## 2018-04-18 NOTE — PATIENT INSTRUCTIONS
Preventing Falls: Care Instructions  Your Care Instructions    Getting around your home safely can be a challenge if you have injuries or health problems that make it easy for you to fall. Loose rugs and furniture in walkways are among the dangers for many older people who have problems walking or who have poor eyesight. People who have conditions such as arthritis, osteoporosis, or dementia also have to be careful not to fall. You can make your home safer with a few simple measures. Follow-up care is a key part of your treatment and safety. Be sure to make and go to all appointments, and call your doctor if you are having problems. It's also a good idea to know your test results and keep a list of the medicines you take. How can you care for yourself at home? Taking care of yourself  · You may get dizzy if you do not drink enough water. To prevent dehydration, drink plenty of fluids, enough so that your urine is light yellow or clear like water. Choose water and other caffeine-free clear liquids. If you have kidney, heart, or liver disease and have to limit fluids, talk with your doctor before you increase the amount of fluids you drink. · Exercise regularly to improve your strength, muscle tone, and balance. Walk if you can. Swimming may be a good choice if you cannot walk easily. · Have your vision and hearing checked each year or any time you notice a change. If you have trouble seeing and hearing, you might not be able to avoid objects and could lose your balance. · Know the side effects of the medicines you take. Ask your doctor or pharmacist whether the medicines you take can affect your balance. Sleeping pills or sedatives can affect your balance. · Limit the amount of alcohol you drink. Alcohol can impair your balance and other senses. · Ask your doctor whether calluses or corns on your feet need to be removed.  If you wear loose-fitting shoes because of calluses or corns, you can lose your balance and fall. · Talk to your doctor if you have numbness in your feet. Preventing falls at home  · Remove raised doorway thresholds, throw rugs, and clutter. Repair loose carpet or raised areas in the floor. · Move furniture and electrical cords to keep them out of walking paths. · Use nonskid floor wax, and wipe up spills right away, especially on ceramic tile floors. · If you use a walker or cane, put rubber tips on it. If you use crutches, clean the bottoms of them regularly with an abrasive pad, such as steel wool. · Keep your house well lit, especially Shazia Citizen, and outside walkways. Use night-lights in areas such as hallways and bathrooms. Add extra light switches or use remote switches (such as switches that go on or off when you clap your hands) to make it easier to turn lights on if you have to get up during the night. · Install sturdy handrails on stairways. · Move items in your cabinets so that the things you use a lot are on the lower shelves (about waist level). · Keep a cordless phone and a flashlight with new batteries by your bed. If possible, put a phone in each of the main rooms of your house, or carry a cell phone in case you fall and cannot reach a phone. Or, you can wear a device around your neck or wrist. You push a button that sends a signal for help. · Wear low-heeled shoes that fit well and give your feet good support. Use footwear with nonskid soles. Check the heels and soles of your shoes for wear. Repair or replace worn heels or soles. · Do not wear socks without shoes on wood floors. · Walk on the grass when the sidewalks are slippery. If you live in an area that gets snow and ice in the winter, sprinkle salt on slippery steps and sidewalks. Preventing falls in the bath  · Install grab bars and nonskid mats inside and outside your shower or tub and near the toilet and sinks. · Use shower chairs and bath benches.   · Use a hand-held shower head that will allow you to sit while showering. · Get into a tub or shower by putting the weaker leg in first. Get out of a tub or shower with your strong side first.  · Repair loose toilet seats and consider installing a raised toilet seat to make getting on and off the toilet easier. · Keep your bathroom door unlocked while you are in the shower. Where can you learn more? Go to http://jean-pierre-miller.info/. Enter 0476 [id number] 69 [id number] in the search box to learn more about \"Preventing Falls: Care Instructions. \"  Current as of: May 12, 2017  Content Version: 11.4  © 1939-8330 [employer]. Care instructions adapted under license by [employer] (which disclaims liability or warranty for this information). If you have questions about a medical condition or this instruction, always ask your healthcare professional. [place] [address] any warranty or liability for your use of this information. Learning About How to Have a Healthy Back  What causes back pain? Back pain is often caused by overuse, strain, or injury. For example, people often hurt their backs playing sports or working in the yard, being jolted in a car accident, or lifting something too heavy. Aging plays a part too. Your bones and muscles tend to lose strength as you age, which makes injury more likely. The spongy discs between the bones of the spine (vertebrae) may suffer from wear and tear and no longer provide enough cushion between the bones. A disc that bulges or breaks open (herniated disc) can press on nerves, causing back pain. In some people, back pain is the result of arthritis, broken vertebrae caused by bone loss (osteoporosis), illness, or a spine problem. Although most people have back pain at one time or another, there are steps you can take to make it less likely. How can you have a healthy back? Reduce stress on your back through good posture  Slumping or slouching alone may not cause low back pain. But after the back has been strained or injured, bad posture can make pain worse. Sleep in a position that maintains your back's normal curves and on a mattress that feels comfortable. Sleep on your side with a pillow between your knees, or sleep on your back with a pillow under your knees. These positions can reduce strain on your back. Stand and sit up straight. \"Good posture\" generally means your ears, shoulders, and hips are in a straight line. If you must stand for a long time, put one foot on a stool, ledge, or box. Switch feet every now and then. Sit in a chair that is low enough to let you place both feet flat on the floor with both knees nearly level with your hips. If your chair or desk is too high, use a footrest to raise your knees. Place a small pillow, a rolled-up towel, or a lumbar roll in the curve of your back if you need extra support. Try a kneeling chair, which helps tilt your hips forward. This takes pressure off your lower back. Try sitting on an exercise ball. It can rock from side to side, which helps keep your back loose. When driving, keep your knees nearly level with your hips. Sit straight, and drive with both hands on the steering wheel. Your arms should be in a slightly bent position. Reduce stress on your back through careful lifting  Squat down, bending at the hips and knees only. If you need to, put one knee to the floor and extend your other knee in front of you, bent at a right angle (half kneeling). Press your chest straight forward. This helps keep your upper back straight while keeping a slight arch in your low back. Hold the load as close to your body as possible, at the level of your belly button (navel). Use your feet to change direction, taking small steps. Lead with your hips as you change direction. Keep your shoulders in line with your hips as you move. Set down your load carefully, squatting with your knees and hips only.   Exercise and stretch your back  Do some exercise on most days of the week, if your doctor says it is okay. You can walk, run, swim, or cycle. Stretch your back muscles. Here are a few exercises to try:  Lie on your back, and gently pull one bent knee to your chest. Put that foot back on the floor, and then pull the other knee to your chest.  Do pelvic tilts. Lie on your back with your knees bent. Tighten your stomach muscles. Pull your belly button (navel) in and up toward your ribs. You should feel like your back is pressing to the floor and your hips and pelvis are slightly lifting off the floor. Hold for 6 seconds while breathing smoothly. Sit with your back flat against a wall. Keep your core muscles strong. The muscles of your back, belly (abdomen), and buttocks support your spine. Pull in your belly and imagine pulling your navel toward your spine. Hold this for 6 seconds, then relax. Remember to keep breathing normally as you tense your muscles. Do curl-ups. Always do them with your knees bent. Keep your low back on the floor, and curl your shoulders toward your knees using a smooth, slow motion. Keep your arms folded across your chest. If this bothers your neck, try putting your hands behind your neck (not your head), with your elbows spread apart. Lie on your back with your knees bent and your feet flat on the floor. Tighten your belly muscles, and then push with your feet and raise your buttocks up a few inches. Hold this position 6 seconds as you continue to breathe normally, then lower yourself slowly to the floor. Repeat 8 to 12 times. If you like group exercise, try Pilates or yoga. These classes have poses that strengthen the core muscles. Lead a healthy lifestyle  Stay at a healthy weight to avoid strain on your back. Do not smoke. Smoking increases the risk of osteoporosis, which weakens the spine. If you need help quitting, talk to your doctor about stop-smoking programs and medicines.  These can increase your chances of quitting for good. Where can you learn more? Go to http://jean-pierre-miller.info/. Enter L315 in the search box to learn more about \"Learning About How to Have a Healthy Back. \"  Current as of: March 21, 2017  Content Version: 11.4  © 1230-6657 [employer]. Care instructions adapted under license by [employer] (which disclaims liability or warranty for this information). If you have questions about a medical condition or this instruction, always ask your healthcare professional. [place] [address] any warranty or liability for your use of this information. Back Pain: Care Instructions  Your Care Instructions    Back pain has many possible causes. It is often related to problems with muscles and ligaments of the back. It may also be related to problems with the nerves, discs, or bones of the back. Moving, lifting, standing, sitting, or sleeping in an awkward way can strain the back. Sometimes you don't notice the injury until later. Arthritis is another common cause of back pain. Although it may hurt a lot, back pain usually improves on its own within several weeks. Most people recover in 12 weeks or less. Using good home treatment and being careful not to stress your back can help you feel better sooner. Follow-up care is a key part of your treatment and safety. Be sure to make and go to all appointments, and call your doctor if you are having problems. It's also a good idea to know your test results and keep a list of the medicines you take. How can you care for yourself at home? · Sit or lie in positions that are most comfortable and reduce your pain. Try one of these positions when you lie down:  ¨ Lie on your back with your knees bent and supported by large pillows. ¨ Lie on the floor with your legs on the seat of a sofa or chair. Molly Posey on your side with your knees and hips bent and a pillow between your legs.   [place] on your stomach if it does not make pain worse. · Do not sit up in bed, and avoid soft couches and twisted positions. Bed rest can help relieve pain at first, but it delays healing. Avoid bed rest after the first day of back pain. · Change positions every 30 minutes. If you must sit for long periods of time, take breaks from sitting. Get up and walk around, or lie in a comfortable position. · Try using a heating pad on a low or medium setting for 15 to 20 minutes every 2 or 3 hours. Try a warm shower in place of one session with the heating pad. · You can also try an ice pack for 10 to 15 minutes every 2 to 3 hours. Put a thin cloth between the ice pack and your skin. · Take pain medicines exactly as directed. ¨ If the doctor gave you a prescription medicine for pain, take it as prescribed. ¨ If you are not taking a prescription pain medicine, ask your doctor if you can take an over-the-counter medicine. · Take short walks several times a day. You can start with 5 to 10 minutes, 3 or 4 times a day, and work up to longer walks. Walk on level surfaces and avoid hills and stairs until your back is better. · Return to work and other activities as soon as you can. Continued rest without activity is usually not good for your back. · To prevent future back pain, do exercises to stretch and strengthen your back and stomach. Learn how to use good posture, safe lifting techniques, and proper body mechanics. When should you call for help? Call your doctor now or seek immediate medical care if:  ? · You have new or worsening numbness in your legs. ? · You have new or worsening weakness in your legs. (This could make it hard to stand up.)   ? · You lose control of your bladder or bowels. ? Watch closely for changes in your health, and be sure to contact your doctor if:  ? · Your pain gets worse. ? · You are not getting better after 2 weeks. Where can you learn more? Go to http://jean-pierre-miller.info/.   Enter V102 in the search box to learn more about \"Back Pain: Care Instructions. \"  Current as of: March 21, 2017  Content Version: 11.4  © 2134-9839 [employer]. Care instructions adapted under license by [employer] (which disclaims liability or warranty for this information). If you have questions about a medical condition or this instruction, always ask your healthcare professional. [place] [address] any warranty or liability for your use of this information.

## 2018-04-18 NOTE — PROGRESS NOTES
Name and  verified      Chief Complaint   Patient presents with    Back Pain     X (7) days         Health Maintenance reviewed-discussed with patient. 1. Have you been to the ER, urgent care clinic since your last visit? Hospitalized since your last visit? No    2. Have you seen or consulted any other health care providers outside of the [address] [place] [address] since your last visit? Include any pap smears or colon screening.  No

## 2018-04-18 NOTE — PROGRESS NOTES
HISTORY OF PRESENT ILLNESS  Madison Aquino is a 69 y.o. male. HPI   Back pain  The history is provided by the patient. This is a worsening problem,no calcium vitamin d intake, no hx of long term steroids, but hx of CVA in 1996 and his defeciet is moslty on the left ext. 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 The problem occurs constantly. The problem has changed and worsening since onset. The pain is present in the lower back. The quality of the pain is described as dull. The pain is at a severity of 8/10. Associated symptoms include limited range of motion. Pertinent negatives include no numbness, ++ stiffness, no tingling b/l, no itching, + back pain and no neck pain, laying flat gets rid of the pain, activity makes the pain, . The symptoms are aggravated by movement and palpation. There has been no history of extremity trauma. no incontinence of urine nor of stool, sees the urologist, for BPH      Current Outpatient Prescriptions   Medication Sig Dispense Refill    finasteride (PROSCAR) 5 mg tablet TAKE 1 TABLET BY MOUTH  NIGHTLY 90 Tab 0    aspirin delayed-release 81 mg tablet Take  by mouth daily.  ezetimibe (ZETIA) 10 mg tablet Take 1 Tab by mouth daily. 90 Tab 3    tamsulosin (FLOMAX) 0.4 mg capsule TAKE 2 CAPSULES BY MOUTH  DAILY 30 Cap 1    atorvastatin (LIPITOR) 40 mg tablet Take 1 Tab by mouth daily. 90 Tab 1    carvedilol (COREG) 12.5 mg tablet Take 1 tablet by mouth two  times daily with meals 180 Tab 3    buPROPion SR (WELLBUTRIN SR) 150 mg SR tablet Take 1 Tab by mouth two (2) times a day. 180 Tab 1    hydroCHLOROthiazide (HYDRODIURIL) 25 mg tablet Take 1 Tab by mouth daily. 90 Tab 3    amLODIPine-benazepril (LOTREL) 5-20 mg per capsule Take 1 Cap by Mouth Once a Day. 90 Cap 2    therapeutic multivitamin (THERA) tablet Take 1 Tab by mouth daily.  90 Tab 1    clopidogrel (PLAVIX) 75 mg tab TAKE 1 TABLET BY MOUTH DAILY  0     No Known Allergies  Past Medical History:   Diagnosis Date    Agoraphobia with panic disorder     Anxiety     BPH (benign prostatic hypertrophy) with urinary obstruction     CAD (coronary artery disease)     no previous MI or stents    Chronic edema     Coronary artery disease involving native coronary artery without angina pectoris 3/28/2017    CVD (cardiovascular disease)     Depression     Diabetes [facility] [address]     Erectile dysfunction     Essential hypertension 3/28/2017    Falls     Fracture of multiple ribs of right side 7/26/2017    GERD (gastroesophageal reflux disease)     Hemiparesis ([facility] [address]     History of stroke 3/28/2017    Hypercholesterolemia     Hypercholesterolemia 3/28/2017    Hypertension     Joint pain     Muscle pain     Muscle weakness     Nocturia     Panic disorder     Ringing in the ears     Stroke [facility])      Past Surgical History:   Procedure Laterality Date    HX COLONOSCOPY      HX LUMBAR DISKECTOMY      HX ORTHOPAEDIC      right finger repair    HX OTHER SURGICAL      lap band    HX OTHER SURGICAL      Lap band     Family History   Problem Relation Age of Onset    Cancer Father     Stroke Father     Hypertension Father     Cancer Mother     Hypertension Sister     Heart Disease Brother      Social History   Substance Use Topics    Smoking status: Never Smoker    Smokeless tobacco: Never Used    Alcohol use Yes      Lab Results  Component Value Date/Time   WBC 7.4 01/16/2018 08:53 AM   HGB 13.4 01/16/2018 08:53 AM   HCT 40.3 01/16/2018 08:53 AM   PLATELET 297 76/76/2422 08:53 AM   MCV 95 01/16/2018 08:53 AM     Lab Results  Component Value Date/Time   Hemoglobin A1c 6.1 06/24/2017 05:59 AM   Hemoglobin A1c 5.8 (H) 03/28/2017 01:08 PM   Glucose 143 (H) 01/16/2018 08:53 AM   Glucose (POC) 110 (H) 06/24/2017 06:39 AM   Microalb/Creat ratio (ug/mg creat.) 82.2 (H) 03/28/2017 01:09 PM   LDL, calculated 116 (H) 01/16/2018 08:53 AM   Creatinine 1.16 01/16/2018 08:53 AM      Lab Results  Component Value Date/Time   GFR est non-AA 64 01/16/2018 08:53 AM   GFR est AA 74 01/16/2018 08:53 AM   Creatinine 1.16 01/16/2018 08:53 AM   BUN 14 01/16/2018 08:53 AM   Sodium 143 01/16/2018 08:53 AM   Potassium 4.2 01/16/2018 08:53 AM   Chloride 102 01/16/2018 08:53 AM   CO2 27 01/16/2018 08:53 AM        Review of Systems   Constitutional: Negative for chills, fever and malaise/fatigue. HENT: Negative for congestion and nosebleeds. Eyes: Negative for blurred vision and pain. Respiratory: Negative for shortness of breath and wheezing. Cardiovascular: Negative for chest pain, palpitations and leg swelling. Gastrointestinal: Negative for constipation, diarrhea, nausea and vomiting. Genitourinary: Negative for dysuria and frequency. Musculoskeletal: Negative for joint pain and myalgias. Skin: Negative for itching and rash. Neurological: Negative for dizziness, loss of consciousness and headaches. Endo/Heme/Allergies: Does not bruise/bleed easily. Psychiatric/Behavioral: Negative for depression. The patient is not nervous/anxious and does not have insomnia. All other systems reviewed and are negative. Physical Exam   Constitutional: He is oriented to person, place, and time. He appears well-developed and well-nourished. HENT:   Head: Normocephalic and atraumatic. Mouth/Throat: No oropharyngeal exudate. Eyes: Conjunctivae and EOM are normal. Pupils are equal, round, and reactive to light. Neck: Normal range of motion. Neck supple. No JVD present. No thyromegaly present. Cardiovascular: Normal rate, regular rhythm, normal heart sounds and intact distal pulses. Exam reveals no friction rub. No murmur heard. Pulmonary/Chest: Effort normal and breath sounds normal. No respiratory distress. He has no wheezes. He has no rales. Abdominal: Soft. Bowel sounds are normal. He exhibits no distension.  There is no tenderness. Musculoskeletal: He exhibits tenderness. He exhibits no edema. Lymphadenopathy:     He has no cervical adenopathy. Neurological: He is alert and oriented to person, place, and time. He has normal reflexes. Skin: Skin is warm. No rash noted. No erythema. Psychiatric: He has a normal mood and affect. His behavior is normal.   Nursing note and vitals reviewed. ASSESSMENT and PLAN  Diagnoses and all orders for this visit:    1. Need for hepatitis C screening test  -     XR SPINE THORAC 3 V; Future    2. Screening for glaucoma  -     REFERRAL TO OPTOMETRY  -     XR SPINE THORAC 3 V; Future    3. Diabetes mellitus without complication (HCC)  -     MICROALBUMIN, UR, RAND W/ MICROALB/CREAT RATIO  -     XR SPINE THORAC 3 V; Future    Other orders  -     methylPREDNISolone (MEDROL DOSEPACK) 4 mg tablet; As directed for 6 days one package  -     baclofen (LIORESAL) 10 mg tablet; Take 1 Tab by mouth three (3) times daily for 6 days. Patient was provided evidence based informations with the regard of their expected course,  In addition was told to help with weight reduction, spinal manipulations, massage therapy, exercise therapy:  Patient was also told to remain active but to avoid heavy lifting and pushing at this time for the next 6 weeks which is the time of the recovery for most of the low back pain duration of healing. Advised for self cared options such as:  NSAID's and Tylenol for pain, take meds w/ food and water, if develop abdominal upsets, such as heart burn and sour stomach. Please take some OTC antacids or Nexium 30 min before the first meal once daily and watch for discolored stool. 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 Dependency and tolerancy were also addressed,  meds side effects and compliancy advised,  Call or rtc if worsens,   Pt agreed with today's recommendations.

## 2018-04-18 NOTE — MR AVS SNAPSHOT
[address] [facility] [address] 
[phone] Patient: Shelia Beard MRN: [id number] ESD:1/49/1986 Visit Information Date & Time Provider Department Dept. Phone Encounter #  
 4/18/2018  3:00 PM Mignon Loaiza MD [facility] [phone] [id number] Follow-up Instructions Return in about 4 weeks (around 5/16/2018), or if symptoms worsen or fail to improve. Follow-up and Disposition History Your Appointments 7/23/2018  4:00 PM  
ROUTINE CARE with Mignon Loaiza MD  
[facility] ([facility]) Appt Note: 6 mnth   
 [address] [facility] [address]  
[phone] [phone]  
  
    
 7/25/2018 10:15 AM  
ESTABLISHED PATIENT with Akil Manning MD  
[facility]) Appt Note: Dr Maryann Aschoff  
 [address] [facility]  
[phone] [address] [facility] Upcoming Health Maintenance Date Due Hepatitis C Screening 1950 MICROALBUMIN Q1 3/28/2018 FOOT EXAM Q1 4/14/2018 MEDICARE YEARLY EXAM 6/23/2018 HEMOGLOBIN A1C Q6M 7/16/2018 EYE EXAM RETINAL OR DILATED Q1 9/7/2018 LIPID PANEL Q1 1/16/2019 FOBT Q 1 YEAR AGE 50-75 1/23/2019 GLAUCOMA SCREENING Q2Y 9/7/2019 DTaP/Tdap/Td series (2 - Td) 8/9/2022 Allergies as of 4/18/2018  Review Complete On: 4/18/2018 By: Mignon Loaiza MD  
 No Known Allergies Current Immunizations  Reviewed on 6/22/2017 Name Date Influenza High Dose Vaccine PF 11/7/2017 Influenza Vaccine 11/18/2015 12:00 AM  
 Pneumococcal Conjugate (PCV-13) 3/17/2015 Pneumococcal Polysaccharide (PPSV-23) 2/21/2014 12:00 AM  
 Tdap 8/9/2012 Zoster Vaccine, Live 10/28/2016 Not reviewed this visit You Were Diagnosed With   
  
 Codes Comments Need for hepatitis C screening test    -  Primary ICD-10-CM: Z11.59 
ICD-9-CM: V73.89 Screening for glaucoma     ICD-10-CM: Z13.5 ICD-9-CM: V80.1 Diabetes mellitus without complication ([facility] 75.)     KQH-84-YW: E11.9 ICD-9-CM: 250.00 Vitals BP Pulse Temp Resp Height(growth percentile) Weight(growth percentile)  
 119/63 (BP 1 Location: Left arm, BP Patient Position: At rest) 69 97.2 °F (36.2 °C) (Oral) 16 5' 11\" (1.803 m) 221 lb 12.8 oz (100.6 kg) SpO2 BMI Smoking Status 96% 30.93 kg/m2 Never Smoker Vitals History BMI and BSA Data Body Mass Index Body Surface Area 30.93 kg/m 2 2.24 m 2 Preferred Pharmacy Pharmacy Name Phone [id number]- [place], [place] - [address] [phone] Your Updated Medication List  
  
   
This list is accurate as of 4/18/18  4:03 PM.  Always use your most recent med list. amLODIPine-benazepril 5-20 mg per capsule Commonly known as:  Elester Rajas Take 1 Cap by Mouth Once a Day. aspirin delayed-release 81 mg tablet Take  by mouth daily. atorvastatin 40 mg tablet Commonly known as:  LIPITOR Take 1 Tab by mouth daily. baclofen 10 mg tablet Commonly known as:  LIORESAL Take 1 Tab by mouth three (3) times daily for 6 days. buPROPion  mg SR tablet Commonly known as:  Debbie Margaret Take 1 Tab by mouth two (2) times a day. carvedilol 12.5 mg tablet Commonly known as:  Ronen Lacrosse Take 1 tablet by mouth two  times daily with meals  
  
 clopidogrel 75 mg Tab Commonly known as:  PLAVIX TAKE 1 TABLET BY MOUTH DAILY  
  
 ezetimibe 10 mg tablet Commonly known as:  Carmell Media Take 1 Tab by mouth daily. finasteride 5 mg tablet Commonly known as:  PROSCAR  
TAKE 1 TABLET BY MOUTH  NIGHTLY  
  
 hydroCHLOROthiazide 25 mg tablet Commonly known as:  HYDRODIURIL  
 Take 1 Tab by mouth daily. methylPREDNISolone 4 mg tablet Commonly known as:  Haxtun Esesnce As directed for 6 days one package  
  
 tamsulosin 0.4 mg capsule Commonly known as:  FLOMAX TAKE 2 CAPSULES BY MOUTH  DAILY therapeutic multivitamin tablet Commonly known as:  THERA Take 1 Tab by mouth daily. Prescriptions Sent to Pharmacy Refills  
 methylPREDNISolone (MEDROL DOSEPACK) 4 mg tablet 0 Sig: As directed for 6 days one package Class: Normal  
 Pharmacy: [facility]/pharmacy [address] [place] [address] [address] Ph #: [phone]  
 baclofen (LIORESAL) 10 mg tablet 0 Sig: Take 1 Tab by mouth three (3) times daily for 6 days. Class: Normal  
 Pharmacy: [facility]/pharmacy [address] [place] [address] [address] Ph #: [phone] Route: Oral  
  
We Performed the Following MICROALBUMIN, UR, RAND W/ MICROALB/CREAT RATIO [id number] CPT(R)] REFERRAL TO OPTOMETRY [id number] Custom] Comments:  
 Please evaluate patient for glaucoma. Follow-up Instructions Return in about 4 weeks (around 5/16/2018), or if symptoms worsen or fail to improve. To-Do List   
 04/18/2018 Imaging:  XR SPINE THORAC 3 V Referral Information Referral ID Referred By Referred To  
  
 [id number] YOAN COSTA MD   
   [facility] [address], [address] [place] [address] Street Phone: [phone] Fax: [phone] Visits Status Start Date End Date 1 New Request 4/18/18 4/18/19 If your referral has a status of pending review or denied, additional information will be sent to support the outcome of this decision. Patient Instructions Preventing Falls: Care Instructions Your Care Instructions Getting around your home safely can be a challenge if you have injuries or health problems that make it easy for you to fall.  Loose rugs and furniture in walkways are among the dangers for many older people who have problems walking or who have poor eyesight. People who have conditions such as arthritis, osteoporosis, or dementia also have to be careful not to fall. You can make your home safer with a few simple measures. Follow-up care is a key part of your treatment and safety. Be sure to make and go to all appointments, and call your doctor if you are having problems. It's also a good idea to know your test results and keep a list of the medicines you take. How can you care for yourself at home? Taking care of yourself · You may get dizzy if you do not drink enough water. To prevent dehydration, drink plenty of fluids, enough so that your urine is light yellow or clear like water. Choose water and other caffeine-free clear liquids. If you have kidney, heart, or liver disease and have to limit fluids, talk with your doctor before you increase the amount of fluids you drink. · Exercise regularly to improve your strength, muscle tone, and balance. Walk if you can. Swimming may be a good choice if you cannot walk easily. · Have your vision and hearing checked each year or any time you notice a change. If you have trouble seeing and hearing, you might not be able to avoid objects and could lose your balance. · Know the side effects of the medicines you take. Ask your doctor or pharmacist whether the medicines you take can affect your balance. Sleeping pills or sedatives can affect your balance. · Limit the amount of alcohol you drink. Alcohol can impair your balance and other senses. · Ask your doctor whether calluses or corns on your feet need to be removed. If you wear loose-fitting shoes because of calluses or corns, you can lose your balance and fall. · Talk to your doctor if you have numbness in your feet. Preventing falls at home · Remove raised doorway thresholds, throw rugs, and clutter.  Repair loose carpet or raised areas in the floor. · Move furniture and electrical cords to keep them out of walking paths. · Use nonskid floor wax, and wipe up spills right away, especially on ceramic tile floors. · If you use a walker or cane, put rubber tips on it. If you use crutches, clean the bottoms of them regularly with an abrasive pad, such as steel wool. · Keep your house well lit, especially [place], and outside walkways. Use night-lights in areas such as hallways and bathrooms. Add extra light switches or use remote switches (such as switches that go on or off when you clap your hands) to make it easier to turn lights on if you have to get up during the night. · Install sturdy handrails on stairways. · Move items in your cabinets so that the things you use a lot are on the lower shelves (about waist level). · Keep a cordless phone and a flashlight with new batteries by your bed. If possible, put a phone in each of the main rooms of your house, or carry a cell phone in case you fall and cannot reach a phone. Or, you can wear a device around your neck or wrist. You push a button that sends a signal for help. · Wear low-heeled shoes that fit well and give your feet good support. Use footwear with nonskid soles. Check the heels and soles of your shoes for wear. Repair or replace worn heels or soles. · Do not wear socks without shoes on wood floors. · Walk on the grass when the sidewalks are slippery. If you live in an area that gets snow and ice in the winter, sprinkle salt on slippery steps and sidewalks. Preventing falls in the bath · Install grab bars and nonskid mats inside and outside your shower or tub and near the toilet and sinks. · Use shower chairs and bath benches. · Use a hand-held shower head that will allow you to sit while showering.  
· Get into a tub or shower by putting the weaker leg in first. Get out of a tub or shower with your strong side first. 
 · Repair loose toilet seats and consider installing a raised toilet seat to make getting on and off the toilet easier. · Keep your bathroom door unlocked while you are in the shower. Where can you learn more? Go to http://jean-pierre-miller.info/. Enter [id number] [id number] in the search box to learn more about \"Preventing Falls: Care Instructions. \" Current as of: May 12, 2017 Content Version: 11.4 © 3073-2019 Branch. Care instructions adapted under license by [employer] (which disclaims liability or warranty for this information). If you have questions about a medical condition or this instruction, always ask your healthcare professional. [place] [address] any warranty or liability for your use of this information. Learning About How to Have a Healthy Back What causes back pain? Back pain is often caused by overuse, strain, or injury. For example, people often hurt their backs playing sports or working in the yard, being jolted in a car accident, or lifting something too heavy. Aging plays a part too. Your bones and muscles tend to lose strength as you age, which makes injury more likely. The spongy discs between the bones of the spine (vertebrae) may suffer from wear and tear and no longer provide enough cushion between the bones. A disc that bulges or breaks open (herniated disc) can press on nerves, causing back pain. In some people, back pain is the result of arthritis, broken vertebrae caused by bone loss (osteoporosis), illness, or a spine problem. Although most people have back pain at one time or another, there are steps you can take to make it less likely. How can you have a healthy back? Reduce stress on your back through good posture Slumping or slouching alone may not cause low back pain. But after the back has been strained or injured, bad posture can make pain worse. Sleep in a position that maintains your back's normal curves and on a mattress that feels comfortable. Sleep on your side with a pillow between your knees, or sleep on your back with a pillow under your knees. These positions can reduce strain on your back. Stand and sit up straight. \"Good posture\" generally means your ears, shoulders, and hips are in a straight line. If you must stand for a long time, put one foot on a stool, ledge, or box. Switch feet every now and then. Sit in a chair that is low enough to let you place both feet flat on the floor with both knees nearly level with your hips. If your chair or desk is too high, use a footrest to raise your knees. Place a small pillow, a rolled-up towel, or a lumbar roll in the curve of your back if you need extra support. Try a kneeling chair, which helps tilt your hips forward. This takes pressure off your lower back. Try sitting on an exercise ball. It can rock from side to side, which helps keep your back loose. When driving, keep your knees nearly level with your hips. Sit straight, and drive with both hands on the steering wheel. Your arms should be in a slightly bent position. Reduce stress on your back through careful lifting Squat down, bending at the hips and knees only. If you need to, put one knee to the floor and extend your other knee in front of you, bent at a right angle (half kneeling). Press your chest straight forward. This helps keep your upper back straight while keeping a slight arch in your low back. Hold the load as close to your body as possible, at the level of your belly button (navel). Use your feet to change direction, taking small steps. Lead with your hips as you change direction. Keep your shoulders in line with your hips as you move. Set down your load carefully, squatting with your knees and hips only. Exercise and stretch your back Do some exercise on most days of the week, if your doctor says it is okay. You can walk, run, swim, or cycle. Stretch your back muscles. Here are a few exercises to try: 
Lie on your back, and gently pull one bent knee to your chest. Put that foot back on the floor, and then pull the other knee to your chest. 
Do pelvic tilts. Lie on your back with your knees bent. Tighten your stomach muscles. Pull your belly button (navel) in and up toward your ribs. You should feel like your back is pressing to the floor and your hips and pelvis are slightly lifting off the floor. Hold for 6 seconds while breathing smoothly. Sit with your back flat against a wall. Keep your core muscles strong. The muscles of your back, belly (abdomen), and buttocks support your spine. Pull in your belly and imagine pulling your navel toward your spine. Hold this for 6 seconds, then relax. Remember to keep breathing normally as you tense your muscles. Do curl-ups. Always do them with your knees bent. Keep your low back on the floor, and curl your shoulders toward your knees using a smooth, slow motion. Keep your arms folded across your chest. If this bothers your neck, try putting your hands behind your neck (not your head), with your elbows spread apart. Lie on your back with your knees bent and your feet flat on the floor. Tighten your belly muscles, and then push with your feet and raise your buttocks up a few inches. Hold this position 6 seconds as you continue to breathe normally, then lower yourself slowly to the floor. Repeat 8 to 12 times. If you like group exercise, try Pilates or yoga. These classes have poses that strengthen the core muscles. Lead a healthy lifestyle Stay at a healthy weight to avoid strain on your back. Do not smoke. Smoking increases the risk of osteoporosis, which weakens the spine. If you need help quitting, talk to your doctor about stop-smoking programs and medicines. These can increase your chances of quitting for good. Where can you learn more? Go to http://jean-pierre-miller.info/. Enter L315 in the search box to learn more about \"Learning About How to Have a Healthy Back. \" Current as of: March 21, 2017 Content Version: 11.4 © 7566-5282 [email or web address].sg. Care instructions adapted under license by [employer] (which disclaims liability or warranty for this information). If you have questions about a medical condition or this instruction, always ask your healthcare professional. [place] [address] any warranty or liability for your use of this information. Back Pain: Care Instructions Your Care Instructions Back pain has many possible causes. It is often related to problems with muscles and ligaments of the back. It may also be related to problems with the nerves, discs, or bones of the back. Moving, lifting, standing, sitting, or sleeping in an awkward way can strain the back. Sometimes you don't notice the injury until later. Arthritis is another common cause of back pain. Although it may hurt a lot, back pain usually improves on its own within several weeks. Most people recover in 12 weeks or less. Using good home treatment and being careful not to stress your back can help you feel better sooner. Follow-up care is a key part of your treatment and safety. Be sure to make and go to all appointments, and call your doctor if you are having problems. It's also a good idea to know your test results and keep a list of the medicines you take. How can you care for yourself at home? · Sit or lie in positions that are most comfortable and reduce your pain. Try one of these positions when you lie down: ¨ Lie on your back with your knees bent and supported by large pillows. ¨ Lie on the floor with your legs on the seat of a sofa or chair. Terressa Gutter on your side with your knees and hips bent and a pillow between your legs. ¨ Lie on your stomach if it does not make pain worse. · Do not sit up in bed, and avoid soft couches and twisted positions. Bed rest can help relieve pain at first, but it delays healing. Avoid bed rest after the first day of back pain. · Change positions every 30 minutes. If you must sit for long periods of time, take breaks from sitting. Get up and walk around, or lie in a comfortable position. · Try using a heating pad on a low or medium setting for 15 to 20 minutes every 2 or 3 hours. Try a warm shower in place of one session with the heating pad. · You can also try an ice pack for 10 to 15 minutes every 2 to 3 hours. Put a thin cloth between the ice pack and your skin. · Take pain medicines exactly as directed. ¨ If the doctor gave you a prescription medicine for pain, take it as prescribed. ¨ If you are not taking a prescription pain medicine, ask your doctor if you can take an over-the-counter medicine. · Take short walks several times a day. You can start with 5 to 10 minutes, 3 or 4 times a day, and work up to longer walks. Walk on level surfaces and avoid hills and stairs until your back is better. · Return to work and other activities as soon as you can. Continued rest without activity is usually not good for your back. · To prevent future back pain, do exercises to stretch and strengthen your back and stomach. Learn how to use good posture, safe lifting techniques, and proper body mechanics. When should you call for help? Call your doctor now or seek immediate medical care if: 
? · You have new or worsening numbness in your legs. ? · You have new or worsening weakness in your legs. (This could make it hard to stand up.) ? · You lose control of your bladder or bowels. ? Watch closely for changes in your health, and be sure to contact your doctor if: 
? · Your pain gets worse. ? · You are not getting better after 2 weeks. Where can you learn more? Go to http://jean-pierre-miller.info/. Enter W879 in the search box to learn more about \"Back Pain: Care Instructions. \" Current as of: March 21, 2017 Content Version: 11.4 © 3170-6759 The Daily Hundred. Care instructions adapted under license by [facility] (which disclaims liability or warranty for this information). If you have questions about a medical condition or this instruction, always ask your healthcare professional. [address] any warranty or liability for your use of this information. Patient Instructions History Introducing [facility]! Dear Maria De Jesus Randolph: Thank you for requesting a [employer] account. Our records indicate that you already have an active [employer] account. You can access your account anytime at [email or web address]. [email or web address] Did you know that you can access your hospital and ER discharge instructions at any time in [employer]? You can also review all of your test results from your hospital stay or ER visit. Additional Information If you have questions, please visit the Frequently Asked Questions section of the [employer] website at [email or web address] Remember, [employer] is NOT to be used for urgent needs. For medical emergencies, dial 911. Now available from your iPhone and Android! Please provide this summary of care documentation to your next provider. Your primary care clinician is listed as Kae Thapa. If you have any questions after today's visit, please call [phone].

## 2018-04-19 ENCOUNTER — HOSPITAL ENCOUNTER (OUTPATIENT)
Dept: GENERAL RADIOLOGY | Age: 68
Discharge: HOME | End: 2018-04-19
Payer: MEDICARE

## 2018-04-19 DIAGNOSIS — Z11.59 NEED FOR HEPATITIS C SCREENING TEST: ICD-10-CM

## 2018-04-19 DIAGNOSIS — E11.9 DIABETES MELLITUS WITHOUT COMPLICATION (HCC): ICD-10-CM

## 2018-04-19 DIAGNOSIS — Z13.5 SCREENING FOR GLAUCOMA: ICD-10-CM

## 2018-04-19 LAB
ALBUMIN/CREAT UR: 53.9 MG/G CREAT (ref 0–30)
CREAT UR-MCNC: 125.9 MG/DL
MICROALBUMIN UR-MCNC: 67.9 UG/ML

## 2018-04-19 PROCEDURE — 72072 X-RAY EXAM THORAC SPINE 3VWS: CPT

## 2018-05-04 ENCOUNTER — ANESTHESIA (OUTPATIENT)
Dept: ENDOSCOPY | Age: 68
End: 2018-05-04
Payer: MEDICARE

## 2018-05-04 ENCOUNTER — HOSPITAL ENCOUNTER (OUTPATIENT)
Age: 68
Setting detail: OUTPATIENT SURGERY
Discharge: HOME | End: 2018-05-04
Attending: SPECIALIST | Admitting: SPECIALIST
Payer: MEDICARE

## 2018-05-04 ENCOUNTER — ANESTHESIA EVENT (OUTPATIENT)
Dept: ENDOSCOPY | Age: 68
End: 2018-05-04
Payer: MEDICARE

## 2018-05-04 VITALS
SYSTOLIC BLOOD PRESSURE: 124 MMHG | TEMPERATURE: 98.7 F | BODY MASS INDEX: 30.83 KG/M2 | DIASTOLIC BLOOD PRESSURE: 73 MMHG | RESPIRATION RATE: 17 BRPM | HEART RATE: 66 BPM | OXYGEN SATURATION: 97 % | HEIGHT: 70 IN | WEIGHT: 215.38 LBS

## 2018-05-04 PROCEDURE — 76040000007: Performed by: SPECIALIST

## 2018-05-04 PROCEDURE — 77030013992 HC SNR POLYP ENDOSC BSC -B: Performed by: SPECIALIST

## 2018-05-04 PROCEDURE — 74011000250 HC RX REV CODE- 250

## 2018-05-04 PROCEDURE — 76060000032 HC ANESTHESIA 0.5 TO 1 HR: Performed by: SPECIALIST

## 2018-05-04 PROCEDURE — 74011250636 HC RX REV CODE- 250/636: Performed by: SPECIALIST

## 2018-05-04 PROCEDURE — 74011250636 HC RX REV CODE- 250/636

## 2018-05-04 PROCEDURE — 88305 TISSUE EXAM BY PATHOLOGIST: CPT | Performed by: SPECIALIST

## 2018-05-04 RX ORDER — SODIUM CHLORIDE 0.9 % (FLUSH) 0.9 %
5-10 SYRINGE (ML) INJECTION AS NEEDED
Status: DISCONTINUED | OUTPATIENT
Start: 2018-05-04 | End: 2018-05-04 | Stop reason: HOSPADM

## 2018-05-04 RX ORDER — NALOXONE HYDROCHLORIDE 0.4 MG/ML
0.4 INJECTION, SOLUTION INTRAMUSCULAR; INTRAVENOUS; SUBCUTANEOUS
Status: DISCONTINUED | OUTPATIENT
Start: 2018-05-04 | End: 2018-05-04 | Stop reason: HOSPADM

## 2018-05-04 RX ORDER — LIDOCAINE HYDROCHLORIDE 20 MG/ML
INJECTION, SOLUTION EPIDURAL; INFILTRATION; INTRACAUDAL; PERINEURAL AS NEEDED
Status: DISCONTINUED | OUTPATIENT
Start: 2018-05-04 | End: 2018-05-04 | Stop reason: HOSPADM

## 2018-05-04 RX ORDER — FENTANYL CITRATE 50 UG/ML
50 INJECTION, SOLUTION INTRAMUSCULAR; INTRAVENOUS
Status: DISCONTINUED | OUTPATIENT
Start: 2018-05-04 | End: 2018-05-04 | Stop reason: HOSPADM

## 2018-05-04 RX ORDER — SODIUM CHLORIDE 0.9 % (FLUSH) 0.9 %
5-10 SYRINGE (ML) INJECTION EVERY 8 HOURS
Status: DISCONTINUED | OUTPATIENT
Start: 2018-05-04 | End: 2018-05-04 | Stop reason: HOSPADM

## 2018-05-04 RX ORDER — DEXTROMETHORPHAN/PSEUDOEPHED 2.5-7.5/.8
1.2 DROPS ORAL
Status: DISCONTINUED | OUTPATIENT
Start: 2018-05-04 | End: 2018-05-04 | Stop reason: HOSPADM

## 2018-05-04 RX ORDER — SODIUM CHLORIDE 9 MG/ML
50 INJECTION, SOLUTION INTRAVENOUS CONTINUOUS
Status: DISCONTINUED | OUTPATIENT
Start: 2018-05-04 | End: 2018-05-04 | Stop reason: HOSPADM

## 2018-05-04 RX ORDER — FLUMAZENIL 0.1 MG/ML
0.2 INJECTION INTRAVENOUS
Status: DISCONTINUED | OUTPATIENT
Start: 2018-05-04 | End: 2018-05-04 | Stop reason: HOSPADM

## 2018-05-04 RX ORDER — MIDAZOLAM HYDROCHLORIDE 1 MG/ML
.25-5 INJECTION, SOLUTION INTRAMUSCULAR; INTRAVENOUS
Status: DISCONTINUED | OUTPATIENT
Start: 2018-05-04 | End: 2018-05-04 | Stop reason: HOSPADM

## 2018-05-04 RX ORDER — PROPOFOL 10 MG/ML
INJECTION, EMULSION INTRAVENOUS AS NEEDED
Status: DISCONTINUED | OUTPATIENT
Start: 2018-05-04 | End: 2018-05-04 | Stop reason: HOSPADM

## 2018-05-04 RX ADMIN — LIDOCAINE HYDROCHLORIDE 40 MG: 20 INJECTION, SOLUTION EPIDURAL; INFILTRATION; INTRACAUDAL; PERINEURAL at 11:04

## 2018-05-04 RX ADMIN — PROPOFOL 380 MG: 10 INJECTION, EMULSION INTRAVENOUS at 11:27

## 2018-05-04 RX ADMIN — SODIUM CHLORIDE 50 ML/HR: 900 INJECTION, SOLUTION INTRAVENOUS at 10:21

## 2018-05-04 NOTE — PROCEDURES
Colonoscopy Procedure Note    Indications:   Personal history of colon polyps (screening only)    Referring Physician: Lamberto Williamson MD  Anesthesia/Sedation: MAC anesthesia Propofol  Endoscopist:  Dr. Celio Amaya    Procedure in Detail:  Informed consent was obtained for the procedure, including sedation. Risks of perforation, hemorrhage, adverse drug reaction, and aspiration were discussed. The patient was placed in the left lateral decubitus position. Based on the pre-procedure assessment, including review of the patient's medical history, medications, allergies, and review of systems, he had been deemed to be an appropriate candidate for moderate sedation; he was therefore sedated with the medications listed above. The patient was monitored continuously with ECG tracing, pulse oximetry, blood pressure monitoring, and direct observations. A rectal examination was performed. The [id number] was inserted into the rectum and advanced under direct vision to the cecum, which was identified by the ileocecal valve and appendiceal orifice. The quality of the colonic preparation was adequate. A careful inspection was made as the colonoscope was withdrawn, including a retroflexed view of the rectum; findings and interventions are described below. Appropriate photodocumentation was obtained. Findings:     1. Scope advanced to the cecum. 2.  Preparation was adequate. 3.  (3) polyps removed as below:   (1) ascending colon polyp 5 mm s/p cold snare removal   (1) 7 mm sessile polyp at the splenic flexure s/p hot snare removal on 15 blend   (1) 6 mm sessile polyp in rectum s/p hot snare removal on 15 blend  4. Internal hemorrhoids with skin tags. Therapies:  See above    Specimen: Specimens were collected as described above and sent to pathology. Complications: None were encountered during the procedure.      EBL: < 10 ml    Recommendations:   -f/u path  -repeat colonoscopy in 3 years  Signed By: Melisa Campbell MD                        May 4, 2018

## 2018-05-04 NOTE — PROGRESS NOTES
Anesthesia reports 380mg Propofol, 40mg Lidocaine and 700mL NS given during procedure. Received report from anesthesia staff on vital signs and status of patient.

## 2018-05-04 NOTE — IP AVS SNAPSHOT
[address] [facility] 
[phone] Patient: Aly Barrientos MRN: [id number] CHRISTIAN:[id number] About your hospitalization You were admitted on: May 4, 2018 You last received care in the:  [facility] ENDOSCOPY You were discharged on: May 4, 2018 Why you were hospitalized Your primary diagnosis was:  Not on File Follow-up Information None Discharge Orders None A check alpa indicates which time of day the medication should be taken. My Medications CONTINUE taking these medications Instructions Each Dose to Equal  
 Morning Noon Evening Bedtime  
 amLODIPine-benazepril 5-20 mg per capsule Commonly known as:  Carmell Hernandez Your last dose was: Your next dose is: Take 1 Cap by Mouth Once a Day. aspirin delayed-release 81 mg tablet Your last dose was: Your next dose is: Take  by mouth daily. atorvastatin 40 mg tablet Commonly known as:  LIPITOR Your last dose was: Your next dose is: Take 1 Tab by mouth daily. 40 mg  
    
   
   
   
  
 buPROPion  mg SR tablet Commonly known as:  Betha Lamer Your last dose was: Your next dose is: Take 1 Tab by mouth two (2) times a day. 150 mg  
    
   
   
   
  
 carvedilol 12.5 mg tablet Commonly known as:  Elmo Mood Your last dose was: Your next dose is: Take 1 tablet by mouth two  times daily with meals  
     
   
   
   
  
 ezetimibe 10 mg tablet Commonly known as:  Sonia Jiménez Your last dose was: Your next dose is: Take 1 Tab by mouth daily. 10 mg  
    
   
   
   
  
 finasteride 5 mg tablet Commonly known as:  PROSCAR Your last dose was: Your next dose is: TAKE 1 TABLET BY MOUTH  NIGHTLY hydroCHLOROthiazide 25 mg tablet Commonly known as:  HYDRODIURIL Your last dose was: Your next dose is: Take 1 Tab by mouth daily. 25 mg  
    
   
   
   
  
 tamsulosin 0.4 mg capsule Commonly known as:  FLOMAX Your last dose was: Your next dose is: TAKE 2 CAPSULES BY MOUTH  DAILY therapeutic multivitamin tablet Commonly known as:  THERA Your last dose was: Your next dose is: Take 1 Tab by mouth daily. 1 Tab Discharge Instructions Mliss Scale 
[id number] 
1950 COLON DISCHARGE INSTRUCTIONS Discomfort: 
Redness at IV site- apply warm compress to area; if redness or soreness persist- contact your physician There may be a slight amount of blood passed from the rectum Gaseous discomfort- walking, belching will help relieve any discomfort You may not operate a vehicle for 12 hours You may not engage in an occupation involving machinery or appliances for rest of today You may not drink alcoholic beverages for at least 12 hours Avoid making any critical decisions for at least 24 hour DIET: 
 Regular diet.  however -  remember your colon is empty and a heavy meal will produce gas. Avoid these foods:  vegetables, fried / greasy foods, carbonated drinks for today. MEDICATIONS: 
  
 
 Regarding Aspirin or Nonsteroidal medications, please see below. ACTIVITY: 
You may resume your normal daily activities it is recommended that you spend the remainder of the day resting -  avoid any strenuous activity. CALL M.D. ANY SIGN OF: Increasing pain, nausea, vomiting Abdominal distension (swelling) New increased bleeding (oral or rectal) Fever (chills) Pain in chest area Bloody discharge from nose or mouth Shortness of breath ONLY  Tylenol as needed for pain. Follow-up Instructions: Call Dr. Lele Khan for results of procedure / biopsy in 4-5 days at telephone #  [phone] Repeat Colonoscopy in 3 years Introducing [facility]! Dear Maggi Leal: Thank you for requesting a [employer] account. Our records indicate that you already have an active [employer] account. You can access your account anytime at [email or web address] Did you know that you can access your hospital and ER discharge instructions at any time in [employer]? You can also review all of your test results from your hospital stay or ER visit. Additional Information If you have questions, please visit the Frequently Asked Questions section of the [employer] website at [email or web address] Remember, [employer] is NOT to be used for urgent needs. For medical emergencies, dial 911. Now available from your iPhone and Android! Introducing Juan Antonio Hoang As a [facility] patient, I wanted to make you aware of our electronic visit tool called Juan Antonio Hoang. [facility] [employer] [facility] 24/7 allows you to connect within minutes with a medical provider 24 hours a day, seven days a week via a mobile device or tablet or logging into a secure website from your computer. You can access Juan Antonio Hoang from anywhere in the [place]. A virtual visit might be right for you when you have a simple condition and feel like you just dont want to get out of bed, or cant get away from work for an appointment, when your regular [facility] provider is not available (evenings, weekends or holidays), or when youre out of town and need minor care. Electronic visits cost only $49 and if the [facility] 24/7 provider determines a prescription is needed to treat your condition, one can be electronically transmitted to a nearby pharmacy*. Please take a moment to enroll today if you have not already done so.   The enrollment process is free and takes just a few minutes. To enroll, please download the [employer] 24/7 jeannette to your tablet or phone, or visit [email or web address] to enroll on your computer. And, as an [address] [place] [address] patient with a [employer] account, the results of your visits will be scanned into your electronic medical record and your primary care provider will be able to view the scanned results. We urge you to continue to see your regular [employer] provider for your ongoing medical care. And while your primary care provider may not be the one available when you seek a [employer] virtual visit, the peace of mind you get from getting a real diagnosis real time can be priceless. For more information on [employer], view our Frequently Asked Questions (FAQs) at [email or web address]. Sincerely, 
 
Gina Bhandari MD 
Chief Medical Officer Paul Sainz *:  certain medications cannot be prescribed via [employer] Providers Seen During Your Hospitalization Provider Specialty Primary office phone Brigitte Waller MD Gastroenterology [phone] Your Primary Care Physician (PCP) Primary Care Physician Office Phone Office Fax Ignacio [facility] [phone] You are allergic to the following No active allergies Recent Documentation Height Weight BMI Smoking Status 1.778 m 97.7 kg 30.9 kg/m2 Never Smoker Emergency Contacts Name Discharge Info Relation Home Work Mobile KyleGina vu DISCHARGE CAREGIVER [3] Spouse [3] [phone] Patient Belongings The following personal items are in your possession at time of discharge: 
     Visual Aid: None Please provide this summary of care documentation to your next provider.  
  
  
 
  
Signatures-by signing, you are acknowledging that this After Visit Summary has been reviewed with you and you have received a copy. Patient Signature:  ____________________________________________________________ Date:  ____________________________________________________________  
  
Arna Taisha Provider Signature:  ____________________________________________________________ Date:  ____________________________________________________________

## 2018-05-04 NOTE — ROUTINE PROCESS
Deepthi Centeno  1950  [id number]    Situation:  Verbal report received from: Lucien Posada RN  Procedure: Procedure(s):  COLONOSCOPY  ENDOSCOPIC POLYPECTOMY    Background:    Preoperative diagnosis: PERSONAL HISTORY COLON POLYPS  Postoperative diagnosis: polyps    :  Dr. Ingrid Berger): Endoscopy Technician-1: Chance Verdugo  Endoscopy RN-Relief: Justino Brewer RN    Specimens:   ID Type Source Tests Collected by Time Destination   1 : ascending colon polyp cold snare Preservative Colon, Ascending  Niesha Savage MD 5/4/2018 1125 Pathology   2 : splenic flexure hot polyp Preservative   Niesha Savage MD 5/4/2018 1126 Pathology   3 : rectal polyp hot snare Preservative Rectum  Niesha Savage MD 5/4/2018 1130 Pathology     H. Pylori  no    Assessment:  Intra-procedure medications     Anesthesia gave intra-procedure sedation and medications, see anesthesia flow sheet yes    Intravenous fluids: NS@ KVO     Vital signs stable     Abdominal assessment: round and semi-soft     Recommendation:  Discharge patient per MD order.   Family or Friend   Permission to share finding with family or friend yes

## 2018-05-04 NOTE — H&P
Gastroenterology Outpatient History and Physical    Patient: Sebastián Spicer    Physician: Yonathan Gallardo MD    Vital Signs: Blood pressure 122/77, pulse 77, temperature 97.8 °F (36.6 °C), resp. rate 18, height 5' 10\" (1.778 m), weight 97.7 kg (215 lb 6 oz), SpO2 100 %. Allergies: No Known Allergies    Chief Complaint: H/O Colon polyps    History of Present Illness: 70 yo WM for h/o polyps.     Justification for Procedure: above    History:  Past Medical History:   Diagnosis Date    Agoraphobia with panic disorder     Anxiety     Arthritis     spine    BPH (benign prostatic hypertrophy) with urinary obstruction     CAD (coronary artery disease)     no previous MI or stents    Chronic edema     Coronary artery disease involving native coronary artery without angina pectoris 3/28/2017    CVD (cardiovascular disease)     Depression     Diabetes ([address]     no longer medicated    Erectile dysfunction     Essential hypertension 3/28/2017    Falls     Fracture of multiple ribs of right side 7/26/2017    GERD (gastroesophageal reflux disease)     Hemiparesis (HCC)     History of stroke 3/28/2017    Hypercholesterolemia     Hypercholesterolemia 3/28/2017    Hypertension     Joint pain     Muscle pain     Muscle weakness     Nocturia     Panic disorder     Ringing in the ears     Seizures ([address]     at age 3, facial    Stroke ([address] 1996    left sided weakness      Past Surgical History:   Procedure Laterality Date    ABDOMEN SURGERY PROC UNLISTED  2008    Lap band    HX COLONOSCOPY      HX LUMBAR DISKECTOMY  1970's    HX ORTHOPAEDIC      right finger repair      Social History     Social History    Marital status:      Spouse name: N/A    Number of children: N/A    Years of education: N/A     Social History Main Topics    Smoking status: Never Smoker    Smokeless tobacco: Never Used    Alcohol use 3.0 oz/week     5 Cans of beer per week    Drug use: No    Sexual activity: Yes Partners: Female     Other Topics Concern    None     Social History Narrative      Family History   Problem Relation Age of Onset    Cancer Father      lung    Stroke Father     Hypertension Father     Cancer Mother      lung    Hypertension Sister     Heart Disease Brother        Medications:   Prior to Admission medications    Medication Sig Start Date End Date Taking? Authorizing Provider   finasteride (PROSCAR) 5 mg tablet TAKE 1 TABLET BY MOUTH  NIGHTLY 4/16/18  Yes Natalie Lozano MD   aspirin delayed-release 81 mg tablet Take  by mouth daily. Yes Historical Provider   ezetimibe (ZETIA) 10 mg tablet Take 1 Tab by mouth daily. 2/12/18  Yes ASTRID Draper   tamsulosin (FLOMAX) 0.4 mg capsule TAKE 2 CAPSULES BY MOUTH  DAILY 1/23/18  Yes Natalie Lozano MD   atorvastatin (LIPITOR) 40 mg tablet Take 1 Tab by mouth daily. 1/23/18  Yes Natalie Lozano MD   carvedilol (COREG) 12.5 mg tablet Take 1 tablet by mouth two  times daily with meals 1/23/18  Yes Natalie Lozano MD   buPROPion SR (WELLBUTRIN SR) 150 mg SR tablet Take 1 Tab by mouth two (2) times a day. 1/23/18  Yes Natalie Lozano MD   hydroCHLOROthiazide (HYDRODIURIL) 25 mg tablet Take 1 Tab by mouth daily. 1/23/18  Yes Natalie Lozano MD   amLODIPine-benazepril (LOTREL) 5-20 mg per capsule Take 1 Cap by Mouth Once a Day. 1/23/18  Yes Natalie Lozano MD   therapeutic multivitamin (THERA) tablet Take 1 Tab by mouth daily. 1/23/18  Yes Natalie Lozano MD       Physical Exam:   General: alert, no distress   HEENT: Head: Normocephalic, no lesions, without obvious abnormality.    Heart: regular rate and rhythm, S1, S2 normal, no murmur, click, rub or gallop   Lungs: chest clear, no wheezing, rales, normal symmetric air entry   Abdominal: soft, nontender, nondistended, + BS   Neurological: Grossly normal   Extremities: extremities normal, atraumatic, no cyanosis or edema     Findings/Diagnosis: H/O Polyps    Plan of Care/Planned Procedure: Colonoscopy     Signed By: Esteban Martines MD     May 4, 2018

## 2018-05-04 NOTE — ANESTHESIA POSTPROCEDURE EVALUATION
Post-Anesthesia Evaluation and Assessment    Patient: Madison Aquino MRN: [id number]  SSN: [id number]    YOB: 1950  Age: 69 y.o. Sex: male       Cardiovascular Function/Vital Signs  Visit Vitals    /73    Pulse 66    Temp 37.1 °C (98.7 °F)    Resp 17    Ht 5' 10\" (1.778 m)    Wt 97.7 kg (215 lb 6 oz)    SpO2 97%    BMI 30.9 kg/m2       Patient is status post total IV anesthesia anesthesia for Procedure(s):  COLONOSCOPY  ENDOSCOPIC POLYPECTOMY. Nausea/Vomiting: None    Postoperative hydration reviewed and adequate. Pain:  Pain Scale 1: Numeric (0 - 10) (05/04/18 1148)  Pain Intensity 1: 0 (05/04/18 1148)   Managed    Neurological Status: At baseline    Mental Status and Level of Consciousness: Arousable    Pulmonary Status:   O2 Device: Room air (05/04/18 1148)   Adequate oxygenation and airway patent    Complications related to anesthesia: None    Post-anesthesia assessment completed.  No concerns    Signed By: Orlene Boxer, MD     May 4, 2018

## 2018-05-04 NOTE — ANESTHESIA PREPROCEDURE EVALUATION
Anesthetic History   No history of anesthetic complications            Review of Systems / Medical History  Patient summary reviewed, nursing notes reviewed and pertinent labs reviewed    Pulmonary  Within defined limits                 Neuro/Psych   Within defined limits  seizures  CVA  TIA and psychiatric history     Cardiovascular  Within defined limits  Hypertension          CAD    Exercise tolerance: >4 METS     GI/Hepatic/Renal  Within defined limits   GERD           Endo/Other  Within defined limits  Diabetes    Morbid obesity and arthritis     Other Findings   Comments: BPH           Physical Exam    Airway  Mallampati: II  TM Distance: 4 - 6 cm  Neck ROM: normal range of motion   Mouth opening: Normal     Cardiovascular  Regular rate and rhythm,  S1 and S2 normal,  no murmur, click, rub, or gallop             Dental  No notable dental hx       Pulmonary  Breath sounds clear to auscultation               Abdominal  GI exam deferred       Other Findings            Anesthetic Plan    ASA: 3  Anesthesia type: general and total IV anesthesia          Induction: Intravenous  Anesthetic plan and risks discussed with: Patient      Propofol MAC

## 2018-05-17 RX ORDER — TAMSULOSIN HYDROCHLORIDE 0.4 MG/1
CAPSULE ORAL
Qty: 30 CAP | Refills: 1 | Status: SHIPPED | OUTPATIENT
Start: 2018-05-17 | End: 2018-06-16 | Stop reason: SDUPTHER

## 2018-06-19 RX ORDER — TAMSULOSIN HYDROCHLORIDE 0.4 MG/1
CAPSULE ORAL
Qty: 30 CAP | Refills: 1 | Status: SHIPPED | OUTPATIENT
Start: 2018-06-19 | End: 2018-07-16 | Stop reason: SDUPTHER

## 2018-07-09 DIAGNOSIS — I10 ESSENTIAL HYPERTENSION: ICD-10-CM

## 2018-07-09 DIAGNOSIS — I25.10 CORONARY ARTERY DISEASE INVOLVING NATIVE CORONARY ARTERY OF NATIVE HEART WITHOUT ANGINA PECTORIS: ICD-10-CM

## 2018-07-09 RX ORDER — BUPROPION HYDROCHLORIDE 150 MG/1
TABLET, EXTENDED RELEASE ORAL
Qty: 180 TAB | Refills: 1 | Status: SHIPPED | OUTPATIENT
Start: 2018-07-09 | End: 2018-10-03 | Stop reason: SDUPTHER

## 2018-07-09 RX ORDER — ATORVASTATIN CALCIUM 40 MG/1
TABLET, FILM COATED ORAL
Qty: 90 TAB | Refills: 1 | Status: SHIPPED | OUTPATIENT
Start: 2018-07-09 | End: 2018-10-03 | Stop reason: SDUPTHER

## 2018-07-16 RX ORDER — TAMSULOSIN HYDROCHLORIDE 0.4 MG/1
CAPSULE ORAL
Qty: 30 CAP | Refills: 1 | Status: SHIPPED | OUTPATIENT
Start: 2018-07-16 | End: 2018-08-14 | Stop reason: SDUPTHER

## 2018-07-23 ENCOUNTER — OFFICE VISIT (OUTPATIENT)
Dept: FAMILY MEDICINE CLINIC | Age: 68
End: 2018-07-23

## 2018-07-23 VITALS
OXYGEN SATURATION: 99 % | RESPIRATION RATE: 18 BRPM | DIASTOLIC BLOOD PRESSURE: 76 MMHG | BODY MASS INDEX: 32.9 KG/M2 | WEIGHT: 229.8 LBS | SYSTOLIC BLOOD PRESSURE: 136 MMHG | HEIGHT: 70 IN | TEMPERATURE: 97.3 F | HEART RATE: 71 BPM

## 2018-07-23 DIAGNOSIS — Z13.39 SCREENING FOR ALCOHOLISM: ICD-10-CM

## 2018-07-23 DIAGNOSIS — E11.9 DIABETES MELLITUS WITHOUT COMPLICATION (HCC): Primary | ICD-10-CM

## 2018-07-23 DIAGNOSIS — Z78.9: ICD-10-CM

## 2018-07-23 DIAGNOSIS — Z11.59 NEED FOR HEPATITIS C SCREENING TEST: ICD-10-CM

## 2018-07-23 DIAGNOSIS — R60.0 LOCALIZED EDEMA: ICD-10-CM

## 2018-07-23 DIAGNOSIS — Z13.31 SCREENING FOR DEPRESSION: ICD-10-CM

## 2018-07-23 RX ORDER — ATORVASTATIN CALCIUM 20 MG/1
TABLET, FILM COATED ORAL
COMMUNITY
End: 2018-07-25 | Stop reason: ALTCHOICE

## 2018-07-23 RX ORDER — BUPROPION HYDROCHLORIDE 150 MG/1
TABLET, EXTENDED RELEASE ORAL
COMMUNITY
End: 2018-07-23 | Stop reason: SDUPTHER

## 2018-07-23 RX ORDER — HYDROCHLOROTHIAZIDE 25 MG/1
TABLET ORAL
COMMUNITY
End: 2018-07-23 | Stop reason: SDUPTHER

## 2018-07-23 RX ORDER — METFORMIN HYDROCHLORIDE 500 MG/1
TABLET ORAL
COMMUNITY
End: 2018-07-23 | Stop reason: ALTCHOICE

## 2018-07-23 RX ORDER — POLYETHYLENE GLYCOL-3350, SODIUM CHLORIDE, POTASSIUM CHLORIDE AND SODIUM BICARBONATE 420; 11.2; 5.72; 1.48 G/438.4G; G/438.4G; G/438.4G; G/438.4G
POWDER, FOR SOLUTION ORAL
Refills: 0 | COMMUNITY
Start: 2018-05-01 | End: 2018-07-25

## 2018-07-23 RX ORDER — METHYLPREDNISOLONE 4 MG/1
TABLET ORAL
Refills: 0 | COMMUNITY
Start: 2018-04-18 | End: 2018-07-23 | Stop reason: ALTCHOICE

## 2018-07-23 RX ORDER — CIPROFLOXACIN 500 MG/1
TABLET ORAL
COMMUNITY
End: 2018-07-23 | Stop reason: ALTCHOICE

## 2018-07-23 RX ORDER — CARVEDILOL 12.5 MG/1
TABLET ORAL
COMMUNITY
End: 2018-07-23 | Stop reason: SDUPTHER

## 2018-07-23 RX ORDER — AMLODIPINE AND BENAZEPRIL HYDROCHLORIDE 5; 20 MG/1; MG/1
CAPSULE ORAL
COMMUNITY
End: 2018-07-23 | Stop reason: SDUPTHER

## 2018-07-23 RX ORDER — TAMSULOSIN HYDROCHLORIDE 0.4 MG/1
CAPSULE ORAL
COMMUNITY
End: 2018-07-23 | Stop reason: SDUPTHER

## 2018-07-23 RX ORDER — POTASSIUM CHLORIDE 20 MEQ/1
20 TABLET, EXTENDED RELEASE ORAL DAILY
Qty: 30 TAB | Refills: 1 | Status: SHIPPED | OUTPATIENT
Start: 2018-07-23 | End: 2018-09-26 | Stop reason: SDUPTHER

## 2018-07-23 RX ORDER — BACLOFEN 10 MG/1
TABLET ORAL
Refills: 0 | COMMUNITY
Start: 2018-04-18 | End: 2018-07-25 | Stop reason: ALTCHOICE

## 2018-07-23 RX ORDER — FUROSEMIDE 40 MG/1
40 TABLET ORAL DAILY
Qty: 30 TAB | Refills: 1 | Status: SHIPPED | OUTPATIENT
Start: 2018-07-23 | End: 2018-09-19 | Stop reason: SDUPTHER

## 2018-07-23 RX ORDER — FINASTERIDE 5 MG/1
TABLET, FILM COATED ORAL
COMMUNITY
End: 2018-07-23 | Stop reason: SDUPTHER

## 2018-07-23 NOTE — MR AVS SNAPSHOT
[address] [facility] [address] [address] 
[phone] Patient: Addy Nina MRN: [id number] NDX:[id number] Visit Information Date & Time Provider Department Dept. Phone Encounter #  
 7/23/2018  4:00 PM Nayeli Edmondson MD [facility] OFFICE-ANNEX [phone] [id number] Follow-up Instructions Return in about 2 months (around 9/23/2018), or if symptoms worsen or fail to improve. Your Appointments 7/25/2018 10:15 AM  
ESTABLISHED PATIENT with Luz Elena Hicks MD  
[facility]) Appt Note: Dr Jose Leblanc  
 [address]  
[phone] [address] Upcoming Health Maintenance Date Due Hepatitis C Screening 1950 FOOT EXAM Q1 4/14/2018 MEDICARE YEARLY EXAM 6/23/2018 HEMOGLOBIN A1C Q6M 7/16/2018 Influenza Age 5 to Adult 8/1/2018 EYE EXAM RETINAL OR DILATED Q1 9/7/2018 LIPID PANEL Q1 1/16/2019 FOBT Q 1 YEAR AGE 50-75 1/23/2019 MICROALBUMIN Q1 4/18/2019 GLAUCOMA SCREENING Q2Y 9/7/2019 DTaP/Tdap/Td series (2 - Td) 8/9/2022 Allergies as of 7/23/2018  Review Complete On: 7/23/2018 By: Nayeli Edmondson MD  
 No Known Allergies Current Immunizations  Reviewed on 6/22/2017 Name Date Influenza High Dose Vaccine PF 11/7/2017 Influenza Vaccine 11/18/2015 12:00 AM  
 Pneumococcal Conjugate (PCV-13) 3/17/2015 Pneumococcal Polysaccharide (PPSV-23) 2/21/2014 12:00 AM  
 Tdap 8/9/2012 Zoster Vaccine, Live 10/28/2016 Not reviewed this visit You Were Diagnosed With   
  
 Codes Comments Diabetes mellitus without complication [facility]    -  Primary ICD-10-CM: E11.9 ICD-9-CM: 250.00 Need for hepatitis C screening test     ICD-10-CM: Z11.59 
ICD-9-CM: V73.89 Full code but no chest compressions     ICD-10-CM: Z78.9 ICD-9-CM: V49.89 Screening for alcoholism     ICD-10-CM: Z13.89 ICD-9-CM: V79.1 Screening for depression     ICD-10-CM: Z13.89 ICD-9-CM: V79.0 Localized edema     ICD-10-CM: R60.0 ICD-9-CM: 376. 3 Vitals BP Pulse Temp Resp Height(growth percentile) Weight(growth percentile) 136/76 (BP 1 Location: Left arm, BP Patient Position: At rest) 71 97.3 °F (36.3 °C) (Oral) 18 5' 10\" (1.778 m) 229 lb 12.8 oz (104.2 kg) SpO2 BMI Smoking Status 99% 32.97 kg/m2 Never Smoker Vitals History BMI and BSA Data Body Mass Index Body Surface Area  
 32.97 kg/m 2 2.27 m 2 Preferred Pharmacy Pharmacy Name Phone [id number]- [facility] [address] [phone] Your Updated Medication List  
  
   
This list is accurate as of 7/23/18  4:58 PM.  Always use your most recent med list. amLODIPine-benazepril 5-20 mg per capsule Commonly known as:  Chitra Deep Take 1 Cap by Mouth Once a Day. aspirin delayed-release 81 mg tablet Take  by mouth daily. * atorvastatin 20 mg tablet Commonly known as:  LIPITOR  
atorvastatin 20 mg tablet * atorvastatin 40 mg tablet Commonly known as:  LIPITOR  
TAKE 1 TAB BY MOUTH DAILY. baclofen 10 mg tablet Commonly known as:  LIORESAL  
TAKE 1 TAB BY MOUTH THREE (3) TIMES DAILY FOR 6 DAYS. buPROPion  mg SR tablet Commonly known as:  WELLBUTRIN SR  
TAKE 1 TAB BY MOUTH TWO (2) TIMES A DAY. carvedilol 12.5 mg tablet Commonly known as:  Vergia Vinnie Take 1 tablet by mouth two  times daily with meals  
  
 ezetimibe 10 mg tablet Commonly known as:  Ken Och Take 1 Tab by mouth daily. finasteride 5 mg tablet Commonly known as:  PROSCAR  
TAKE 1 TABLET BY MOUTH  NIGHTLY  
  
 furosemide 40 mg tablet Commonly known as:  LASIX Take 1 Tab by mouth daily. GAVILYTE-N 420 gram solution Generic drug:  peg-electrolyte soln TAKE AS DIRECTED  
  
 hydroCHLOROthiazide 25 mg tablet Commonly known as:  HYDRODIURIL Take 1 Tab by mouth daily. potassium chloride 20 mEq tablet Commonly known as:  K-DUR, KLOR-CON Take 1 Tab by mouth daily. tamsulosin 0.4 mg capsule Commonly known as:  FLOMAX TAKE 2 CAPSULES BY MOUTH EVERY DAY  
  
 therapeutic multivitamin tablet Commonly known as:  THERA Take 1 Tab by mouth daily. * Notice: This list has 2 medication(s) that are the same as other medications prescribed for you. Read the directions carefully, and ask your doctor or other care provider to review them with you. Prescriptions Sent to Pharmacy Refills  
 furosemide (LASIX) 40 mg tablet 1 Sig: Take 1 Tab by mouth daily. Class: Normal  
 Pharmacy: [facility]/pharmacy [id number] Ph #: [phone] Route: Oral  
 potassium chloride (K-DUR, KLOR-CON) 20 mEq tablet 1 Sig: Take 1 Tab by mouth daily. Class: Normal  
 Pharmacy: [facility]/pharmacy [id number] Ph #: [phone] Route: Oral  
  
We Performed the Following [address] [address] [[id number] [facility]] [place] ANNUAL ALCOHOL SCREEN 15 MIN [id number] [facility]] REFERRAL TO PODIATRY [REF90 Custom] Comments:  
 Please evaluate patient for DM. Follow-up Instructions Return in about 2 months (around 9/23/2018), or if symptoms worsen or fail to improve. Referral Information Referral ID Referred By Referred To  
  
 [id number], DOMENICA [address] [facility], [address] [place] [address] Street Phone: [phone] Visits Status Start Date End Date 1 New Request 7/23/18 7/23/19 If your referral has a status of pending review or denied, additional information will be sent to support the outcome of this decision. Patient Instructions Medicare Wellness Visit, Male The best way to live healthy is to have a lifestyle where you eat a well-balanced diet, exercise regularly, limit alcohol use, and quit all forms of tobacco/nicotine, if applicable. Regular preventive services are another way to keep healthy. Preventive services (vaccines, screening tests, monitoring & exams) can help personalize your care plan, which helps you manage your own care. Screening tests can find health problems at the earliest stages, when they are easiest to treat. [address] follows the current, evidence-based guidelines published by the [facility] Andres Ruiz ([facility]) when recommending preventive services for our patients. Because we follow these guidelines, sometimes recommendations change over time as research supports it. (For example, a prostate screening blood test is no longer routinely recommended for men with no symptoms.) Of course, you and your provider may decide to screen more often for some diseases, based on your risk and co-morbidities (chronic disease you are already diagnosed with). Preventive services for you include: - Medicare offers their members a free annual wellness visit, which is time for you and your primary care provider to discuss and plan for your preventive service needs. Take advantage of this benefit every year! 
 
-All people over age 72 should receive the recommended pneumonia vaccines. Current USPSTF guidelines recommend a series of two vaccines for the best pneumonia protection.  
 
-All adults should have a yearly flu vaccine and a tetanus vaccine every 10 years.  All adults age 61 years should receive a shingles vaccine once in their lifetime.   
 
-All adults age 38-68 years who are overweight should have a diabetes screening test once every three years.  
 
-Other screening tests & preventive services for persons with diabetes include: an eye exam to screen for diabetic retinopathy, a kidney function test, a foot exam, and stricter control over your cholesterol.  
 
-Cardiovascular screening for adults with routine risk involves an electrocardiogram (ECG) at intervals determined by the provider.  
 
-Colorectal cancer screenings should be done for adults age 54-65 years with normal risk. There are a number of acceptable methods of screening for this type of cancer. Each test has its own benefits and drawbacks. Discuss with your provider what is most appropriate for you during your annual wellness visit. The different tests include: colonoscopy (considered the best screening method), a fecal occult blood test, a fecal DNA test, and sigmoidoscopy. 
 
-All adults born between [facility] should be screened once for Hepatitis C. 
 
-An Abdominal Aortic Aneurysm (AAA) Screening is recommended for men age 73-68 who has ever smoked in their lifetime. Here is a list of your current Health Maintenance items (your personalized list of preventive services) with a due date: 
Health Maintenance Due Topic Date Due  
 Hepatitis C Test  1950 Rebekah Diabetic Foot Care  04/14/2018 Rebekah Annual Well Visit  06/23/2018  Hemoglobin A1C    07/16/2018 \[facility]\"" & HEALTH SERVICES! Dear Justina Avalos: Thank you for requesting a [employer] account. Our records indicate that you already have an active [employer] account. You can access your account anytime at [email or web address] Did you know that you can access your hospital and ER discharge instructions at any time in [employer]? You can also review all of your test results from your hospital stay or ER visit. Additional Information If you have questions, please visit the Frequently Asked Questions section of the [employer] website at [email or web address] Remember, [employer] is NOT to be used for urgent needs. For medical emergencies, dial 911. Now available from your iPhone and Android! Please provide this summary of care documentation to your next provider. Your primary care clinician is listed as Olinda Young. If you have any questions after today's visit, please call [phone].

## 2018-07-23 NOTE — PROGRESS NOTES
Name and  verified      Chief Complaint   Patient presents with   Rachel Helm Annual Wellness Visit     Advance Directives Care Planning Information given, patient acknowledged understanding. Health Maintenance reviewed-discussed with patient. Patient educated on the parts of a diabetes care plan  1. Meal plan  2. Plan for staying active  3. Diabetes medicine plan  4. Plan for checking blood sugar as ordered by Dr. Tess Alejandre  5.  Schedule for diabetes follow up appt as recommended by provider

## 2018-07-23 NOTE — PROGRESS NOTES
This is the Subsequent Medicare Annual Wellness Exam, performed 12 months or more after the Initial AWV or the last Subsequent AWV    I have reviewed the patient's medical history in detail and updated the computerized patient record.      History     Present for CPE, last Complete Physical exam was 2017 , not Up todate w/ all vaccination, last tetanus vaccine was in <6yrs ago,  Last psa exam was abnormal and was in 2017 biopsied with nl results  last colonoscopy was abnormal with few polyps and was in 2018,  No past surgical hx,  last bone dexa scan was never,     No family hx of breast cancer in a male  no family hx of prostate cancer   no family hx of colon cancer, father 75 yo  of lung cancer, mother yo  of lung cancer as well, ++fhx of no early heart Dz , +++sexaully active and uses Safe sex, +++physically active,  compliant w/ meds, ++Rf needed for today for his meds.    no fall, not depressed  Not depressed no hx of fall, walking with the cane  His A1c of 5.8 % in March his med changed,    Vitals 2018   Weight 229 lb 12.8 oz          Vitals 2018   Weight     215 lb 6 oz         Past Medical History:   Diagnosis Date    Agoraphobia with panic disorder     Anxiety     Arthritis     spine    BPH (benign prostatic hypertrophy) with urinary obstruction     CAD (coronary artery disease)     no previous MI or stents    Chronic edema     Coronary artery disease involving native coronary artery without angina pectoris 3/28/2017    CVD (cardiovascular disease)     Depression     Diabetes ([address]     no longer medicated    Erectile dysfunction     Essential hypertension 3/28/2017    Falls     Fracture of multiple ribs of right side 2017    Full code but no chest compressions 2018    GERD (gastroesophageal reflux disease)     Hemiparesis ([address]     History of stroke 3/28/2017    Hypercholesterolemia     Hypercholesterolemia 3/28/2017    Hypertension     Joint pain     Muscle pain     Muscle weakness     Nocturia     Panic disorder     Ringing in the ears     Seizures ([address]     at age 3, facial    Stroke [facility]) 1996    left sided weakness      Past Surgical History:   Procedure Laterality Date    ABDOMEN SURGERY PROC UNLISTED  2008    Lap band    COLONOSCOPY N/A 5/4/2018    COL[facility]    HX COLONOSCOPY      HX LUMBAR DISKECTOMY  1970's    HX ORTHOPAEDIC      right finger repair     Current Outpatient Prescriptions   Medication Sig Dispense Refill    baclofen (LIORESAL) 10 mg tablet TAKE 1 TAB BY MOUTH THREE (3) TIMES DAILY FOR 6 DAYS.  0    GAVILYTE-N 420 gram solution TAKE AS DIRECTED  0    tamsulosin (FLOMAX) 0.4 mg capsule TAKE 2 CAPSULES BY MOUTH EVERY DAY 30 Cap 1    buPROPion SR (WELLBUTRIN SR) 150 mg SR tablet TAKE 1 TAB BY MOUTH TWO (2) TIMES A DAY. 180 Tab 1    atorvastatin (LIPITOR) 40 mg tablet TAKE 1 TAB BY MOUTH DAILY. 90 Tab 1    finasteride (PROSCAR) 5 mg tablet TAKE 1 TABLET BY MOUTH  NIGHTLY 90 Tab 0    aspirin delayed-release 81 mg tablet Take  by mouth daily.  ezetimibe (ZETIA) 10 mg tablet Take 1 Tab by mouth daily. 90 Tab 3    carvedilol (COREG) 12.5 mg tablet Take 1 tablet by mouth two  times daily with meals 180 Tab 3    hydroCHLOROthiazide (HYDRODIURIL) 25 mg tablet Take 1 Tab by mouth daily. 90 Tab 3    amLODIPine-benazepril (LOTREL) 5-20 mg per capsule Take 1 Cap by Mouth Once a Day. 90 Cap 2    therapeutic multivitamin (THERA) tablet Take 1 Tab by mouth daily.  90 Tab 1    metFORMIN (GLUCOPHAGE) 500 mg tablet metformin 500 mg tablet      atorvastatin (LIPITOR) 20 mg tablet atorvastatin 20 mg tablet       No Known Allergies  Family History   Problem Relation Age of Onset    Cancer Father      lung    Stroke Father     Hypertension Father     Cancer Mother      lung    Hypertension Sister     Heart Disease Brother      Social History   Substance Use Topics    Smoking status: Never Smoker    Smokeless tobacco: Never Used    Alcohol use 3.0 oz/week     5 Cans of beer per week     Patient Active Problem List   Diagnosis Code    Benign prostatic hyperplasia with lower urinary tract symptoms N40.1    Urinary frequency R35.0    Obesity E66.9    Nocturia R35.1    Slowing of urinary stream R39.198    Erectile dysfunction N52.9    Calculus of kidney N20.0    Left low back pain M54.5    Gross hematuria R31.0    Essential hypertension I10    Hypercholesterolemia E78.00    Controlled type 2 diabetes mellitus without complication (HCC) J77.9    History of stroke Z86.73    Coronary artery disease involving native coronary artery without angina pectoris I25.10    Fracture of multiple ribs of right side S22.41XA    Type 2 diabetes mellitus with nephropathy ([facility] E11.21    Full code but no chest compressions Z78.9       Depression Risk Factor Screening:     PHQ over the last two weeks 7/23/2018   PHQ Not Done -   Little interest or pleasure in doing things Not at all   Feeling down, depressed, irritable, or hopeless Not at all   Total Score PHQ 2 0     Alcohol Risk Factor Screening: You do not drink alcohol or very rarely. Functional Ability and Level of Safety:   Hearing Loss  Hearing is good. Activities of Daily Living  The home contains: handrails, grab bars and rugs  Patient does total self care    Fall Risk  Fall Risk Assessment, last 12 mths 7/23/2018   Able to walk? Yes   Fall in past 12 months?  No   Fall with injury? -   Number of falls in past 12 months -   Fall Risk Score -       Abuse Screen  Patient is not abused    Cognitive Screening   Evaluation of Cognitive Function:  Has your family/caregiver stated any concerns about your memory: no  Normal    Patient Care Team   Patient Care Team:  Slim Miller MD as PCP - General (Family Practice)  Leisa Uriostegui Kike Caceres MD (Urology)  Romero Tena MD as Physician (Cardiology)  Efra Valenzuela, GIOVANNY as Ambulatory Care Navigator    Assessment/Plan   Education and counseling provided:  Are appropriate based on today's review and evaluation  End-of-Life planning (with patient's consent)    Diagnoses and all orders for this visit:    1. Diabetes mellitus without complication ([facility]  -     REFERRAL TO PODIATRY    2. Need for hepatitis C screening test  -     REFERRAL TO PODIATRY    3. Full code but no chest compressions  -     REFERRAL TO PODIATRY    4. Screening for alcoholism  -     REFERRAL TO PODIATRY  -     Annual  Alcohol Screen 15 min ()    5. Screening for depression  -     REFERRAL TO PODIATRY  -     Depression Screen Annual    6. Localized edema  -     furosemide (LASIX) 40 mg tablet; Take 1 Tab by mouth daily. -     potassium chloride (K-DUR, KLOR-CON) 20 mEq tablet; Take 1 Tab by mouth daily. [address] education and counseling provided:  Age appropriate evidence-based preventive care recommendations based on today's review and evaluation; including relevant cancer screening guidelines, and vaccination recommendations. An After Visit Summary was printed and given to the patient which information about these guidelines, and a personalized schedule for health maintenance items. Whe appropriate and with patient agreement, orders noted below were placed to complete missing health maintenance items.         Health Maintenance Due   Topic Date Due    Hepatitis C Screening  1950    FOOT EXAM Q1  04/14/2018    MEDICARE YEARLY EXAM  06/23/2018    HEMOGLOBIN A1C Q6M  07/16/2018

## 2018-07-23 NOTE — ACP (ADVANCE CARE PLANNING)
Advance Care Planning  Other Legally Authorized Decision Maker would be wife as SDM     For My patients who has currently great Decision Making Capacity:     Patient is on no presence of family member stated that he wants to be not DNR at this time,  he likes to be a full code individual,  Pt was given the form, he will sign and bring us a copy on the later date.

## 2018-07-23 NOTE — PATIENT INSTRUCTIONS
Medicare Wellness Visit, Male    The best way to live healthy is to have a lifestyle where you eat a well-balanced diet, exercise regularly, limit alcohol use, and quit all forms of tobacco/nicotine, if applicable. Regular preventive services are another way to keep healthy. Preventive services (vaccines, screening tests, monitoring & exams) can help personalize your care plan, which helps you manage your own care. Screening tests can find health problems at the earliest stages, when they are easiest to treat. [address] follows the current, evidence-based guidelines published by the [facility] Andres Sara ([facility]) when recommending preventive services for our patients. Because we follow these guidelines, sometimes recommendations change over time as research supports it. (For example, a prostate screening blood test is no longer routinely recommended for men with no symptoms.)    Of course, you and your provider may decide to screen more often for some diseases, based on your risk and co-morbidities (chronic disease you are already diagnosed with). Preventive services for you include:    - Medicare offers their members a free annual wellness visit, which is time for you and your primary care provider to discuss and plan for your preventive service needs. Take advantage of this benefit every year!    -All people over age 72 should receive the recommended pneumonia vaccines. Current USPSTF guidelines recommend a series of two vaccines for the best pneumonia protection.     -All adults should have a yearly flu vaccine and a tetanus vaccine every 10 years.  All adults age 61 years should receive a shingles vaccine once in their lifetime.      -All adults age 38-68 years who are overweight should have a diabetes screening test once every three years.     -Other screening tests & preventive services for persons with diabetes include: an eye exam to screen for diabetic retinopathy, a kidney function test, a foot exam, and stricter control over your cholesterol.     -Cardiovascular screening for adults with routine risk involves an electrocardiogram (ECG) at intervals determined by the provider.     -Colorectal cancer screenings should be done for adults age 54-65 years with normal risk. There are a number of acceptable methods of screening for this type of cancer. Each test has its own benefits and drawbacks. Discuss with your provider what is most appropriate for you during your annual wellness visit. The different tests include: colonoscopy (considered the best screening method), a fecal occult blood test, a fecal DNA test, and sigmoidoscopy.    -All adults born between [facility] should be screened once for Hepatitis C.    -An Abdominal Aortic Aneurysm (AAA) Screening is recommended for men age 73-68 who has ever smoked in their lifetime.      Here is a list of your current Health Maintenance items (your personalized list of preventive services) with a due date:  Health Maintenance Due   Topic Date Due    Hepatitis C Test  1950    Diabetic Foot Care  04/14/2018    Annual Well Visit  06/23/2018    Hemoglobin A1C    07/16/2018

## 2018-07-24 PROBLEM — F32.1 MODERATE MAJOR DEPRESSION (HCC): Status: ACTIVE | Noted: 2018-07-24

## 2018-07-25 ENCOUNTER — HOSPITAL ENCOUNTER (OUTPATIENT)
Dept: ULTRASOUND IMAGING | Age: 68
Discharge: HOME | End: 2018-07-25
Attending: NURSE PRACTITIONER
Payer: MEDICARE

## 2018-07-25 ENCOUNTER — OFFICE VISIT (OUTPATIENT)
Dept: CARDIOLOGY CLINIC | Age: 68
End: 2018-07-25

## 2018-07-25 VITALS
RESPIRATION RATE: 18 BRPM | OXYGEN SATURATION: 97 % | WEIGHT: 221.4 LBS | HEIGHT: 70 IN | SYSTOLIC BLOOD PRESSURE: 130 MMHG | DIASTOLIC BLOOD PRESSURE: 86 MMHG | HEART RATE: 66 BPM | BODY MASS INDEX: 31.7 KG/M2

## 2018-07-25 DIAGNOSIS — E78.00 HIGH CHOLESTEROL: ICD-10-CM

## 2018-07-25 DIAGNOSIS — R60.0 LEG EDEMA, LEFT: ICD-10-CM

## 2018-07-25 DIAGNOSIS — I25.10 CORONARY ARTERY DISEASE INVOLVING NATIVE CORONARY ARTERY OF NATIVE HEART WITHOUT ANGINA PECTORIS: ICD-10-CM

## 2018-07-25 DIAGNOSIS — I10 ESSENTIAL HYPERTENSION: Primary | ICD-10-CM

## 2018-07-25 DIAGNOSIS — I10 ESSENTIAL HYPERTENSION: ICD-10-CM

## 2018-07-25 DIAGNOSIS — E11.21 TYPE 2 DIABETES MELLITUS WITH NEPHROPATHY (HCC): ICD-10-CM

## 2018-07-25 PROCEDURE — 93971 EXTREMITY STUDY: CPT

## 2018-07-25 NOTE — PROGRESS NOTES
1. Have you been to the ER, urgent care clinic since your last visit? Hospitalized since your last visit? No.    2. Have you seen or consulted any other health care providers outside of the [address] [place] [address] since your last visit? Include any pap smears or colon screening.    No.      Chief Complaint   Patient presents with    Other     6 month- some swelling in lf leg

## 2018-07-25 NOTE — PROGRESS NOTES
Alayna Gates DNP, ANP-BC  Subjective/HPI:     Candis Hadley is a 69 y.o. male is here for routine f/u. The patient denies chest pain/ shortness of breath, orthopnea, PND, LE edema, palpitations, syncope, presyncope or fatigue. Hurts in the last few weeks developing left lower extremity edema, was recently placed on additional diuretic therapy by primary care furosemide. He reports minimal improvement. Denies dyspnea on exertion, has some diffuse leg discomfort however states has been present since his CVA.       PCP Provider  Corinne Vicente MD  Past Medical History:   Diagnosis Date    Agoraphobia with panic disorder     Anxiety     Arthritis     spine    BPH (benign prostatic hypertrophy) with urinary obstruction     CAD (coronary artery disease)     no previous MI or stents    Chronic edema     Coronary artery disease involving native coronary artery without angina pectoris 3/28/2017    CVD (cardiovascular disease)     Depression     Diabetes ([address]     no longer medicated    Erectile dysfunction     Essential hypertension 3/28/2017    Falls     Fracture of multiple ribs of right side 7/26/2017    Full code but no chest compressions 7/23/2018    GERD (gastroesophageal reflux disease)     Hemiparesis ([address]     History of stroke 3/28/2017    Hypercholesterolemia     Hypercholesterolemia 3/28/2017    Hypertension     Joint pain     Localized edema 7/23/2018    Muscle pain     Muscle weakness     Nocturia     Panic disorder     Ringing in the ears     Seizures ([address]     at age 3, facial    Stroke [facility]) 1996    left sided weakness      Past Surgical History:   Procedure Laterality Date    ABDOMEN SURGERY PROC UNLISTED  2008    Lap band    COLONOSCOPY N/A 5/4/2018    COLON[facility] Phillip [facility]h [facility] ENDOSCOPY    HX COLONOSCOPY      HX LUMBAR DISKECTOMY  1970's    HX ORTHOPAEDIC      right finger repair     No Known Allergies   Family History   Problem Relation Age of Onset    Cancer Father      lung    Stroke Father     Hypertension Father     Cancer Mother      lung    Hypertension Sister     Heart Disease Brother       Current Outpatient Prescriptions   Medication Sig    furosemide (LASIX) 40 mg tablet Take 1 Tab by mouth daily.  potassium chloride (K-DUR, KLOR-CON) 20 mEq tablet Take 1 Tab by mouth daily.  tamsulosin (FLOMAX) 0.4 mg capsule TAKE 2 CAPSULES BY MOUTH EVERY DAY    buPROPion SR (WELLBUTRIN SR) 150 mg SR tablet TAKE 1 TAB BY MOUTH TWO (2) TIMES A DAY.  atorvastatin (LIPITOR) 40 mg tablet TAKE 1 TAB BY MOUTH DAILY.  finasteride (PROSCAR) 5 mg tablet TAKE 1 TABLET BY MOUTH  NIGHTLY    aspirin delayed-release 81 mg tablet Take  by mouth daily.  ezetimibe (ZETIA) 10 mg tablet Take 1 Tab by mouth daily.  carvedilol (COREG) 12.5 mg tablet Take 1 tablet by mouth two  times daily with meals    hydroCHLOROthiazide (HYDRODIURIL) 25 mg tablet Take 1 Tab by mouth daily.  amLODIPine-benazepril (LOTREL) 5-20 mg per capsule Take 1 Cap by Mouth Once a Day.  therapeutic multivitamin (THERA) tablet Take 1 Tab by mouth daily. No current facility-administered medications for this visit. Vitals:    07/25/18 1003   BP: 130/86   Pulse: 66   Resp: 18   SpO2: 97%   Weight: 221 lb 6.4 oz (100.4 kg)   Height: 5' 10\" (1.778 m)     Social History     Social History    Marital status:      Spouse name: N/A    Number of children: N/A    Years of education: N/A     Occupational History    Not on file.      Social History Main Topics    Smoking status: Never Smoker    Smokeless tobacco: Never Used    Alcohol use 3.0 oz/week     5 Cans of beer per week      Comment: a week    Drug use: No    Sexual activity: Yes     Partners: Female     Other Topics Concern    Not on file     Social History Narrative       I have reviewed the nurses notes, vitals, problem list, allergy list, medical history, family, social history and medications. Review of Symptoms:    General: Pt denies excessive weight gain or loss. Pt is able to conduct ADL's  HEENT: Denies blurred vision, headaches, epistaxis and difficulty swallowing. Respiratory: Denies shortness of breath, YAN, wheezing or stridor. Cardiovascular: Denies precordial pain, palpitations, + edema denies PND  Gastrointestinal: Denies poor appetite, indigestion, abdominal pain or blood in stool  Musculoskeletal: Denies pain or swelling from muscles or joints  Neurologic: Denies tremor, paresthesias, or sensory motor disturbance  Skin: Denies rash, itching or texture change. Physical Exam:      General: Well developed, in no acute distress, cooperative and alert  HEENT: No carotid bruits, no JVD, trach is midline. Neck Supple, PEERL, EOM intact. Heart:  Normal S1/S2 negative S3 or S4. Regular, no murmur, gallop or rub.   Respiratory: Clear bilaterally x 4, no wheezing or rales  Abdomen:   Soft, non-tender, no masses, bowel sounds are active.   Extremities: +2 dependent edema left lower extremity normal cap refill, no cyanosis, atraumatic. Neuro: A&Ox3, speech clear, gait stable stable with cane  Skin: Skin color is normal. No rashes or lesions.  Non diaphoretic  Vascular: 2+ pulses symmetric in all extremities    Cardiographics    ECG: Sinus rhythm  Results for orders placed or performed during the hospital encounter of 06/23/17   EKG, 12 LEAD, INITIAL   Result Value Ref Range    Ventricular Rate 68 BPM    Atrial Rate 68 BPM    P-R Interval 122 ms    QRS Duration 90 ms    Q-T Interval 420 ms    QTC Calculation (Bezet) 446 ms    Calculated P Axis 31 degrees    Calculated R Axis -12 degrees    Calculated T Axis -15 degrees    Diagnosis       Normal sinus rhythm  No previous ECGs available  Confirmed by Daina Call MD. ([id number]) on 6/24/2017 12:20:22 AM           Cardiology Labs:  Lab Results   Component Value Date/Time    Cholesterol, total 201 (H) 01/16/2018 08:53 AM    HDL Cholesterol 61 01/16/2018 08:53 AM    LDL, calculated 116 (H) 01/16/2018 08:53 AM    Triglyceride 119 01/16/2018 08:53 AM    CHOL/HDL Ratio 2.9 06/24/2017 05:59 AM       Lab Results   Component Value Date/Time    Sodium 143 01/16/2018 08:53 AM    Potassium 4.2 01/16/2018 08:53 AM    Chloride 102 01/16/2018 08:53 AM    CO2 27 01/16/2018 08:53 AM    Anion gap 7 06/25/2017 06:39 AM    Glucose 143 (H) 01/16/2018 08:53 AM    BUN 14 01/16/2018 08:53 AM    Creatinine 1.16 01/16/2018 08:53 AM    BUN/Creatinine ratio 12 01/16/2018 08:53 AM    GFR est AA 74 01/16/2018 08:53 AM    GFR est non-AA 64 01/16/2018 08:53 AM    Calcium 9.1 01/16/2018 08:53 AM    Bilirubin, total 0.5 01/16/2018 08:53 AM    AST (SGOT) 18 01/16/2018 08:53 AM    Alk. phosphatase 72 01/16/2018 08:53 AM    Protein, total 6.4 01/16/2018 08:53 AM    Albumin 4.0 01/16/2018 08:53 AM    Globulin 3.1 06/24/2017 05:59 AM    A-G Ratio 1.7 01/16/2018 08:53 AM    ALT (SGPT) 22 01/16/2018 08:53 AM           Assessment:     Assessment:     Diagnoses and all orders for this visit:    1. Essential hypertension  -     AMB POC EKG ROUTINE W/ 12 LEADS, INTER & REP  -     DUPLEX LOWER EXT VENOUS LEFT; Future  -     METABOLIC PANEL, COMPREHENSIVE  -     CK  -     LIPID PANEL    2. Leg edema, left  -     DUPLEX LOWER EXT VENOUS LEFT; Future  -     METABOLIC PANEL, COMPREHENSIVE  -     CK  -     LIPID PANEL    3. Type 2 diabetes mellitus with nephropathy (HCC)  -     DUPLEX LOWER EXT VENOUS LEFT; Future  -     METABOLIC PANEL, COMPREHENSIVE  -     CK  -     LIPID PANEL    4. Coronary artery disease involving native coronary artery of native heart without angina pectoris  -     DUPLEX LOWER EXT VENOUS LEFT; Future  -     METABOLIC PANEL, COMPREHENSIVE  -     CK  -     LIPID PANEL    5. High cholesterol  -     DUPLEX LOWER EXT VENOUS LEFT; Future  -     METABOLIC PANEL, COMPREHENSIVE  -     CK  -     LIPID PANEL        ICD-10-CM ICD-9-CM    1.  Essential hypertension I10 401.9 AMB POC EKG ROUTINE W/ 12 LEADS, INTER & REP      DUPLEX LOWER EXT VENOUS LEFT      METABOLIC PANEL, COMPREHENSIVE      CK      LIPID PANEL   2. Leg edema, left R60.0 782.3 DUPLEX LOWER EXT VENOUS LEFT      METABOLIC PANEL, COMPREHENSIVE      CK      LIPID PANEL   3. Type 2 diabetes mellitus with nephropathy (HCC) E11.21 250.40 DUPLEX LOWER EXT VENOUS LEFT     342.34 METABOLIC PANEL, COMPREHENSIVE      CK      LIPID PANEL   4. Coronary artery disease involving native coronary artery of native heart without angina pectoris I25.10 414.01 DUPLEX LOWER EXT VENOUS LEFT      METABOLIC PANEL, COMPREHENSIVE      CK      LIPID PANEL   5. High cholesterol E78.00 272.0 DUPLEX LOWER EXT VENOUS LEFT      METABOLIC PANEL, COMPREHENSIVE      CK      LIPID PANEL     Orders Placed This Encounter    DUPLEX LOWER EXT VENOUS LEFT     Standing Status:   Future     Standing Expiration Date:   5/75/3996    METABOLIC PANEL, COMPREHENSIVE    CK    LIPID PANEL    AMB POC EKG ROUTINE W/ 12 LEADS, INTER & REP     Order Specific Question:   Reason for Exam:     Answer:   routine        Plan:     1. Hypertension: Controlled 130/86 continue current medications  2. Hyperlipidemia: Due for repeat LDL since starting Zetia, lab slip provided  3. Lower extremity edema: We will send for duplex today to rule out DVT. 4.  History of CAD: Clinically stable asymptomatic  Follow-up with cardiology in 6 months. Eugene Muñoz MD    This note was created using voice recognition software. Despite editing, there may be syntax errors.

## 2018-07-25 NOTE — MR AVS SNAPSHOT
[address] [facility] 
[phone] Patient: Phoebe Zaman MRN: [id number] Visit Information Date & Time Provider Department Dept. Phone Encounter #  
 7/25/2018 10:15 AM Ryan Brewer, [address] [facility] [phone] Your Appointments 8/1/2018  3:30 PM  
PROCEDURE with ECHO, [facility] Appt Note: $0CP 7/25/18ksr /per Dr Avani Rivers EXT VENOUS LEFT [61253 CPT(R)]  
 [address] [facility] [place]  
[phone] [address] [facility] [place] Upcoming Health Maintenance Date Due Hepatitis C Screening 1950 FOOT EXAM Q1 4/14/2018 HEMOGLOBIN A1C Q6M 7/16/2018 Influenza Age 5 to Adult 8/1/2018 EYE EXAM RETINAL OR DILATED Q1 9/7/2018 LIPID PANEL Q1 1/16/2019 FOBT Q 1 YEAR AGE 50-75 1/23/2019 MICROALBUMIN Q1 4/18/2019 MEDICARE YEARLY EXAM 7/24/2019 GLAUCOMA SCREENING Q2Y 9/7/2019 DTaP/Tdap/Td series (2 - Td) 8/9/2022 Allergies as of 7/25/2018  Review Complete On: 7/25/2018 By: Juan Rowley NP No Known Allergies Current Immunizations  Reviewed on 6/22/2017 Name Date Influenza High Dose Vaccine PF 11/7/2017 Influenza Vaccine 11/18/2015 12:00 AM  
 Pneumococcal Conjugate (PCV-13) 3/17/2015 Pneumococcal Polysaccharide (PPSV-23) 2/21/2014 12:00 AM  
 Tdap 8/9/2012 Zoster Vaccine, Live 10/28/2016 Not reviewed this visit You Were Diagnosed With   
  
 Codes Comments Essential hypertension    -  Primary ICD-10-CM: I10 
ICD-9-CM: 401.9 Leg edema, left     ICD-10-CM: R60.0 ICD-9-CM: 256. 3 Type 2 diabetes mellitus with nephropathy (HCC)     ICD-10-CM: E11.21 
ICD-9-CM: 250.40, 583.81 Coronary artery disease involving native coronary artery of native heart without angina pectoris     ICD-10-CM: I25.10 ICD-9-CM: 414.01 High cholesterol     ICD-10-CM: E78.00 ICD-9-CM: 272.0 Vitals BP Pulse Resp Height(growth percentile) Weight(growth percentile) SpO2  
 130/86 (BP 1 Location: Left arm, BP Patient Position: Sitting) 66 18 5' 10\" (1.778 m) 221 lb 6.4 oz (100.4 kg) 97% BMI Smoking Status 31.77 kg/m2 Never Smoker Vitals History BMI and BSA Data Body Mass Index Body Surface Area 31.77 kg/m 2 2.23 m 2 Preferred Pharmacy Pharmacy Name Phone [id number]- [place], [place] - [address] [phone] Your Updated Medication List  
  
   
This list is accurate as of 7/25/18 11:24 AM.  Always use your most recent med list. amLODIPine-benazepril 5-20 mg per capsule Commonly known as:  Volney Rang Take 1 Cap by Mouth Once a Day. aspirin delayed-release 81 mg tablet Take  by mouth daily. atorvastatin 40 mg tablet Commonly known as:  LIPITOR  
TAKE 1 TAB BY MOUTH DAILY. buPROPion  mg SR tablet Commonly known as:  WELLBUTRIN SR  
TAKE 1 TAB BY MOUTH TWO (2) TIMES A DAY. carvedilol 12.5 mg tablet Commonly known as:  Jinx Re Take 1 tablet by mouth two  times daily with meals  
  
 ezetimibe 10 mg tablet Commonly known as:  [place] Take 1 Tab by mouth daily. finasteride 5 mg tablet Commonly known as:  PROSCAR  
TAKE 1 TABLET BY MOUTH  NIGHTLY  
  
 furosemide 40 mg tablet Commonly known as:  LASIX Take 1 Tab by mouth daily. hydroCHLOROthiazide 25 mg tablet Commonly known as:  HYDRODIURIL Take 1 Tab by mouth daily. potassium chloride 20 mEq tablet Commonly known as:  K-DUR, KLOR-CON Take 1 Tab by mouth daily. tamsulosin 0.4 mg capsule Commonly known as:  FLOMAX TAKE 2 CAPSULES BY MOUTH EVERY DAY  
  
 therapeutic multivitamin tablet Commonly known as:  THERA Take 1 Tab by mouth daily. We Performed the Following AMB POC EKG ROUTINE W/ 12 LEADS, INTER & REP [77961 CPT(R)] CK [id number] CPT(R)] LIPID PANEL [48040 CPT(R)] METABOLIC PANEL, COMPREHENSIVE [65599 CPT(R)] To-Do List   
 07/25/2018 Imaging:  DUPLEX LOWER EXT VENOUS LEFT Introducing [facility] SERVICES! Dear Ace Flower: Thank you for requesting a [employer] account. Our records indicate that you already have an active [employer] account. You can access your account anytime at [email or web address] Did you know that you can access your hospital and ER discharge instructions at any time in [employer]? You can also review all of your test results from your hospital stay or ER visit. Additional Information If you have questions, please visit the Frequently Asked Questions section of the [employer] website at [email or web address] Remember, [employer] is NOT to be used for urgent needs. For medical emergencies, dial 911. Now available from your iPhone and Android! Please provide this summary of care documentation to your next provider. Your primary care clinician is listed as Roselyn Jeffrey. If you have any questions after today's visit, please call [phone].

## 2018-07-27 LAB
ALBUMIN SERPL-MCNC: 4.1 G/DL (ref 3.6–4.8)
ALBUMIN/GLOB SERPL: 1.5 {RATIO} (ref 1.2–2.2)
ALP SERPL-CCNC: 78 IU/L (ref 39–117)
ALT SERPL-CCNC: 24 IU/L (ref 0–44)
AST SERPL-CCNC: 18 IU/L (ref 0–40)
BILIRUB SERPL-MCNC: 0.8 MG/DL (ref 0–1.2)
BUN SERPL-MCNC: 21 MG/DL (ref 8–27)
BUN/CREAT SERPL: 16 (ref 10–24)
CALCIUM SERPL-MCNC: 9.4 MG/DL (ref 8.6–10.2)
CHLORIDE SERPL-SCNC: 98 MMOL/L (ref 96–106)
CHOLEST SERPL-MCNC: 164 MG/DL (ref 100–199)
CK SERPL-CCNC: 135 U/L (ref 24–204)
CO2 SERPL-SCNC: 27 MMOL/L (ref 20–29)
CREAT SERPL-MCNC: 1.34 MG/DL (ref 0.76–1.27)
GLOBULIN SER CALC-MCNC: 2.8 G/DL (ref 1.5–4.5)
GLUCOSE SERPL-MCNC: 146 MG/DL (ref 65–99)
HDLC SERPL-MCNC: 57 MG/DL
INTERPRETATION, 910389: NORMAL
INTERPRETATION: NORMAL
LDLC SERPL CALC-MCNC: 78 MG/DL (ref 0–99)
Lab: NORMAL
PDF IMAGE, 910387: NORMAL
POTASSIUM SERPL-SCNC: 3.6 MMOL/L (ref 3.5–5.2)
PROT SERPL-MCNC: 6.9 G/DL (ref 6–8.5)
SODIUM SERPL-SCNC: 143 MMOL/L (ref 134–144)
TRIGL SERPL-MCNC: 143 MG/DL (ref 0–149)
VLDLC SERPL CALC-MCNC: 29 MG/DL (ref 5–40)

## 2018-07-27 NOTE — PROGRESS NOTES
EM: Please call patient cholesterol looks great. Creatinine mildly elevated he recently had his diuretic increased for leg edema by primary care. His DVT scan was negative. I recommend once his swelling is resolved, he discuss with primary care if they wish to keep him on the higher dose of diuretics or not.

## 2018-07-27 NOTE — PROGRESS NOTES
Spoke to patient using 2 identifiers. Per Miles Her NP, pt was made aware that cholesterol looks great. Creatinine mildly elevated - recently had his diuretic increased for leg edema by primary care. DVT scan was negative. Recommends that once swelling is resolved, he discuss with primary care if they wish to keep him on the higher dose of diuretics or not. Patient verbalized understanding.

## 2018-08-07 ENCOUNTER — TELEPHONE (OUTPATIENT)
Dept: FAMILY MEDICINE CLINIC | Age: 68
End: 2018-08-07

## 2018-08-07 DIAGNOSIS — R79.89 ABNORMAL BUN-TO-CREATININE RATIO: Primary | ICD-10-CM

## 2018-08-07 NOTE — TELEPHONE ENCOUNTER
Received call from pt , requesting nephrology referral.  Stated lab results abnormal ( BUN/creatinine)  Order placed for  Nephrology referral , per Verbal Order from Dr. Sandra Dorsey on 8/7/2018 due to pt request

## 2018-08-15 RX ORDER — TAMSULOSIN HYDROCHLORIDE 0.4 MG/1
CAPSULE ORAL
Qty: 30 CAP | Refills: 1 | Status: SHIPPED | OUTPATIENT
Start: 2018-08-15 | End: 2018-09-16 | Stop reason: SDUPTHER

## 2018-09-17 ENCOUNTER — OFFICE VISIT (OUTPATIENT)
Dept: FAMILY MEDICINE CLINIC | Age: 68
End: 2018-09-17

## 2018-09-17 VITALS
DIASTOLIC BLOOD PRESSURE: 64 MMHG | RESPIRATION RATE: 16 BRPM | HEIGHT: 70 IN | HEART RATE: 65 BPM | TEMPERATURE: 96.8 F | BODY MASS INDEX: 32.27 KG/M2 | WEIGHT: 225.4 LBS | SYSTOLIC BLOOD PRESSURE: 111 MMHG | OXYGEN SATURATION: 97 %

## 2018-09-17 DIAGNOSIS — R60.0 LOCALIZED EDEMA: Primary | ICD-10-CM

## 2018-09-17 DIAGNOSIS — I10 ESSENTIAL HYPERTENSION: ICD-10-CM

## 2018-09-17 DIAGNOSIS — R35.0 URINARY FREQUENCY: ICD-10-CM

## 2018-09-17 DIAGNOSIS — E11.21 TYPE 2 DIABETES MELLITUS WITH NEPHROPATHY (HCC): ICD-10-CM

## 2018-09-17 DIAGNOSIS — R35.1 NOCTURIA: ICD-10-CM

## 2018-09-17 DIAGNOSIS — E78.00 HYPERCHOLESTEROLEMIA: ICD-10-CM

## 2018-09-17 DIAGNOSIS — S22.41XS CLOSED FRACTURE OF MULTIPLE RIBS OF RIGHT SIDE, SEQUELA: ICD-10-CM

## 2018-09-17 RX ORDER — MULTIVITAMIN
1 TABLET ORAL 2 TIMES DAILY
Qty: 60 TAB | Refills: 11 | Status: SHIPPED | OUTPATIENT
Start: 2018-09-17 | End: 2018-10-12 | Stop reason: SDUPTHER

## 2018-09-17 RX ORDER — TAMSULOSIN HYDROCHLORIDE 0.4 MG/1
CAPSULE ORAL
Qty: 30 CAP | Refills: 1 | Status: SHIPPED | OUTPATIENT
Start: 2018-09-17 | End: 2018-10-04 | Stop reason: SDUPTHER

## 2018-09-17 NOTE — MR AVS SNAPSHOT
[address] [facility] [id number] 
[phone] Patient: Adams Clemente MRN: [id number] BEB:[birth year]12 Visit Information Date & Time Provider Department Dept. Phone Encounter #  
 9/17/2018  4:30 PM Ancelmo Meza MD [address] [place] [address] OFFICE-ANNEX [phone] [id number] Follow-up Instructions Return in about 3 months (around 12/17/2018), or if symptoms worsen or fail to improve. Upcoming Health Maintenance Date Due Hepatitis C Screening 1950 FOOT EXAM Q1 4/14/2018 HEMOGLOBIN A1C Q6M 7/16/2018 Influenza Age 5 to Adult 8/1/2018 EYE EXAM RETINAL OR DILATED Q1 9/7/2018 FOBT Q 1 YEAR AGE 50-75 1/23/2019 MICROALBUMIN Q1 4/18/2019 MEDICARE YEARLY EXAM 7/24/2019 LIPID PANEL Q1 7/26/2019 GLAUCOMA SCREENING Q2Y 9/7/2019 DTaP/Tdap/Td series (2 - Td) 8/9/2022 Allergies as of 9/17/2018  Review Complete On: 9/17/2018 By: Dino Fuentes LPN No Known Allergies Current Immunizations  Reviewed on 6/22/2017 Name Date Influenza High Dose Vaccine PF 11/7/2017 Influenza Vaccine 11/18/2015 12:00 AM  
 Pneumococcal Conjugate (PCV-13) 3/17/2015 Pneumococcal Polysaccharide (PPSV-23) 2/21/2014 12:00 AM  
 Tdap 8/9/2012 Zoster Vaccine, Live 10/28/2016 Not reviewed this visit You Were Diagnosed With   
  
 Codes Comments Localized edema    -  Primary ICD-10-CM: R60.0 ICD-9-CM: 782.3 Essential hypertension     ICD-10-CM: I10 
ICD-9-CM: 401.9 Hypercholesterolemia     ICD-10-CM: E78.00 ICD-9-CM: 272.0 Type 2 diabetes mellitus with nephropathy (HCC)     ICD-10-CM: E11.21 
ICD-9-CM: 250.40, 583.81 Urinary frequency     ICD-10-CM: R35.0 ICD-9-CM: 788.41 Nocturia     ICD-10-CM: R35.1 ICD-9-CM: 788.43 Closed fracture of multiple ribs of right side, sequela     ICD-10-CM: S22.41XS ICD-9-CM: 905.1 Vitals BP Pulse Temp Resp Height(growth percentile) Weight(growth percentile) 111/64 (BP 1 Location: Left arm, BP Patient Position: At rest) 65 96.8 °F (36 °C) (Oral) 16 5' 10\" (1.778 m) 225 lb 6.4 oz (102.2 kg) SpO2 BMI Smoking Status 97% 32.34 kg/m2 Never Smoker Vitals History BMI and BSA Data Body Mass Index Body Surface Area  
 32.34 kg/m 2 2.25 m 2 Preferred Pharmacy Pharmacy Name Phone [facility]AC[id number]- [place], [place] - [address] [phone] Your Updated Medication List  
  
   
This list is accurate as of 9/17/18  5:13 PM.  Always use your most recent med list. amLODIPine-benazepril 5-20 mg per capsule Commonly known as:  Basil Cotton Take 1 Cap by Mouth Once a Day. aspirin delayed-release 81 mg tablet Take  by mouth daily. atorvastatin 40 mg tablet Commonly known as:  LIPITOR  
TAKE 1 TAB BY MOUTH DAILY. buPROPion  mg SR tablet Commonly known as:  WELLBUTRIN SR  
TAKE 1 TAB BY MOUTH TWO (2) TIMES A DAY. calcium-cholecalciferol (D3) tablet Commonly known as:  CALTRATE 600+D Take 1 Tab by mouth two (2) times a day. carvedilol 12.5 mg tablet Commonly known as:  Kathlean Due Take 1 tablet by mouth two  times daily with meals  
  
 ezetimibe 10 mg tablet Commonly known as:  Michalene Gills Take 1 Tab by mouth daily. finasteride 5 mg tablet Commonly known as:  PROSCAR  
TAKE 1 TABLET BY MOUTH  NIGHTLY  
  
 furosemide 40 mg tablet Commonly known as:  LASIX Take 1 Tab by mouth daily. hydroCHLOROthiazide 25 mg tablet Commonly known as:  HYDRODIURIL Take 1 Tab by mouth daily. potassium chloride 20 mEq tablet Commonly known as:  K-DUR, KLOR-CON Take 1 Tab by mouth daily. tamsulosin 0.4 mg capsule Commonly known as:  FLOMAX TAKE 2 CAPSULES BY MOUTH EVERY DAY  
  
 therapeutic multivitamin tablet Commonly known as:  THERA  
 Take 1 Tab by mouth daily. Prescriptions Sent to Pharmacy Refills  
 calcium-cholecalciferol, D3, (CALTRATE 600+D) tablet 11 Sig: Take 1 Tab by mouth two (2) times a day. Class: Normal  
 Pharmacy: [facility]/pharmacy [address]  #: [phone] Route: Oral  
  
We Performed the Following AMB SUPPLY ORDER [[id number] Custom] Comments:  
 Below the knees Bilaterally 20-30 mmhg to be worn daily and off nightly CBC W/O DIFF [19097 CPT(R)] HEMOGLOBIN A1C WITH EAG [10616 CPT(R)] LIPID PANEL [26054 CPT(R)] METABOLIC PANEL, COMPREHENSIVE [31545 CPT(R)] REFERRAL TO UROLOGY [PSC176 Custom] Follow-up Instructions Return in about 3 months (around 12/17/2018), or if symptoms worsen or fail to improve. To-Do List   
 09/17/2018 Imaging:  DEXA BONE DENSITY STUDY AXIAL Referral Information Referral ID Referred By Referred To  
  
 [id number] Randi Gutierrez Not Available Visits Status Start Date End Date 1 New Request 9/17/18 9/17/19 If your referral has a status of pending review or denied, additional information will be sent to support the outcome of this decision. Patient Instructions Learning About Diabetes Food Guidelines Your Care Instructions Meal planning is important to manage diabetes. It helps keep your blood sugar at a target level (which you set with your doctor). You don't have to eat special foods. You can eat what your family eats, including sweets once in a while. But you do have to pay attention to how often you eat and how much you eat of certain foods. You may want to work with a dietitian or a certified diabetes educator (CDE) to help you plan meals and snacks. A dietitian or CDE can also help you lose weight if that is one of your goals. What should you know about eating carbs? Managing the amount of carbohydrate (carbs) you eat is an important part of healthy meals when you have diabetes. Carbohydrate is found in many foods. · Learn which foods have carbs. And learn the amounts of carbs in different foods. ¨ Bread, cereal, pasta, and rice have about 15 grams of carbs in a serving. A serving is 1 slice of bread (1 ounce), ½ cup of cooked cereal, or 1/3 cup of cooked pasta or rice. ¨ Fruits have 15 grams of carbs in a serving. A serving is 1 small fresh fruit, such as an apple or orange; ½ of a banana; ½ cup of cooked or canned fruit; ½ cup of fruit juice; 1 cup of melon or raspberries; or 2 tablespoons of dried fruit. ¨ Milk and no-sugar-added yogurt have 15 grams of carbs in a serving. A serving is 1 cup of milk or 2/3 cup of no-sugar-added yogurt. ¨ Starchy vegetables have 15 grams of carbs in a serving. A serving is ½ cup of mashed potatoes or sweet potato; 1 cup winter squash; ½ of a small baked potato; ½ cup of cooked beans; or ½ cup cooked corn or green peas. · Learn how much carbs to eat each day and at each meal. A dietitian or CDE can teach you how to keep track of the amount of carbs you eat. This is called carbohydrate counting. · If you are not sure how to count carbohydrate grams, use the Plate Method to plan meals. It is a good, quick way to make sure that you have a balanced meal. It also helps you spread carbs throughout the day. ¨ Divide your plate by types of foods. Put non-starchy vegetables on half the plate, meat or other protein food on one-quarter of the plate, and a grain or starchy vegetable in the final quarter of the plate. To this you can add a small piece of fruit and 1 cup of milk or yogurt, depending on how many carbs you are supposed to eat at a meal. 
· Try to eat about the same amount of carbs at each meal. Do not \"save up\" your daily allowance of carbs to eat at one meal. 
· Proteins have very little or no carbs per serving.  Examples of proteins are beef, chicken, turkey, fish, eggs, tofu, cheese, cottage cheese, and peanut butter. A serving size of meat is 3 ounces, which is about the size of a deck of cards. Examples of meat substitute serving sizes (equal to 1 ounce of meat) are 1/4 cup of cottage cheese, 1 egg, 1 tablespoon of peanut butter, and ½ cup of tofu. How can you eat out and still eat healthy? · Learn to estimate the serving sizes of foods that have carbohydrate. If you measure food at home, it will be easier to estimate the amount in a serving of restaurant food. · If the meal you order has too much carbohydrate (such as potatoes, corn, or baked beans), ask to have a low-carbohydrate food instead. Ask for a salad or green vegetables. · If you use insulin, check your blood sugar before and after eating out to help you plan how much to eat in the future. · If you eat more carbohydrate at a meal than you had planned, take a walk or do other exercise. This will help lower your blood sugar. What else should you know? · Limit saturated fat, such as the fat from meat and dairy products. This is a healthy choice because people who have diabetes are at higher risk of heart disease. So choose lean cuts of meat and nonfat or low-fat dairy products. Use olive or canola oil instead of butter or shortening when cooking. · Don't skip meals. Your blood sugar may drop too low if you skip meals and take insulin or certain medicines for diabetes. · Check with your doctor before you drink alcohol. Alcohol can cause your blood sugar to drop too low. Alcohol can also cause a bad reaction if you take certain diabetes medicines. Follow-up care is a key part of your treatment and safety. Be sure to make and go to all appointments, and call your doctor if you are having problems. It's also a good idea to know your test results and keep a list of the medicines you take. Where can you learn more? Go to http://jean-pierre-miller.info/. Enter Z927 in the search box to learn more about \"Learning About Diabetes Food Guidelines. \" Current as of: December 7, 2017 Content Version: 11.7 © 5557-8134 [employer]. Care instructions adapted under license by H?[employer] (which disclaims liability or warranty for this information). If you have questions about a medical condition or this instruction, always ask your healthcare professional. [place] [address] any warranty or liability for your use of this information. Leg and Ankle Edema: Care Instructions Your Care Instructions Swelling in the legs, ankles, and feet is called edema. It is common after you sit or stand for a while. Long plane flights or car rides often cause swelling in the legs and feet. You may also have swelling if you have to stand for long periods of time at your job. Problems with the veins in the legs (varicose veins) and changes in hormones can also cause swelling. Sometimes the swelling in the ankles and feet is caused by a more serious problem, such as heart failure, infection, blood clots, or liver or kidney disease. Follow-up care is a key part of your treatment and safety. Be sure to make and go to all appointments, and call your doctor if you are having problems. It's also a good idea to know your test results and keep a list of the medicines you take. How can you care for yourself at home? · If your doctor gave you medicine, take it as prescribed. Call your doctor if you think you are having a problem with your medicine. · Whenever you are resting, raise your legs up. Try to keep the swollen area higher than the level of your heart. · Take breaks from standing or sitting in one position. ¨ Walk around to increase the blood flow in your lower legs. ¨ Move your feet and ankles often while you stand, or tighten and relax your leg muscles. · Wear support stockings. Put them on in the morning, before swelling gets worse. · Eat a balanced diet. Lose weight if you need to. · Limit the amount of salt (sodium) in your diet. Salt holds fluid in the body and may increase swelling. When should you call for help? Call 911 anytime you think you may need emergency care. For example, call if: 
  · You have symptoms of a blood clot in your lung (called a pulmonary embolism). These may include: 
¨ Sudden chest pain. ¨ Trouble breathing. ¨ Coughing up blood.  
 Call your doctor now or seek immediate medical care if: 
  · You have signs of a blood clot, such as: 
¨ Pain in your calf, back of the knee, thigh, or groin. ¨ Redness and swelling in your leg or groin.  
  · You have symptoms of infection, such as: 
¨ Increased pain, swelling, warmth, or redness. ¨ Red streaks or pus. ¨ A fever.  
 Watch closely for changes in your health, and be sure to contact your doctor if: 
  · Your swelling is getting worse.  
  · You have new or worsening pain in your legs.  
  · You do not get better as expected. Where can you learn more? Go to http://jean-pierre-miller.info/. Enter T390 in the search box to learn more about \"Leg and Ankle Edema: Care Instructions. \" Current as of: November 20, 2017 Content Version: 11.7 © 4609-7694 Vector[employer]. Care instructions adapted under license by [employer] (which disclaims liability or warranty for this information). If you have questions about a medical condition or this instruction, always ask your healthcare professional. [place] [address] any warranty or liability for your use of this information. Introducing \[facility] [place] [facility]\"" & HEALTH SERVICES! Dear Arun Gomez: Thank you for requesting a [employer] account. Our records indicate that you already have an active [employer] account. You can access your account anytime at [email or web address] Did you know that you can access your hospital and ER discharge instructions at any time in [employer]? You can also review all of your test results from your hospital stay or ER visit. Additional Information If you have questions, please visit the Frequently Asked Questions section of the [employer] website at [email or web address] Remember, [employer] is NOT to be used for urgent needs. For medical emergencies, dial 911. Now available from your iPhone and Android! Please provide this summary of care documentation to your next provider. Your primary care clinician is listed as Remi Snyder. If you have any questions after today's visit, please call [phone].

## 2018-09-17 NOTE — PROGRESS NOTES
HISTORY OF PRESENT ILLNESS Adams Clemente is a 69 y.o. male. HPI  
DMtype II into a diabetic level, Currently on no diabetic meds, having no diabetic diet, and not doing much of daily exercise, no home glucose monitoring no Rf needed for today. Denies any tingling sensation, polyurea and polydipsia, last a1c was at target 6.1%% Patient present with b/lSwelling of the lower ext, Patient complains of edema. The location of the edema is lower leg(s) bilateral, ankle(s) bilateral, feet bilateral.  The edema has been moderate. Onset of symptoms was a few months ago, not worsening since that time since last visit 3+ and now is 2+. The edema is present all day. The patient states the problem is long-standing. The swelling has been aggravated by dependency of involved area on the pt last visits, the patient did have much of the legs swelling, pt with swelling but states that he is not concerned about it, Vitals 9/17/2018 7/25/2018 7/23/2018 Weight 225 lb 6.4 oz 221 lb 6.4 oz 229 lb 12.8 oz  
 
 the weight was also better, Today the patient denies leg pain, denies rash, and legs lesion,and the denial of dizziness,   the wt went up by close to 10 pounds, Last urine microalbumin 2017 and was abnormal  . Feeling better since the last visit. Current Outpatient Prescriptions Medication Sig Dispense Refill  tamsulosin (FLOMAX) 0.4 mg capsule TAKE 2 CAPSULES BY MOUTH EVERY DAY 30 Cap 1  
 furosemide (LASIX) 40 mg tablet Take 1 Tab by mouth daily. 30 Tab 1  potassium chloride (K-DUR, KLOR-CON) 20 mEq tablet Take 1 Tab by mouth daily. 30 Tab 1  
 buPROPion SR (WELLBUTRIN SR) 150 mg SR tablet TAKE 1 TAB BY MOUTH TWO (2) TIMES A DAY. 180 Tab 1  
 atorvastatin (LIPITOR) 40 mg tablet TAKE 1 TAB BY MOUTH DAILY. 90 Tab 1  
 finasteride (PROSCAR) 5 mg tablet TAKE 1 TABLET BY MOUTH  NIGHTLY 90 Tab 0  
 aspirin delayed-release 81 mg tablet Take  by mouth daily.  ezetimibe (ZETIA) 10 mg tablet Take 1 Tab by mouth daily. 90 Tab 3  carvedilol (COREG) 12.5 mg tablet Take 1 tablet by mouth two  times daily with meals 180 Tab 3  
 hydroCHLOROthiazide (HYDRODIURIL) 25 mg tablet Take 1 Tab by mouth daily. 90 Tab 3  
 amLODIPine-benazepril (LOTREL) 5-20 mg per capsule Take 1 Cap by Mouth Once a Day. 90 Cap 2  therapeutic multivitamin (THERA) tablet Take 1 Tab by mouth daily. 90 Tab 1 No Known Allergies Past Medical History:  
Diagnosis Date  Agoraphobia with panic disorder  Anxiety  Arthritis   
 spine  BPH (benign prostatic hypertrophy) with urinary obstruction  CAD (coronary artery disease)   
 no previous MI or stents  Chronic edema  Coronary artery disease involving native coronary artery without angina pectoris 3/28/2017  CVD (cardiovascular disease)  Depression  Diabetes ([address]   
 no longer medicated  Erectile dysfunction  Essential hypertension 3/28/2017 [address]  Fracture of multiple ribs of right side 7/26/2017  Full code but no chest compressions 7/23/2018  GERD (gastroesophageal reflux disease)  Hemiparesis ([address]  History of stroke 3/28/2017  Hypercholesterolemia  Hypercholesterolemia 3/28/2017  Hypertension  Joint pain  Localized edema 7/23/2018  Muscle pain  Muscle weakness  Nocturia  Panic disorder  Ringing in the ears  Seizures ([address]   
 at age 3, facial  
 Stroke [facility]) 1996  
 left sided weakness Past Surgical History:  
Procedure Laterality Date [address] [place] UNLISTED  2008 Lap band  COLONOSCOPY N/A 5/4/2018 COLONOSCOPY performed by Prosper Pollack MD at [facility] ENDOSCOPY  
 HX COLONOSCOPY    
 HX LUMBAR DISKECTOMY  1970's  HX ORTHOPAEDIC    
 right finger repair Family History Problem Relation Age of Onset  Cancer Father   
  lung  Stroke Father  Hypertension Father  Cancer Mother   
  lung  Hypertension Sister  Heart Disease Brother Social History Substance Use Topics  Smoking status: Never Smoker  Smokeless tobacco: Never Used  Alcohol use 3.0 oz/week 5 Cans of beer per week Comment: a week Lab Results Component Value Date/Time WBC 7.4 01/16/2018 08:53 AM  
HGB 13.4 01/16/2018 08:53 AM  
HCT 40.3 01/16/2018 08:53 AM  
PLATELET 037 26/05/6375 08:53 AM  
MCV 95 01/16/2018 08:53 AM  
 
Lab Results Component Value Date/Time GFR est non-AA 54 (L) 07/26/2018 08:58 AM  
GFR est AA 62 07/26/2018 08:58 AM  
Creatinine 1.34 (H) 07/26/2018 08:58 AM  
BUN 21 07/26/2018 08:58 AM  
Sodium 143 07/26/2018 08:58 AM  
Potassium 3.6 07/26/2018 08:58 AM  
Chloride 98 07/26/2018 08:58 AM  
CO2 27 07/26/2018 08:58 AM  
  
 
Review of Systems Constitutional: Negative for chills, fever and malaise/fatigue. HENT: Negative for congestion and nosebleeds. Eyes: Negative for blurred vision and pain. Respiratory: Negative for shortness of breath and wheezing. Cardiovascular: Negative for chest pain, palpitations and leg swelling. Gastrointestinal: Negative for constipation, diarrhea, nausea and vomiting. Genitourinary: Negative for dysuria and frequency. Musculoskeletal: Negative for joint pain and myalgias. Skin: Negative for itching and rash. Neurological: Negative for dizziness, loss of consciousness and headaches. Endo/Heme/Allergies: Does not bruise/bleed easily. Psychiatric/Behavioral: Negative for depression. The patient is not nervous/anxious and does not have insomnia. All other systems reviewed and are negative. Physical Exam  
Constitutional: He is oriented to person, place, and time. He appears well-developed and well-nourished. HENT:  
Head: Normocephalic and atraumatic. Mouth/Throat: No oropharyngeal exudate. Eyes: Conjunctivae and EOM are normal. Pupils are equal, round, and reactive to light. Neck: Normal range of motion. Neck supple. No JVD present. No thyromegaly present. Cardiovascular: Normal rate, regular rhythm, normal heart sounds and intact distal pulses. Exam reveals no friction rub. No murmur heard. Pulmonary/Chest: Effort normal and breath sounds normal. No respiratory distress. He has no wheezes. He has no rales. Abdominal: Soft. Bowel sounds are normal. He exhibits no distension. There is no tenderness. Musculoskeletal: He exhibits no edema or tenderness. Lymphadenopathy:  
  He has no cervical adenopathy. Neurological: He is alert and oriented to person, place, and time. He has normal reflexes. Skin: Skin is warm. No rash noted. No erythema. Psychiatric: He has a normal mood and affect. His behavior is normal.  
Nursing note and vitals reviewed. ASSESSMENT and PLAN Diagnoses and all orders for this visit: 1. Localized edema -     AMB SUPPLY ORDER 
-     CBC WITH AUTOMATED DIFF; Future -     METABOLIC PANEL, COMPREHENSIVE; Future -     AMB POC HEMOGLOBIN A1C; Future -     LIPID PANEL; Future 2. Essential hypertension -     AMB SUPPLY ORDER 
-     CBC WITH AUTOMATED DIFF; Future -     METABOLIC PANEL, COMPREHENSIVE; Future -     AMB POC HEMOGLOBIN A1C; Future -     LIPID PANEL; Future 3. Hypercholesterolemia -     AMB SUPPLY ORDER 
-     CBC WITH AUTOMATED DIFF; Future -     METABOLIC PANEL, COMPREHENSIVE; Future -     AMB POC HEMOGLOBIN A1C; Future -     LIPID PANEL; Future 4. Type 2 diabetes mellitus with nephropathy ([facility] -     AMB SUPPLY ORDER 
-     CBC WITH AUTOMATED DIFF; Future -     METABOLIC PANEL, COMPREHENSIVE; Future -     AMB POC HEMOGLOBIN A1C; Future -     LIPID PANEL; Future 5. Urinary frequency 
-     REFERRAL TO UROLOGY 6. Nocturia 
-     REFERRAL TO UROLOGY 7. Closed fracture of multiple ribs of right side, sequela -     DEXA BONE DENSITY STUDY AXIAL; Future Other orders -     calcium-cholecalciferol, D3, (CALTRATE 600+D) tablet; Take 1 Tab by mouth two (2) times a day. At this time patient was told to lose weight, so that the current body mass index would get into a close to 25 or between 20-25, patient was told that the patient can achieve this by starting an active life style modifications, working on the diet, increase physical activity, limit alcohol consumption, stop secondhand tobacco exposure,    for which includes creating a an interesting delightful to do list,  such as start of a light physical activity with a brisk daily walking 30 minutes most days of the week, most likely to total of 150 minutes per week, then the patient was told to try to avoid fatty fast foods, have a low-fat low-cholesterol diet, include seafood such as adding fatty fish such as Alyson Horns, Mackerel, [place] to the diet, increase vegetables and fruits, nuts 3-4 times per week and finally have a low-salt and K rich food intake for a good 4-6 months possibly for ever for the best outcome, patient was told that either a DASH diet or Mediterranean diet but satisfies the need Routine labs ordered, and the needed abnormal labs will be discussed soon and they can be repeated in 3-6 months. In addition relevant handouts were given to the patient for a better understanding, 
 
patient was told to call if any problems. Patient acknowledged understanding and  agreed with today's recommendations.

## 2018-09-17 NOTE — PATIENT INSTRUCTIONS
Learning About Diabetes Food Guidelines Your Care Instructions Meal planning is important to manage diabetes. It helps keep your blood sugar at a target level (which you set with your doctor). You don't have to eat special foods. You can eat what your family eats, including sweets once in a while. But you do have to pay attention to how often you eat and how much you eat of certain foods. You may want to work with a dietitian or a certified diabetes educator (CDE) to help you plan meals and snacks. A dietitian or CDE can also help you lose weight if that is one of your goals. What should you know about eating carbs? Managing the amount of carbohydrate (carbs) you eat is an important part of healthy meals when you have diabetes. Carbohydrate is found in many foods. · Learn which foods have carbs. And learn the amounts of carbs in different foods. ¨ Bread, cereal, pasta, and rice have about 15 grams of carbs in a serving. A serving is 1 slice of bread (1 ounce), ½ cup of cooked cereal, or 1/3 cup of cooked pasta or rice. ¨ Fruits have 15 grams of carbs in a serving. A serving is 1 small fresh fruit, such as an apple or orange; ½ of a banana; ½ cup of cooked or canned fruit; ½ cup of fruit juice; 1 cup of melon or raspberries; or 2 tablespoons of dried fruit. ¨ Milk and no-sugar-added yogurt have 15 grams of carbs in a serving. A serving is 1 cup of milk or 2/3 cup of no-sugar-added yogurt. ¨ Starchy vegetables have 15 grams of carbs in a serving. A serving is ½ cup of mashed potatoes or sweet potato; 1 cup winter squash; ½ of a small baked potato; ½ cup of cooked beans; or ½ cup cooked corn or green peas. · Learn how much carbs to eat each day and at each meal. A dietitian or CDE can teach you how to keep track of the amount of carbs you eat. This is called carbohydrate counting.  
· If you are not sure how to count carbohydrate grams, use the Plate Method to plan meals. It is a good, quick way to make sure that you have a balanced meal. It also helps you spread carbs throughout the day. ¨ Divide your plate by types of foods. Put non-starchy vegetables on half the plate, meat or other protein food on one-quarter of the plate, and a grain or starchy vegetable in the final quarter of the plate. To this you can add a small piece of fruit and 1 cup of milk or yogurt, depending on how many carbs you are supposed to eat at a meal. 
· Try to eat about the same amount of carbs at each meal. Do not \"save up\" your daily allowance of carbs to eat at one meal. 
· Proteins have very little or no carbs per serving. Examples of proteins are beef, chicken, turkey, fish, eggs, tofu, cheese, cottage cheese, and peanut butter. A serving size of meat is 3 ounces, which is about the size of a deck of cards. Examples of meat substitute serving sizes (equal to 1 ounce of meat) are 1/4 cup of cottage cheese, 1 egg, 1 tablespoon of peanut butter, and ½ cup of tofu. How can you eat out and still eat healthy? · Learn to estimate the serving sizes of foods that have carbohydrate. If you measure food at home, it will be easier to estimate the amount in a serving of restaurant food. · If the meal you order has too much carbohydrate (such as potatoes, corn, or baked beans), ask to have a low-carbohydrate food instead. Ask for a salad or green vegetables. · If you use insulin, check your blood sugar before and after eating out to help you plan how much to eat in the future. · If you eat more carbohydrate at a meal than you had planned, take a walk or do other exercise. This will help lower your blood sugar. What else should you know? · Limit saturated fat, such as the fat from meat and dairy products. This is a healthy choice because people who have diabetes are at higher risk of heart disease.  So choose lean cuts of meat and nonfat or low-fat dairy products. Use olive or canola oil instead of butter or shortening when cooking. · Don't skip meals. Your blood sugar may drop too low if you skip meals and take insulin or certain medicines for diabetes. · Check with your doctor before you drink alcohol. Alcohol can cause your blood sugar to drop too low. Alcohol can also cause a bad reaction if you take certain diabetes medicines. Follow-up care is a key part of your treatment and safety. Be sure to make and go to all appointments, and call your doctor if you are having problems. It's also a good idea to know your test results and keep a list of the medicines you take. Where can you learn more? Go to http://jean-pierreAquapdesignsmiller.info/. Enter J007 in the search box to learn more about \"Learning About Diabetes Food Guidelines. \" Current as of: December 7, 2017 Content Version: 11.7 © 0129-0649 [employer]. Care instructions adapted under license by [employer] (which disclaims liability or warranty for this information). If you have questions about a medical condition or this instruction, always ask your healthcare professional. [address] any warranty or liability for your use of this information. Leg and Ankle Edema: Care Instructions Your Care Instructions Swelling in the legs, ankles, and feet is called edema. It is common after you sit or stand for a while. Long plane flights or car rides often cause swelling in the legs and feet. You may also have swelling if you have to stand for long periods of time at your job. Problems with the veins in the legs (varicose veins) and changes in hormones can also cause swelling. Sometimes the swelling in the ankles and feet is caused by a more serious problem, such as heart failure, infection, blood clots, or liver or kidney disease. Follow-up care is a key part of your treatment and safety.  Be sure to make and go to all appointments, and call your doctor if you are having problems. It's also a good idea to know your test results and keep a list of the medicines you take. How can you care for yourself at home? · If your doctor gave you medicine, take it as prescribed. Call your doctor if you think you are having a problem with your medicine. · Whenever you are resting, raise your legs up. Try to keep the swollen area higher than the level of your heart. · Take breaks from standing or sitting in one position. ¨ Walk around to increase the blood flow in your lower legs. ¨ Move your feet and ankles often while you stand, or tighten and relax your leg muscles. · Wear support stockings. Put them on in the morning, before swelling gets worse. · Eat a balanced diet. Lose weight if you need to. · Limit the amount of salt (sodium) in your diet. Salt holds fluid in the body and may increase swelling. When should you call for help? Call 911 anytime you think you may need emergency care. For example, call if: 
  · You have symptoms of a blood clot in your lung (called a pulmonary embolism). These may include: 
¨ Sudden chest pain. ¨ Trouble breathing. ¨ Coughing up blood.  
 Call your doctor now or seek immediate medical care if: 
  · You have signs of a blood clot, such as: 
¨ Pain in your calf, back of the knee, thigh, or groin. ¨ Redness and swelling in your leg or groin.  
  · You have symptoms of infection, such as: 
¨ Increased pain, swelling, warmth, or redness. ¨ Red streaks or pus. ¨ A fever.  
 Watch closely for changes in your health, and be sure to contact your doctor if: 
  · Your swelling is getting worse.  
  · You have new or worsening pain in your legs.  
  · You do not get better as expected. Where can you learn more? Go to http://jean-pierre-miller.info/. Enter O555 in the search box to learn more about \"Leg and Ankle Edema: Care Instructions. \" Current as of: November 20, 2017 Content Version: 11.7 © 0299-2202 [employer]. Care instructions adapted under license by [employer] (which disclaims liability or warranty for this information). If you have questions about a medical condition or this instruction, always ask your healthcare professional. [address] any warranty or liability for your use of this information.

## 2018-09-17 NOTE — PROGRESS NOTES
Name and  verified Chief Complaint Patient presents with  Results f/u Health Maintenance reviewed-discussed with patient. 1. Have you been to the ER, urgent care clinic since your last visit? Hospitalized since your last visit? Yes, Cardiology Office visit  2. Have you seen or consulted any other health care providers outside of the [address] [place] [address] since your last visit? Include any pap smears or colon screening. Yes, Neurology office visit 2018.

## 2018-09-18 ENCOUNTER — LAB ONLY (OUTPATIENT)
Dept: FAMILY MEDICINE CLINIC | Age: 68
End: 2018-09-18

## 2018-09-18 DIAGNOSIS — I10 ESSENTIAL HYPERTENSION: ICD-10-CM

## 2018-09-18 DIAGNOSIS — E11.21 TYPE 2 DIABETES MELLITUS WITH NEPHROPATHY (HCC): ICD-10-CM

## 2018-09-18 DIAGNOSIS — E78.00 HYPERCHOLESTEROLEMIA: ICD-10-CM

## 2018-09-18 DIAGNOSIS — R60.0 LOCALIZED EDEMA: ICD-10-CM

## 2018-09-18 LAB — HBA1C MFR BLD HPLC: 6.6 %

## 2018-09-19 DIAGNOSIS — R60.0 LOCALIZED EDEMA: ICD-10-CM

## 2018-09-19 LAB
ALBUMIN SERPL-MCNC: 4.3 G/DL (ref 3.6–4.8)
ALBUMIN/GLOB SERPL: 1.7 {RATIO} (ref 1.2–2.2)
ALP SERPL-CCNC: 76 IU/L (ref 39–117)
ALT SERPL-CCNC: 26 IU/L (ref 0–44)
AST SERPL-CCNC: 19 IU/L (ref 0–40)
BASOPHILS # BLD AUTO: 0.1 X10E3/UL (ref 0–0.2)
BASOPHILS NFR BLD AUTO: 1 %
BILIRUB SERPL-MCNC: 0.5 MG/DL (ref 0–1.2)
BUN SERPL-MCNC: 19 MG/DL (ref 8–27)
BUN/CREAT SERPL: 15 (ref 10–24)
CALCIUM SERPL-MCNC: 9.3 MG/DL (ref 8.6–10.2)
CHLORIDE SERPL-SCNC: 96 MMOL/L (ref 96–106)
CHOLEST SERPL-MCNC: 172 MG/DL (ref 100–199)
CO2 SERPL-SCNC: 28 MMOL/L (ref 20–29)
CREAT SERPL-MCNC: 1.26 MG/DL (ref 0.76–1.27)
EOSINOPHIL # BLD AUTO: 0.4 X10E3/UL (ref 0–0.4)
EOSINOPHIL NFR BLD AUTO: 5 %
ERYTHROCYTE [DISTWIDTH] IN BLOOD BY AUTOMATED COUNT: 13.2 % (ref 12.3–15.4)
GLOBULIN SER CALC-MCNC: 2.6 G/DL (ref 1.5–4.5)
GLUCOSE SERPL-MCNC: 144 MG/DL (ref 65–99)
HCT VFR BLD AUTO: 41 % (ref 37.5–51)
HDLC SERPL-MCNC: 50 MG/DL
HGB BLD-MCNC: 13.5 G/DL (ref 13–17.7)
IMM GRANULOCYTES # BLD: 0 X10E3/UL (ref 0–0.1)
IMM GRANULOCYTES NFR BLD: 0 %
INTERPRETATION: NORMAL
LDLC SERPL CALC-MCNC: 78 MG/DL (ref 0–99)
LYMPHOCYTES # BLD AUTO: 2.2 X10E3/UL (ref 0.7–3.1)
LYMPHOCYTES NFR BLD AUTO: 26 %
Lab: NORMAL
MCH RBC QN AUTO: 30.6 PG (ref 26.6–33)
MCHC RBC AUTO-ENTMCNC: 32.9 G/DL (ref 31.5–35.7)
MCV RBC AUTO: 93 FL (ref 79–97)
MONOCYTES # BLD AUTO: 1 X10E3/UL (ref 0.1–0.9)
MONOCYTES NFR BLD AUTO: 11 %
NEUTROPHILS # BLD AUTO: 5 X10E3/UL (ref 1.4–7)
NEUTROPHILS NFR BLD AUTO: 57 %
PLATELET # BLD AUTO: 259 X10E3/UL (ref 150–379)
POTASSIUM SERPL-SCNC: 3.8 MMOL/L (ref 3.5–5.2)
PROT SERPL-MCNC: 6.9 G/DL (ref 6–8.5)
RBC # BLD AUTO: 4.41 X10E6/UL (ref 4.14–5.8)
SODIUM SERPL-SCNC: 142 MMOL/L (ref 134–144)
TRIGL SERPL-MCNC: 219 MG/DL (ref 0–149)
VLDLC SERPL CALC-MCNC: 44 MG/DL (ref 5–40)
WBC # BLD AUTO: 8.6 X10E3/UL (ref 3.4–10.8)

## 2018-09-19 RX ORDER — FUROSEMIDE 40 MG/1
TABLET ORAL
Qty: 30 TAB | Refills: 1 | Status: SHIPPED | OUTPATIENT
Start: 2018-09-19 | End: 2018-10-12 | Stop reason: SDUPTHER

## 2018-09-20 ENCOUNTER — TELEPHONE (OUTPATIENT)
Dept: FAMILY MEDICINE CLINIC | Age: 68
End: 2018-09-20

## 2018-09-20 NOTE — TELEPHONE ENCOUNTER
----- Message from Mahendra Mahoney sent at 9/20/2018  1:38 PM EDT -----  Regarding: Dr. José Manuel Bob from [facility] would need the most recent Labs and notes (PSA) faxed over A.S.A.P. Pt is has a appt at 2:40 pm today (9/20/18).  Fax: [phone]: [phone]

## 2018-09-26 DIAGNOSIS — R60.0 LOCALIZED EDEMA: ICD-10-CM

## 2018-09-26 NOTE — TELEPHONE ENCOUNTER
[facility] Requests A 90 day supply on the patients behalf. Last Visit: 9/17-Grace  Next Appt: None-Chart says 3 months (12/17)  Previous Refill Encounter: 7/23-30+1    Requested Prescriptions     Pending Prescriptions Disp Refills    potassium chloride (K-DUR, KLOR-CON) 20 mEq tablet 90 Tab 0     Sig: Take 1 Tab by mouth daily.

## 2018-09-30 RX ORDER — POTASSIUM CHLORIDE 20 MEQ/1
20 TABLET, EXTENDED RELEASE ORAL DAILY
Qty: 90 TAB | Refills: 0 | Status: SHIPPED | OUTPATIENT
Start: 2018-09-30 | End: 2018-12-27 | Stop reason: SDUPTHER

## 2018-10-03 DIAGNOSIS — E78.00 HYPERCHOLESTEROLEMIA: ICD-10-CM

## 2018-10-03 DIAGNOSIS — I25.10 CORONARY ARTERY DISEASE INVOLVING NATIVE CORONARY ARTERY OF NATIVE HEART WITHOUT ANGINA PECTORIS: ICD-10-CM

## 2018-10-03 DIAGNOSIS — Z00.00 ROUTINE GENERAL MEDICAL EXAMINATION AT A HEALTH CARE FACILITY: ICD-10-CM

## 2018-10-03 DIAGNOSIS — I10 ESSENTIAL HYPERTENSION: ICD-10-CM

## 2018-10-03 NOTE — TELEPHONE ENCOUNTER
Last Visit: 9/17  Next Appt: none  Previous Refill Encounter: 1/23-90+2    Requested Prescriptions     Pending Prescriptions Disp Refills    amLODIPine-benazepril (LOTREL) 5-20 mg per capsule 90 Cap 2     Sig: Take 1 Cap by Mouth Once a Day.

## 2018-10-04 NOTE — TELEPHONE ENCOUNTER
Last Visit: 9/17  Next Appt: none  Previous Refill Encounter: 1/23-90+2       Requested Prescriptions     Pending Prescriptions Disp Refills    tamsulosin (FLOMAX) 0.4 mg capsule 180 Cap 1     Sig: TAKE 2 CAPSULES BY MOUTH EVERY DAY

## 2018-10-07 DIAGNOSIS — E78.00 HYPERCHOLESTEROLEMIA: ICD-10-CM

## 2018-10-07 DIAGNOSIS — I10 ESSENTIAL HYPERTENSION: ICD-10-CM

## 2018-10-08 RX ORDER — BUPROPION HYDROCHLORIDE 150 MG/1
TABLET, EXTENDED RELEASE ORAL
Qty: 180 TAB | Refills: 1 | Status: SHIPPED | OUTPATIENT
Start: 2018-10-08 | End: 2020-03-18 | Stop reason: SDUPTHER

## 2018-10-08 RX ORDER — AMLODIPINE AND BENAZEPRIL HYDROCHLORIDE 5; 20 MG/1; MG/1
CAPSULE ORAL
Qty: 90 CAP | Refills: 2 | Status: SHIPPED | OUTPATIENT
Start: 2018-10-08 | End: 2020-03-18 | Stop reason: SDUPTHER

## 2018-10-08 RX ORDER — HYDROCHLOROTHIAZIDE 25 MG/1
25 TABLET ORAL DAILY
Qty: 90 TAB | Refills: 3 | Status: SHIPPED | OUTPATIENT
Start: 2018-10-08 | End: 2020-03-18 | Stop reason: SDUPTHER

## 2018-10-08 RX ORDER — TAMSULOSIN HYDROCHLORIDE 0.4 MG/1
CAPSULE ORAL
Qty: 180 CAP | Refills: 1 | Status: SHIPPED | OUTPATIENT
Start: 2018-10-08 | End: 2020-03-18 | Stop reason: SDUPTHER

## 2018-10-08 RX ORDER — AMLODIPINE AND BENAZEPRIL HYDROCHLORIDE 5; 20 MG/1; MG/1
CAPSULE ORAL
Qty: 90 CAP | Refills: 2 | Status: SHIPPED | OUTPATIENT
Start: 2018-10-08 | End: 2019-04-23 | Stop reason: SDUPTHER

## 2018-10-08 RX ORDER — CARVEDILOL 12.5 MG/1
TABLET ORAL
Qty: 180 TAB | Refills: 3 | Status: SHIPPED | OUTPATIENT
Start: 2018-10-08 | End: 2020-03-18 | Stop reason: SDUPTHER

## 2018-10-08 RX ORDER — ATORVASTATIN CALCIUM 40 MG/1
TABLET, FILM COATED ORAL
Qty: 90 TAB | Refills: 1 | Status: SHIPPED | OUTPATIENT
Start: 2018-10-08 | End: 2020-03-18 | Stop reason: SDUPTHER

## 2018-10-12 DIAGNOSIS — R60.0 LOCALIZED EDEMA: ICD-10-CM

## 2018-10-12 NOTE — TELEPHONE ENCOUNTER
Last Visit: 9/17  Next Appt: none  Previous Refill Encounter: 9/17-60+11    Requested Prescriptions     Pending Prescriptions Disp Refills    calcium-cholecalciferol, D3, (CALTRATE 600+D) tablet 180 Tab 3     Sig: Take 1 Tab by mouth two (2) times a day.

## 2018-10-15 RX ORDER — FUROSEMIDE 40 MG/1
TABLET ORAL
Qty: 90 TAB | Refills: 3 | Status: SHIPPED | OUTPATIENT
Start: 2018-10-15 | End: 2020-03-18 | Stop reason: SDUPTHER

## 2018-10-15 RX ORDER — MULTIVITAMIN
1 TABLET ORAL 2 TIMES DAILY
Qty: 180 TAB | Refills: 3 | Status: SHIPPED | OUTPATIENT
Start: 2018-10-15 | End: 2019-04-23

## 2018-11-09 RX ORDER — EZETIMIBE 10 MG/1
10 TABLET ORAL DAILY
Qty: 90 TAB | Refills: 1 | Status: SHIPPED | OUTPATIENT
Start: 2018-11-09 | End: 2020-03-18 | Stop reason: SDUPTHER

## 2018-12-27 DIAGNOSIS — R60.0 LOCALIZED EDEMA: ICD-10-CM

## 2019-01-02 RX ORDER — POTASSIUM CHLORIDE 1500 MG/1
TABLET, EXTENDED RELEASE ORAL
Qty: 90 TAB | Refills: 0 | Status: SHIPPED | OUTPATIENT
Start: 2019-01-02 | End: 2020-03-18 | Stop reason: SDUPTHER

## 2019-04-23 ENCOUNTER — OFFICE VISIT (OUTPATIENT)
Dept: CARDIOLOGY CLINIC | Age: 69
End: 2019-04-23

## 2019-04-23 VITALS
HEIGHT: 70 IN | BODY MASS INDEX: 31.9 KG/M2 | WEIGHT: 222.8 LBS | DIASTOLIC BLOOD PRESSURE: 84 MMHG | RESPIRATION RATE: 18 BRPM | OXYGEN SATURATION: 97 % | HEART RATE: 74 BPM | SYSTOLIC BLOOD PRESSURE: 134 MMHG

## 2019-04-23 DIAGNOSIS — I73.9 PAD (PERIPHERAL ARTERY DISEASE) (HCC): Primary | ICD-10-CM

## 2019-04-23 DIAGNOSIS — I25.10 CORONARY ARTERY DISEASE INVOLVING NATIVE CORONARY ARTERY OF NATIVE HEART WITHOUT ANGINA PECTORIS: ICD-10-CM

## 2019-04-23 DIAGNOSIS — E78.00 HYPERCHOLESTEROLEMIA: ICD-10-CM

## 2019-04-23 DIAGNOSIS — I10 ESSENTIAL HYPERTENSION: ICD-10-CM

## 2019-04-23 NOTE — PROGRESS NOTES
Reviewed record in preparation for visit and obtained necessary documentation. Verified patient with 2 identifiers Chief Complaint Patient presents with  Leg Swelling  
  left lower ( at times))- left leg weakness due to cva 1997- DPM refered here for evaluation - denies pain - just numb 1. Have you been to the ER, urgent care clinic since your last visit? Hospitalized since your last visit? No  
 
2. Have you seen or consulted any other health care providers outside of the [address] [place] [address] since your last visit? Include any pap smears or colon screening.   Yes Dr Clark Zapata  PCP

## 2019-04-23 NOTE — PROGRESS NOTES
4/23/2019 2:06 PM 
 
 
Subjective:  
 
Mr. Malcolm Rodriguez is a 77 y.o. male who is here for new patient consultation  He has a PMHx of CVA, CAD, DM2, HTN, HLD He was referred by podiatry for concerns of poor circulation of the lower legs. He was there for routine care of his toenails. He was noted to have decreased pulses in the left leg. He reports having heaviness and tingling sensations in the left leg since his stroke in 1997. He denies claudication symptoms. He uses a cane for ambulation due to gait instability from left-side weakness. He has mild LE ankle edema on the left, which has been chronic since his stroke. This is improved with compression stockings, but he does not wear these regularly. Visit Vitals /84 (BP 1 Location: Right arm, BP Patient Position: Sitting) Pulse 74 Resp 18 Ht 5' 10\" (1.778 m) Wt 222 lb 12.8 oz (101.1 kg) SpO2 97% BMI 31.97 kg/m² Current Outpatient Medications Medication Sig  
 KLOR-CON M20 20 mEq tablet TAKE 1 TABLET BY MOUTH EVERY DAY  ezetimibe (ZETIA) 10 mg tablet Take 1 Tab by mouth daily.  furosemide (LASIX) 40 mg tablet TAKE 1 TABLET BY MOUTH EVERY DAY  amLODIPine-benazepril (LOTREL) 5-20 mg per capsule Take 1 Cap by Mouth Once a Day.  buPROPion SR (WELLBUTRIN SR) 150 mg SR tablet TAKE 1 TAB BY MOUTH TWO (2) TIMES A DAY.  carvedilol (COREG) 12.5 mg tablet Take 1 tablet by mouth two  times daily with meals  hydroCHLOROthiazide (HYDRODIURIL) 25 mg tablet Take 1 Tab by mouth daily.  atorvastatin (LIPITOR) 40 mg tablet TAKE 1 TAB BY MOUTH DAILY.  tamsulosin (FLOMAX) 0.4 mg capsule TAKE 2 CAPSULES BY MOUTH EVERY DAY  finasteride (PROSCAR) 5 mg tablet TAKE 1 TABLET BY MOUTH  NIGHTLY  therapeutic multivitamin (THERA) tablet Take 1 Tab by mouth daily. No current facility-administered medications for this visit. Objective:  
  
Visit Vitals /84 (BP 1 Location: Right arm, BP Patient Position: Sitting) Pulse 74 Resp 18 Ht 5' 10\" (1.778 m) Wt 222 lb 12.8 oz (101.1 kg) SpO2 97% BMI 31.97 kg/m² Data Review:  
Lab Results Component Value Date/Time Cholesterol, total 172 09/18/2018 03:35 PM  
 HDL Cholesterol 50 09/18/2018 03:35 PM  
 LDL, calculated 78 09/18/2018 03:35 PM  
 Triglyceride 219 (H) 09/18/2018 03:35 PM  
 CHOL/HDL Ratio 2.9 06/24/2017 05:59 AM  
 
 
Lab Results Component Value Date/Time Sodium 142 09/18/2018 03:35 PM  
 Potassium 3.8 09/18/2018 03:35 PM  
 Chloride 96 09/18/2018 03:35 PM  
 CO2 28 09/18/2018 03:35 PM  
 Anion gap 7 06/25/2017 06:39 AM  
 Glucose 144 (H) 09/18/2018 03:35 PM  
 BUN 19 09/18/2018 03:35 PM  
 Creatinine 1.26 09/18/2018 03:35 PM  
 BUN/Creatinine ratio 15 09/18/2018 03:35 PM  
 GFR est AA 67 09/18/2018 03:35 PM  
 GFR est non-AA 58 (L) 09/18/2018 03:35 PM  
 Calcium 9.3 09/18/2018 03:35 PM  
 Bilirubin, total 0.5 09/18/2018 03:35 PM  
 AST (SGOT) 19 09/18/2018 03:35 PM  
 Alk. phosphatase 76 09/18/2018 03:35 PM  
 Protein, total 6.9 09/18/2018 03:35 PM  
 Albumin 4.3 09/18/2018 03:35 PM  
 Globulin 3.1 06/24/2017 05:59 AM  
 A-G Ratio 1.7 09/18/2018 03:35 PM  
 ALT (SGPT) 26 09/18/2018 03:35 PM  
 
 
Past Medical History:  
Diagnosis Date  Agoraphobia with panic disorder  Anxiety  Arthritis   
 spine  BPH (benign prostatic hypertrophy) with urinary obstruction  CAD (coronary artery disease)   
 no previous MI or stents  Chronic edema  Coronary artery disease involving native coronary artery without angina pectoris 3/28/2017  CVD (cardiovascular disease)  Depression  Diabetes ([address]   
 no longer medicated  Erectile dysfunction  Essential hypertension 3/28/2017 [address]  Fracture of multiple ribs of right side 7/26/2017  Full code but no chest compressions 7/23/2018  GERD (gastroesophageal reflux disease)  [facility]iparesis [facility]  History of stroke 3/28/2017  Hypercholesterolemia  Hypercholesterolemia 3/28/2017  Hypertension  Joint pain  Localized edema 7/23/2018  Muscle pain  Muscle weakness  Nocturia  Panic disorder  Ringing in the ears  Seizures ([address]   
 at age 3, facial  
 Stroke [facility] 1996  
 left sided weakness Past Surgical History:  
Procedure Laterality Date [address] UNLISTED  2008 Lap band  COLONOSCOPY N/A 5/4/2018 COLONOSCOPY performed by Lila Salvador MD at [facility] ENDOSCOPY  
 HX COLONOSCOPY    
 HX LUMBAR DISKECTOMY  1970's  HX ORTHOPAEDIC    
 right finger repair No Known Allergies Family History Problem Relation Age of Onset  Cancer Father   
     lung  Stroke Father  Hypertension Father  Cancer Mother   
     lung  Hypertension Sister  Heart Disease Brother Social History Socioeconomic History  Marital status:  Spouse name: Not on file  Number of children: Not on file  Years of education: Not on file  Highest education level: Not on file Occupational History  Not on file Social Needs  Financial resource strain: Not on file  Food insecurity:  
  Worry: Not on file Inability: Not on file  Transportation needs:  
  Medical: Not on file Non-medical: Not on file Tobacco Use  Smoking status: Never Smoker  Smokeless tobacco: Never Used Substance and Sexual Activity  Alcohol use: Yes Alcohol/week: 3.0 oz Types: 5 Cans of beer per week Comment: a week  Drug use: No  
 Sexual activity: Yes  
  Partners: Female Lifestyle  Physical activity:  
  Days per week: Not on file Minutes per session: Not on file  Stress: Not on file Relationships  Social connections:  
  Talks on phone: Not on file Gets together: Not on file Attends Worship service: Not on file Active member of club or organization: Not on file Attends meetings of clubs or organizations: Not on file Relationship status: Not on file  Intimate partner violence:  
  Fear of current or ex partner: Not on file Emotionally abused: Not on file Physically abused: Not on file Forced sexual activity: Not on file Other Topics Concern  Not on file Social History Narrative  Not on file Review of Systems Review of Systems Constitutional: Negative for chills, fever and weight loss. HENT: Negative for nosebleeds. Eyes: Negative for blurred vision and double vision. Respiratory: Negative for cough, shortness of breath and wheezing. Cardiovascular: Negative for chest pain, palpitations, orthopnea, leg swelling and PND. Gastrointestinal: Negative for abdominal pain, blood in stool, diarrhea, nausea and vomiting. Musculoskeletal: Negative for joint pain. Skin: Negative for rash. Neurological: Negative for dizziness, tingling and loss of consciousness. Endo/Heme/Allergies: Does not bruise/bleed easily. Physical Exam  
Physical Exam  
Constitutional: He is oriented to person, place, and time. He appears well-developed and well-nourished. HENT:  
Head: Normocephalic and atraumatic. Eyes: Pupils are equal, round, and reactive to light. EOM are normal.  
Cardiovascular: PMI is not displaced. Pulses: 
     Radial pulses are 2+ on the right side, and 2+ on the left side. Femoral pulses are 2+ on the right side, and 2+ on the left side. Popliteal pulses are 2+ on the right side, and 1+ on the left side. Dorsalis pedis pulses are 2+ on the right side, and 2+ on the left side. Posterior tibial pulses are 2+ on the right side, and 2+ on the left side. Pulmonary/Chest: Breath sounds normal. He has no wheezes. He has no rales. Abdominal: Soft. There is no tenderness. Musculoskeletal: Normal range of motion. He exhibits edema (trace ankle edema LLE). Neurological: He is alert and oriented to person, place, and time. Skin: Skin is warm and dry. Psychiatric: He has a normal mood and affect. Assessment: ICD-10-CM ICD-9-CM 1. PAD (peripheral artery disease) (HCC) I73.9 443. 1120 [address] [place] [address] 2. Coronary artery disease involving native coronary artery of native heart without angina pectoris I25.10 414.01   
3. Essential hypertension I10 401.9 4. Hypercholesterolemia E78.00 272.0 Mercedes Gonzáles MD 
 
 
Plan: 1. LE heaviness and edema Will check FRIEDA given decreased pulses and risk factors including CAD, DM2, HTN and HLD. If normal, would defer further workup unless he has new symptoms. 2.  Coronary artery disease involving native coronary arteyr of native heart without angina pectoris Remains stable; 
Denies anginal or anginal equivalent symptoms Continue with present medical management and keep f/u with Dr. Sarai Lynn Essential hypertension BP controlled; continue with present medical management and low sodium diet 3. Hypercholesterolemia Continue statin + zetia therapy. LDL 78 in 9/2018; lipid management by PCP Continue cardiac care by Dr. Sarai Lynn. Frances Guan NP Patient seen and examined by me with nurse practitioner in vascular clinic.  I personally performed all components of the history, physical, and medical decision making and agree with the assessment and plan with minor modifications as noted. H/o CVA with residual deficit on left side. Check FRIEDA. If normal then no further vascular work up is needed.   
 
Mercedes Gonzáles MD

## 2019-07-02 ENCOUNTER — OFFICE VISIT (OUTPATIENT)
Dept: CARDIOLOGY CLINIC | Age: 69
End: 2019-07-02

## 2019-07-02 VITALS
WEIGHT: 222.5 LBS | HEIGHT: 70 IN | DIASTOLIC BLOOD PRESSURE: 88 MMHG | HEART RATE: 66 BPM | RESPIRATION RATE: 18 BRPM | SYSTOLIC BLOOD PRESSURE: 138 MMHG | BODY MASS INDEX: 31.85 KG/M2 | OXYGEN SATURATION: 97 %

## 2019-07-02 DIAGNOSIS — I10 ESSENTIAL HYPERTENSION: ICD-10-CM

## 2019-07-02 DIAGNOSIS — E78.00 HYPERCHOLESTEROLEMIA: ICD-10-CM

## 2019-07-02 DIAGNOSIS — I73.9 PAD (PERIPHERAL ARTERY DISEASE) (HCC): Primary | ICD-10-CM

## 2019-07-02 DIAGNOSIS — I25.10 CORONARY ARTERY DISEASE INVOLVING NATIVE CORONARY ARTERY OF NATIVE HEART WITHOUT ANGINA PECTORIS: ICD-10-CM

## 2019-07-02 NOTE — PROGRESS NOTES
7/2/2019 2:06 PM      Subjective:     Mr. Freida Diaz is a 77 y.o. male who is here to discuss results of arterial duplex. He has a PMHx of CVA, CAD, DM2, HTN, HLD    He had ABIs done for decreased pulses noted on LE extremities. These were abnormal and he had arterial doppler done. He denies claudication symptoms. He notes only tingling and heaviness of the left leg, which has been ongoing since his stroke. He denies pain with walking. He went to [place] in February this year and walked all day without limitations. Arterial Doppler (5/20/19): · The left distal superficial femoral artery demonstrates <50% stenosis. · The right posterior tibial artery demonstrates moderate (50-75%) stenosis  · The left posterior tibial artery is occluded. Monophasic Doppler waveforms in the left peroneal artery. FRIEDA (4/25/19): · The right resting FRIEDA is mildly decreased. The left resting FRIEDA is moderately decreased. · Immediately post exercise, the right FRIEDA is moderately decreased. Immediately post exercise, the left FRIEDA is moderately decreased. Visit Vitals  /88 (BP 1 Location: Right arm, BP Patient Position: Sitting)   Pulse 66   Resp 18   Ht 5' 10\" (1.778 m)   Wt 222 lb 8 oz (100.9 kg)   SpO2 97%   BMI 31.93 kg/m²     Current Outpatient Medications   Medication Sig    KLOR-CON M20 20 mEq tablet TAKE 1 TABLET BY MOUTH EVERY DAY    ezetimibe (ZETIA) 10 mg tablet Take 1 Tab by mouth daily.  furosemide (LASIX) 40 mg tablet TAKE 1 TABLET BY MOUTH EVERY DAY    amLODIPine-benazepril (LOTREL) 5-20 mg per capsule Take 1 Cap by Mouth Once a Day.  buPROPion SR (WELLBUTRIN SR) 150 mg SR tablet TAKE 1 TAB BY MOUTH TWO (2) TIMES A DAY.  carvedilol (COREG) 12.5 mg tablet Take 1 tablet by mouth two  times daily with meals    hydroCHLOROthiazide (HYDRODIURIL) 25 mg tablet Take 1 Tab by mouth daily.  atorvastatin (LIPITOR) 40 mg tablet TAKE 1 TAB BY MOUTH DAILY.     tamsulosin (FLOMAX) 0.4 mg capsule TAKE 2 CAPSULES BY MOUTH EVERY DAY    finasteride (PROSCAR) 5 mg tablet TAKE 1 TABLET BY MOUTH  NIGHTLY    therapeutic multivitamin (THERA) tablet Take 1 Tab by mouth daily. No current facility-administered medications for this visit. Objective:      Visit Vitals  /88 (BP 1 Location: Right arm, BP Patient Position: Sitting)   Pulse 66   Resp 18   Ht 5' 10\" (1.778 m)   Wt 222 lb 8 oz (100.9 kg)   SpO2 97%   BMI 31.93 kg/m²       Data Review:   Lab Results   Component Value Date/Time    Cholesterol, total 172 09/18/2018 03:35 PM    HDL Cholesterol 50 09/18/2018 03:35 PM    LDL, calculated 78 09/18/2018 03:35 PM    Triglyceride 219 (H) 09/18/2018 03:35 PM    CHOL/HDL Ratio 2.9 06/24/2017 05:59 AM       Lab Results   Component Value Date/Time    Sodium 142 09/18/2018 03:35 PM    Potassium 3.8 09/18/2018 03:35 PM    Chloride 96 09/18/2018 03:35 PM    CO2 28 09/18/2018 03:35 PM    Anion gap 7 06/25/2017 06:39 AM    Glucose 144 (H) 09/18/2018 03:35 PM    BUN 19 09/18/2018 03:35 PM    Creatinine 1.26 09/18/2018 03:35 PM    BUN/Creatinine ratio 15 09/18/2018 03:35 PM    GFR est AA 67 09/18/2018 03:35 PM    GFR est non-AA 58 (L) 09/18/2018 03:35 PM    Calcium 9.3 09/18/2018 03:35 PM    Bilirubin, total 0.5 09/18/2018 03:35 PM    AST (SGOT) 19 09/18/2018 03:35 PM    Alk.  phosphatase 76 09/18/2018 03:35 PM    Protein, total 6.9 09/18/2018 03:35 PM    Albumin 4.3 09/18/2018 03:35 PM    Globulin 3.1 06/24/2017 05:59 AM    A-G Ratio 1.7 09/18/2018 03:35 PM    ALT (SGPT) 26 09/18/2018 03:35 PM       Past Medical History:   Diagnosis Date    Agoraphobia with panic disorder     Anxiety     Arthritis     spine    BPH (benign prostatic hypertrophy) with urinary obstruction     CAD (coronary artery disease)     no previous MI or stents    Chronic edema     Coronary artery disease involving native coronary artery without angina pectoris 3/28/2017    CVD (cardiovascular disease)     Depression     Diabetes ([facility] [address]     no longer medicated    Erectile dysfunction     Essential hypertension 3/28/2017    Falls     Fracture of multiple ribs of right side 7/26/2017    Full code but no chest compressions 7/23/2018    GERD (gastroesophageal reflux disease)     Hemiparesis ([facility] [address]     History of stroke 3/28/2017    Hypercholesterolemia     Hypercholesterolemia 3/28/2017    Hypertension     Joint pain     Localized edema 7/23/2018    Muscle pain     Muscle weakness     Nocturia     Panic disorder     Ringing in the ears     Seizures ([facility] [address]     at age 3, facial    [facility]roke [facility] 1996    left sided weakness      Past Surgical History:   Procedure Laterality Date    ABDOMEN SURGERY PROC UNLISTED  2008    Lap band    COLONOSCOPY N/A 5/4/2018    COLON[facility]COPY performed by [facility] Long [facility] at [facility] ENDOSCOPY    HX COLONOSCOPY      HX LUMBAR DISKECTOMY  1970's    HX ORTHOPAEDIC      right finger repair     No Known Allergies   Family History   Problem Relation Age of Onset    Cancer Father         lung    Stroke Father     Hypertension Father     Cancer Mother         lung    Hypertension Sister     Heart Disease Brother       Social History     Socioeconomic History    Marital status:      Spouse name: Not on file    Number of children: Not on file    Years of education: Not on file    Highest education level: Not on file   Occupational History    Not on file   Social Needs    Financial resource strain: Not on file    Food insecurity:     Worry: Not on file     Inability: Not on file    Transportation needs:     Medical: Not on file     Non-medical: Not on file   Tobacco Use    Smoking status: Never Smoker    Smokeless tobacco: Never Used   Substance and Sexual Activity    Alcohol use:  Yes     Alcohol/week: 3.0 oz     Types: 5 Cans of beer per week     Comment: a week    Drug use: No    Sexual activity: Yes     Partners: Female   Lifestyle    Physical activity: Days per week: Not on file     Minutes per session: Not on file    Stress: Not on file   Relationships    Social connections:     Talks on phone: Not on file     Gets together: Not on file     Attends Yarsani service: Not on file     Active member of club or organization: Not on file     Attends meetings of clubs or organizations: Not on file     Relationship status: Not on file    Intimate partner violence:     Fear of current or ex partner: Not on file     Emotionally abused: Not on file     Physically abused: Not on file     Forced sexual activity: Not on file   Other Topics Concern    Not on file   Social History Narrative    Not on file       Review of Systems   Review of Systems   Constitutional: Negative for chills, fever and weight loss. HENT: Negative for nosebleeds. Eyes: Negative for blurred vision and double vision. Respiratory: Negative for cough, shortness of breath and wheezing. Cardiovascular: Negative for chest pain, palpitations, orthopnea, leg swelling and PND. Gastrointestinal: Negative for abdominal pain, blood in stool, diarrhea, nausea and vomiting. Musculoskeletal: Negative for joint pain. Skin: Negative for rash. Neurological: Negative for dizziness, tingling and loss of consciousness. Endo/Heme/Allergies: Does not bruise/bleed easily. Physical Exam   Physical Exam   Constitutional: He is oriented to person, place, and time. He appears well-developed and well-nourished. HENT:   Head: Normocephalic and atraumatic. Eyes: Pupils are equal, round, and reactive to light. EOM are normal.   Cardiovascular: PMI is not displaced. Pulses:       Radial pulses are 2+ on the right side, and 2+ on the left side. Femoral pulses are 2+ on the right side, and 2+ on the left side. Popliteal pulses are 2+ on the right side, and 1+ on the left side. Dorsalis pedis pulses are 2+ on the right side, and 2+ on the left side.         Posterior tibial pulses are 2+ on the right side, and 2+ on the left side. Pulmonary/Chest: Breath sounds normal. He has no wheezes. He has no rales. Abdominal: Soft. There is no tenderness. Musculoskeletal: Normal range of motion. He exhibits edema (trace ankle edema LLE). Neurological: He is alert and oriented to person, place, and time. Skin: Skin is warm and dry. Psychiatric: He has a normal mood and affect. Assessment:       ICD-10-CM ICD-9-CM    1. PAD (peripheral artery disease) ([facility]) I73.9 443.9    2. Coronary artery disease involving native coronary artery of native heart without angina pectoris I25.10 414.01    3. Essential hypertension I10 401.9    4. Hypercholesterolemia E78.00 272.0        Plan:     1. PAD  Arterial doppler with < 50% stenosis in the left distal SFA; R PTA with 50-75% stenosis; left PTA is occluded. He is not very symptomatic -- symptoms of heaviness and tingling have been ongoing since stroke. Will refer to peripheral rehab; encourage walking daily. 2.  Coronary artery disease involving native coronary arteyr of native heart without angina pectoris  Remains stable & anginal or anginal equivalent symptoms  Continue with present medical management and keep f/u with Dr. Dima Garcia hypertension  BP controlled; continue with anti-hypertensive therapy and low sodium diet    3. Hypercholesterolemia  Continue statin, zetia therapy. LDL 78 in 9/2018; lipid management by PCP    Continue cardiac care by Dr. Jose C Clay. F/u in 1 year, or sooner if he develops symptoms    Cheko Adam NP       Patient seen and examined by me with nurse practitioner. Angelia Love personally performed all components of the history, physical, and medical decision making and agree with the assessment and plan with minor modifications as noted. Moderate PAD. Medical treatment and rehab. D/w pt.      Benjamin Dumas MD

## 2019-07-02 NOTE — PROGRESS NOTES
Chief Complaint   Patient presents with    Results     bilateral duplex on 5/20/19     Visit Vitals  /88 (BP 1 Location: Right arm, BP Patient Position: Sitting)   Pulse 66   Resp 18   Ht 5' 10\" (1.778 m)   Wt 222 lb 8 oz (100.9 kg)   SpO2 97%   BMI 31.93 kg/m²       1. Have you been to the ER, urgent care clinic since your last visit? Hospitalized since your last visit? NO  2. Have you seen or consulted any other health care providers outside of the [address] [place] [address] since your last visit? Include any pap smears or colon screening.  NO

## 2019-09-09 ENCOUNTER — DOCUMENTATION ONLY (OUTPATIENT)
Dept: CARDIOLOGY CLINIC | Age: 69
End: 2019-09-09

## 2019-10-03 ENCOUNTER — OFFICE VISIT (OUTPATIENT)
Dept: INTERNAL MEDICINE CLINIC | Age: 69
End: 2019-10-03

## 2019-10-03 VITALS
SYSTOLIC BLOOD PRESSURE: 126 MMHG | HEIGHT: 69 IN | RESPIRATION RATE: 16 BRPM | HEART RATE: 68 BPM | OXYGEN SATURATION: 97 % | DIASTOLIC BLOOD PRESSURE: 73 MMHG | WEIGHT: 217 LBS | TEMPERATURE: 97.8 F | BODY MASS INDEX: 32.14 KG/M2

## 2019-10-03 DIAGNOSIS — Z12.11 COLON CANCER SCREENING: ICD-10-CM

## 2019-10-03 DIAGNOSIS — I10 ESSENTIAL HYPERTENSION: ICD-10-CM

## 2019-10-03 DIAGNOSIS — E11.9 CONTROLLED TYPE 2 DIABETES MELLITUS WITHOUT COMPLICATION, WITHOUT LONG-TERM CURRENT USE OF INSULIN (HCC): Primary | ICD-10-CM

## 2019-10-03 DIAGNOSIS — Z00.00 MEDICARE ANNUAL WELLNESS VISIT, SUBSEQUENT: ICD-10-CM

## 2019-10-03 DIAGNOSIS — I73.9 PAD (PERIPHERAL ARTERY DISEASE) (HCC): ICD-10-CM

## 2019-10-03 DIAGNOSIS — Z86.73 HISTORY OF CVA (CEREBROVASCULAR ACCIDENT): ICD-10-CM

## 2019-10-03 DIAGNOSIS — Z11.59 NEED FOR HEPATITIS C SCREENING TEST: ICD-10-CM

## 2019-10-03 DIAGNOSIS — F32.9 MAJOR DEPRESSIVE DISORDER, REMISSION STATUS UNSPECIFIED, UNSPECIFIED WHETHER RECURRENT: ICD-10-CM

## 2019-10-03 DIAGNOSIS — Z11.59 ENCOUNTER FOR HEPATITIS C SCREENING TEST FOR LOW RISK PATIENT: ICD-10-CM

## 2019-10-03 DIAGNOSIS — Z12.5 PROSTATE CANCER SCREENING: ICD-10-CM

## 2019-10-03 DIAGNOSIS — E78.00 PURE HYPERCHOLESTEROLEMIA: ICD-10-CM

## 2019-10-03 RX ORDER — LANCETS
EACH MISCELLANEOUS
Qty: 1 EACH | Refills: 11 | Status: SHIPPED | OUTPATIENT
Start: 2019-10-03 | End: 2022-01-12 | Stop reason: ALTCHOICE

## 2019-10-03 RX ORDER — INSULIN PUMP SYRINGE, 3 ML
EACH MISCELLANEOUS
Qty: 1 KIT | Refills: 0 | Status: SHIPPED | OUTPATIENT
Start: 2019-10-03 | End: 2022-02-08

## 2019-10-03 RX ORDER — ASPIRIN 81 MG/1
81 TABLET ORAL DAILY
Qty: 30 TAB | Refills: 0 | Status: ON HOLD
Start: 2019-10-03 | End: 2022-04-29 | Stop reason: SDUPTHER

## 2019-10-03 NOTE — PROGRESS NOTES
HISTORY OF PRESENT ILLNESS  Silvia Hammer is a 68 y.o. male. 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 Hx abnl stress test, no cath, asymtpomatic  Last a1c 6.6 LDL 78   Had colonoscopy last year 3 polyps removed-tub adenoma-repeat in 3 yrs--Dr Durant    Hx bph on proscar and flomax. Had prosate nodule bx in the past--benign. Urologist now is Dr Mckayla Bunn. Had JESICA  Hx renal stone    Saw podiatrist-had decreaesed pulses in left, no DVT--saw cardiologist Dr Victorino Walter mild-mod PAD -sees Dr Alo Robison    No fsbs at home    Retired from [employer]. M with 2 daughters    Has some left leg weakness and atrophy of leg.  Has had falls in the past, uses cane to ambulate long distances    Patient Active Problem List    Diagnosis Date Noted    Left low back pain 08/15/2016     Priority: 1 - One    Gross hematuria 08/15/2016     Priority: 1 - One    Calculus of kidney 04/18/2016     Priority: 1 - One    Benign prostatic hyperplasia with lower urinary tract symptoms 06/22/2014     Priority: 1 - One    Nocturia 07/30/2014     Priority: 2 - Two    Slowing of urinary stream 07/30/2014     Priority: 2 - Two    Urinary frequency 06/22/2014     Priority: 2 - Two    Erectile dysfunction 04/17/2016     Priority: 3 - Three    Moderate major depression ([address] 07/24/2018    Full code but no chest compressions 07/23/2018    Localized edema 07/23/2018    Type 2 diabetes mellitus with nephropathy ([address] 01/23/2018    Fracture of multiple ribs of right side 07/26/2017    Essential hypertension 03/28/2017    Hypercholesterolemia 03/28/2017    Controlled type 2 diabetes mellitus without complication ([address] 12/27/0595    History of stroke 03/28/2017    Coronary artery disease involving native coronary artery without angina pectoris 03/28/2017    Obesity 06/22/2014     Current Outpatient Medications   Medication Sig Dispense Refill    KLOR-CON M20 20 mEq tablet TAKE 1 TABLET BY MOUTH EVERY DAY 90 Tab 0    ezetimibe (ZETIA) 10 mg tablet Take 1 Tab by mouth daily. 90 Tab 1    furosemide (LASIX) 40 mg tablet TAKE 1 TABLET BY MOUTH EVERY DAY 90 Tab 3    amLODIPine-benazepril (LOTREL) 5-20 mg per capsule Take 1 Cap by Mouth Once a Day. 90 Cap 2    buPROPion SR (WELLBUTRIN SR) 150 mg SR tablet TAKE 1 TAB BY MOUTH TWO (2) TIMES A DAY. 180 Tab 1    carvedilol (COREG) 12.5 mg tablet Take 1 tablet by mouth two  times daily with meals 180 Tab 3    hydroCHLOROthiazide (HYDRODIURIL) 25 mg tablet Take 1 Tab by mouth daily. 90 Tab 3    atorvastatin (LIPITOR) 40 mg tablet TAKE 1 TAB BY MOUTH DAILY. 90 Tab 1    tamsulosin (FLOMAX) 0.4 mg capsule TAKE 2 CAPSULES BY MOUTH EVERY  Cap 1    finasteride (PROSCAR) 5 mg tablet TAKE 1 TABLET BY MOUTH  NIGHTLY 90 Tab 0    therapeutic multivitamin (THERA) tablet Take 1 Tab by mouth daily.  90 Tab 1     No Known Allergies   Lab Results   Component Value Date/Time    WBC 8.6 09/18/2018 03:35 PM    HGB 13.5 09/18/2018 03:35 PM    HCT 41.0 09/18/2018 03:35 PM    PLATELET 446 62/85/0050 03:35 PM    MCV 93 09/18/2018 03:35 PM     Lab Results   Component Value Date/Time    Hemoglobin A1c 6.1 06/24/2017 05:59 AM    Hemoglobin A1c 5.8 (H) 03/28/2017 01:08 PM    Glucose 144 (H) 09/18/2018 03:35 PM    Glucose (POC) 110 (H) 06/24/2017 06:39 AM    Microalb/Creat ratio (ug/mg creat.) 53.9 (H) 04/18/2018 04:23 PM    LDL, calculated 78 09/18/2018 03:35 PM    Creatinine 1.26 09/18/2018 03:35 PM      Lab Results   Component Value Date/Time    Cholesterol, total 172 09/18/2018 03:35 PM    HDL Cholesterol 50 09/18/2018 03:35 PM    LDL, calculated 78 09/18/2018 03:35 PM    Triglyceride 219 (H) 09/18/2018 03:35 PM    CHOL/HDL Ratio 2.9 06/24/2017 05:59 AM Lab Results   Component Value Date/Time    GFR est non-AA 58 (L) 09/18/2018 03:35 PM    GFR est AA 67 09/18/2018 03:35 PM    Creatinine 1.26 09/18/2018 03:35 PM    BUN 19 09/18/2018 03:35 PM    Sodium 142 09/18/2018 03:35 PM    Potassium 3.8 09/18/2018 03:35 PM    Chloride 96 09/18/2018 03:35 PM    CO2 28 09/18/2018 03:35 PM     No results found for: PSA, Rani Gehrig, PSAR3, [id number], [id number], PSALT     Review of Systems   Constitutional: Negative for chills, fever, malaise/fatigue and weight loss. Eyes: Negative for blurred vision and double vision. Respiratory: Negative for cough and shortness of breath. Cardiovascular: Negative for chest pain and palpitations. Gastrointestinal: Negative for abdominal pain, blood in stool, constipation, diarrhea, melena, nausea and vomiting. Genitourinary: Negative for dysuria, frequency, hematuria and urgency. Musculoskeletal: Negative for back pain, falls, joint pain and myalgias. Neurological: Positive for focal weakness. Negative for dizziness, tremors and headaches. Left leg weakness       Physical Exam   Constitutional: He appears well-developed and well-nourished. No distress. Appears stated age, obese, nad   HENT:   Head: Normocephalic. Mouth/Throat: Oropharynx is clear and moist.   Eyes: Pupils are equal, round, and reactive to light. Neck: Normal range of motion. Neck supple. No JVD present. No tracheal deviation present. No thyromegaly present. Cardiovascular: Normal rate, regular rhythm, normal heart sounds and intact distal pulses. Exam reveals no gallop and no friction rub. No murmur heard. decreaesed pedal pulses b/l   Pulmonary/Chest: Effort normal and breath sounds normal. No respiratory distress. He has no wheezes. He has no rales. He exhibits no tenderness. Abdominal: Soft. He exhibits no distension and no mass. There is no tenderness. There is no rebound and no guarding.    Musculoskeletal: He exhibits edema. Atrophy and weakness of left leg  Mild LLE edema,pitting   Lymphadenopathy:     He has no cervical adenopathy. Neurological: He is alert. No cranial nerve deficit. Mild left leg weakness  Slight gait instability and balance control difficulty   Skin: Skin is warm. Onychomycosis of toenails   Psychiatric: He has a normal mood and affect. His behavior is normal. Judgment and thought content normal.   Nursing note and vitals reviewed. ASSESSMENT and PLAN  Diagnoses and all orders for this visit:    1. Controlled type 2 diabetes mellitus without complication, without long-term current use of insulin (HCC)  -     HEMOGLOBIN A1C WITH EAG  -     METABOLIC PANEL, COMPREHENSIVE  -      DIABETES FOOT EXAM  -     MICROALBUMIN, UR, RAND W/ MICROALB/CREAT RATIO  -     Blood-Glucose Meter monitoring kit; Check fsbs every day  -     glucose blood VI test strips (ASCENSIA AUTODISC VI, ONE TOUCH ULTRA TEST VI) strip; Check fsbs every day 250.00  -     lancets misc; check fsbs every day 250.00    2. Essential hypertension  -     METABOLIC PANEL, COMPREHENSIVE  -     CBC W/O DIFF    3. Pure hypercholesterolemia  -     METABOLIC PANEL, COMPREHENSIVE  -     LIPID PANEL  -     TSH 3RD GENERATION    4. Encounter for hepatitis C screening test for low risk patient    5. Colon cancer screening  -     METABOLIC PANEL, COMPREHENSIVE    6. Prostate cancer screening  -     PSA SCREENING (SCREENING)    7. History of CVA (cerebrovascular accident)    Other orders  -     aspirin delayed-release 81 mg tablet; Take 1 Tab by mouth daily. This is the Subsequent Medicare Annual Wellness Exam, performed 12 months or more after the Initial AWV or the last Subsequent AWV    I have reviewed the patient's medical history in detail and updated the computerized patient record.      History     Past Medical History:   Diagnosis Date    Agoraphobia with panic disorder     Anxiety     Arthritis     spine    BPH (benign prostatic hypertrophy) with urinary obstruction     CAD (coronary artery disease)     no previous MI or stents    Chronic edema     Coronary artery disease involving native coronary artery without angina pectoris 3/28/2017    CVD (cardiovascular disease)     Depression     Diabetes ([address]     no longer medicated    Erectile dysfunction     Essential hypertension 3/28/2017    Falls     Fracture of multiple ribs of right side 7/26/2017    Full code but no chest compressions 7/23/2018    GERD (gastroesophageal reflux disease)     Hemiparesis ([address]     History of stroke 3/28/2017    Hypercholesterolemia     Hypercholesterolemia 3/28/2017    Hypertension     Joint pain     Localized edema 7/23/2018    Muscle pain     Muscle weakness     Nocturia     Panic disorder     Ringing in the ears     Seizures ([address]     at age 3, facial    Stroke ([address] 1996    left sided weakness      Past Surgical History:   Procedure Laterality Date    ABDOMEN SURGERY PROC UNLISTED  2008    Lap band    COLONOSCOPY N/A 5/4/2018    [facility] ENDOSCOPY    HX COLONOSCOPY      HX LUMBAR DISKECTOMY  1970's    HX ORTHOPAEDIC      right finger repair     Current Outpatient Medications   Medication Sig Dispense Refill    Blood-Glucose Meter monitoring kit Check fsbs every day 1 Kit 0    glucose blood VI test strips (ASCENSIA AUTODISC VI, ONE TOUCH ULTRA TEST VI) strip Check fsbs every day 250.00 50 Strip 11    lancets misc check fsbs every day 250.00 1 Each 11    aspirin delayed-release 81 mg tablet Take 1 Tab by mouth daily. 30 Tab 0    KLOR-CON M20 20 mEq tablet TAKE 1 TABLET BY MOUTH EVERY DAY 90 Tab 0    ezetimibe (ZETIA) 10 mg tablet Take 1 Tab by mouth daily. 90 Tab 1    furosemide (LASIX) 40 mg tablet TAKE 1 TABLET BY MOUTH EVERY DAY 90 Tab 3    amLODIPine-benazepril (LOTREL) 5-20 mg per capsule Take 1 Cap by Mouth Once a Day.  90 Cap 2    buPROPion SR [facility] SR) 150 mg SR tablet TAKE 1 TAB BY MOUTH TWO (2) TIMES A DAY. 180 Tab 1    carvedilol (COREG) 12.5 mg tablet Take 1 tablet by mouth two  times daily with meals 180 Tab 3    hydroCHLOROthiazide (HYDRODIURIL) 25 mg tablet Take 1 Tab by mouth daily. 90 Tab 3    atorvastatin (LIPITOR) 40 mg tablet TAKE 1 TAB BY MOUTH DAILY. 90 Tab 1    tamsulosin (FLOMAX) 0.4 mg capsule TAKE 2 CAPSULES BY MOUTH EVERY  Cap 1    finasteride (PROSCAR) 5 mg tablet TAKE 1 TABLET BY MOUTH  NIGHTLY 90 Tab 0    therapeutic multivitamin (THERA) tablet Take 1 Tab by mouth daily. 80 Tab 1     No Known Allergies  Family History   Problem Relation Age of Onset    Cancer Father         lung    Stroke Father     Hypertension Father     Cancer Mother         lung    Hypertension Sister     Heart Disease Brother         chf--aicd     Social History     Tobacco Use    Smoking status: Never Smoker    Smokeless tobacco: Never Used   Substance Use Topics    Alcohol use:  Yes     Alcohol/week: 5.0 standard drinks     Types: 5 Cans of beer per week     Frequency: 2-3 times a week     Drinks per session: 1 or 2     Binge frequency: Never     Comment: a week     Patient Active Problem List   Diagnosis Code    Benign prostatic hyperplasia with lower urinary tract symptoms N40.1    Urinary frequency R35.0    Obesity E66.9    Nocturia R35.1    Slowing of urinary stream R39.198    Erectile dysfunction N52.9    Calculus of kidney N20.0    Left low back pain M54.5    Gross hematuria R31.0    Essential hypertension I10    Hypercholesterolemia E78.00    Controlled type 2 diabetes mellitus without complication (HCC) H34.6    History of stroke Z86.73    Coronary artery disease involving native coronary artery without angina pectoris I25.10    Fracture of multiple ribs of right side S22.41XA    Type 2 diabetes mellitus with nephropathy ([facility] E11.21    Full code but no chest compressions Z78.9    Localized edema R60.0    Moderate major depression ([facility] F32.1       Depression Risk Factor Screening:     3 most recent PHQ Screens 10/3/2019   PHQ Not Done -   Little interest or pleasure in doing things Not at all   Feeling down, depressed, irritable, or hopeless Not at all   Total Score PHQ 2 0     Alcohol Risk Factor Screening: You do not drink alcohol or very rarely. Functional Ability and Level of Safety:   Hearing Loss  Hearing is good. Activities of Daily Living  The home contains: no safety equipment. Patient does total self care    Fall Risk  Fall Risk Assessment, last 12 mths 10/3/2019   Able to walk? Yes   Fall in past 12 months? No   Fall with injury? -   Number of falls in past 12 months -   Fall Risk Score -       Abuse Screen  Patient is not abused    Cognitive Screening   Evaluation of Cognitive Function:  Has your family/caregiver stated any concerns about your memory: no  Normal    Patient Care Team   Patient Care Team:  Ahslee Henning MD as PCP - General (Internal Medicine)  Pricilla Gayle MD (Urology)  María Murillo MD as Physician (Cardiology)  Radha Larson RN as Ambulatory Care Navigator    Assessment/Plan   Education and counseling provided:  Are appropriate based on today's review and evaluation  End-of-Life planning (with patient's consent)-advised completion of AMD forms  shingrix recommended  Had flu shot yesterday  Diagnoses and all orders for this visit:    1. Controlled type 2 diabetes mellitus without complication, without long-term current use of insulin ([facility])  -     HEMOGLOBIN A1C WITH EAG  -     METABOLIC PANEL, COMPREHENSIVE  -      DIABETES FOOT EXAM  -     MICROALBUMIN, UR, RAND W/ MICROALB/CREAT RATIO  -     Blood-Glucose Meter monitoring kit; Check fsbs every day  -     glucose blood VI test strips (ASCENSIA AUTODISC VI, ONE TOUCH ULTRA TEST VI) strip;   Check fsbs every day 250.00  -     lancets misc; check fsbs every day 250.00   advised to restart aspirin 81 mg every day   Due for eye exam-discussed   Weight reduction recommended  2. Essential hypertension  -     METABOLIC PANEL, COMPREHENSIVE  -     CBC W/O DIFF   Good control  3. Pure hypercholesterolemia  -     METABOLIC PANEL, COMPREHENSIVE  -     LIPID PANEL  -     TSH 3RD GENERATION    Continue statin  4. Encounter for hepatitis C screening test for low risk patient   Hep c ab ordered  5. Colon cancer screening  -     METABOLIC PANEL, COMPREHENSIVE   utd colonoscopy    6. Prostate cancer screening  -     PSA SCREENING (SCREENING)--will request from [facility] office    7. History of CVA (cerebrovascular accident)   Hemorrhagic 1996   Consider PT outpt for gait strengthening , pt declines right now, has not kep tup with HEP in the  past per pt and wife  8. PAD   Mild-moderate, will f/u cardiologist for PAD and CAD    9. MDD   Appears controlled on wellbutrin  Other orders  -     aspirin delayed-release 81 mg tablet; Take 1 Tab by mouth daily.         Health Maintenance Due   Topic Date Due    Hepatitis C Screening  1950    Shingrix Vaccine Age 50> (1 of 2) 09/23/1998    FOOT EXAM Q1  04/14/2018    HEMOGLOBIN A1C Q6M  03/18/2019    MICROALBUMIN Q1  04/18/2019    MEDICARE YEARLY EXAM  07/24/2019    Influenza Age 5 to Adult  08/01/2019    GLAUCOMA SCREENING Q2Y  09/07/2019    EYE EXAM RETINAL OR DILATED  09/07/2019    LIPID PANEL Q1  09/18/2019

## 2019-10-03 NOTE — PATIENT INSTRUCTIONS
Office Policies Phone calls/patient messages: Please allow up to 24 hours for someone in the office to contact you about your call or message. Be mindful your provider may be out of the office or your message may require further review. We encourage you to use [employer] for your messages as this is a faster, more efficient way to communicate with our office Medication Refills: 
         
Prescription medications require 48-72 business hours to process. We encourage you to use [employer] for your refills. For controlled medications: Please allow 72 business hours to process. Certain medications may require you to  a written prescription at our office. NO narcotic/controlled medications will be prescribed after 4pm Monday through Friday or on weekends Form/Paperwork Completion: 
         
Please note a $25 fee may incur for all paperwork for completed by our providers. We ask that you allow 7-10 business days. Pre-payment is due prior to picking up/faxing the completed form. You may also download your forms to [employer] to have your doctor print off. Medicare Wellness Visit, Male The best way to live healthy is to have a lifestyle where you eat a well-balanced diet, exercise regularly, limit alcohol use, and quit all forms of tobacco/nicotine, if applicable. Regular preventive services are another way to keep healthy. Preventive services (vaccines, screening tests, monitoring & exams) can help personalize your care plan, which helps you manage your own care. Screening tests can find health problems at the earliest stages, when they are easiest to treat. [facility] follows the current, evidence-based guidelines published by the [facility] States Andres Ruiz (USPSTF) when recommending preventive services for our patients.  Because we follow these guidelines, sometimes recommendations change over time as research supports it. (For example, a prostate screening blood test is no longer routinely recommended for men with no symptoms.) Of course, you and your doctor may decide to screen more often for some diseases, based on your risk and co-morbidities (chronic disease you are already diagnosed with). Preventive services for you include: - Medicare offers their members a free annual wellness visit, which is time for you and your primary care provider to discuss and plan for your preventive service needs. Take advantage of this benefit every year! 
-All adults over age 72 should receive the recommended pneumonia vaccines. Current USPSTF guidelines recommend a series of two vaccines for the best pneumonia protection.  
-All adults should have a flu vaccine yearly and an ECG. All adults age 61 and older should receive a shingles vaccine once in their lifetime.   
-All adults age 38-68 who are overweight should have a diabetes screening test once every three years.  
-Other screening tests & preventive services for persons with diabetes include: an eye exam to screen for diabetic retinopathy, a kidney function test, a foot exam, and stricter control over your cholesterol.  
-Cardiovascular screening for adults with routine risk involves an electrocardiogram (ECG) at intervals determined by the provider.  
-Colorectal cancer screening should be done for adults age 54-65 with no increased risk factors for colorectal cancer. There are a number of acceptable methods of screening for this type of cancer. Each test has its own benefits and drawbacks. Discuss with your provider what is most appropriate for you during your annual wellness visit.  The different tests include: colonoscopy (considered the best screening method), a fecal occult blood test, a fecal DNA test, and sigmoidoscopy. 
-All adults born between [facility] should be screened once for Hepatitis C. 
 -An Abdominal Aortic Aneurysm (AAA) Screening is recommended for men age 73-68 who has ever smoked in their lifetime. Here is a list of your current Health Maintenance items (your personalized list of preventive services) with a due date: 
Health Maintenance Due Topic Date Due  
 Hepatitis C Test  1950  Shingles Vaccine (1 of 2) 09/23/1998 [facility] Diabetic Foot Care  04/14/2018  Hemoglobin A1C    03/18/2019  Albumin Urine Test  04/18/2019 [facility] Annual Well Visit  07/24/2019  Flu Vaccine  08/01/2019  Glaucoma Screening   09/07/2019 [address] [place] [address] Eye Exam  09/07/2019  Cholesterol Test   09/18/2019

## 2019-10-04 LAB
ALBUMIN SERPL-MCNC: 4.3 G/DL (ref 3.5–4.8)
ALBUMIN/CREAT UR: <8.3 MG/G CREAT (ref 0–30)
ALBUMIN/GLOB SERPL: 1.8 {RATIO} (ref 1.2–2.2)
ALP SERPL-CCNC: 71 IU/L (ref 39–117)
ALT SERPL-CCNC: 21 IU/L (ref 0–44)
AST SERPL-CCNC: 18 IU/L (ref 0–40)
BILIRUB SERPL-MCNC: 0.6 MG/DL (ref 0–1.2)
BUN SERPL-MCNC: 23 MG/DL (ref 8–27)
BUN/CREAT SERPL: 15 (ref 10–24)
CALCIUM SERPL-MCNC: 9.5 MG/DL (ref 8.6–10.2)
CHLORIDE SERPL-SCNC: 98 MMOL/L (ref 96–106)
CHOLEST SERPL-MCNC: 158 MG/DL (ref 100–199)
CO2 SERPL-SCNC: 26 MMOL/L (ref 20–29)
CREAT SERPL-MCNC: 1.49 MG/DL (ref 0.76–1.27)
CREAT UR-MCNC: 36.1 MG/DL
ERYTHROCYTE [DISTWIDTH] IN BLOOD BY AUTOMATED COUNT: 12.5 % (ref 12.3–15.4)
EST. AVERAGE GLUCOSE BLD GHB EST-MCNC: 160 MG/DL
GLOBULIN SER CALC-MCNC: 2.4 G/DL (ref 1.5–4.5)
GLUCOSE SERPL-MCNC: 160 MG/DL (ref 65–99)
HBA1C MFR BLD: 7.2 % (ref 4.8–5.6)
HCT VFR BLD AUTO: 40.1 % (ref 37.5–51)
HCV AB S/CO SERPL IA: <0.1 S/CO RATIO (ref 0–0.9)
HDLC SERPL-MCNC: 52 MG/DL
HGB BLD-MCNC: 13.6 G/DL (ref 13–17.7)
LDLC SERPL CALC-MCNC: 70 MG/DL (ref 0–99)
MCH RBC QN AUTO: 31.1 PG (ref 26.6–33)
MCHC RBC AUTO-ENTMCNC: 33.9 G/DL (ref 31.5–35.7)
MCV RBC AUTO: 92 FL (ref 79–97)
MICROALBUMIN UR-MCNC: <3 UG/ML
PLATELET # BLD AUTO: 235 X10E3/UL (ref 150–450)
POTASSIUM SERPL-SCNC: 3.5 MMOL/L (ref 3.5–5.2)
PROT SERPL-MCNC: 6.7 G/DL (ref 6–8.5)
RBC # BLD AUTO: 4.37 X10E6/UL (ref 4.14–5.8)
SODIUM SERPL-SCNC: 144 MMOL/L (ref 134–144)
TRIGL SERPL-MCNC: 182 MG/DL (ref 0–149)
TSH SERPL DL<=0.005 MIU/L-ACNC: 1.04 UIU/ML (ref 0.45–4.5)
VLDLC SERPL CALC-MCNC: 36 MG/DL (ref 5–40)
WBC # BLD AUTO: 8.1 X10E3/UL (ref 3.4–10.8)

## 2019-10-04 RX ORDER — METFORMIN HYDROCHLORIDE 500 MG/1
500 TABLET ORAL
Qty: 90 TAB | Refills: 3 | Status: SHIPPED | OUTPATIENT
Start: 2019-10-04 | End: 2020-03-18 | Stop reason: SDUPTHER

## 2019-10-10 LAB
PSA SERPL-MCNC: 0.6 NG/ML (ref 0–4)
SPECIMEN STATUS REPORT, ROLRST: NORMAL

## 2019-10-14 NOTE — PROGRESS NOTES
[phone] (home) - pt is leaving tomorrow for [place]. He will start checking blood sugars when he gets back and start the metformin. Discussed briefly the healthy plate meal planning and how to make substitutions with fish and chips. Pt will call this certified diabetes educator at [phone] when he returns to the [address] [facility]. He stated it may be December.

## 2019-11-18 ENCOUNTER — HOSPITAL ENCOUNTER (OUTPATIENT)
Dept: CARDIAC REHAB | Age: 69
Discharge: HOME | End: 2019-11-18
Payer: MEDICARE

## 2019-11-18 VITALS — BODY MASS INDEX: 30.35 KG/M2 | WEIGHT: 212 LBS | HEIGHT: 70 IN

## 2019-11-18 PROCEDURE — 93668 PERIPHERAL VASCULAR REHAB: CPT

## 2019-11-18 NOTE — CARDIO/PULMONARY
Cardiopulmonary Rehab Orientation:     Met with Mr. Long Gautam for the initial session. Mr Daniella Bess is a 68year-old patient of Dr. Jordy Matthews, who presents to rehab for cardiac conditioning and strengthening, S/P PAD; LVEF 55 %. History also includes CVA, with L side residual, HTN, HLD, DM II. NKDA. Immunization for influenza vaccine UTD. Pt is nonsmoker. Lungs were CTA; denied any cough. No LE edema was present. Exercise at home includes walking. Limitations: Left leg heaviness, fatigue. Psychosocial: Pt is  with caring support system. Reports he has no stress since retiring. He enjoys traveling and playing golf. While relaxing at home he spends his time researching various things on his laptop. Mr. Triny Lira has a history of depression and has been on Wellbutrin for many years. He scored a 3 on PHQ 9 scale. BMI 30.48, based on height of 5'10\" and weight 212 lbs. Feet assessment: no breakdown, redness, open skin. Denies foot pain. Pt completed first walk on the treadmill completing 7:20 total. See exercise physiologist note. Plan: Return for first exercise session on 11/19/20. The six-item Vascular Quality of Life (VascuQoL-6) health-related quality of life (HRQoL) instrument      Underline the best answer:      Because of the poor circulation in my legs, the range of activities that I would have liked to do in the past two weeks has been    1. Severely limited--most activities not done  2. Very limited  3. Very slightly limited  4. Not limited at all--have done all the activities that I wanted to       During the past two weeks, my legs felt tired or weak    1. All of the time  2. Some of the time  3. A little of the time  4. None of the time     During the past two weeks, because of the poor circulation in my legs, my ability to walk has been    1. Totally limited, couldn't walk at all  2. Very limited  3. A little limited  4.  Not at all limited     During the past two weeks, I have been concerned about having poor circulation in my legs    1. All of the time  2. Some of the time  3. A little of the time  4. None of the time    During the past two weeks, because of the poor circulation in my legs, my ability to participate in social activities has been  1. Totally limited, couldn't socialize at all  2. Very limited  3. A little limited  4. Not at all limited     During the past two weeks, when I have had pain in the leg (or foot) it has given me    1. A great deal of discomfort or distress  2. A moderate amount of discomfort or distress  3. Very little discomfort or distress  4.  No discomfort or distress    Total __14__

## 2019-11-19 ENCOUNTER — HOSPITAL ENCOUNTER (OUTPATIENT)
Dept: CARDIAC REHAB | Age: 69
Discharge: HOME | End: 2019-11-19
Payer: MEDICARE

## 2019-11-19 VITALS — WEIGHT: 213 LBS | BODY MASS INDEX: 30.56 KG/M2

## 2019-11-19 PROCEDURE — 93668 PERIPHERAL VASCULAR REHAB: CPT

## 2019-11-20 NOTE — CARDIO/PULMONARY
Hayes-Ramirez Protocol, 2 minute stages. Stage: Speed (MPH): Elevation (%): Pain:   1 2.0 0.0    2 2.0 2.0    3 2.0 3.0    4 2.0 6.0    5 2.0 8.0    6 2.0 10.0    7 2.0 12.0    8 2.0 14.0      Hayes-Ramirez modified Protocol, 2 minute stages.   Stage: Speed (MPH): Elevation (%): Pain:   1 0.5 0    2 1.0 0    3 1.5 0    4 2.0 0    5 2.0 2    6 2.0 4    7 2.0 6    8 2.0 8    9 2.0 10      Comments:      Initial IC time:   4:00                Total walk time: 7:20

## 2019-11-21 ENCOUNTER — HOSPITAL ENCOUNTER (OUTPATIENT)
Dept: CARDIAC REHAB | Age: 69
Discharge: HOME | End: 2019-11-21
Payer: MEDICARE

## 2019-11-21 VITALS — WEIGHT: 212.8 LBS | BODY MASS INDEX: 30.53 KG/M2

## 2019-11-21 PROCEDURE — 93668 PERIPHERAL VASCULAR REHAB: CPT

## 2019-11-26 ENCOUNTER — HOSPITAL ENCOUNTER (OUTPATIENT)
Dept: CARDIAC REHAB | Age: 69
Discharge: HOME | End: 2019-11-26
Payer: MEDICARE

## 2019-11-26 VITALS — WEIGHT: 211.6 LBS | BODY MASS INDEX: 30.36 KG/M2

## 2019-11-26 PROCEDURE — 93668 PERIPHERAL VASCULAR REHAB: CPT

## 2019-12-02 ENCOUNTER — HOSPITAL ENCOUNTER (OUTPATIENT)
Dept: CARDIAC REHAB | Age: 69
Discharge: HOME | End: 2019-12-02
Payer: MEDICARE

## 2019-12-02 VITALS — BODY MASS INDEX: 30.16 KG/M2 | WEIGHT: 210.2 LBS

## 2019-12-02 PROCEDURE — 93668 PERIPHERAL VASCULAR REHAB: CPT

## 2019-12-03 ENCOUNTER — APPOINTMENT (OUTPATIENT)
Dept: CARDIAC REHAB | Age: 69
End: 2019-12-03

## 2019-12-04 ENCOUNTER — HOSPITAL ENCOUNTER (OUTPATIENT)
Dept: CARDIAC REHAB | Age: 69
End: 2019-12-04
Payer: MEDICARE

## 2019-12-04 ENCOUNTER — TELEPHONE (OUTPATIENT)
Dept: INTERNAL MEDICINE CLINIC | Age: 69
End: 2019-12-04

## 2019-12-04 ENCOUNTER — OFFICE VISIT (OUTPATIENT)
Dept: URGENT CARE | Age: 69
End: 2019-12-04

## 2019-12-04 VITALS
WEIGHT: 210 LBS | RESPIRATION RATE: 18 BRPM | HEART RATE: 78 BPM | TEMPERATURE: 97.8 F | BODY MASS INDEX: 30.06 KG/M2 | HEIGHT: 70 IN | SYSTOLIC BLOOD PRESSURE: 127 MMHG | OXYGEN SATURATION: 97 % | DIASTOLIC BLOOD PRESSURE: 60 MMHG
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
## 2019-12-04 DIAGNOSIS — S79.912A INJURY OF LEFT HIP, INITIAL ENCOUNTER: Primary | ICD-10-CM

## 2019-12-04 RX ORDER — TRAMADOL HYDROCHLORIDE 50 MG/1
50 TABLET ORAL
Qty: 12 TAB | Refills: 0 | Status: SHIPPED | OUTPATIENT
Start: 2019-12-04 | End: 2019-12-05

## 2019-12-04 RX ORDER — METHOCARBAMOL 500 MG/1
500 TABLET, FILM COATED ORAL
Qty: 20 TAB | Refills: 0 | Status: SHIPPED | OUTPATIENT
Start: 2019-12-04 | End: 2019-12-05

## 2019-12-04 NOTE — TELEPHONE ENCOUNTER
Called, spoke to pt. Two identifiers confirmed. Pt stated that he is going to go to urgent care. Pt verbalized understanding of information discussed w/ no further questions at this time.

## 2019-12-04 NOTE — PROGRESS NOTES
Hip Pain   This is a new problem. The current episode started 2 days ago (monday- h/o fall in bathroom on left side- with major impact of fall on left hip area ). The problem occurs constantly. The problem has been rapidly worsening. Pertinent negatives include no chest pain, no abdominal pain and no headaches. The symptoms are aggravated by walking, bending and twisting. The symptoms are relieved by medications. Treatments tried: hydrocodone. The treatment provided significant relief.         Past Medical History:   Diagnosis Date    Agoraphobia with panic disorder     Anxiety     Arthritis     spine    BPH (benign prostatic hypertrophy) with urinary obstruction     CAD (coronary artery disease)     no previous MI or stents    Chronic edema     Coronary artery disease involving native coronary artery without angina pectoris 3/28/2017    CVD (cardiovascular disease)     Depression     Diabetes ([address]     no longer medicated    Erectile dysfunction     Essential hypertension 3/28/2017    Falls     Fracture of multiple ribs of right side 7/26/2017    Full code but no chest compressions 7/23/2018    GERD (gastroesophageal reflux disease)     Hemiparesis ([address]     History of stroke 3/28/2017    Hypercholesterolemia     Hypercholesterolemia 3/28/2017    Hypertension     Joint pain     Localized edema 7/23/2018    Muscle pain     Muscle weakness     Nocturia     Panic disorder     Ringing in the ears     Seizures ([address]     at age 3, facial    Stroke [facility]) 1996    left sided weakness        Past Surgical History:   Procedure Laterality Date    ABDOMEN SURGERY PROC UNLISTED  2008    Lap band    COLONOSCOPY N/A 5/4/2018    COLONOSCOPY performed by Jonathan Marin MD at \[facility]"" ENDOSCOPY    HX COLONOSCOPY      HX LUMBAR DISKECTOMY  1970's    HX ORTHOPAEDIC      right finger repair         Family History   Problem Relation Age of Onset    Cancer Father         lung    Stroke Father    [facility] Hypertension Father     Cancer Mother         lung    Hypertension Sister     Heart Disease Brother         chf--aicd        Social History     Socioeconomic History    Marital status:      Spouse name: Not on file    Number of children: Not on file    Years of education: Not on file    Highest education level: Not on file   Occupational History    Not on file   Social Needs    Financial resource strain: Not on file    Food insecurity:     Worry: Not on file     Inability: Not on file    Transportation needs:     Medical: Not on file     Non-medical: Not on file   Tobacco Use    Smoking status: Never Smoker    Smokeless tobacco: Never Used   Substance and Sexual Activity    Alcohol use: Yes     Alcohol/week: 5.0 standard drinks     Types: 5 Cans of beer per week     Frequency: 2-3 times a week     Drinks per session: 1 or 2     Binge frequency: Never     Comment: a week    Drug use: No    Sexual activity: Yes     Partners: Female   Lifestyle    Physical activity:     Days per week: Not on file     Minutes per session: Not on file    Stress: Not on file   Relationships    Social connections:     Talks on phone: Not on file     Gets together: Not on file     Attends Baptism service: Not on file     Active member of club or organization: Not on file     Attends meetings of clubs or organizations: Not on file     Relationship status: Not on file    Intimate partner violence:     Fear of current or ex partner: Not on file     Emotionally abused: Not on file     Physically abused: Not on file     Forced sexual activity: Not on file   Other Topics Concern    Not on file   Social History Narrative    Not on file                ALLERGIES: Patient has no known allergies. Review of Systems   Cardiovascular: Negative for chest pain. Gastrointestinal: Negative for abdominal pain. Musculoskeletal: Positive for back pain and gait problem. Negative for neck pain.    Neurological: Negative for headaches. All other systems reviewed and are negative. Vitals:    12/04/19 1233   BP: 127/60   Pulse: 78   Resp: 18   Temp: 97.8 °F (36.6 °C)   SpO2: 97%   Weight: 210 lb (95.3 kg)   Height: 5' 10\" (1.778 m)       Physical Exam  Vitals signs and nursing note reviewed. Constitutional:       General: He is not in acute distress. Musculoskeletal:      Left shoulder: He exhibits normal range of motion, no tenderness, no swelling, no pain and no spasm. Left hip: He exhibits decreased range of motion, decreased strength and tenderness (on lateral side). He exhibits no bony tenderness, no swelling and no deformity. Cervical back: Normal.      Thoracic back: Normal.      Lumbar back: He exhibits decreased range of motion, pain and spasm. Back:    Neurological:      General: No focal deficit present. Mental Status: He is alert and oriented to person, place, and time. Gait: Gait abnormal (antalgic due to pain and also basline left side CVA). MDM    Procedures        ICD-10-CM ICD-9-CM    1. Injury of left hip, initial encounter S79.912A 959.6 XR HIP LT W OR WO PELV 2-3 VWS      traMADol (ULTRAM) 50 mg tablet   2. Fall at home, initial encounter [address] XXXA E888.9     Y92.009 E849.0      Use NSAID as needed  Self exercise  Follow with Orthopedic as needed. Medications Ordered Today   Medications    traMADol (ULTRAM) 50 mg tablet     Sig: Take 1 Tab by mouth every eight (8) hours as needed for Pain for up to 4 days. Max Daily Amount: 150 mg. Dispense:  12 Tab     Refill:  0    methocarbamol (ROBAXIN) 500 mg tablet     Sig: Take 1 Tab by mouth two (2) times daily as needed for Pain. Dispense:  20 Tab     Refill:  0     Results for orders placed or performed in visit on 12/04/19   XR HIP LT W OR WO PELV 2-3 VWS    Narrative    EXAM: XR HIP LT W OR WO PELV 2-3 VWS    INDICATION: Fall yesterday. COMPARISON: None.     FINDINGS: AP and frog-leg lateral views of the left hip were obtained. The femur  is less than optimally positioned as seen on the frontal projection. Specifically, the greater trochanter is superimposed upon the lateral aspect of  the femoral neck. There is evidence of degenerative change. There is no evidence  of acute, displaced fracture involving the left hip. Impression    IMPRESSION: Limited radiographic is evidence of the left hip with evidence of  degenerative change. No definite evidence of acute traumatic injury (see  discussion above). The patients condition was discussed with the patient and they understand. The patient is to follow up with primary care doctor. If signs and symptoms become worse the pt is to go to the ER. The patient is to take medications as prescribed.

## 2019-12-04 NOTE — PATIENT INSTRUCTIONS
Hip Arthritis: Exercises Introduction Here are some examples of exercises for you to try. The exercises may be suggested for a condition or for rehabilitation. Start each exercise slowly. Ease off the exercises if you start to have pain. You will be told when to start these exercises and which ones will work best for you. How to do the exercises Straight-leg raises to the outside 1. Lie on your side, with your affected hip on top. 2. Tighten the front thigh muscles of your top leg to keep your knee straight. 3. Keep your hip and your leg straight in line with the rest of your body, and keep your knee pointing forward. Do not drop your hip back. 4. Lift your top leg straight up toward the ceiling, about 12 inches off the floor. Hold for about 6 seconds, then slowly lower your leg. 5. Repeat 8 to 12 times. 6. Switch legs and repeat steps 1 through 5, even if only one hip is sore. Straight-leg raises to the inside 1. Lie on your side with your affected hip on the floor. 2. You can either prop up your other leg on a chair, or you can bend that knee and put that foot in front of your other knee. Do not drop your hip back. 3. Tighten the muscles on the front thigh of your bottom leg to straighten that knee. 4. Keep your kneecap pointing forward and your leg straight, and lift your bottom leg up toward the ceiling about 6 inches. Hold for about 6 seconds, then lower slowly. 5. Repeat 8 to 12 times. 6. Switch legs and repeat steps 1 through 5, even if only one hip is sore. Hip hike 1. Stand sideways on the bottom step of a staircase, and hold on to the banister or wall. 2. Keeping both knees straight, lift your good leg off the step and let it hang down. Then hike your good hip up to the same level as your affected hip or a little higher. 3. Repeat 8 to 12 times. 4. Switch legs and repeat steps 1 through 3, even if only one hip is sore. Bridging 1. Lie on your back with both knees bent. Your knees should be bent about 90 degrees. 2. Then push your feet into the floor, squeeze your buttocks, and lift your hips off the floor until your shoulders, hips, and knees are all in a straight line. 3. Hold for about 6 seconds as you continue to breathe normally, and then slowly lower your hips back down to the floor and rest for up to 10 seconds. 4. Repeat 8 to 12 times. Hamstring stretch (lying down) 1. Lie flat on your back with your legs straight. If you feel discomfort in your back, place a small towel roll under your lower back. 2. Holding the back of your affected leg, lift your leg straight up and toward your body until you feel a stretch at the back of your thigh. 3. Hold the stretch for at least 30 seconds. 4. Repeat 2 to 4 times. 5. Switch legs and repeat steps 1 through 4, even if only one hip is sore. Standing quadriceps stretch 1. If you are not steady on your feet, hold on to a chair, counter, or wall. You can also lie on your stomach or your side to do this exercise. 2. Bend the knee of the leg you want to stretch, and reach behind you to grab the front of your foot or ankle with the hand on the same side. For example, if you are stretching your right leg, use your right hand. 3. Keeping your knees next to each other, pull your foot toward your buttock until you feel a gentle stretch across the front of your hip and down the front of your thigh. Your knee should be pointed directly to the ground, and not out to the side. 4. Hold the stretch for at least 15 to 30 seconds. 5. Repeat 2 to 4 times. 6. Switch legs and repeat steps 1 through 5, even if only one hip is sore. Hip rotator stretch 1. Lie on your back with both knees bent and your feet flat on the floor. 2. Put the ankle of your affected leg on your opposite thigh near your knee.  
3. Use your hand to gently push your knee away from your body until you feel a gentle stretch around your hip. 4. Hold the stretch for 15 to 30 seconds. 5. Repeat 2 to 4 times. 6. Repeat steps 1 through 5, but this time use your hand to gently pull your knee toward your opposite shoulder. 7. Switch legs and repeat steps 1 through 6, even if only one hip is sore. Knee-to-chest 
 
1. Lie on your back with your knees bent and your feet flat on the floor. 2. Bring your affected leg to your chest, keeping the other foot flat on the floor (or keeping the other leg straight, whichever feels better on your lower back). 3. Keep your lower back pressed to the floor. Hold for at least 15 to 30 seconds. 4. Relax, and lower the knee to the starting position. 5. Repeat 2 to 4 times. 6. Switch legs and repeat steps 1 through 5, even if only one hip is sore. 7. To get more stretch, put your other leg flat on the floor while pulling your knee to your chest. 
 
Clamshell 1. Lie on your side, with your affected hip on top. Keep your feet and knees together and your knees bent. 2. Raise your top knee, but keep your feet together. Do not let your hips roll back. Your legs should open up like a clamshell. 3. Hold for 6 seconds. 4. Slowly lower your knee back down. Rest for 10 seconds. 5. Repeat 8 to 12 times. 6. Switch legs and repeat steps 1 through 5, even if only one hip is sore. Follow-up care is a key part of your treatment and safety. Be sure to make and go to all appointments, and call your doctor if you are having problems. It's also a good idea to know your test results and keep a list of the medicines you take. Where can you learn more? Go to http://jean-pierre-miller.info/. Enter B090 in the search box to learn more about \"Hip Arthritis: Exercises. \" Current as of: June 26, 2019 Content Version: 12.2 © 6520-1965 [employer].  Care instructions adapted under license by [employer] (which disclaims liability or warranty for this information). If you have questions about a medical condition or this instruction, always ask your healthcare professional. [place] [address] any warranty or liability for your use of this information.

## 2019-12-04 NOTE — TELEPHONE ENCOUNTER
Patient states he needs a call back to get an appt to be seen today prior to lunch if possible for an Acute visit for a Fall that happened on Monday, 12/2/19 & is still Hurting & is now having difficulty walking. Patient states he's having back pain. Patient states history of stroke. Patient requests this morning prior to lunch as his wife has an appt at [address] [facility] at 2 pm today & she is who drives him.

## 2019-12-04 NOTE — TELEPHONE ENCOUNTER
Patient states he needs a call back to get an appt today or tomorrow as patient can now come anytime today as his wife's appt has been re-scheduled & has transportation for anytime today or tomorrow. Please call.  Thank you

## 2019-12-05 ENCOUNTER — OFFICE VISIT (OUTPATIENT)
Dept: INTERNAL MEDICINE CLINIC | Age: 69
End: 2019-12-05

## 2019-12-05 ENCOUNTER — TELEPHONE (OUTPATIENT)
Dept: INTERNAL MEDICINE CLINIC | Age: 69
End: 2019-12-05

## 2019-12-05 ENCOUNTER — APPOINTMENT (OUTPATIENT)
Dept: CARDIAC REHAB | Age: 69
End: 2019-12-05

## 2019-12-05 VITALS
SYSTOLIC BLOOD PRESSURE: 127 MMHG | HEART RATE: 80 BPM | DIASTOLIC BLOOD PRESSURE: 74 MMHG | WEIGHT: 205 LBS | TEMPERATURE: 97.3 F | OXYGEN SATURATION: 96 % | BODY MASS INDEX: 29.35 KG/M2 | RESPIRATION RATE: 16 BRPM | HEIGHT: 70 IN

## 2019-12-05 DIAGNOSIS — M54.50 LOW BACK PAIN, UNSPECIFIED BACK PAIN LATERALITY, UNSPECIFIED CHRONICITY, UNSPECIFIED WHETHER SCIATICA PRESENT: Primary | ICD-10-CM

## 2019-12-05 DIAGNOSIS — M54.50 ACUTE MIDLINE LOW BACK PAIN WITHOUT SCIATICA: ICD-10-CM

## 2019-12-05 DIAGNOSIS — E11.9 TYPE 2 DIABETES MELLITUS WITHOUT COMPLICATION, WITH LONG-TERM CURRENT USE OF INSULIN (HCC): ICD-10-CM

## 2019-12-05 DIAGNOSIS — M54.50 ACUTE BILATERAL LOW BACK PAIN, UNSPECIFIED WHETHER SCIATICA PRESENT: Primary | ICD-10-CM

## 2019-12-05 DIAGNOSIS — R11.0 NAUSEA: Primary | ICD-10-CM

## 2019-12-05 DIAGNOSIS — Z79.4 TYPE 2 DIABETES MELLITUS WITHOUT COMPLICATION, WITH LONG-TERM CURRENT USE OF INSULIN (HCC): ICD-10-CM

## 2019-12-05 LAB — GLUCOSE POC: 171 MG/DL (ref 70–110)

## 2019-12-05 RX ORDER — HYDROCODONE BITARTRATE AND ACETAMINOPHEN 5; 325 MG/1; MG/1
1 TABLET ORAL
Qty: 15 TAB | Refills: 0 | Status: SHIPPED | OUTPATIENT
Start: 2019-12-05 | End: 2019-12-08

## 2019-12-05 NOTE — TELEPHONE ENCOUNTER
Tasneem Stanley MD  You 23 minutes ago (12:56 PM)     Please order L spine xr    Routing comment      Called and spoke with pt's wife, Merry Peña. Notified Lela Pace will be ordered and form left up front to . Merry Peña verbalized understanding of information discussed w/ no further questions at this time.

## 2019-12-05 NOTE — PATIENT INSTRUCTIONS
Office Policies Phone calls/patient messages: Please allow up to 24 hours for someone in the office to contact you about your call or message. Be mindful your provider may be out of the office or your message may require further review. We encourage you to use [employer] for your messages as this is a faster, more efficient way to communicate with our office Medication Refills: 
         
Prescription medications require 48-72 business hours to process. We encourage you to use [employer] for your refills. For controlled medications: Please allow 72 business hours to process. Certain medications may require you to  a written prescription at our office. NO narcotic/controlled medications will be prescribed after 4pm Monday through Friday or on weekends Form/Paperwork Completion: 
         
Please note a $25 fee may incur for all paperwork for completed by our providers. We ask that you allow 7-10 business days. Pre-payment is due prior to picking up/faxing the completed form. You may also download your forms to [employer] to have your doctor print off.

## 2019-12-05 NOTE — PROGRESS NOTES
HISTORY OF PRESENT ILLNESS  Sarai Velasquez is a 68 y.o. male.   HPI   Here for acute care OV  Had a fall at home 4 d ago landed on left side and back area  Went to  day of fall --xr left hip--DJD but no fracture  Was given muscle relaxer and tramadol  Has had severe 10/10 pain when he moves  Took muscle relaxer and pain med this afternoon and did not eat  In office today became diaphoretic and clammy--glucose 171  EMT called but better when they arrived    Pt denies any pain radiating to legs  Pain just on lower lumbar area  Had XR of L spine today--chronic appearing lumbar compression fx's  C/o feeling nauseated earlier this morning and in office d/t pain    Patient Active Problem List    Diagnosis Date Noted    Left low back pain 08/15/2016     Priority: 1 - One    Gross hematuria 08/15/2016     Priority: 1 - One    Calculus of kidney 04/18/2016     Priority: 1 - One    Benign prostatic hyperplasia with lower urinary tract symptoms 06/22/2014     Priority: 1 - One    Nocturia 07/30/2014     Priority: 2 - Two    Slowing of urinary stream 07/30/2014     Priority: 2 - Two    Urinary frequency 06/22/2014     Priority: 2 - Two    Erectile dysfunction 04/17/2016     Priority: 3 - Three    Moderate major depression ([address] 07/24/2018    Full code but no chest compressions 07/23/2018    Localized edema 07/23/2018    Type 2 diabetes mellitus with nephropathy ([address] 01/23/2018    Fracture of multiple ribs of right side 07/26/2017    Essential hypertension 03/28/2017    Hypercholesterolemia 03/28/2017    Controlled type 2 diabetes mellitus without complication ([address] 35/09/6279    History of stroke 03/28/2017    Coronary artery disease involving native coronary artery without angina pectoris 03/28/2017    Obesity 06/22/2014     Current Outpatient Medications   Medication Sig Dispense Refill    HYDROcodone-acetaminophen (NORCO) 5-325 mg per tablet Take 1 Tab by mouth every eight (8) hours as needed for Pain for up to 3 days. Max Daily Amount: 3 Tabs. 15 Tab 0    methocarbamol (ROBAXIN) 500 mg tablet Take 1 Tab by mouth two (2) times daily as needed for Pain. 20 Tab 0    metFORMIN (GLUCOPHAGE) 500 mg tablet Take 1 Tab by mouth daily (with dinner). 90 Tab 3    Blood-Glucose Meter monitoring kit Check fsbs every day 1 Kit 0    glucose blood VI test strips (ASCENSIA AUTODISC VI, ONE TOUCH ULTRA TEST VI) strip Check fsbs every day 250.00 50 Strip 11    lancets misc check fsbs every day 250.00 1 Each 11    aspirin delayed-release 81 mg tablet Take 1 Tab by mouth daily. 30 Tab 0    KLOR-CON M20 20 mEq tablet TAKE 1 TABLET BY MOUTH EVERY DAY 90 Tab 0    ezetimibe (ZETIA) 10 mg tablet Take 1 Tab by mouth daily. 90 Tab 1    furosemide (LASIX) 40 mg tablet TAKE 1 TABLET BY MOUTH EVERY DAY 90 Tab 3    amLODIPine-benazepril (LOTREL) 5-20 mg per capsule Take 1 Cap by Mouth Once a Day. 90 Cap 2    buPROPion SR (WELLBUTRIN SR) 150 mg SR tablet TAKE 1 TAB BY MOUTH TWO (2) TIMES A DAY. 180 Tab 1    carvedilol (COREG) 12.5 mg tablet Take 1 tablet by mouth two  times daily with meals 180 Tab 3    hydroCHLOROthiazide (HYDRODIURIL) 25 mg tablet Take 1 Tab by mouth daily. 90 Tab 3    atorvastatin (LIPITOR) 40 mg tablet TAKE 1 TAB BY MOUTH DAILY. 90 Tab 1    tamsulosin (FLOMAX) 0.4 mg capsule TAKE 2 CAPSULES BY MOUTH EVERY  Cap 1    finasteride (PROSCAR) 5 mg tablet TAKE 1 TABLET BY MOUTH  NIGHTLY 90 Tab 0    therapeutic multivitamin (THERA) tablet Take 1 Tab by mouth daily.  90 Tab 1     No Known Allergies   Lab Results   Component Value Date/Time    WBC 8.1 10/03/2019 11:13 AM    HGB 13.6 10/03/2019 11:13 AM    HCT 40.1 10/03/2019 11:13 AM    PLATELET 932 46/71/4311 11:13 AM    MCV 92 10/03/2019 11:13 AM     Lab Results   Component Value Date/Time    Hemoglobin A1c 7.2 (H) 10/03/2019 11:13 AM    Hemoglobin A1c 6.1 06/24/2017 05:59 AM    Hemoglobin A1c 5.8 (H) 03/28/2017 01:08 PM    Glucose 160 (H) 10/03/2019 11:13 AM Glucose (POC) 110 (H) 06/24/2017 06:39 AM    Glucose  (A) 12/05/2019 05:48 PM    Microalb/Creat ratio (ug/mg creat.) <8.3 10/03/2019 11:13 AM    LDL, calculated 70 10/03/2019 11:13 AM    Creatinine 1.49 (H) 10/03/2019 11:13 AM      Lab Results   Component Value Date/Time    GFR est non-AA 47 (L) 10/03/2019 11:13 AM    GFR est AA 54 (L) 10/03/2019 11:13 AM    Creatinine 1.49 (H) 10/03/2019 11:13 AM    BUN 23 10/03/2019 11:13 AM    Sodium 144 10/03/2019 11:13 AM    Potassium 3.5 10/03/2019 11:13 AM    Chloride 98 10/03/2019 11:13 AM    CO2 26 10/03/2019 11:13 AM        ROS    Physical Exam  Vitals signs and nursing note reviewed. Constitutional:       General: He is not in acute distress. Appearance: He is well-developed. Comments: Appears stated age  Was clammy and diaphoretic   HENT:      Head: Normocephalic. Cardiovascular:      Rate and Rhythm: Normal rate and regular rhythm. Heart sounds: Normal heart sounds. Pulmonary:      Effort: Pulmonary effort is normal.      Breath sounds: Normal breath sounds. Abdominal:      Palpations: Abdomen is soft. Musculoskeletal:      Comments: Mild TTP of lower lumbar spine area  Limited ROM of lower back d/t  Pain     Neurological:      Mental Status: He is alert. ASSESSMENT and PLAN  Diagnoses and all orders for this visit:    1. Nausea  -     AMB POC EKG ROUTINE W/ 12 LEADS, INTER & REP-no iscemic abnormality   fsbs 171   Improved clammy sensation and diaphoreses upon EMT arrivel   Suspect nausea d/t pain and tramadol/muscle relaxer combination    2. Acute midline low back pain without sciatica  -     REFERRAL TO ORTHOPEDICS  -     HYDROcodone-acetaminophen (NORCO) 5-325 mg per tablet; Take 1 Tab by mouth every eight (8) hours as needed for Pain for up to 3 days. Max Daily Amount: 3 Tabs. 15 tabs /nr   Lumbar compression fx likely chronic but get opinion from ortho MD ?MRI   Stop tramadol and robaxin  3.  Type 2 diabetes mellitus without complication, with long-term current use of insulin (HCC)  -     AMB POC GLUCOSE BLOOD, BY GLUCOSE MONITORING DEVICE--171   Continue metformin

## 2019-12-05 NOTE — TELEPHONE ENCOUNTER
#[phone] [facility] pt had an x ray and they found no broken bones. Pt was given muscle relaxer /pain med. Pt is having severe lower back pain. She needs to know what they do next? Does pt need to be seen or see ortho? Needs advise, please call today.

## 2019-12-06 ENCOUNTER — TELEPHONE (OUTPATIENT)
Dept: INTERNAL MEDICINE CLINIC | Age: 69
End: 2019-12-06

## 2019-12-06 ENCOUNTER — HOSPITAL ENCOUNTER (OUTPATIENT)
Dept: CARDIAC REHAB | Age: 69
End: 2019-12-06
Payer: MEDICARE

## 2019-12-06 NOTE — TELEPHONE ENCOUNTER
#[phone] Litaannamaria Mcconnell is asking that you call her pertaining to a referral that the doctor is not in network.

## 2019-12-06 NOTE — TELEPHONE ENCOUNTER
Called and spoke with Mayelin Cortez. Recommended Mayelin Cortez speak with [facility] to see who pt can see in ortho. Mayelin Cortez verbalized understanding of information discussed w/ no further questions at this time.

## 2019-12-09 ENCOUNTER — APPOINTMENT (OUTPATIENT)
Dept: CARDIAC REHAB | Age: 69
End: 2019-12-09
Payer: MEDICARE

## 2019-12-10 ENCOUNTER — APPOINTMENT (OUTPATIENT)
Dept: CARDIAC REHAB | Age: 69
End: 2019-12-10

## 2019-12-11 ENCOUNTER — APPOINTMENT (OUTPATIENT)
Dept: CARDIAC REHAB | Age: 69
End: 2019-12-11
Payer: MEDICARE

## 2019-12-11 ENCOUNTER — TELEPHONE (OUTPATIENT)
Dept: CARDIAC REHAB | Age: 69
End: 2019-12-11

## 2019-12-11 NOTE — TELEPHONE ENCOUNTER
Cardiopulmonary Rehab Nursing Entry:    Pts wife phoned to report that Mr. Montejo December has continued back pain and will not be able to attend PAD work-out for the next one week at least through the holiday. Pt has had an MRI without results at this time and is unsure of the treatment plan. He is not sure if he will be able to return to exercise before his 12 week program timeframe ends.

## 2019-12-12 ENCOUNTER — APPOINTMENT (OUTPATIENT)
Dept: CARDIAC REHAB | Age: 69
End: 2019-12-12

## 2019-12-13 ENCOUNTER — APPOINTMENT (OUTPATIENT)
Dept: CARDIAC REHAB | Age: 69
End: 2019-12-13
Payer: MEDICARE

## 2019-12-16 ENCOUNTER — APPOINTMENT (OUTPATIENT)
Dept: CARDIAC REHAB | Age: 69
End: 2019-12-16
Payer: MEDICARE

## 2019-12-17 ENCOUNTER — APPOINTMENT (OUTPATIENT)
Dept: CARDIAC REHAB | Age: 69
End: 2019-12-17

## 2019-12-18 ENCOUNTER — APPOINTMENT (OUTPATIENT)
Dept: CARDIAC REHAB | Age: 69
End: 2019-12-18
Payer: MEDICARE

## 2019-12-19 ENCOUNTER — APPOINTMENT (OUTPATIENT)
Dept: CARDIAC REHAB | Age: 69
End: 2019-12-19

## 2019-12-20 ENCOUNTER — HOSPITAL ENCOUNTER (OUTPATIENT)
Dept: INTERVENTIONAL RADIOLOGY/VASCULAR | Age: 69
Discharge: HOME | End: 2019-12-20
Attending: ORTHOPAEDIC SURGERY | Admitting: ORTHOPAEDIC SURGERY
Payer: MEDICARE

## 2019-12-20 ENCOUNTER — APPOINTMENT (OUTPATIENT)
Dept: CARDIAC REHAB | Age: 69
End: 2019-12-20
Payer: MEDICARE

## 2019-12-20 VITALS
SYSTOLIC BLOOD PRESSURE: 113 MMHG | BODY MASS INDEX: 27.77 KG/M2 | TEMPERATURE: 98.7 F | HEIGHT: 72 IN | DIASTOLIC BLOOD PRESSURE: 54 MMHG | OXYGEN SATURATION: 99 % | WEIGHT: 205 LBS | HEART RATE: 62 BPM | RESPIRATION RATE: 12 BRPM

## 2019-12-20 DIAGNOSIS — M48.50XA VERTEBRAL COMPRESSION FRACTURE (HCC): ICD-10-CM

## 2019-12-20 PROCEDURE — 74011250636 HC RX REV CODE- 250/636: Performed by: RADIOLOGY

## 2019-12-20 PROCEDURE — 74011000250 HC RX REV CODE- 250: Performed by: RADIOLOGY

## 2019-12-20 PROCEDURE — 74011636320 HC RX REV CODE- 636/320: Performed by: RADIOLOGY

## 2019-12-20 PROCEDURE — C1713 ANCHOR/SCREW BN/BN,TIS/BN: HCPCS

## 2019-12-20 PROCEDURE — 22514 PERQ VERTEBRAL AUGMENTATION: CPT

## 2019-12-20 RX ORDER — SODIUM CHLORIDE 9 MG/ML
500 INJECTION, SOLUTION INTRAVENOUS CONTINUOUS
Status: DISCONTINUED | OUTPATIENT
Start: 2019-12-20 | End: 2019-12-20 | Stop reason: HOSPADM

## 2019-12-20 RX ORDER — MIDAZOLAM HYDROCHLORIDE 1 MG/ML
5 INJECTION, SOLUTION INTRAMUSCULAR; INTRAVENOUS
Status: DISCONTINUED | OUTPATIENT
Start: 2019-12-20 | End: 2019-12-20 | Stop reason: HOSPADM

## 2019-12-20 RX ORDER — BUPIVACAINE HYDROCHLORIDE 5 MG/ML
30 INJECTION, SOLUTION EPIDURAL; INTRACAUDAL ONCE
Status: COMPLETED | OUTPATIENT
Start: 2019-12-20 | End: 2019-12-20

## 2019-12-20 RX ORDER — LIDOCAINE HYDROCHLORIDE 20 MG/ML
20 INJECTION, SOLUTION INFILTRATION; PERINEURAL ONCE
Status: COMPLETED | OUTPATIENT
Start: 2019-12-20 | End: 2019-12-20

## 2019-12-20 RX ORDER — SODIUM CHLORIDE 0.9 % (FLUSH) 0.9 %
10 SYRINGE (ML) INJECTION AS NEEDED
Status: DISCONTINUED | OUTPATIENT
Start: 2019-12-20 | End: 2019-12-20 | Stop reason: HOSPADM

## 2019-12-20 RX ORDER — FENTANYL CITRATE 50 UG/ML
200 INJECTION, SOLUTION INTRAMUSCULAR; INTRAVENOUS
Status: DISCONTINUED | OUTPATIENT
Start: 2019-12-20 | End: 2019-12-20 | Stop reason: HOSPADM

## 2019-12-20 RX ORDER — KETOROLAC TROMETHAMINE 30 MG/ML
15 INJECTION, SOLUTION INTRAMUSCULAR; INTRAVENOUS AS NEEDED
Status: DISCONTINUED | OUTPATIENT
Start: 2019-12-20 | End: 2019-12-20 | Stop reason: HOSPADM

## 2019-12-20 RX ORDER — CEFAZOLIN SODIUM/WATER 2 G/20 ML
2 SYRINGE (ML) INTRAVENOUS ONCE
Status: COMPLETED | OUTPATIENT
Start: 2019-12-20 | End: 2019-12-20

## 2019-12-20 RX ORDER — TRAMADOL HYDROCHLORIDE 50 MG/1
50 TABLET ORAL
COMMUNITY
End: 2021-07-08 | Stop reason: CLARIF

## 2019-12-20 RX ADMIN — SODIUM CHLORIDE 500 ML: 900 INJECTION, SOLUTION INTRAVENOUS at 12:32

## 2019-12-20 RX ADMIN — KETOROLAC TROMETHAMINE 15 MG: 30 INJECTION, SOLUTION INTRAMUSCULAR at 12:32

## 2019-12-20 RX ADMIN — Medication 2 G: at 12:38

## 2019-12-20 RX ADMIN — LIDOCAINE HYDROCHLORIDE 400 MG: 20 INJECTION, SOLUTION INFILTRATION; PERINEURAL at 13:12

## 2019-12-20 RX ADMIN — FENTANYL CITRATE 25 MCG: 50 INJECTION, SOLUTION INTRAMUSCULAR; INTRAVENOUS at 13:13

## 2019-12-20 RX ADMIN — FENTANYL CITRATE 50 MCG: 50 INJECTION, SOLUTION INTRAMUSCULAR; INTRAVENOUS at 13:23

## 2019-12-20 RX ADMIN — MIDAZOLAM 1 MG: 1 INJECTION INTRAMUSCULAR; INTRAVENOUS at 13:00

## 2019-12-20 RX ADMIN — FENTANYL CITRATE 25 MCG: 50 INJECTION, SOLUTION INTRAMUSCULAR; INTRAVENOUS at 13:06

## 2019-12-20 RX ADMIN — Medication 10 ML: at 13:49

## 2019-12-20 RX ADMIN — FENTANYL CITRATE 25 MCG: 50 INJECTION, SOLUTION INTRAMUSCULAR; INTRAVENOUS at 12:37

## 2019-12-20 RX ADMIN — IOHEXOL 60 ML: 240 INJECTION, SOLUTION INTRATHECAL; INTRAVASCULAR; INTRAVENOUS; ORAL at 13:11

## 2019-12-20 RX ADMIN — FENTANYL CITRATE 25 MCG: 50 INJECTION, SOLUTION INTRAMUSCULAR; INTRAVENOUS at 13:01

## 2019-12-20 RX ADMIN — MIDAZOLAM 0.5 MG: 1 INJECTION INTRAMUSCULAR; INTRAVENOUS at 13:14

## 2019-12-20 RX ADMIN — BUPIVACAINE HYDROCHLORIDE 150 MG: 5 INJECTION, SOLUTION EPIDURAL; INTRACAUDAL; PERINEURAL at 13:12

## 2019-12-20 RX ADMIN — MIDAZOLAM 0.5 MG: 1 INJECTION INTRAMUSCULAR; INTRAVENOUS at 13:18

## 2019-12-20 RX ADMIN — MIDAZOLAM 1 MG: 1 INJECTION INTRAMUSCULAR; INTRAVENOUS at 13:08

## 2019-12-20 NOTE — DISCHARGE INSTRUCTIONS
[address] [facility]  Department of Interventional Radiology  [facility]    KYPHOPLASTY DISCHARGE INSTRUCTIONS    General Information: This procedure is done to help with the back pain that is associated with compression fractures in the spine. The kyphoplasty involves placing a balloon into the space of the vertebrae that is fractured, blowing up the balloon, therefore realigning the broken pieces of bone, and then injecting cement into the space to strengthen the vertebrae. The pain experienced from compression fractures is caused by the vertebrae not being stabilized. The cement stabilizes the bone, therefore reducing the pain. Home Care Instructions: You can resume your regular diet and medication regimen. Do not drink alcohol, drive, or make any important legal decisions in the next 24 hours. Do not lift anything heavier than a gallon of milk, or do anything strenuous for the next 24 hours. You will notice a dressing on your lower back after your procedure. This dressing can be removed in 24 hours. Showering is acceptable in 24 hours, but you should refrain from tub baths or swimming for 5 days. Call If:     You should call your Physician and/or the Radiology Nurse if you have any bleeding other than a small spot on your bandage. Call if you have any signs of infection, fever, or increased pain at the site. Call if you should have new or worsening pain in your back, or if you lose control of your bladder or bowel. Any tingling or loss of feeling or movement in your legs should also be reported. Follow-Up Instructions: Please see your ordering doctor as he/she has requested. To Reach Us:   Side effects of sedation medications and other medications used today have been reviewed. Notify us of nausea, itching, hives, dizziness, or anything else out of the ordinary.        Should you experience any of these significant changes, please call [phone] between the hours of 7:30 am and 10 pm or [phone] after hours.  After hours, ask the  to page the [address] Technologist, and describe the problem to the technologist.            Patient Signature:  Date: 12/20/2019  Discharging Nurse: Bhupinder Kyle RN

## 2019-12-20 NOTE — H&P
Interventional and Vascular Radiology History and Physical    Patient: Felipe Long 68 y.o. male       Chief Complaint: No chief complaint on file.       History of Present Illness: L3 fracture     History:    Past Medical History:   Diagnosis Date    Agoraphobia with panic disorder     Anxiety     Arthritis     spine    BPH (benign prostatic hypertrophy) with urinary obstruction     CAD (coronary artery disease)     no previous MI or stents    Chronic edema     Coronary artery disease involving native coronary artery without angina pectoris 3/28/2017    CVD (cardiovascular disease)     Depression     Diabetes ([address]     no longer medicated    Erectile dysfunction     Essential hypertension 3/28/2017    Falls     Fracture of multiple ribs of right side 7/26/2017    Full code but no chest compressions 7/23/2018    GERD (gastroesophageal reflux disease)     Hemiparesis ([address]     History of stroke 3/28/2017    Hypercholesterolemia     Hypercholesterolemia 3/28/2017    Hypertension     Joint pain     Localized edema 7/23/2018    Muscle pain     Muscle weakness     Nocturia     Panic disorder     Ringing in the ears     Seizures ([address]     at age 3, facial    Stroke ([address] 1996    left sided weakness     Family History   Problem Relation Age of Onset    Cancer Father         lung    Stroke Father     Hypertension Father     Cancer Mother         lung    Hypertension Sister     Heart Disease Brother         chf--aicd     Social History     Socioeconomic History    Marital status:      Spouse name: Not on file    Number of children: Not on file    Years of education: Not on file    Highest education level: Not on file   Occupational History    Not on file   Social Needs    Financial resource strain: Not on file    Food insecurity:     Worry: Not on file     Inability: Not on file    Transportation needs:     Medical: Not on file     Non-medical: Not on file   Tobacco Use  Smoking status: Never Smoker    Smokeless tobacco: Never Used   Substance and Sexual Activity    Alcohol use: Yes     Alcohol/week: 5.0 standard drinks     Types: 5 Cans of beer per week     Frequency: 2-3 times a week     Drinks per session: 1 or 2     Binge frequency: Never     Comment: a week    Drug use: No    Sexual activity: Yes     Partners: Female   Lifestyle    Physical activity:     Days per week: Not on file     Minutes per session: Not on file    Stress: Not on file   Relationships    Social connections:     Talks on phone: Not on file     Gets together: Not on file     Attends Judaism service: Not on file     Active member of club or organization: Not on file     Attends meetings of clubs or organizations: Not on file     Relationship status: Not on file    Intimate partner violence:     Fear of current or ex partner: Not on file     Emotionally abused: Not on file     Physically abused: Not on file     Forced sexual activity: Not on file   Other Topics Concern    Not on file   Social History Narrative    Not on file       Allergies: No Known Allergies    Current Medications:  Current Facility-Administered Medications   Medication Dose Route Frequency    midazolam (VERSED) injection 5 mg  5 mg IntraVENous Multiple    fentaNYL citrate (PF) injection 200 mcg  200 mcg IntraVENous Multiple    0.9% sodium chloride infusion 500 mL  500 mL IntraVENous CONTINUOUS    saline peripheral flush soln 10 mL  10 mL InterCATHeter PRN    ketorolac (TORADOL) injection 15 mg  15 mg IntraVENous PRN        Physical Exam:  Blood pressure 125/65, pulse 70, temperature 98.7 °F (37.1 °C), resp. rate 12, height 6' (1.829 m), weight 93 kg (205 lb), SpO2 98 %.   LUNG: clear to auscultation bilaterally, HEART: regular rate and rhythm, S1, S2 normal, no murmur, click, rub or gallop      Alerts:    Hospital Problems  Date Reviewed: 10/3/2019    None          Laboratory:    No results for input(s): HGB, HCT, WBC, PLT, INR, BUN, CREA, K, CRCLT, HGBEXT, HCTEXT, PLTEXT, INREXT in the last 72 hours. No lab exists for component: PTT, PT      Plan of Care/Planned Procedure:  Risks, benefits, and alternatives reviewed with patient and he agrees to proceed with the procedure. Conscious sedation will be performed with IV fentanyl and versed.  Plan is for L3 kyphoplasty       Adrian Cantu MD

## 2019-12-23 ENCOUNTER — APPOINTMENT (OUTPATIENT)
Dept: CARDIAC REHAB | Age: 69
End: 2019-12-23
Payer: MEDICARE

## 2019-12-26 ENCOUNTER — APPOINTMENT (OUTPATIENT)
Dept: CARDIAC REHAB | Age: 69
End: 2019-12-26

## 2019-12-27 ENCOUNTER — APPOINTMENT (OUTPATIENT)
Dept: CARDIAC REHAB | Age: 69
End: 2019-12-27
Payer: MEDICARE

## 2019-12-31 ENCOUNTER — APPOINTMENT (OUTPATIENT)
Dept: CARDIAC REHAB | Age: 69
End: 2019-12-31

## 2020-01-02 ENCOUNTER — TELEPHONE (OUTPATIENT)
Dept: INTERNAL MEDICINE CLINIC | Age: 70
End: 2020-01-02

## 2020-01-02 ENCOUNTER — APPOINTMENT (OUTPATIENT)
Dept: CARDIAC REHAB | Age: 70
End: 2020-01-02
Payer: MEDICARE

## 2020-01-02 NOTE — TELEPHONE ENCOUNTER
#[phone] pt had a back procedure done  (date?) and now has swollen testicles. Pt needs to be seen as soon as possible.

## 2020-01-02 NOTE — TELEPHONE ENCOUNTER
Called, spoke to pt. Two identifiers confirmed. Notified pt to contact his urologist for his recommendations. Pt verbalized understanding of information discussed w/ no further questions at this time.

## 2020-01-02 NOTE — CARDIO/PULMONARY
Patient's wife called and said he has a fracture in his back and will not be participating in the program.  Stated he may come back later but to please remove him now. Explained importance of new order if he returns. Chart broken down.

## 2020-01-07 ENCOUNTER — PATIENT OUTREACH (OUTPATIENT)
Dept: INTERNAL MEDICINE CLINIC | Age: 70
End: 2020-01-07

## 2020-01-07 NOTE — PROGRESS NOTES
Chart reviewed. Reached out to pt regarding referral for diabetes care and education. Pt stated he broke his back at home after returning from a month long trip to [facility]. Pt was seen by Dr. Delmis Stein at that time and was asked to continue metformin. Pt will restart the metformin, start checking blood sugars at least once a week. He will walk as tolerated and per orders from doctor as he has an L3 kyphoplasty on 12/20/19. Pt will be mindful of healthy eating and f/u with Dr. Delmis Stein on 4/1/20 at which time a f/u a1c is due. Pt will reach out as needed for diabetes care and education. Result Notes for HEMOGLOBIN A1C WITH EAG     Notes recorded by Andra Schmid RN on 10/14/2019 at 11:43 AM EDT  [phone] (home) - pt is leaving tomorrow for [facility]. He will start checking blood sugars when he gets back and start the metformin. Discussed briefly the healthy plate meal planning and how to make substitutions with fish and chips. Pt will call this certified diabetes educator at [phone] when he returns to the [address] [place] [address],[address]. He stated it may be December.         Future Appointments   Date Time Provider Roberto Garibay   1/8/2020  9:30 AM [facility] [address] [address]   4/1/2020 11:00 AM MD STEPHON [address]      Last Appointment My Department:  12/5/2019

## 2020-01-08 ENCOUNTER — APPOINTMENT (OUTPATIENT)
Dept: CARDIAC REHAB | Age: 70
End: 2020-01-08

## 2020-01-08 ENCOUNTER — TELEPHONE (OUTPATIENT)
Dept: CARDIAC REHAB | Age: 70
End: 2020-01-08

## 2020-01-16 ENCOUNTER — HOSPITAL ENCOUNTER (OUTPATIENT)
Dept: MRI IMAGING | Age: 70
Discharge: HOME | End: 2020-01-16
Attending: ORTHOPAEDIC SURGERY
Payer: MEDICARE

## 2020-01-16 DIAGNOSIS — M48.50XA SPINAL COMPRESSION FRACTURE (HCC): ICD-10-CM

## 2020-01-16 DIAGNOSIS — M54.16 LUMBAR RADICULOPATHY: ICD-10-CM

## 2020-01-16 PROCEDURE — 72148 MRI LUMBAR SPINE W/O DYE: CPT

## 2020-01-24 ENCOUNTER — HOSPITAL ENCOUNTER (OUTPATIENT)
Dept: INTERVENTIONAL RADIOLOGY/VASCULAR | Age: 70
Discharge: HOME | End: 2020-01-24
Attending: ORTHOPAEDIC SURGERY
Payer: MEDICARE

## 2020-01-24 VITALS
HEART RATE: 65 BPM | SYSTOLIC BLOOD PRESSURE: 108 MMHG | BODY MASS INDEX: 27.06 KG/M2 | OXYGEN SATURATION: 97 % | WEIGHT: 189 LBS | HEIGHT: 70 IN | TEMPERATURE: 98.1 F | DIASTOLIC BLOOD PRESSURE: 62 MMHG | RESPIRATION RATE: 18 BRPM

## 2020-01-24 DIAGNOSIS — S32.020S COMPRESSION FRACTURE OF L2 LUMBAR VERTEBRA, SEQUELA: ICD-10-CM

## 2020-01-24 DIAGNOSIS — S32.020A COMPRESSION FRACTURE OF L2 LUMBAR VERTEBRA (HCC): ICD-10-CM

## 2020-01-24 PROCEDURE — 74011000250 HC RX REV CODE- 250: Performed by: RADIOLOGY

## 2020-01-24 PROCEDURE — 77030030399

## 2020-01-24 PROCEDURE — 74011250636 HC RX REV CODE- 250/636: Performed by: RADIOLOGY

## 2020-01-24 PROCEDURE — 77030040175 HC TAMP SPN BN INFLT KYPH MEDT -I1

## 2020-01-24 PROCEDURE — 77030003666 HC NDL SPINAL BD -A

## 2020-01-24 PROCEDURE — C1713 ANCHOR/SCREW BN/BN,TIS/BN: HCPCS

## 2020-01-24 PROCEDURE — 22514 PERQ VERTEBRAL AUGMENTATION: CPT

## 2020-01-24 PROCEDURE — 74011636320 HC RX REV CODE- 636/320: Performed by: RADIOLOGY

## 2020-01-24 RX ORDER — LIDOCAINE HYDROCHLORIDE 20 MG/ML
20 INJECTION, SOLUTION INFILTRATION; PERINEURAL ONCE
Status: COMPLETED | OUTPATIENT
Start: 2020-01-24 | End: 2020-01-24

## 2020-01-24 RX ORDER — DIPHENHYDRAMINE HYDROCHLORIDE 50 MG/ML
12.5 INJECTION, SOLUTION INTRAMUSCULAR; INTRAVENOUS
Status: COMPLETED | OUTPATIENT
Start: 2020-01-24 | End: 2020-01-24

## 2020-01-24 RX ORDER — FENTANYL CITRATE 50 UG/ML
100 INJECTION, SOLUTION INTRAMUSCULAR; INTRAVENOUS
Status: DISCONTINUED | OUTPATIENT
Start: 2020-01-24 | End: 2020-01-28 | Stop reason: HOSPADM

## 2020-01-24 RX ORDER — KETOROLAC TROMETHAMINE 30 MG/ML
15 INJECTION, SOLUTION INTRAMUSCULAR; INTRAVENOUS ONCE
Status: COMPLETED | OUTPATIENT
Start: 2020-01-24 | End: 2020-01-24

## 2020-01-24 RX ORDER — SODIUM CHLORIDE 9 MG/ML
25 INJECTION, SOLUTION INTRAVENOUS CONTINUOUS
Status: DISCONTINUED | OUTPATIENT
Start: 2020-01-24 | End: 2020-01-28 | Stop reason: HOSPADM

## 2020-01-24 RX ORDER — MIDAZOLAM HYDROCHLORIDE 1 MG/ML
5 INJECTION, SOLUTION INTRAMUSCULAR; INTRAVENOUS
Status: DISCONTINUED | OUTPATIENT
Start: 2020-01-24 | End: 2020-01-28 | Stop reason: HOSPADM

## 2020-01-24 RX ORDER — HEPARIN SODIUM 200 [USP'U]/100ML
400 INJECTION, SOLUTION INTRAVENOUS ONCE
Status: DISPENSED | OUTPATIENT
Start: 2020-01-24 | End: 2020-01-24

## 2020-01-24 RX ORDER — BUPIVACAINE HYDROCHLORIDE 5 MG/ML
10 INJECTION, SOLUTION EPIDURAL; INTRACAUDAL ONCE
Status: COMPLETED | OUTPATIENT
Start: 2020-01-24 | End: 2020-01-24

## 2020-01-24 RX ADMIN — FENTANYL CITRATE 50 MCG: 50 INJECTION, SOLUTION INTRAMUSCULAR; INTRAVENOUS at 09:27

## 2020-01-24 RX ADMIN — FENTANYL CITRATE 25 MCG: 50 INJECTION, SOLUTION INTRAMUSCULAR; INTRAVENOUS at 09:18

## 2020-01-24 RX ADMIN — MIDAZOLAM 2 MG: 1 INJECTION INTRAMUSCULAR; INTRAVENOUS at 09:21

## 2020-01-24 RX ADMIN — IOPAMIDOL 3 ML: 612 INJECTION, SOLUTION INTRATHECAL at 09:51

## 2020-01-24 RX ADMIN — BUPIVACAINE HYDROCHLORIDE 20 MG: 5 INJECTION, SOLUTION EPIDURAL; INTRACAUDAL; PERINEURAL at 09:50

## 2020-01-24 RX ADMIN — DIPHENHYDRAMINE HYDROCHLORIDE 12.5 MG: 50 INJECTION, SOLUTION INTRAMUSCULAR; INTRAVENOUS at 08:39

## 2020-01-24 RX ADMIN — MIDAZOLAM 2 MG: 1 INJECTION INTRAMUSCULAR; INTRAVENOUS at 09:17

## 2020-01-24 RX ADMIN — FENTANYL CITRATE 50 MCG: 50 INJECTION, SOLUTION INTRAMUSCULAR; INTRAVENOUS at 09:23

## 2020-01-24 RX ADMIN — LIDOCAINE HYDROCHLORIDE 20 MG: 20 INJECTION, SOLUTION INFILTRATION; PERINEURAL at 09:50

## 2020-01-24 RX ADMIN — KETOROLAC TROMETHAMINE 15 MG: 30 INJECTION, SOLUTION INTRAMUSCULAR at 08:38

## 2020-01-24 RX ADMIN — WATER 2 G: 1 INJECTION INTRAMUSCULAR; INTRAVENOUS; SUBCUTANEOUS at 08:40

## 2020-01-24 RX ADMIN — SODIUM CHLORIDE 25 ML/HR: 900 INJECTION, SOLUTION INTRAVENOUS at 08:35

## 2020-01-24 RX ADMIN — FENTANYL CITRATE 50 MCG: 50 INJECTION, SOLUTION INTRAMUSCULAR; INTRAVENOUS at 09:21

## 2020-01-24 NOTE — ROUTINE PROCESS
Patient alert and oriented x 4, denies discomfort to procedure site, dressing to procedure site dry and intact. Patient ate lunch meal without difficulty. Patient provided with verbal and written discharge instructions. Patient discharged via wheelchair with wife to transport home.

## 2020-01-24 NOTE — ROUTINE PROCESS
0798  Patient arrived ambulatory to Radiology [facility], alert and oriented x 4, accompanied by wife. 1513  Dr. El Dow at bedside explaining procedure to patient and providing patient with risks and benefits of procedure. Patient verbalized understanding and signed consent for procedure. 1012  Meal ordered.

## 2020-01-24 NOTE — DISCHARGE INSTRUCTIONS
[facility]  Radiology Department  [phone]      Radiologist:  Dr. Tahira Tran     Date:  1/24/2020        Kyphoplasty Discharge Instructions    Go home and rest  and restrict your activity the next 24 hours. You have been given sedating medications, so do not drive or drink alcohol today. Resume your previous diet and medications. You may take Tylenol, as directed on the label, for pain or discomfort. Avoid Ibuprofen (Advil, Motrin etc.) and Aspirin today as they may increase your risk of bleeding. Avoid heavy lifting (nothing greater than 5 pounds),  excessive bending,  and pushing or pulling movements for one week. Then begin to advance your activity as tolerated. Be sure to follow up with your physician, and let him know how you are progressing. If a biopsy sample was collected, your results will be sent to your ordering physician. If you have new severe pain, numbness, tingling, or weakness in your legs go to the nearest emergency department, and tell them you have had a Kyphoplasty.

## 2020-01-24 NOTE — H&P
Radiology History and Physical    Patient: Saima Rodriguez 68 y.o. male       Chief Complaint: No chief complaint on file.       History of Present Illness: for kypho    History:    Past Medical History:   Diagnosis Date    Agoraphobia with panic disorder     Anxiety     Arthritis     spine    BPH (benign prostatic hypertrophy) with urinary obstruction     CAD (coronary artery disease)     no previous MI or stents    Chronic edema     Coronary artery disease involving native coronary artery without angina pectoris 3/28/2017    CVD (cardiovascular disease)     Depression     Diabetes ([address]     no longer medicated    Erectile dysfunction     Essential hypertension 3/28/2017    Falls     Fracture of multiple ribs of right side 7/26/2017    Full code but no chest compressions 7/23/2018    GERD (gastroesophageal reflux disease)     Hemiparesis ([address]     History of stroke 3/28/2017    Hypercholesterolemia     Hypercholesterolemia 3/28/2017    Hypertension     Joint pain     Localized edema 7/23/2018    Muscle pain     Muscle weakness     Nocturia     Panic disorder     Ringing in the ears     Seizures ([address]     at age 3, facial    Stroke ([address] 1996    left sided weakness     Family History   Problem Relation Age of Onset    Cancer Father         lung    Stroke Father     Hypertension Father     Cancer Mother         lung    Hypertension Sister     Heart Disease Brother         chf--aicd     Social History     Socioeconomic History    Marital status:      Spouse name: Not on file    Number of children: Not on file    Years of education: Not on file    Highest education level: Not on file   Occupational History    Not on file   Social Needs    Financial resource strain: Not on file    Food insecurity:     Worry: Not on file     Inability: Not on file    Transportation needs:     Medical: Not on file     Non-medical: Not on file   Tobacco Use    Smoking status: Never Smoker  Smokeless tobacco: Never Used   Substance and Sexual Activity    Alcohol use: Yes     Alcohol/week: 5.0 standard drinks     Types: 5 Cans of beer per week     Frequency: 2-3 times a week     Drinks per session: 1 or 2     Binge frequency: Never     Comment: a week    Drug use: No    Sexual activity: Yes     Partners: Female   Lifestyle    Physical activity:     Days per week: Not on file     Minutes per session: Not on file    Stress: Not on file   Relationships    Social connections:     Talks on phone: Not on file     Gets together: Not on file     Attends [employer] service: Not on file     Active member of club or organization: Not on file     Attends meetings of clubs or organizations: Not on file     Relationship status: Not on file    Intimate partner violence:     Fear of current or ex partner: Not on file     Emotionally abused: Not on file     Physically abused: Not on file     Forced sexual activity: Not on file   Other Topics Concern    Not on file   Social History Narrative    Not on file       Allergies: No Known Allergies    Current Medications:  Current Outpatient Medications   Medication Sig    traMADol (ULTRAM) 50 mg tablet Take 50 mg by mouth every six (6) hours as needed for Pain.  metFORMIN (GLUCOPHAGE) 500 mg tablet Take 1 Tab by mouth daily (with dinner).  KLOR-CON M20 20 mEq tablet TAKE 1 TABLET BY MOUTH EVERY DAY    ezetimibe (ZETIA) 10 mg tablet Take 1 Tab by mouth daily.  furosemide (LASIX) 40 mg tablet TAKE 1 TABLET BY MOUTH EVERY DAY    amLODIPine-benazepril (LOTREL) 5-20 mg per capsule Take 1 Cap by Mouth Once a Day.  buPROPion SR (WELLBUTRIN SR) 150 mg SR tablet TAKE 1 TAB BY MOUTH TWO (2) TIMES A DAY.  carvedilol (COREG) 12.5 mg tablet Take 1 tablet by mouth two  times daily with meals    hydroCHLOROthiazide (HYDRODIURIL) 25 mg tablet Take 1 Tab by mouth daily.  atorvastatin (LIPITOR) 40 mg tablet TAKE 1 TAB BY MOUTH DAILY.     tamsulosin (FLOMAX) 0.4 mg capsule TAKE 2 CAPSULES BY MOUTH EVERY DAY    finasteride (PROSCAR) 5 mg tablet TAKE 1 TABLET BY MOUTH  NIGHTLY    therapeutic multivitamin (THERA) tablet Take 1 Tab by mouth daily.  Blood-Glucose Meter monitoring kit Check fsbs every day    glucose blood VI test strips (ASCENSIA AUTODISC VI, ONE TOUCH ULTRA TEST VI) strip Check fsbs every day 250.00    lancets misc check fsbs every day 250.00    aspirin delayed-release 81 mg tablet Take 1 Tab by mouth daily. Current Facility-Administered Medications   Medication Dose Route Frequency    heparinized saline 2 units/mL infusion 800 Units  400 mL Irrigation ONCE    0.9% sodium chloride infusion  25 mL/hr IntraVENous CONTINUOUS    fentaNYL citrate (PF) injection 100 mcg  100 mcg IntraVENous Multiple    midazolam (VERSED) injection 5 mg  5 mg IntraVENous Rad Multiple        Physical Exam:  Blood pressure 97/55, pulse 92, temperature 98.1 °F (36.7 °C), resp. rate 14, height 5' 10\" (1.778 m), weight 85.7 kg (189 lb), SpO2 93 %. LUNG: clear to auscultation bilaterally, HEART: regular rate and rhythm      Alerts:    Hospital Problems  Date Reviewed: 10/3/2019    None          Laboratory:    No results for input(s): HGB, HCT, WBC, PLT, INR, BUN, CREA, K, CRCLT, HGBEXT, HCTEXT, PLTEXT, INREXT in the last 72 hours. No lab exists for component: PTT, PT      Plan of Care/Planned Procedure:  Risks, benefits, and alternatives reviewed with patient and he agrees to proceed with the procedure.        Naman Malave MD

## 2020-02-04 ENCOUNTER — HOSPITAL ENCOUNTER (OUTPATIENT)
Dept: INTERVENTIONAL RADIOLOGY/VASCULAR | Age: 70
Discharge: HOME | End: 2020-02-04
Attending: ORTHOPAEDIC SURGERY | Admitting: ORTHOPAEDIC SURGERY
Payer: MEDICARE

## 2020-02-04 VITALS
RESPIRATION RATE: 16 BRPM | DIASTOLIC BLOOD PRESSURE: 67 MMHG | SYSTOLIC BLOOD PRESSURE: 139 MMHG | TEMPERATURE: 98.4 F | OXYGEN SATURATION: 99 % | HEART RATE: 64 BPM

## 2020-02-04 DIAGNOSIS — M48.062 LUMBAR STENOSIS WITH NEUROGENIC CLAUDICATION: ICD-10-CM

## 2020-02-04 PROCEDURE — 74011636320 HC RX REV CODE- 636/320: Performed by: STUDENT IN AN ORGANIZED HEALTH CARE EDUCATION/TRAINING PROGRAM

## 2020-02-04 PROCEDURE — 64483 NJX AA&/STRD TFRM EPI L/S 1: CPT

## 2020-02-04 PROCEDURE — 74011250636 HC RX REV CODE- 250/636: Performed by: STUDENT IN AN ORGANIZED HEALTH CARE EDUCATION/TRAINING PROGRAM

## 2020-02-04 PROCEDURE — 74011000250 HC RX REV CODE- 250: Performed by: STUDENT IN AN ORGANIZED HEALTH CARE EDUCATION/TRAINING PROGRAM

## 2020-02-04 PROCEDURE — 62323 NJX INTERLAMINAR LMBR/SAC: CPT

## 2020-02-04 RX ORDER — LIDOCAINE HYDROCHLORIDE 10 MG/ML
15 INJECTION, SOLUTION EPIDURAL; INFILTRATION; INTRACAUDAL; PERINEURAL
Status: COMPLETED | OUTPATIENT
Start: 2020-02-04 | End: 2020-02-04

## 2020-02-04 RX ORDER — DEXAMETHASONE SODIUM PHOSPHATE 10 MG/ML
10 INJECTION INTRAMUSCULAR; INTRAVENOUS ONCE
Status: COMPLETED | OUTPATIENT
Start: 2020-02-04 | End: 2020-02-04

## 2020-02-04 RX ORDER — LIDOCAINE HYDROCHLORIDE 20 MG/ML
15 INJECTION, SOLUTION EPIDURAL; INFILTRATION; INTRACAUDAL; PERINEURAL
Status: DISCONTINUED | OUTPATIENT
Start: 2020-02-04 | End: 2020-02-04

## 2020-02-04 RX ADMIN — DEXAMETHASONE SODIUM PHOSPHATE 5 MG: 10 INJECTION, SOLUTION INTRAMUSCULAR; INTRAVENOUS at 13:20

## 2020-02-04 RX ADMIN — LIDOCAINE HYDROCHLORIDE 12 ML: 10 INJECTION, SOLUTION EPIDURAL; INFILTRATION; INTRACAUDAL; PERINEURAL at 13:19

## 2020-02-04 RX ADMIN — DEXAMETHASONE SODIUM PHOSPHATE 10 MG: 10 INJECTION, SOLUTION INTRAMUSCULAR; INTRAVENOUS at 13:20

## 2020-02-04 RX ADMIN — IOHEXOL 2 ML: 180 INJECTION INTRAVENOUS at 13:22

## 2020-02-04 NOTE — PROGRESS NOTES
Standing and ambulating 2-3 steps, tolerated well. Denies any new numbness or pain. Discharged via [address] home with spouse post ARLEEN after discharge instructions reviewed, verbalized good understanding. VS stable, bandage dry and intact.

## 2020-02-11 ENCOUNTER — TELEPHONE (OUTPATIENT)
Dept: INTERNAL MEDICINE CLINIC | Age: 70
End: 2020-02-11

## 2020-02-11 NOTE — TELEPHONE ENCOUNTER
[address] Office Pool             Caller's first and last name: Omar Long   Reason for call:  wanted to change orthopedic doctor since the current one he has is not making Pt happy. Needs a referral for the new Orthopedic doctor that takes his [employer].    Callback required yes/no and why: yes   Best contact number(s): [phone]   Details to clarify the request:       Copy/Paste  [email or web address]

## 2020-02-11 NOTE — TELEPHONE ENCOUNTER
Called, spoke to pt. Two identifiers confirmed. Pt stated he had several procedures done on his back with [facility] and did not have the best experience. Pt stated [facility] is in network. Notified pt to call our office back when he has an appointment so we can fax referral.   Pt verbalized understanding of information discussed w/ no further questions at this time.

## 2020-02-12 ENCOUNTER — TELEPHONE (OUTPATIENT)
Dept: INTERNAL MEDICINE CLINIC | Age: 70
End: 2020-02-12

## 2020-02-12 DIAGNOSIS — Z86.73 HISTORY OF STROKE: ICD-10-CM

## 2020-02-12 DIAGNOSIS — Z86.73 HISTORY OF CVA (CEREBROVASCULAR ACCIDENT): Primary | ICD-10-CM

## 2020-02-12 NOTE — TELEPHONE ENCOUNTER
Alex Low [facility] [address]   Phone Number: [phone]             Referral     Caller (first and last name if not the patient or from practice):       Caller's relationship to patient (if not from a practice):       Name of caller (if calling from a practice):       Name of practice:       Specialist's title, first, and last name:       Office Phone Number:       Fax number:       Date and time of appointment:       Reason for appointment: requesting a recommendation for referral for an orthopedic doctor in network with [employer] medicare       Details to clarify the request:       [address]

## 2020-02-13 NOTE — TELEPHONE ENCOUNTER
Called, spoke to pt. Two identifiers confirmed. Pt requesting referral for neurology. Notified pt referral will be placed. Pt verbalized understanding of information discussed w/ no further questions at this time.

## 2020-02-19 ENCOUNTER — TELEPHONE (OUTPATIENT)
Dept: NEUROLOGY | Age: 70
End: 2020-02-19

## 2020-02-19 ENCOUNTER — OFFICE VISIT (OUTPATIENT)
Dept: NEUROLOGY | Age: 70
End: 2020-02-19

## 2020-02-19 VITALS
BODY MASS INDEX: 28.2 KG/M2 | OXYGEN SATURATION: 97 % | HEIGHT: 70 IN | SYSTOLIC BLOOD PRESSURE: 108 MMHG | HEART RATE: 66 BPM | WEIGHT: 197 LBS | DIASTOLIC BLOOD PRESSURE: 68 MMHG

## 2020-02-19 DIAGNOSIS — G83.14 MONOPARESIS OF LOWER EXTREMITY AFFECTING LEFT NONDOMINANT SIDE (HCC): ICD-10-CM

## 2020-02-19 DIAGNOSIS — Z91.81 HISTORY OF FALL: ICD-10-CM

## 2020-02-19 DIAGNOSIS — Z86.73 HISTORY OF RIGHT MCA STROKE: ICD-10-CM

## 2020-02-19 DIAGNOSIS — I61.9 HEMORRHAGIC STROKE (HCC): ICD-10-CM

## 2020-02-19 DIAGNOSIS — M48.062 LUMBAR STENOSIS WITH NEUROGENIC CLAUDICATION: ICD-10-CM

## 2020-02-19 DIAGNOSIS — M79.2 NEUROPATHIC PAIN: Primary | ICD-10-CM

## 2020-02-19 DIAGNOSIS — I63.81 BASAL GANGLIA STROKE (HCC): ICD-10-CM

## 2020-02-19 RX ORDER — ATORVASTATIN CALCIUM 10 MG/1
TABLET, FILM COATED ORAL
COMMUNITY
End: 2020-06-23 | Stop reason: SDUPTHER

## 2020-02-19 RX ORDER — GABAPENTIN 300 MG/1
300 CAPSULE ORAL 4 TIMES DAILY
Qty: 270 CAP | Refills: 5 | Status: SHIPPED | OUTPATIENT
Start: 2020-02-19 | End: 2020-12-30 | Stop reason: SDUPTHER

## 2020-02-19 RX ORDER — GABAPENTIN 300 MG/1
300 CAPSULE ORAL 4 TIMES DAILY
COMMUNITY
Start: 2020-01-31 | End: 2020-02-19 | Stop reason: SDUPTHER

## 2020-02-19 RX ORDER — METHYLPREDNISOLONE 4 MG/1
TABLET ORAL
COMMUNITY
Start: 2020-01-31 | End: 2020-02-19 | Stop reason: ALTCHOICE

## 2020-02-19 RX ORDER — DIAZEPAM 10 MG/1
TABLET ORAL
COMMUNITY
Start: 2019-12-09 | End: 2021-07-08 | Stop reason: CLARIF

## 2020-02-19 NOTE — TELEPHONE ENCOUNTER
Patient needs to follow up at [facility] due to logistics with wife's condition.     Please document in agreement for change in location

## 2020-02-19 NOTE — PROGRESS NOTES
Navdeep Nath is a 68 y.o. male who presents today for the following:  Chief Complaint   Patient presents with    Follow-up    Fall         HPI  Historical Data  Patient is known to the practice with a history of intracranial hemorrhage at that time his Plavix and aspirin were discontinued    MRI of 2017 demonstrated a small left basal ganglion infarct suspected to be acute along withOld hemorrhagic right parietal infarct with encephalomalacia. Severe supratentorial white matter hyperintensity consistent with microvascular  changes related to aging. Multifocal old basal ganglia lacunar infarcts versus prominent perivascular  spaces. Additionally, scan at the time showed 10% of both right and left ICA occlusion external carotids were patent    There is secondary issues of depression and he was on Wellbutrin for that and well maintained      Interim Data:   Patient is known to this practice. This is my first time seeing the patient. Chart and history reviewed in detail at today's office visit. Patient is here with his wife history is obtained from both of them    Patient is here due to significant pain in his legs and lower back. This was initiated with a fall in December. He subsequently needed kyphoplasty. Pain was not relieved he received epidural injection February 2020 under fluoroscopy. His pain is not been relieved      MRI scan of the lumbar spine is consistent with the kyphoplasty as well as severe canal stenosis at L2-3 and L3-4 along with multilevel foraminal narrowing    Patient currently states:  Low Back pain and pain down the front of his legs  [employer] Payment Dec 2 2019 that started this process    Still no pain relieve  Legs on fire  Not sleeping at HS  Cannot walking any distance  He has no further falls since December.   No change B/B and no UE involvement    DM: metformin and states his blood sugars are well controlled    Patient is very frustrated and complicating the matter is his wife is been diagnosed with cancer and is undergoing chemotherapy treatment presently and this is being done at [facility]. Logistically would be easier if they were followed by neurology at [facility] and I will see what I can do to arrange this. The patient is just asking for some level of relief. to date he is gotten no relief with current interventional procedures      ROS  Other than what is stated above under HPI there is no new or changing issues related to the following:  Constitutional: No recent weight change, fever,fatigue, sleep difficulties, or loss of appetite. ENT/Mouth:  No hearing loss, ringing in the ears, chronic sinus problem, nose bleeds sore throat, voice change, hoarseness, swollen glands in neck, or difficulties with chewing and swallowing. Cardiovascular:  No chest pain/angina pectoris, palpitations,swelling of feet/ankles/hands, or calf pain while walking. Respiratory: No chronic or frequent coughs, spitting up blood, shortness of breath, asthma, or wheezing. Gastrointestinal: No abdominal pain, heartburn, nausea, vomiting, constipation, frequent diarrhea, rectal bleeding, or blood in stool. Genitourinary: No frequent urination, burning or painful urination, blood in urine, incontinence or dribbling. Musculoskeletal:   No joint pain, stiffness/swelling, weakness of muscles, muscle pain/cramp, or back pain. Integument:   No rash/itching, change in skin color, change in hair/nails, or change in color/size of moles. Neurological:  No dizziness/vertigo, loss of consciousness, numbness/tingling sensation, tremors, weakness in limbs, difficulty with balance, frequent or recurring headaches, memory loss or confusion. Psychiatric:   No nervousness, depression, hallucinations, paranoia or suspiciousness. Endocrine: No excessive thirst or urination, heat or cold intolerance. Hematologic/Lymphatic: No bleeding/bruising tendency, phlebitis, or past transfusion. EXAMINATION  Visit Vitals  /68 (BP 1 Location: Right arm, BP Patient Position: Sitting)   Pulse 66   Ht 5' 10\" (1.778 m)   Wt 197 lb (89.4 kg)   SpO2 97%   BMI 28.27 kg/m²        General appearance: Patient is well-developed and well-nourished in no apparent distress and well groomed. Psych/mental health:  Affect: Appropriate    PHQ  3 most recent PHQ Screens 11/18/2019   PHQ Not Done -   Little interest or pleasure in doing things Not at all   Feeling down, depressed, irritable, or hopeless Not at all   Total Score PHQ 2 0   Trouble falling or staying asleep, or sleeping too much Several days   Feeling tired or having little energy Several days   Poor appetite, weight loss, or overeating Not at all   Feeling bad about yourself - or that you are a failure or have let yourself or your family down Not at all   Trouble concentrating on things such as school, work, reading, or watching TV Not at all   Moving or speaking so slowly that other people could have noticed; or the opposite being so fidgety that others notice Several days   Thoughts of being better off dead, or hurting yourself in some way Not at all   PHQ 9 Score 3       HEENT: Normocephalic, without evidence of trauma  Full range of motion, no tenderness to palpation in the head or neck region     Cardiovascular: Extremities warm to touch, no swelling appreciated    Respiratory: No dyspnea on exertion noted, normal effort on casual observation    Musculoskeletal: No evidence of significant bony deformities or spinal curvature    Integumentary:  No obvious bruising, lacerations or discoloaration on casual observation. Neurological Examination:   Mental Status:        MMSE  No flowsheet data found.      Formal testing was not completed    There does seem to be some difficulty with articulation of fax his wife fills in the gaps but overall he could make his needs known and  there was nothing grossly concerning on general observation and discussion. Alert oriented and appropriate to general conversation  Normal processing on general observation  Followed conversation and responded seemingly appropriate throughout the office visit  No word finding difficulties noted on casual observation  Able to follow directions without difficulty     Cranial Nerves:    PERRLA,   EOMs intact gaze is conjugate  No nystagmus is appreciated  No ADORE  Facial motor and sensory intact bilaterally  Hearing intact to conversation  Voice with normal projection, no evidence of secretion pooling  Palate elevates symmetrically  Shoulder shrug intact bilaterally  No tongue deviation appreciated     Motor:   Normal tone and bulk  Fine dexterity intact bilaterally  No tremor appreciated on today's exam  No abnormal movements appreciated on today's exam    Muscle strength testing:  Right side  Upper extremities  Deltoid 5/5  Bicep 5/5  Tricep 5/5  Hand grasp 5/5    Lower extremity  Hip flexor 5/5  Extensor 5/5  Flexor 5/5  Plantar flexion 5/5  Dorsiflexion 5/5      Left side  Deltoid 5/5  Bicep 5/5  Tricep 5/5  Hand grasp 5/5    Lower extremity  Hip flexor 4/5  Extensor 4+/5  Flexor 4/5  Plantar flexion 4/5  Dorsiflexion 4/5    Sensation: Intact to light touch bilaterally    Coordination/Cerebellar:   Grossly intact    Gait: Ambulates independently but guardedly presumably due to pain    Fall risk assessment  Fall Risk Assessment, last 12 mths 2/19/2020   Able to walk? Yes   Fall in past 12 months? Yes   Fall with injury? Yes   Number of falls in past 12 months 3   Fall Risk Score 4       Reflexes: Not tested    ASSESSMENT AND PLAN  Patient has significant pain and functional disability most likely due to spinal stenosis which is rather severe. Kyphoplasty at upper lumbar levels have failed to relieve his pain along with epidural injection.     I think it is reasonable to have him referred for neurosurgical evaluation given the level of of his discomfort and failure to respond to interventional treatment to date. He does need some resolve from his symptoms it is interfering with sleep and function. Additionally he has a complicating matter of having to deal with his wife's serious illness of cancer and chemotherapy intervention    He presently is on gabapentin 300 mg taking 1-2 at bedtime and he supplements with Tylenol but he still wakes up frequently. And during the day his functional ability is limited subsequently I think we need to liberalize his use of gabapentin presently. I suspect this should be a short-term measure until we can get him into neurosurgery for evaluation and recommendations. We can increase his gabapentin to 300 mg taking 2-3 at bedtime; he can take an additional 1 to 2 tablets up to 3 times daily during the day as needed for pain and he can augment with Tylenol. I have asked him to make sure he stays ahead of his pain curve and ultimately he will probably use less medication. We will make the referral to neurosurgery for evaluation and recommendations    In the meantime we will continue to try to manage his pain as this is still in the subacute phase. If the gabapentin fails to give him adequate relief he can be switched to Lyrica or add Cymbalta however he is already on Wellbutrin so would probably look to switch to Lyrica. He does have a prescription for Ultram he can use that sparingly as backup to the gabapentin and Tylenol    He is aware that if this should become chronic will need to be referred to pain management    Patient remains a fall risk. He has paresis of the left lower extremity related to an old right hemispheric stroke, she he has had basal ganglia stroke which can certainly impede mobility and he most probably has a component of diabetic neuropathy.   After his pain is better resolved we can further assess his baseline gait and perhaps get him into physical therapy for gait and balance retraining and training related to falling safely and had to get up safely but this will be deferred as we need to deal with the more acute issue presently    Patient is presently on an aspirin a day to prevent further strokes. He is to continue on that and has been encouraged to continue to keep his comorbid medical conditions under good control    Finally to help streamline the overall health care of this patient as well as his wife's patient logistically it would be better for them to follow-up with neurology at [facility]. And this is per the request.  I think it is a reasonable request on her. I have contacted Albert Plummer NP and asked her to accept him as a patient. Per verbal discussion she is excepted him and understands were just trying to tweak his pain medicine to give him better rested nice and better functional ability during the day until he gets into neurosurgery. We will set up an appointment with Albert Plummer NP. But if he should need to be seen prior to that appointment due to ineffective pain management I have asked him to call our office and set up an appointment to see me they state they clearly understand this    ICD-10-CM ICD-9-CM    1. Neuropathic pain M79.2 729.2 gabapentin (NEURONTIN) 300 mg capsule      REFERRAL TO NEUROSURGERY   2. Lumbar stenosis with neurogenic claudication M48.062 724.03 REFERRAL TO NEUROSURGERY   3. Monoparesis of lower extremity affecting left nondominant side ([facility]) G83.14 344.32     residual from stroke   4. Basal ganglia stroke ([facility]) I63.9 434.91    5. History of right MCA stroke Z86.73 V12.54    6. Hemorrhagic stroke ([facility] [address] I61.9 431    7. History of fall Z91.81 V15.88            Additional pertinent medical data reviewed at today's office visit:    Flowsheets    No Known Allergies    Current Outpatient Medications   Medication Sig    gabapentin (NEURONTIN) 300 mg capsule Take 1 Cap by mouth four (4) times daily.  Max Daily Amount: 1,200 mg. 2 to 3 tablets at bedtime; may take 1 to 2 tablets up to 3 times a day during the day as needed for additional pain  Indications: neuropathic pain    traMADol (ULTRAM) 50 mg tablet Take 50 mg by mouth every six (6) hours as needed for Pain.  metFORMIN (GLUCOPHAGE) 500 mg tablet Take 1 Tab by mouth daily (with dinner).  Blood-Glucose Meter monitoring kit Check fsbs every day    glucose blood VI test strips (ASCENSIA AUTODISC VI, ONE TOUCH ULTRA TEST VI) strip Check fsbs every day 250.00    lancets misc check fsbs every day 250.00    aspirin delayed-release 81 mg tablet Take 1 Tab by mouth daily.  KLOR-CON M20 20 mEq tablet TAKE 1 TABLET BY MOUTH EVERY DAY    ezetimibe (ZETIA) 10 mg tablet Take 1 Tab by mouth daily.  amLODIPine-benazepril (LOTREL) 5-20 mg per capsule Take 1 Cap by Mouth Once a Day.  buPROPion SR (WELLBUTRIN SR) 150 mg SR tablet TAKE 1 TAB BY MOUTH TWO (2) TIMES A DAY.  carvedilol (COREG) 12.5 mg tablet Take 1 tablet by mouth two  times daily with meals    hydroCHLOROthiazide (HYDRODIURIL) 25 mg tablet Take 1 Tab by mouth daily.  atorvastatin (LIPITOR) 40 mg tablet TAKE 1 TAB BY MOUTH DAILY.  tamsulosin (FLOMAX) 0.4 mg capsule TAKE 2 CAPSULES BY MOUTH EVERY DAY    finasteride (PROSCAR) 5 mg tablet TAKE 1 TABLET BY MOUTH  NIGHTLY    therapeutic multivitamin (THERA) tablet Take 1 Tab by mouth daily.  diazePAM (VALIUM) 10 mg tablet     atorvastatin (LIPITOR) 10 mg tablet Take  by mouth.  furosemide (LASIX) 40 mg tablet TAKE 1 TABLET BY MOUTH EVERY DAY     No current facility-administered medications for this visit.         Past Medical History:   Diagnosis Date    Agoraphobia with panic disorder     Anxiety     Arthritis     spine    BPH (benign prostatic hypertrophy) with urinary obstruction     CAD (coronary artery disease)     no previous MI or stents    Chronic edema     Coronary artery disease involving native coronary artery without angina pectoris 3/28/2017    CVD (cardiovascular disease)     Depression     Diabetes ([facility] [address]     no longer medicated    Erectile dysfunction     Essential hypertension 3/28/2017    Falls     Fracture of multiple ribs of right side 7/26/2017    Full code but no chest compressions 7/23/2018    GERD (gastroesophageal reflux disease)     Hemiparesis (HCC)     History of stroke 3/28/2017    Hypercholesterolemia     Hypercholesterolemia 3/28/2017    Hypertension     Joint pain     Localized edema 7/23/2018    Muscle pain     Muscle weakness     Nocturia     Panic disorder     Ringing in the ears     Seizures ([facility] [address]     at age 3, facial    Stroke [facility]) 1996    left sided weakness       Past Surgical History:   Procedure Laterality Date    ABDOMEN SURGERY PROC UNLISTED  2008    Lap band    COLONOSCOPY N/A 5/4/2018    COLON[facility]COPY performed by [facility] at [facility] ENDOSCOPY    HX COLONOSCOPY      HX LUMBAR DISKECTOMY  1970's    HX ORTHOPAEDIC      right finger repair    HX ORTHOPAEDIC  12/2019    spinal surgery    IR INJ SPINE THER SUBST LUM/SAC W IMG  2/4/2020    IR KYPHOPLASTY LUMBAR  12/20/2019    IR KYPHOPLASTY LUMBAR  1/24/2020       Social History     Socioeconomic History    Marital status:      Spouse name: Not on file    Number of children: Not on file    Years of education: Not on file    Highest education level: Not on file   Tobacco Use    Smoking status: Never Smoker    Smokeless tobacco: Never Used   Substance and Sexual Activity    Alcohol use:  Yes     Alcohol/week: 5.0 standard drinks     Types: 5 Cans of beer per week     Frequency: 2-3 times a week     Drinks per session: 1 or 2     Binge frequency: Never     Comment: a week    Drug use: No    Sexual activity: Yes     Partners: Female       Family History   Problem Relation Age of Onset    Cancer Father         lung    Stroke Father     Hypertension Father     Cancer Mother         lung    Hypertension Sister     Heart Disease Brother         chf--aicd          Office Visit on 12/05/2019   Component Date Value    Glucose POC 12/05/2019 171*       XR Results (maximum last 3): Results from Appointment encounter on 12/05/19   XR SPINE LUMB 2 OR 3 V    Impression IMPRESSION: No acute bony abnormality of the lumbar spine is obvious. Compression deformities at L1, L2 and L3 appear most likely chronic, but if some  degree of acute compression is suspected, MRI would be required to make this  distinction. Results from Appointment encounter on 12/04/19   XR HIP LT W OR WO PELV 2-3 VWS    Impression IMPRESSION: Limited radiographic is evidence of the left hip with evidence of  degenerative change. No definite evidence of acute traumatic injury (see  discussion above). Results from [place] encounter on 04/19/18   XR SPINE THORAC 3 V    Impression IMPRESSION:   1. No acute thoracic fracture or subluxation. 2. 3 mm anterolisthesis of C4 with respect to C5.  3. Mild L1 compression deformity, unchanged. CT Results (maximum last 3): Results from Hospital Encounter encounter on 06/23/17   CT HEAD WO CONT    Impression IMPRESSION: Atherosclerosis with microvascular disease, small basal ganglia  lacunar infarcts and old right frontoparietal infarct. No acute intracranial  normality. Results from [facility] encounter on 08/15/16   CT ABD PELV WO CONT       MRI Results (maximum last 3): Results from [place] encounter on 01/16/20   MRI LUMB SPINE WO CONT    Impression IMPRESSION:  1. Status post L3 kyphoplasty. There is a compression fracture of L2 with  marrow edema. Review of prior imaging demonstrates that this was present on  prior studies and may represent a subacute on chronic fracture. 2.  Severe canal stenosis at L2-3 and L3-4. Multilevel foraminal narrowing as  described. Results from [place] encounter on 06/23/17   MRA NECK W CONT    Impression IMPRESSION: Unremarkable MRA of the cervical vasculature.              MRA BRAIN WO CONT Impression IMPRESSION:    No definite major occlusive changes involving the intracranial vasculature. No definite aneurysm or vascular malformation. Stenotic left posterior cerebral artery. Follow-up and Dispositions    · Return in about 3 months (around 5/19/2020) for meg Chirinos sooner here if needed.            Adam Andres, MS, ANP-BC, [facility] [place]

## 2020-02-19 NOTE — PATIENT INSTRUCTIONS
Office Policies    o Phone calls/patient messages:  Please allow up to 24 hours for someone in the office to contact you about your call or message. Be mindful your provider may be out of the office or your message may require further review. We encourage you to use [employer] for your messages as this is a faster, more efficient way to communicate with our office    o Medication Refills:  Prescription medications require up to 48 business hours to process. We encourage you to use [employer] for your refills. For controlled medications: Please allow up to 72 business hours to process. Certain medications may require you to  a written prescription at our office. NO narcotic/controlled medications will be prescribed after 4pm Monday through Friday or on weekends    o Form/Paperwork Completion:  We ask that you allow 7-14 business days. You may also download your forms to [employer] to have your doctor print off. We are going to liberalize your gabapentin so you can sleep at night and have less pain during the day. We can continue you on 300 mg you can take 2 to 3 tablets at bedtime and you can take Tylenol with it additionally you can have 1 to 2 tablets up to 3 times a day as needed during the day for pain you can continue to use Tylenol as necessary.     We are going to send you to Dr. Riley Chilel for continued care and evaluation regarding your severe spinal stenosis    Per your request we are going to work on getting you set up over at [facility] with the nurse practitioner over there due to logistics related to your wife's health issues but I be happy to see you back if there are any further issues

## 2020-03-18 DIAGNOSIS — E78.00 HYPERCHOLESTEROLEMIA: ICD-10-CM

## 2020-03-18 DIAGNOSIS — I25.10 CORONARY ARTERY DISEASE INVOLVING NATIVE CORONARY ARTERY OF NATIVE HEART WITHOUT ANGINA PECTORIS: ICD-10-CM

## 2020-03-18 DIAGNOSIS — I10 ESSENTIAL HYPERTENSION: ICD-10-CM

## 2020-03-18 DIAGNOSIS — R60.0 LOCALIZED EDEMA: ICD-10-CM

## 2020-03-18 DIAGNOSIS — Z00.00 ROUTINE GENERAL MEDICAL EXAMINATION AT A HEALTH CARE FACILITY: ICD-10-CM

## 2020-03-18 NOTE — TELEPHONE ENCOUNTER
Patient states he needs refills done for 90 day supplies with refills thru [employer].  Thank you

## 2020-03-19 RX ORDER — HYDROCHLOROTHIAZIDE 25 MG/1
25 TABLET ORAL DAILY
Qty: 90 TAB | Refills: 2 | Status: SHIPPED | OUTPATIENT
Start: 2020-03-19 | End: 2020-11-12

## 2020-03-19 RX ORDER — FINASTERIDE 5 MG/1
TABLET, FILM COATED ORAL
Qty: 90 TAB | Refills: 2 | Status: SHIPPED | OUTPATIENT
Start: 2020-03-19 | End: 2020-11-12

## 2020-03-19 RX ORDER — BUPROPION HYDROCHLORIDE 150 MG/1
TABLET, EXTENDED RELEASE ORAL
Qty: 180 TAB | Refills: 2 | Status: SHIPPED | OUTPATIENT
Start: 2020-03-19 | End: 2020-10-15

## 2020-03-19 RX ORDER — EZETIMIBE 10 MG/1
10 TABLET ORAL DAILY
Qty: 90 TAB | Refills: 2 | Status: SHIPPED | OUTPATIENT
Start: 2020-03-19 | End: 2020-11-12

## 2020-03-19 RX ORDER — CARVEDILOL 12.5 MG/1
TABLET ORAL
Qty: 180 TAB | Refills: 2 | Status: SHIPPED | OUTPATIENT
Start: 2020-03-19 | End: 2020-11-12

## 2020-03-19 RX ORDER — ATORVASTATIN CALCIUM 40 MG/1
TABLET, FILM COATED ORAL
Qty: 90 TAB | Refills: 1 | Status: SHIPPED | OUTPATIENT
Start: 2020-03-19 | End: 2020-08-07

## 2020-03-19 RX ORDER — TAMSULOSIN HYDROCHLORIDE 0.4 MG/1
CAPSULE ORAL
Qty: 180 CAP | Refills: 2 | Status: SHIPPED | OUTPATIENT
Start: 2020-03-19 | End: 2020-11-12

## 2020-03-19 RX ORDER — AMLODIPINE AND BENAZEPRIL HYDROCHLORIDE 5; 20 MG/1; MG/1
CAPSULE ORAL
Qty: 90 CAP | Refills: 2 | Status: SHIPPED | OUTPATIENT
Start: 2020-03-19 | End: 2020-11-12

## 2020-03-19 RX ORDER — METFORMIN HYDROCHLORIDE 500 MG/1
500 TABLET ORAL
Qty: 90 TAB | Refills: 2 | Status: SHIPPED | OUTPATIENT
Start: 2020-03-19 | End: 2020-11-12

## 2020-03-19 RX ORDER — FUROSEMIDE 40 MG/1
TABLET ORAL
Qty: 90 TAB | Refills: 2 | Status: SHIPPED | OUTPATIENT
Start: 2020-03-19 | End: 2020-11-12

## 2020-03-19 RX ORDER — POTASSIUM CHLORIDE 20 MEQ/1
20 TABLET, EXTENDED RELEASE ORAL DAILY
Qty: 90 TAB | Refills: 3 | Status: SHIPPED | OUTPATIENT
Start: 2020-03-19 | End: 2021-02-10

## 2020-06-23 ENCOUNTER — VIRTUAL VISIT (OUTPATIENT)
Dept: INTERNAL MEDICINE CLINIC | Age: 70
End: 2020-06-23

## 2020-06-23 DIAGNOSIS — E78.00 PURE HYPERCHOLESTEROLEMIA: ICD-10-CM

## 2020-06-23 DIAGNOSIS — F32.9 MAJOR DEPRESSIVE DISORDER WITH CURRENT ACTIVE EPISODE, UNSPECIFIED DEPRESSION EPISODE SEVERITY, UNSPECIFIED WHETHER RECURRENT: ICD-10-CM

## 2020-06-23 DIAGNOSIS — M48.061 SPINAL STENOSIS OF LUMBAR REGION WITHOUT NEUROGENIC CLAUDICATION: ICD-10-CM

## 2020-06-23 DIAGNOSIS — Z86.73 HISTORY OF STROKE: ICD-10-CM

## 2020-06-23 DIAGNOSIS — I10 ESSENTIAL HYPERTENSION: ICD-10-CM

## 2020-06-23 DIAGNOSIS — M54.50 CHRONIC LOW BACK PAIN, UNSPECIFIED BACK PAIN LATERALITY, UNSPECIFIED WHETHER SCIATICA PRESENT: ICD-10-CM

## 2020-06-23 DIAGNOSIS — G89.29 CHRONIC LOW BACK PAIN, UNSPECIFIED BACK PAIN LATERALITY, UNSPECIFIED WHETHER SCIATICA PRESENT: ICD-10-CM

## 2020-06-23 DIAGNOSIS — N18.30 CKD (CHRONIC KIDNEY DISEASE) STAGE 3, GFR 30-59 ML/MIN (HCC): ICD-10-CM

## 2020-06-23 DIAGNOSIS — E11.8 CONTROLLED TYPE 2 DIABETES MELLITUS WITH COMPLICATION, WITHOUT LONG-TERM CURRENT USE OF INSULIN (HCC): Primary | ICD-10-CM

## 2020-06-23 NOTE — PROGRESS NOTES
HISTORY OF PRESENT ILLNESS  Dominik Pedraza is a 68 y.o. male. HPI   Dominik Pedraza is a 68 y.o. male being evaluated by a Virtual Visit (video visit) encounter to address concerns as mentioned above. A caregiver was present when appropriate. Due to this being a TeleHealth encounter (During WSQZM-33 public health emergency), evaluation of the following organ systems was limited: Vitals/Constitutional/EENT/Resp/CV/GI//MS/Neuro/Skin/Heme-Lymph-Imm. Pursuant to the emergency declaration under the [facility], [address] [place] [address] authority and the [employer] and [employer] Act, this Virtual Visit was conducted with patient's (and/or legal guardian's) consent, to reduce the risk of exposure to COVID-19 and provide necessary medical care. Services were provided through a video synchronous discussion virtually to substitute for in-person encounter. --iMmi Reese MD on 6/23/2020 at 7:54 AM    An electronic signature was used to authenticate this note. F/u Dm-2 with microalbuminuuria, HTN HLD hx cva with hemorrhage with left Hp CKD3 MDD/aniety bph  S/p L3 kyphoplasty x2 and ARLEEN d/t compression fx and severe canal stenosis. Saw Dr Arthur Driscoll ortho  Saw Dr. Ulloa PT , hoping to avoid back sx  Still some leg weakness and balance  Last a1c 7.2 LDL 70  fsbs-none  Home BP-wnl per pt    Saw renal ELA [facility] Forward for CKD3   No cp sob        Wife dx with cancer indergoing tx--      Last OV  Pt here to establish care and medicare wellness---------  Former PCP Dr Dylon Mcneill then Dr Tylor Gaines     Hx dm-2 diet controlled now with microalbuminura, HTN ,HLD hx hemorrhagic 1996 CVA with left HP  (LLE weakness), CKD 3 ( Dr Meagan Caballero) MDD/anxiety, obesity ,BPH     Hx CAD by hx--Dr Dionicio Abdalla.  Hx abnl stress test, no cath, asymtpomatic  Last a1c 6.6 LDL 78   Had colonoscopy last year 3 polyps removed-tub adenoma-repeat in 3 yrs--Dr Durant     Hx bph on proscar and flomax. Had prosate nodule bx in the past--benign. Urologist now is Dr Gloria Gonzalez. Had JESICA  Hx renal stone     Saw podiatrist-had decreaesed pulses in left, no DVT--saw cardiologist Dr Martina Webb mild-mod PAD -sees Dr Alexis Jacques     No fsbs at home     Retired from [employer] [employer]. M with 2 daughters     Has some left leg weakness and atrophy of leg. Has had falls in the past, uses cane to ambulate long distances       Patient Active Problem List    Diagnosis Date Noted    Left low back pain 08/15/2016     Priority: 1 - One    Gross hematuria 08/15/2016     Priority: 1 - One    Calculus of kidney 04/18/2016     Priority: 1 - One    Benign prostatic hyperplasia with lower urinary tract symptoms 06/22/2014     Priority: 1 - One    Nocturia 07/30/2014     Priority: 2 - Two    Slowing of urinary stream 07/30/2014     Priority: 2 - Two    Urinary frequency 06/22/2014     Priority: 2 - Two    Erectile dysfunction 04/17/2016     Priority: 3 - Three    Moderate major depression ([address] 07/24/2018    Full code but no chest compressions 07/23/2018    Localized edema 07/23/2018    Type 2 diabetes mellitus with nephropathy ([address] 01/23/2018    Fracture of multiple ribs of right side 07/26/2017    Essential hypertension 03/28/2017    Hypercholesterolemia 03/28/2017    Controlled type 2 diabetes mellitus without complication ([address] 49/58/1643    History of stroke 03/28/2017    Coronary artery disease involving native coronary artery without angina pectoris 03/28/2017    Obesity 06/22/2014     Current Outpatient Medications   Medication Sig Dispense Refill    metFORMIN (GLUCOPHAGE) 500 mg tablet Take 1 Tab by mouth daily (with dinner). 90 Tab 2    potassium chloride (Klor-Con M20) 20 mEq tablet Take 1 Tab by mouth daily. 90 Tab 3    ezetimibe (ZETIA) 10 mg tablet Take 1 Tab by mouth daily.  90 Tab 2    furosemide (LASIX) 40 mg tablet TAKE 1 TABLET BY MOUTH EVERY DAY 90 Tab 2    amLODIPine-benazepril (LOTREL) 5-20 mg per capsule Take 1 Cap by Mouth Once a Day. 90 Cap 2    buPROPion SR (WELLBUTRIN SR) 150 mg SR tablet TAKE 1 TAB BY MOUTH TWO (2) TIMES A DAY. 180 Tab 2    carvediloL (COREG) 12.5 mg tablet Take 1 tablet by mouth two  times daily with meals 180 Tab 2    hydroCHLOROthiazide (HYDRODIURIL) 25 mg tablet Take 1 Tab by mouth daily. 90 Tab 2    atorvastatin (LIPITOR) 40 mg tablet TAKE 1 TAB BY MOUTH DAILY. 90 Tab 1    tamsulosin (FLOMAX) 0.4 mg capsule TAKE 2 CAPSULES BY MOUTH EVERY  Cap 2    finasteride (PROSCAR) 5 mg tablet TAKE 1 TABLET BY MOUTH  NIGHTLY 90 Tab 2    gabapentin (NEURONTIN) 300 mg capsule Take 1 Cap by mouth four (4) times daily. Max Daily Amount: 1,200 mg. 2 to 3 tablets at bedtime; may take 1 to 2 tablets up to 3 times a day during the day as needed for additional pain  Indications: neuropathic pain 270 Cap 5    Blood-Glucose Meter monitoring kit Check fsbs every day 1 Kit 0    glucose blood VI test strips (ASCENSIA AUTODISC VI, ONE TOUCH ULTRA TEST VI) strip Check fsbs every day 250.00 50 Strip 11    lancets misc check fsbs every day 250.00 1 Each 11    aspirin delayed-release 81 mg tablet Take 1 Tab by mouth daily. 30 Tab 0    therapeutic multivitamin (THERA) tablet Take 1 Tab by mouth daily. 90 Tab 1    diazePAM (VALIUM) 10 mg tablet       traMADol (ULTRAM) 50 mg tablet Take 50 mg by mouth every six (6) hours as needed for Pain.        No Known Allergies   Lab Results   Component Value Date/Time    WBC 8.1 10/03/2019 11:13 AM    HGB 13.6 10/03/2019 11:13 AM    HCT 40.1 10/03/2019 11:13 AM    PLATELET 764 61/33/3451 11:13 AM    MCV 92 10/03/2019 11:13 AM     Lab Results   Component Value Date/Time    Hemoglobin A1c 7.2 (H) 10/03/2019 11:13 AM    Hemoglobin A1c 6.1 06/24/2017 05:59 AM    Hemoglobin A1c 5.8 (H) 03/28/2017 01:08 PM    Glucose 160 (H) 10/03/2019 11:13 AM    Glucose (POC) 110 (H) 06/24/2017 06:39 AM    Glucose  (A) 12/05/2019 05:48 PM    Microalb/Creat ratio (ug/mg creat.) <8.3 10/03/2019 11:13 AM    LDL, calculated 70 10/03/2019 11:13 AM    Creatinine 1.49 (H) 10/03/2019 11:13 AM      Lab Results   Component Value Date/Time    Cholesterol, total 158 10/03/2019 11:13 AM    HDL Cholesterol 52 10/03/2019 11:13 AM    LDL, calculated 70 10/03/2019 11:13 AM    Triglyceride 182 (H) 10/03/2019 11:13 AM    CHOL/HDL Ratio 2.9 06/24/2017 05:59 AM     Lab Results   Component Value Date/Time    GFR est non-AA 47 (L) 10/03/2019 11:13 AM    GFR est AA 54 (L) 10/03/2019 11:13 AM    Creatinine 1.49 (H) 10/03/2019 11:13 AM    BUN 23 10/03/2019 11:13 AM    Sodium 144 10/03/2019 11:13 AM    Potassium 3.5 10/03/2019 11:13 AM    Chloride 98 10/03/2019 11:13 AM    CO2 26 10/03/2019 11:13 AM        ROS    Physical Exam  Constitutional:       Appearance: Normal appearance. He is obese. Pulmonary:      Effort: Pulmonary effort is normal.   Neurological:      General: No focal deficit present. Mental Status: He is alert. ASSESSMENT and PLAN  Diagnoses and all orders for this visit:    1. Controlled type 2 diabetes mellitus with complication, without long-term current use of insulin ([facility])  -     HEMOGLOBIN A1C WITH EAG   Continue metformin and diet   UTD eye exam per pt  2. Essential hypertension   controlled  3. Pure hypercholesterolemia  -     LIPID PANEL   Continue statin  4. Major depressive disorder with current active episode, unspecified depression episode severity, unspecified whether recurrent   Stable, controlled  5. Chronic low back pain, unspecified back pain laterality, unspecified whether sciatica present   No longer has pain   Continue PT for leg weakness   F/u Dr Krunal Hensley  6. CKD (chronic kidney disease) stage 3, GFR 30-59 ml/min ([facility])  -     METABOLIC PANEL, COMPREHENSIVE    7. History of stroke   On aspirin   Control risk factors  8.  Spinal stenosis of lumbar region without neurogenic claudication   PT per Dr Ailyn Smith f/u    Follow-up and Dispositions · Return in about 6 months (around 12/23/2020) for medicare wellness---.

## 2020-06-23 NOTE — PATIENT INSTRUCTIONS
This is an established visit conducted via telemedicine. The patient has been instructed that this meets HIPAA criteria and acknowledges and agrees to this method of visitation. Moe Bobby, [place] 
74/67/82 
7:75 PM 
Chief Complaint Patient presents with  Cholesterol Problem 6 month follow up  Hypertension 6 month follow up  Labs Mail orders with map

## 2020-08-07 RX ORDER — ATORVASTATIN CALCIUM 40 MG/1
TABLET, FILM COATED ORAL
Qty: 90 TAB | Refills: 1 | Status: SHIPPED | OUTPATIENT
Start: 2020-08-07 | End: 2021-01-23

## 2020-10-14 DIAGNOSIS — I10 ESSENTIAL HYPERTENSION: ICD-10-CM

## 2020-10-14 DIAGNOSIS — I25.10 CORONARY ARTERY DISEASE INVOLVING NATIVE CORONARY ARTERY OF NATIVE HEART WITHOUT ANGINA PECTORIS: ICD-10-CM

## 2020-10-15 RX ORDER — BUPROPION HYDROCHLORIDE 150 MG/1
TABLET, EXTENDED RELEASE ORAL
Qty: 180 TAB | Refills: 2 | Status: SHIPPED | OUTPATIENT
Start: 2020-10-15 | End: 2021-06-06

## 2020-10-22 ENCOUNTER — TELEPHONE (OUTPATIENT)
Dept: INTERNAL MEDICINE CLINIC | Age: 70
End: 2020-10-22

## 2020-10-22 NOTE — TELEPHONE ENCOUNTER
Patient states he needs a call back to be advised what to do about Kidney Stones he has right now. Patient states does he need to see Dr. Reggy Frankel or Specialist. Please call.  Thank you

## 2020-10-22 NOTE — TELEPHONE ENCOUNTER
Called, spoke to pt. Two identifiers confirmed. Pt stated he has been experiencing left side pain for a couple days now. Pt had same s/s previously when he had several kidney stones. Contact information provided to pt for previous urologist and kidney stone hotline. Notified pt to call the office back if not able to make appointment. Pt verbalized understanding of information discussed w/ no further questions at this time.

## 2020-11-11 DIAGNOSIS — Z00.00 ROUTINE GENERAL MEDICAL EXAMINATION AT A HEALTH CARE FACILITY: ICD-10-CM

## 2020-11-11 DIAGNOSIS — I10 ESSENTIAL HYPERTENSION: ICD-10-CM

## 2020-11-11 DIAGNOSIS — I25.10 CORONARY ARTERY DISEASE INVOLVING NATIVE CORONARY ARTERY OF NATIVE HEART WITHOUT ANGINA PECTORIS: ICD-10-CM

## 2020-11-11 DIAGNOSIS — E78.00 HYPERCHOLESTEROLEMIA: ICD-10-CM

## 2020-11-11 DIAGNOSIS — R60.0 LOCALIZED EDEMA: ICD-10-CM

## 2020-11-12 RX ORDER — FUROSEMIDE 40 MG/1
TABLET ORAL
Qty: 90 TAB | Refills: 2 | Status: SHIPPED | OUTPATIENT
Start: 2020-11-12 | End: 2021-06-11

## 2020-11-12 RX ORDER — FINASTERIDE 5 MG/1
TABLET, FILM COATED ORAL
Qty: 90 TAB | Refills: 2 | Status: SHIPPED | OUTPATIENT
Start: 2020-11-12 | End: 2021-06-11

## 2020-11-12 RX ORDER — METFORMIN HYDROCHLORIDE 500 MG/1
TABLET ORAL
Qty: 90 TAB | Refills: 2 | Status: SHIPPED | OUTPATIENT
Start: 2020-11-12 | End: 2021-06-11

## 2020-11-12 RX ORDER — AMLODIPINE AND BENAZEPRIL HYDROCHLORIDE 5; 20 MG/1; MG/1
CAPSULE ORAL
Qty: 90 CAP | Refills: 2 | Status: SHIPPED | OUTPATIENT
Start: 2020-11-12 | End: 2021-06-11

## 2020-11-12 RX ORDER — CARVEDILOL 12.5 MG/1
TABLET ORAL
Qty: 180 TAB | Refills: 2 | Status: SHIPPED | OUTPATIENT
Start: 2020-11-12 | End: 2021-06-11

## 2020-11-12 RX ORDER — TAMSULOSIN HYDROCHLORIDE 0.4 MG/1
CAPSULE ORAL
Qty: 180 CAP | Refills: 2 | Status: SHIPPED | OUTPATIENT
Start: 2020-11-12 | End: 2021-06-11

## 2020-11-12 RX ORDER — EZETIMIBE 10 MG/1
TABLET ORAL
Qty: 90 TAB | Refills: 2 | Status: SHIPPED | OUTPATIENT
Start: 2020-11-12 | End: 2021-06-11

## 2020-11-12 RX ORDER — HYDROCHLOROTHIAZIDE 25 MG/1
TABLET ORAL
Qty: 90 TAB | Refills: 2 | Status: SHIPPED | OUTPATIENT
Start: 2020-11-12 | End: 2021-06-11

## 2020-12-30 ENCOUNTER — VIRTUAL VISIT (OUTPATIENT)
Dept: INTERNAL MEDICINE CLINIC | Age: 70
End: 2020-12-30
Payer: MEDICARE

## 2020-12-30 DIAGNOSIS — Z86.73 HISTORY OF CVA (CEREBROVASCULAR ACCIDENT): ICD-10-CM

## 2020-12-30 DIAGNOSIS — I10 ESSENTIAL HYPERTENSION: ICD-10-CM

## 2020-12-30 DIAGNOSIS — Z00.00 MEDICARE ANNUAL WELLNESS VISIT, SUBSEQUENT: ICD-10-CM

## 2020-12-30 DIAGNOSIS — E11.8 CONTROLLED TYPE 2 DIABETES MELLITUS WITH COMPLICATION, WITHOUT LONG-TERM CURRENT USE OF INSULIN (HCC): Primary | ICD-10-CM

## 2020-12-30 DIAGNOSIS — N18.30 STAGE 3 CHRONIC KIDNEY DISEASE, UNSPECIFIED WHETHER STAGE 3A OR 3B CKD (HCC): ICD-10-CM

## 2020-12-30 DIAGNOSIS — E78.00 PURE HYPERCHOLESTEROLEMIA: ICD-10-CM

## 2020-12-30 DIAGNOSIS — I73.9 PAD (PERIPHERAL ARTERY DISEASE) (HCC): ICD-10-CM

## 2020-12-30 DIAGNOSIS — M79.2 NEUROPATHIC PAIN: ICD-10-CM

## 2020-12-30 DIAGNOSIS — Z12.5 PROSTATE CANCER SCREENING: ICD-10-CM

## 2020-12-30 DIAGNOSIS — M48.062 SPINAL STENOSIS OF LUMBAR REGION WITH NEUROGENIC CLAUDICATION: ICD-10-CM

## 2020-12-30 PROCEDURE — G0439 PPPS, SUBSEQ VISIT: HCPCS | Performed by: INTERNAL MEDICINE

## 2020-12-30 PROCEDURE — 3046F HEMOGLOBIN A1C LEVEL >9.0%: CPT | Performed by: INTERNAL MEDICINE

## 2020-12-30 PROCEDURE — G8419 CALC BMI OUT NRM PARAM NOF/U: HCPCS | Performed by: INTERNAL MEDICINE

## 2020-12-30 PROCEDURE — 2022F DILAT RTA XM EVC RTNOPTHY: CPT | Performed by: INTERNAL MEDICINE

## 2020-12-30 PROCEDURE — G8536 NO DOC ELDER MAL SCRN: HCPCS | Performed by: INTERNAL MEDICINE

## 2020-12-30 PROCEDURE — 3017F COLORECTAL CA SCREEN DOC REV: CPT | Performed by: INTERNAL MEDICINE

## 2020-12-30 PROCEDURE — G8756 NO BP MEASURE DOC: HCPCS | Performed by: INTERNAL MEDICINE

## 2020-12-30 PROCEDURE — G9717 DOC PT DX DEP/BP F/U NT REQ: HCPCS | Performed by: INTERNAL MEDICINE

## 2020-12-30 PROCEDURE — G8427 DOCREV CUR MEDS BY ELIG CLIN: HCPCS | Performed by: INTERNAL MEDICINE

## 2020-12-30 PROCEDURE — 1101F PT FALLS ASSESS-DOCD LE1/YR: CPT | Performed by: INTERNAL MEDICINE

## 2020-12-30 PROCEDURE — 99214 OFFICE O/P EST MOD 30 MIN: CPT | Performed by: INTERNAL MEDICINE

## 2020-12-30 RX ORDER — GABAPENTIN 300 MG/1
600 CAPSULE ORAL
Qty: 180 CAP | Refills: 1 | Status: SHIPPED | OUTPATIENT
Start: 2020-12-30 | End: 2021-01-05 | Stop reason: CLARIF

## 2020-12-30 NOTE — PROGRESS NOTES
HISTORY OF PRESENT ILLNESS  Jay Lema is a 65 y.o. male. HPI     F/u Dm-2 with microalbuminuuria, HTN HLD hx cva with hemorrhage with left Hp CKD3 MDD/aniety bph PAD -Dr Jan Butler and medicare wellness---  -Last a1c 7.2 LDL 70  fsbs--none recently  No cp sob or sxs of neuropathy   on wellbutirn for MDD--contrlled  No BPH sxs on flomax    Has back and leg letty s/p kyphoplasty after a fall and has severe lumbar spinal stenosis--Dr Xenia Murray  Pain is better now-completed PT  No falls since last OV  His wife is undergoing cancer treatment    Sees Dr Shaniqua Malave for CKD 3    Walks for exercise for miles    Last OV  S/p L3 kyphoplasty x2 and ARLEEN d/t compression fx and severe canal stenosis.  Saw Dr Zee Quiet ortho  Saw Dr. Kimberly Velez PT , hoping to avoid back sx  Still some leg weakness and balance  Last a1c 7.2 LDL 70  fsbs-none  Home BP-wnl per pt     Saw renal ELA Jain for CKD3   No cp sob           Wife dx with cancer indergoing tx--       Patient Active Problem List    Diagnosis Date Noted    Left low back pain 08/15/2016     Priority: 1 - One    Gross hematuria 08/15/2016     Priority: 1 - One    Calculus of kidney 04/18/2016     Priority: 1 - One    Benign prostatic hyperplasia with lower urinary tract symptoms 06/22/2014     Priority: 1 - One    Nocturia 07/30/2014     Priority: 2 - Two    Slowing of urinary stream 07/30/2014     Priority: 2 - Two    Urinary frequency 06/22/2014     Priority: 2 - Two    Erectile dysfunction 04/17/2016     Priority: 3 - Three    Moderate major depression ([address] 07/24/2018    Full code but no chest compressions 07/23/2018    Localized edema 07/23/2018    Type 2 diabetes mellitus with nephropathy ([address] 01/23/2018    Fracture of multiple ribs of right side 07/26/2017    Essential hypertension 03/28/2017    Hypercholesterolemia 03/28/2017    Controlled type 2 diabetes mellitus without complication ([address] 89/55/3092    History of stroke 03/28/2017    Coronary artery disease involving native coronary artery without angina pectoris 03/28/2017    Obesity 06/22/2014     Current Outpatient Medications   Medication Sig Dispense Refill    gabapentin (NEURONTIN) 300 mg capsule Take 2 Caps by mouth nightly. Max Daily Amount: 600 mg. 2 to 3 tablets at bedtime; may take 1 to 2 tablets up to 3 times a day during the day as needed for additional pain  Indications: neuropathic pain 180 Cap 1    amLODIPine-benazepril (LOTREL) 5-20 mg per capsule TAKE 1 CAPSULE EVERY DAY 90 Cap 2    hydroCHLOROthiazide (HYDRODIURIL) 25 mg tablet TAKE 1 TABLET EVERY DAY 90 Tab 2    ezetimibe (ZETIA) 10 mg tablet TAKE 1 TABLET EVERY DAY 90 Tab 2    finasteride (PROSCAR) 5 mg tablet TAKE 1 TABLET EVERY NIGHT 90 Tab 2    metFORMIN (GLUCOPHAGE) 500 mg tablet TAKE 1 TABLET EVERY DAY WITH DINNER 90 Tab 2    furosemide (LASIX) 40 mg tablet TAKE 1 TABLET EVERY DAY 90 Tab 2    tamsulosin (FLOMAX) 0.4 mg capsule TAKE 2 CAPSULES BY MOUTH EVERY  Cap 2    carvediloL (COREG) 12.5 mg tablet TAKE 1 TABLET TWICE DAILY WITH MEALS 180 Tab 2    buPROPion SR (WELLBUTRIN SR) 150 mg SR tablet TAKE 1 TABLET TWICE DAILY 180 Tab 2    atorvastatin (LIPITOR) 40 mg tablet TAKE 1 TABLET EVERY DAY 90 Tab 1    potassium chloride (Klor-Con M20) 20 mEq tablet Take 1 Tab by mouth daily. 90 Tab 3    Blood-Glucose Meter monitoring kit Check fsbs every day 1 Kit 0    glucose blood VI test strips (ASCENSIA AUTODISC VI, ONE TOUCH ULTRA TEST VI) strip Check fsbs every day 250.00 50 Strip 11    lancets misc check fsbs every day 250.00 1 Each 11    aspirin delayed-release 81 mg tablet Take 1 Tab by mouth daily. 30 Tab 0    therapeutic multivitamin (THERA) tablet Take 1 Tab by mouth daily. 90 Tab 1    diazePAM (VALIUM) 10 mg tablet       traMADol (ULTRAM) 50 mg tablet Take 50 mg by mouth every six (6) hours as needed for Pain.        No Known Allergies   Lab Results   Component Value Date/Time    WBC 8.1 10/03/2019 11:13 AM HGB 13.6 10/03/2019 11:13 AM    HCT 40.1 10/03/2019 11:13 AM    PLATELET 652 91/72/7134 11:13 AM    MCV 92 10/03/2019 11:13 AM     Lab Results   Component Value Date/Time    Hemoglobin A1c 7.2 (H) 10/03/2019 11:13 AM    Hemoglobin A1c 6.1 06/24/2017 05:59 AM    Hemoglobin A1c 5.8 (H) 03/28/2017 01:08 PM    Glucose 160 (H) 10/03/2019 11:13 AM    Glucose (POC) 110 (H) 06/24/2017 06:39 AM    Glucose  (A) 12/05/2019 05:48 PM    Microalb/Creat ratio (ug/mg creat.) <8.3 10/03/2019 11:13 AM    LDL, calculated 70 10/03/2019 11:13 AM    Creatinine 1.49 (H) 10/03/2019 11:13 AM      Lab Results   Component Value Date/Time    Cholesterol, total 158 10/03/2019 11:13 AM    HDL Cholesterol 52 10/03/2019 11:13 AM    LDL, calculated 70 10/03/2019 11:13 AM    Triglyceride 182 (H) 10/03/2019 11:13 AM    CHOL/HDL Ratio 2.9 06/24/2017 05:59 AM     Lab Results   Component Value Date/Time    GFR est non-AA 47 (L) 10/03/2019 11:13 AM    GFR est AA 54 (L) 10/03/2019 11:13 AM    Creatinine 1.49 (H) 10/03/2019 11:13 AM    BUN 23 10/03/2019 11:13 AM    Sodium 144 10/03/2019 11:13 AM    Potassium 3.5 10/03/2019 11:13 AM    Chloride 98 10/03/2019 11:13 AM    CO2 26 10/03/2019 11:13 AM     Lab Results   Component Value Date/Time    Prostate Specific Ag 0.6 10/03/2019 11:13 AM        ROS    Physical Exam  Constitutional:       Appearance: He is obese. Pulmonary:      Effort: Pulmonary effort is normal.   Neurological:      General: No focal deficit present. ASSESSMENT and PLAN  Diagnoses and all orders for this visit:    1. Controlled type 2 diabetes mellitus with complication, without long-term current use of insulin (HCC)  -     HEMOGLOBIN A1C WITH EAG    2. Essential hypertension  -     CBC W/O DIFF  -     METABOLIC PANEL, COMPREHENSIVE    3.  Pure hypercholesterolemia  -     METABOLIC PANEL, COMPREHENSIVE  -     LIPID PANEL    4. Stage 3 chronic kidney disease, unspecified whether stage 3a or 3b CKD  -     METABOLIC PANEL, COMPREHENSIVE    5. PAD (peripheral artery disease) ([facility]    6. Spinal stenosis of lumbar region with neurogenic claudication    7. History of CVA (cerebrovascular accident)    8. Prostate cancer screening  -     PSA SCREENING (SCREENING)    9. Neuropathic pain  -     gabapentin (NEURONTIN) 300 mg capsule; Take 2 Caps by mouth nightly. Max Daily Amount: 600 mg. 2 to 3 tablets at bedtime; may take 1 to 2 tablets up to 3 times a day during the day as needed for additional pain  Indications: neuropathic pain    10. Medicare annual wellness visit, subsequent      Follow-up and Dispositions    · Return in about 6 months (around 6/30/2021) for fu dm-2 htn hld hx cva,back pain x 30 min--. This is the Subsequent Medicare Annual Wellness Exam, performed 12 months or more after the Initial AWV or the last Subsequent AWV    I have reviewed the patient's medical history in detail and updated the computerized patient record.      Depression Risk Factor Screening:     3 most recent PHQ Screens 6/23/2020   PHQ Not Done -   Little interest or pleasure in doing things Not at all   Feeling down, depressed, irritable, or hopeless Not at all   Total Score PHQ 2 0   Trouble falling or staying asleep, or sleeping too much -   Feeling tired or having little energy -   Poor appetite, weight loss, or overeating -   Feeling bad about yourself - or that you are a failure or have let yourself or your family down -   Trouble concentrating on things such as school, work, reading, or watching TV -   Moving or speaking so slowly that other people could have noticed; or the opposite being so fidgety that others notice -   Thoughts of being better off dead, or hurting yourself in some way -   PHQ 9 Score -       Alcohol Risk Screen    Do you average more than 1 drink per night or more than 7 drinks a week: No    In the past three months have you have had more than 4 drinks containing alcohol on one occasion: No        Functional Ability and Level of Safety:    Hearing: Hearing is good. Activities of Daily Living: The home contains: no safety equipment. Patient does total self care      Ambulation: with difficulty, uses a cane     Fall Risk:  Fall Risk Assessment, last 12 mths 12/30/2020   Able to walk? Yes   Fall in past 12 months? 0   Do you feel unsteady? 0   Are you worried about falling 0   Number of falls in past 12 months -   Fall with injury? -      Abuse Screen:  Patient is not abused       Cognitive Screening    Has your family/caregiver stated any concerns about your memory: no    Cognitive Screening: Normal - serial 3    Assessment/Plan   Education and counseling provided:  Are appropriate based on today's review and evaluation  End-of-Life planning (with patient's consent)  shingrix recommended    Diagnoses and all orders for this visit:    1. Controlled type 2 diabetes mellitus with complication, without long-term current use of insulin (HCC)  -     HEMOGLOBIN A1C WITH EAG   Advised fsbs checks  2. Essential hypertension  -     CBC W/O DIFF  -     METABOLIC PANEL, COMPREHENSIVE   bp monitroing  3. Pure hypercholesterolemia  -     METABOLIC PANEL, COMPREHENSIVE  -     LIPID PANEL    4. Stage 3 chronic kidney disease, unspecified whether stage 3a or 3b CKD  -     METABOLIC PANEL, COMPREHENSIVE   Fu Dr Rosa Kirby  5. PAD (peripheral artery disease) (HCC)   No claudication  6. Spinal stenosis of lumbar region with neurogenic claudication   Less pain   Refilled gabapentin  7.  History of CVA (cerebrovascular accident)   On aspirin 81 mg every day         Health Maintenance Due     Health Maintenance Due   Topic Date Due    Shingrix Vaccine Age 49> (1 of 2) 09/23/1998    GLAUCOMA SCREENING Q2Y  09/07/2019    Eye Exam Retinal or Dilated  09/07/2019    Medicare Yearly Exam  10/03/2020    Foot Exam Q1  10/03/2020    A1C test (Diabetic or Prediabetic)  10/03/2020    MICROALBUMIN Q1  10/03/2020    Lipid Screen  10/03/2020       Patient Care Team   Patient Care Team:  Kendell Adams MD as PCP - General (Internal Medicine)  Kendell Adams MD as PCP - [address] [place] [address] Provider  lBaise Gar MD (Urology)  Flora Stoner MD as Physician (Cardiology)  Dary Bond, RN as Ambulatory Care Manager    History     Patient Active Problem List   Diagnosis Code    Benign prostatic hyperplasia with lower urinary tract symptoms N40.1    Urinary frequency R35.0    Obesity E66.9    Nocturia R35.1    Slowing of urinary stream R39.198    Erectile dysfunction N52.9    Calculus of kidney N20.0    Left low back pain M54.5    Gross hematuria R31.0    Essential hypertension I10    Hypercholesterolemia E78.00    Controlled type 2 diabetes mellitus without complication ([address] G85.0    History of stroke Z86.73    Coronary artery disease involving native coronary artery without angina pectoris I25.10    Fracture of multiple ribs of right side S22.41XA    Type 2 diabetes mellitus with nephropathy ([address] E11.21    Full code but no chest compressions Z78.9    Localized edema R60.0    Moderate major depression ([address] F32.1     Past Medical History:   Diagnosis Date    Agoraphobia with panic disorder     Anxiety     Arthritis     spine    BPH (benign prostatic hypertrophy) with urinary obstruction     CAD (coronary artery disease)     no previous MI or stents    Chronic edema     Coronary artery disease involving native coronary artery without angina pectoris 3/28/2017    CVD (cardiovascular disease)     Depression     Diabetes ([address]     no longer medicated    Erectile dysfunction     Essential hypertension 3/28/2017    Falls     Fracture of multiple ribs of right side 7/26/2017    Full code but no chest compressions 7/23/2018    GERD (gastroesophageal reflux disease)     Hemiparesis ([address]     History of stroke 3/28/2017    Hypercholesterolemia     Hypercholesterolemia 3/28/2017    Hypertension     Joint pain     Localized edema 7/23/2018    Muscle pain     Muscle weakness     Nocturia     Panic disorder     Ringing in the ears     Seizures ([address]     at age 3, facial    Stroke [facility]) 1996    left sided weakness      Past Surgical History:   Procedure Laterality Date    ABDOMEN SURGERY PROC UNLISTED  2008    Lap band    COLONOSCOPY N/A 5/4/2018    COL[facility]    HX COLONOSCOPY      HX LUMBAR DISKECTOMY  1970's    HX ORTHOPAEDIC      right finger repair    HX ORTHOPAEDIC  12/2019    spinal surgery    IR INJ SPINE THER SUBST LUM/SAC W IMG  2/4/2020    IR KYPHOPLASTY LUMBAR  12/20/2019    IR KYPHOPLASTY LUMBAR  1/24/2020     Current Outpatient Medications   Medication Sig Dispense Refill    amLODIPine-benazepril (LOTREL) 5-20 mg per capsule TAKE 1 CAPSULE EVERY DAY 90 Cap 2    hydroCHLOROthiazide (HYDRODIURIL) 25 mg tablet TAKE 1 TABLET EVERY DAY 90 Tab 2    ezetimibe (ZETIA) 10 mg tablet TAKE 1 TABLET EVERY DAY 90 Tab 2    finasteride (PROSCAR) 5 mg tablet TAKE 1 TABLET EVERY NIGHT 90 Tab 2    metFORMIN (GLUCOPHAGE) 500 mg tablet TAKE 1 TABLET EVERY DAY WITH DINNER 90 Tab 2    furosemide (LASIX) 40 mg tablet TAKE 1 TABLET EVERY DAY 90 Tab 2    tamsulosin (FLOMAX) 0.4 mg capsule TAKE 2 CAPSULES BY MOUTH EVERY  Cap 2    carvediloL (COREG) 12.5 mg tablet TAKE 1 TABLET TWICE DAILY WITH MEALS 180 Tab 2    buPROPion SR (WELLBUTRIN SR) 150 mg SR tablet TAKE 1 TABLET TWICE DAILY 180 Tab 2    atorvastatin (LIPITOR) 40 mg tablet TAKE 1 TABLET EVERY DAY 90 Tab 1    potassium chloride (Klor-Con M20) 20 mEq tablet Take 1 Tab by mouth daily. 90 Tab 3    Blood-Glucose Meter monitoring kit Check fsbs every day 1 Kit 0    glucose blood VI test strips (ASCENSIA AUTODISC VI, ONE TOUCH ULTRA TEST VI) strip Check fsbs every day 250.00 50 Strip 11    lancets misc check fsbs every day 250.00 1 Each 11    aspirin delayed-release 81 mg tablet Take 1 Tab by mouth daily.  30 Tab 0  therapeutic multivitamin (THERA) tablet Take 1 Tab by mouth daily. 90 Tab 1    diazePAM (VALIUM) 10 mg tablet       gabapentin (NEURONTIN) 300 mg capsule Take 1 Cap by mouth four (4) times daily. Max Daily Amount: 1,200 mg. 2 to 3 tablets at bedtime; may take 1 to 2 tablets up to 3 times a day during the day as needed for additional pain  Indications: neuropathic pain 270 Cap 5    traMADol (ULTRAM) 50 mg tablet Take 50 mg by mouth every six (6) hours as needed for Pain. No Known Allergies    Family History   Problem Relation Age of Onset    Cancer Father         lung    Stroke Father     Hypertension Father     Cancer Mother         lung    Hypertension Sister     Heart Disease Brother         chf--aicd     Social History     Tobacco Use    Smoking status: Never Smoker    Smokeless tobacco: Never Used   Substance Use Topics    Alcohol use: Yes     Alcohol/week: 5.0 standard drinks     Types: 5 Cans of beer per week     Frequency: 2-3 times a week     Drinks per session: 1 or 2     Binge frequency: Never     Comment: a week       Jay Lema, who was evaluated through a synchronous (real-time) audio-video encounter, and/or his healthcare decision maker, is aware that it is a billable service, with coverage as determined by his insurance carrier. He provided verbal consent to proceed: Yes, and patient identification was verified. It was conducted pursuant to the emergency declaration under the [address] [place] [address], [address] [place] [address] authority and the [employer] Act. A caregiver was present when appropriate. Ability to conduct physical exam was limited. I was at home. The patient was at home.     Mook Brooke MD

## 2020-12-30 NOTE — PATIENT INSTRUCTIONS
This is an established visit conducted via telemedicine. The patient has been instructed that this meets HIPAA criteria and acknowledges and agrees to this method of visitation. Costa Archuleta LPN 
84/39/10 
7:19 AM 
Medicare Wellness Visit, Male The best way to live healthy is to have a lifestyle where you eat a well-balanced diet, exercise regularly, limit alcohol use, and quit all forms of tobacco/nicotine, if applicable. Regular preventive services are another way to keep healthy. Preventive services (vaccines, screening tests, monitoring & exams) can help personalize your care plan, which helps you manage your own care. Screening tests can find health problems at the earliest stages, when they are easiest to treat. [facility] follows the current, evidence-based guidelines published by the [facility] States Andres Ruiz ([facility]) when recommending preventive services for our patients. Because we follow these guidelines, sometimes recommendations change over time as research supports it. (For example, a prostate screening blood test is no longer routinely recommended for men with no symptoms). Of course, you and your doctor may decide to screen more often for some diseases, based on your risk and co-morbidities (chronic disease you are already diagnosed with). Preventive services for you include: - Medicare offers their members a free annual wellness visit, which is time for you and your primary care provider to discuss and plan for your preventive service needs. Take advantage of this benefit every year! 
-All adults over age 72 should receive the recommended pneumonia vaccines. Current USPSTF guidelines recommend a series of two vaccines for the best pneumonia protection.  
-All adults should have a flu vaccine yearly and tetanus vaccine every 10 years. 
-All adults age 48 and older should receive the shingles vaccines (series of two vaccines). -All adults age 38-68 who are overweight should have a diabetes screening test once every three years.  
-Other screening tests & preventive services for persons with diabetes include: an eye exam to screen for diabetic retinopathy, a kidney function test, a foot exam, and stricter control over your cholesterol.  
-Cardiovascular screening for adults with routine risk involves an electrocardiogram (ECG) at intervals determined by the provider.  
-Colorectal cancer screening should be done for adults age 54-65 with no increased risk factors for colorectal cancer. There are a number of acceptable methods of screening for this type of cancer. Each test has its own benefits and drawbacks. Discuss with your provider what is most appropriate for you during your annual wellness visit. The different tests include: colonoscopy (considered the best screening method), a fecal occult blood test, a fecal DNA test, and sigmoidoscopy. 
-All adults born between [facility] should be screened once for Hepatitis C. 
-An Abdominal Aortic Aneurysm (AAA) Screening is recommended for men age 73-68 who has ever smoked in their lifetime. Here is a list of your current Health Maintenance items (your personalized list of preventive services) with a due date: 
Health Maintenance Due Topic Date Due  Shingles Vaccine (1 of 2) 09/23/1998  Glaucoma Screening   09/07/2019 [email or web address] Eye Exam  09/07/2019 [email or web address] Diabetic Foot Care  10/03/2020  Hemoglobin A1C    10/03/2020  Albumin Urine Test  10/03/2020  Cholesterol Test   10/03/2020

## 2021-01-05 DIAGNOSIS — M48.062 SPINAL STENOSIS OF LUMBAR REGION WITH NEUROGENIC CLAUDICATION: ICD-10-CM

## 2021-01-05 DIAGNOSIS — M79.606 PAIN OF LOWER EXTREMITY, UNSPECIFIED LATERALITY: Primary | ICD-10-CM

## 2021-01-05 RX ORDER — GABAPENTIN 300 MG/1
600 CAPSULE ORAL
Qty: 180 CAP | Refills: 1 | Status: SHIPPED | OUTPATIENT
Start: 2021-01-05 | End: 2021-08-18 | Stop reason: SDUPTHER

## 2021-01-12 LAB
ALBUMIN SERPL-MCNC: 4.4 G/DL (ref 3.7–4.7)
ALBUMIN/GLOB SERPL: 1.8 {RATIO} (ref 1.2–2.2)
ALP SERPL-CCNC: 83 IU/L (ref 39–117)
ALT SERPL-CCNC: 21 IU/L (ref 0–44)
AST SERPL-CCNC: 18 IU/L (ref 0–40)
BILIRUB SERPL-MCNC: 0.5 MG/DL (ref 0–1.2)
BUN SERPL-MCNC: 18 MG/DL (ref 8–27)
BUN/CREAT SERPL: 15 (ref 10–24)
CALCIUM SERPL-MCNC: 9.5 MG/DL (ref 8.6–10.2)
CHLORIDE SERPL-SCNC: 106 MMOL/L (ref 96–106)
CHOLEST SERPL-MCNC: 144 MG/DL (ref 100–199)
CO2 SERPL-SCNC: 28 MMOL/L (ref 20–29)
CREAT SERPL-MCNC: 1.22 MG/DL (ref 0.76–1.27)
ERYTHROCYTE [DISTWIDTH] IN BLOOD BY AUTOMATED COUNT: 12.5 % (ref 11.6–15.4)
EST. AVERAGE GLUCOSE BLD GHB EST-MCNC: 137 MG/DL
GLOBULIN SER CALC-MCNC: 2.5 G/DL (ref 1.5–4.5)
GLUCOSE SERPL-MCNC: 132 MG/DL (ref 65–99)
HBA1C MFR BLD: 6.4 % (ref 4.8–5.6)
HCT VFR BLD AUTO: 45 % (ref 37.5–51)
HDLC SERPL-MCNC: 56 MG/DL
HGB BLD-MCNC: 15 G/DL (ref 13–17.7)
LDLC SERPL CALC-MCNC: 69 MG/DL (ref 0–99)
MCH RBC QN AUTO: 31.3 PG (ref 26.6–33)
MCHC RBC AUTO-ENTMCNC: 33.3 G/DL (ref 31.5–35.7)
MCV RBC AUTO: 94 FL (ref 79–97)
PLATELET # BLD AUTO: 237 X10E3/UL (ref 150–450)
POTASSIUM SERPL-SCNC: 4.4 MMOL/L (ref 3.5–5.2)
PROT SERPL-MCNC: 6.9 G/DL (ref 6–8.5)
PSA SERPL-MCNC: 0.7 NG/ML (ref 0–4)
RBC # BLD AUTO: 4.8 X10E6/UL (ref 4.14–5.8)
SODIUM SERPL-SCNC: 146 MMOL/L (ref 134–144)
TRIGL SERPL-MCNC: 104 MG/DL (ref 0–149)
VLDLC SERPL CALC-MCNC: 19 MG/DL (ref 5–40)
WBC # BLD AUTO: 7.5 X10E3/UL (ref 3.4–10.8)

## 2021-01-12 NOTE — PROGRESS NOTES
Please inform pt normal blood cell counts, kidney liver and cholesterol levels,  3 month bs avg is 137--good control of diabetes.  Continue current meds and diet

## 2021-01-13 ENCOUNTER — TELEPHONE (OUTPATIENT)
Dept: INTERNAL MEDICINE CLINIC | Age: 71
End: 2021-01-13

## 2021-01-13 NOTE — TELEPHONE ENCOUNTER
----- Message from Maribell Flores sent at 1/13/2021  2:49 PM EST -----  Regarding: /telephone  Contact: [phone]  Patient return call    Caller's first and last name and relationship (if not the patient):N/A      Best contact number(s):[phone]      Whose call is being returned: He believes the nurse regarding his lab results.      Message from VICENTE

## 2021-01-13 NOTE — TELEPHONE ENCOUNTER
Called, spoke to pt. Two identifiers confirmed. Notified pt of lab results/recommendations per Dr. Gurjit Valencia. Pt verbalized understanding of information discussed w/ no further questions at this time.

## 2021-01-23 RX ORDER — ATORVASTATIN CALCIUM 40 MG/1
TABLET, FILM COATED ORAL
Qty: 90 TAB | Refills: 1 | Status: SHIPPED | OUTPATIENT
Start: 2021-01-23 | End: 2021-06-11

## 2021-02-10 DIAGNOSIS — R60.0 LOCALIZED EDEMA: ICD-10-CM

## 2021-02-10 RX ORDER — POTASSIUM CHLORIDE 20 MEQ/1
TABLET, EXTENDED RELEASE ORAL
Qty: 90 TAB | Refills: 3 | Status: SHIPPED | OUTPATIENT
Start: 2021-02-10 | End: 2021-11-17

## 2021-03-18 ENCOUNTER — TELEPHONE (OUTPATIENT)
Dept: INTERNAL MEDICINE CLINIC | Age: 71
End: 2021-03-18

## 2021-03-18 NOTE — TELEPHONE ENCOUNTER
#[phone]  Wife is returning your call and states that you were to call the number given. She is asking for a call back as soon as you can.

## 2021-03-18 NOTE — TELEPHONE ENCOUNTER
Pt Wife Samy Reddy called stating pt scheduled for 2nd vaccine on Sat. States pt has cough and runny nose. States: does he need a covid test prior to shot due to symptoms. Please advise.   [phone]

## 2021-03-31 ENCOUNTER — NURSE TRIAGE (OUTPATIENT)
Dept: OTHER | Facility: CLINIC | Age: 71
End: 2021-03-31

## 2021-03-31 ENCOUNTER — TELEPHONE (OUTPATIENT)
Dept: INTERNAL MEDICINE CLINIC | Age: 71
End: 2021-03-31

## 2021-03-31 RX ORDER — AZITHROMYCIN 250 MG/1
250 TABLET, FILM COATED ORAL SEE ADMIN INSTRUCTIONS
Qty: 6 TAB | Refills: 0 | Status: SHIPPED | OUTPATIENT
Start: 2021-03-31 | End: 2021-04-05

## 2021-03-31 NOTE — TELEPHONE ENCOUNTER
Called out and spoke to pt. Two pt identifiers confirmed. Pt was okay with trying the Zpak and I informed him to call if things seem to worsen. Pt verbalized understanding of information discussed w/ no further questions at this time.

## 2021-03-31 NOTE — TELEPHONE ENCOUNTER
Patient called [facility] with red flag complaint. Cold transfer. Brief description of triage: Pt reports having dry cough x3 weeks. Reports runny nose, scratchy throat. Triage indicates for patient to have virtual appt with PCP today or tomorrow. Care advice provided, patient verbalizes understanding; denies any other questions or concerns; instructed to call back for any new or worsening symptoms. Writer provided warm transfer to Goldy Casey at [facility] for appointment scheduling. Attention Provider: Thank you for allowing me to participate in the care of your patient. The patient was connected to triage from [facility]. Please do not respond through this encounter as the response is not directed to a shared pool. Reason for Disposition   Patient wants to be seen    Answer Assessment - Initial Assessment Questions  1. ONSET: \"When did the cough begin? \"       3 weeks ago. 2. SEVERITY: \"How bad is the cough today? \"       Worse at night when laying down. 3. RESPIRATORY DISTRESS: \"Describe your breathing. \"       Denies    4. FEVER: \"Do you have a fever? \" If so, ask: \"What is your temperature, how was it measured, and when did it start? \"      Denies    5. HEMOPTYSIS: \"Are you coughing up any blood? \" If so ask: \"How much? \" (flecks, streaks, tablespoons, etc.)      Dry cough    6. TREATMENT: \"What have you done so far to treat the cough? \" (e.g., meds, fluids, humidifier)      Nyquil and Dayquil    7. CARDIAC HISTORY: \"Do you have any history of heart disease? \" (e.g., heart attack, congestive heart failure)      HTN    8. LUNG HISTORY: \"Do you have any history of lung disease? \"  (e.g., pulmonary embolus, asthma, emphysema)      Deneis    9. PE RISK FACTORS: \"Do you have a history of blood clots? \" (or: recent major surgery, recent prolonged travel, bedridden)      Denies    10. OTHER SYMPTOMS: \"Do you have any other symptoms? (e.g., runny nose, wheezing, chest pain)        Runny nose, scratchy throat    11. PREGNANCY: \"Is there any chance you are pregnant? \" \"When was your last menstrual period? \"        N/A    12. TRAVEL: \"Have you traveled out of the country in the last month? \" (e.g., travel history, exposures)        Denies    Protocols used: [id number]

## 2021-03-31 NOTE — TELEPHONE ENCOUNTER
----- Message from Gee Roberson sent at 3/31/2021 10:13 AM EDT -----  Regarding: FW: Tino/telephone    ----- Message -----  From: Ivan Lambert  Sent: 3/31/2021  10:00 AM EDT  To: Bhupinder  Office Pool  Subject: Tino/ziyad                                    Pt is requesting an appointment for coughing and sinus he did not want to schedule a vv appointment. He stated he tested negative for covid and had both his vaccines. Pts number [phone].

## 2021-04-05 ENCOUNTER — NURSE TRIAGE (OUTPATIENT)
Dept: OTHER | Facility: CLINIC | Age: 71
End: 2021-04-05

## 2021-04-05 NOTE — TELEPHONE ENCOUNTER
Duplicate call:    Patient previously seen and treated by provider. Patient is calling back because symptoms have not improved. Transfer to Red[place] at [employer] [facility] for scheduling.

## 2021-04-07 ENCOUNTER — VIRTUAL VISIT (OUTPATIENT)
Dept: INTERNAL MEDICINE CLINIC | Age: 71
End: 2021-04-07
Payer: MEDICARE

## 2021-04-07 DIAGNOSIS — R05.9 COUGH: Primary | ICD-10-CM

## 2021-04-07 PROCEDURE — 99213 OFFICE O/P EST LOW 20 MIN: CPT | Performed by: FAMILY MEDICINE

## 2021-04-07 PROCEDURE — 3017F COLORECTAL CA SCREEN DOC REV: CPT | Performed by: FAMILY MEDICINE

## 2021-04-07 PROCEDURE — 1101F PT FALLS ASSESS-DOCD LE1/YR: CPT | Performed by: FAMILY MEDICINE

## 2021-04-07 PROCEDURE — G8756 NO BP MEASURE DOC: HCPCS | Performed by: FAMILY MEDICINE

## 2021-04-07 PROCEDURE — G8427 DOCREV CUR MEDS BY ELIG CLIN: HCPCS | Performed by: FAMILY MEDICINE

## 2021-04-07 PROCEDURE — G9717 DOC PT DX DEP/BP F/U NT REQ: HCPCS | Performed by: FAMILY MEDICINE

## 2021-04-07 NOTE — PROGRESS NOTES
Norberto Ghotra is a 65 y.o. male who was seen by synchronous (real-time) audio-video technology on 4/7/2021 for Cough (cough been going on since 2 weeks later from first covid shot- dry hacky cough- )    Assessment & Plan:   Diagnoses and all orders for this visit:    1. Cough  -     XR CHEST PA LAT; Future    1. Cough  I do not see anything on his medication list that would cause a cough. I advised him to try Zyrtec and Flonase to see if that alleviates his cough. I encouraged him to contact the office if his sxs do not improve. I ordered a Chest Xray for further evaluation. Subjective:     Patient presents virtually today c/o a cough. Pt's PCP is Dr. Danna Neff. Cough: Pt reports getting his first COVID shot in 02/2021 and notes a persistent, nonproductive cough that started two weeks later. He got a COVID-19 test following the presentation of the cough which returned negative. He then received his second COVID shot and contacted his PCP who prescribed him a Z-pack. He confirms an intermittent runny nose and the cough worsening at night. He has been taking acetaminophen for cough relief and has not used Flonase before. Pt denies issues with his eyes or gerd symptoms    Pt reports his BP today at home was 141/78. Prior to Admission medications    Medication Sig Start Date End Date Taking? Authorizing Provider   potassium chloride (K-DUR, KLOR-CON) 20 mEq tablet TAKE 1 TABLET EVERY DAY 2/10/21  Yes Karina Patricia MD   atorvastatin (LIPITOR) 40 mg tablet TAKE 1 TABLET EVERY DAY 1/23/21  Yes Karina Patricia MD   gabapentin (NEURONTIN) 300 mg capsule Take 2 Caps by mouth nightly.  Max Daily Amount: 600 mg. 1/5/21  Yes Karina Patricia MD   amLODIPine-benazepril (LOTREL) 5-20 mg per capsule TAKE 1 CAPSULE EVERY DAY 11/12/20  Yes Karina Patricia MD   hydroCHLOROthiazide (HYDRODIURIL) 25 mg tablet TAKE 1 TABLET EVERY DAY 11/12/20  Yes Karina Patricia MD   ezetimibe (ZETIA) 10 mg tablet TAKE 1 TABLET EVERY DAY 11/12/20  Yes Danna Neff Soo Portillo MD   finasteride (PROSCAR) 5 mg tablet TAKE 1 TABLET EVERY NIGHT 11/12/20  Yes Trenton Gill MD   metFORMIN (GLUCOPHAGE) 500 mg tablet TAKE 1 TABLET EVERY DAY WITH DINNER 11/12/20  Yes Trenton Gill MD   furosemide (LASIX) 40 mg tablet TAKE 1 TABLET EVERY DAY 11/12/20  Yes Trenton Gill MD   tamsulosin (FLOMAX) 0.4 mg capsule TAKE 2 CAPSULES BY MOUTH EVERY DAY 11/12/20  Yes Trenton Gill MD   carvediloL (COREG) 12.5 mg tablet TAKE 1 TABLET TWICE DAILY WITH MEALS 11/12/20  Yes Trenton Gill MD   buPROPion SR [facility] SR) 150 mg SR tablet TAKE 1 TABLET TWICE DAILY 10/15/20  Yes Trenton Gill MD   Blood-Glucose Meter monitoring kit Check fsbs every day 10/3/19  Yes Trenton Gill MD   glucose blood VI test strips (ASCENSIA AUTODISC VI, ONE TOUCH ULTRA TEST VI) strip Check fsbs every day 250.00 10/3/19  Yes Trenton Gill MD   lancets misc check fsbs every day 250.00 10/3/19  Yes Trenton Gill MD   aspirin delayed-release 81 mg tablet Take 1 Tab by mouth daily. 10/3/19  Yes Trenton Gill MD   therapeutic multivitamin (THERA) tablet Take 1 Tab by mouth daily. 1/23/18  Yes Issac Edwards MD   diazePAM (VALIUM) 10 mg tablet  12/9/19   Provider, Historical   traMADol (ULTRAM) 50 mg tablet Take 50 mg by mouth every six (6) hours as needed for Pain.     Provider, Historical     Patient Active Problem List    Diagnosis Date Noted    Left low back pain 08/15/2016     Priority: 1 - One    Gross hematuria 08/15/2016     Priority: 1 - One    Calculus of kidney 04/18/2016     Priority: 1 - One    Benign prostatic hyperplasia with lower urinary tract symptoms 06/22/2014     Priority: 1 - One    Nocturia 07/30/2014     Priority: 2 - Two    Slowing of urinary stream 07/30/2014     Priority: 2 - Two    Urinary frequency 06/22/2014     Priority: 2 - Two    Erectile dysfunction 04/17/2016     Priority: 3 - Three    Moderate major depression ([facility] [address] 07/24/2018    Full code but no chest compressions 07/23/2018    Localized edema 07/23/2018    Type 2 diabetes mellitus with nephropathy ([facility]) 01/23/2018    Fracture of multiple ribs of right side 07/26/2017    Essential hypertension 03/28/2017    Hypercholesterolemia 03/28/2017    Controlled type 2 diabetes mellitus without complication ([facility]) 14/75/6268    History of stroke 03/28/2017    Coronary artery disease involving native coronary artery without angina pectoris 03/28/2017    Obesity 06/22/2014     Current Outpatient Medications   Medication Sig Dispense Refill    potassium chloride (K-DUR, KLOR-CON) 20 mEq tablet TAKE 1 TABLET EVERY DAY 90 Tab 3    atorvastatin (LIPITOR) 40 mg tablet TAKE 1 TABLET EVERY DAY 90 Tab 1    gabapentin (NEURONTIN) 300 mg capsule Take 2 Caps by mouth nightly. Max Daily Amount: 600 mg. 180 Cap 1    amLODIPine-benazepril (LOTREL) 5-20 mg per capsule TAKE 1 CAPSULE EVERY DAY 90 Cap 2    hydroCHLOROthiazide (HYDRODIURIL) 25 mg tablet TAKE 1 TABLET EVERY DAY 90 Tab 2    ezetimibe (ZETIA) 10 mg tablet TAKE 1 TABLET EVERY DAY 90 Tab 2    finasteride (PROSCAR) 5 mg tablet TAKE 1 TABLET EVERY NIGHT 90 Tab 2    metFORMIN (GLUCOPHAGE) 500 mg tablet TAKE 1 TABLET EVERY DAY WITH DINNER 90 Tab 2    furosemide (LASIX) 40 mg tablet TAKE 1 TABLET EVERY DAY 90 Tab 2    tamsulosin (FLOMAX) 0.4 mg capsule TAKE 2 CAPSULES BY MOUTH EVERY  Cap 2    carvediloL (COREG) 12.5 mg tablet TAKE 1 TABLET TWICE DAILY WITH MEALS 180 Tab 2    buPROPion SR (WELLBUTRIN SR) 150 mg SR tablet TAKE 1 TABLET TWICE DAILY 180 Tab 2    Blood-Glucose Meter monitoring kit Check fsbs every day 1 Kit 0    glucose blood VI test strips (ASCENSIA AUTODISC VI, ONE TOUCH ULTRA TEST VI) strip Check fsbs every day 250.00 50 Strip 11    lancets misc check fsbs every day 250.00 1 Each 11    aspirin delayed-release 81 mg tablet Take 1 Tab by mouth daily. 30 Tab 0    therapeutic multivitamin (THERA) tablet Take 1 Tab by mouth daily.  90 Tab 1    diazePAM (VALIUM) 10 mg tablet       traMADol (ULTRAM) 50 mg tablet Take 50 mg by mouth every six (6) hours as needed for Pain.        No Known Allergies  Past Medical History:   Diagnosis Date    Agoraphobia with panic disorder     Anxiety     Arthritis     spine    BPH (benign prostatic hypertrophy) with urinary obstruction     CAD (coronary artery disease)     no previous MI or stents    Chronic edema     Coronary artery disease involving native coronary artery without angina pectoris 3/28/2017    CVD (cardiovascular disease)     Depression     Diabetes ([facility] [address]     no longer medicated    Erectile dysfunction     Essential hypertension 3/28/2017    Falls     Fracture of multiple ribs of right side 7/26/2017    Full code but no chest compressions 7/23/2018    GERD (gastroesophageal reflux disease)     Hemiparesis ([facility] [address]     History of stroke 3/28/2017    Hypercholesterolemia     Hypercholesterolemia 3/28/2017    Hypertension     Joint pain     Localized edema 7/23/2018    Muscle pain     Muscle weakness     Nocturia     Panic disorder     Ringing in the ears     Seizures ([facility] [address]     at age 3, facial    Stroke [facility]) 1996    left sided weakness     Past Surgical History:   Procedure Laterality Date    COLONOSCOPY N/A 5/4/2018    COLONOSCOPY performed by Gardenia Culver MD at [facility] ENDOSCOPY    HX COLONOSCOPY      HX LUMBAR DISKECTOMY  1970's    HX ORTHOPAEDIC      right finger repair    HX ORTHOPAEDIC  12/2019    spinal surgery    IR INJ SPINE THER SUBST LUM/SAC W IMG  2/4/2020    IR KYPHOPLASTY LUMBAR  12/20/2019    IR KYPHOPLASTY LUMBAR  1/24/2020    SC ABDOMEN SURGERY PROC UNLISTED  2008    Lap band     Family History   Problem Relation Age of Onset    Cancer Father         lung    Stroke Father     Hypertension Father     Cancer Mother         lung    Hypertension Sister     Heart Disease Brother         chf--aicd     Social History     Tobacco Use    Smoking status: Never Smoker    Smokeless tobacco: Never Used   Substance Use Topics    Alcohol use: Yes     Alcohol/week: 5.0 standard drinks     Types: 5 Cans of beer per week     Frequency: 2-3 times a week     Drinks per session: 1 or 2     Binge frequency: Never     Comment: a week       Review of Systems   Constitutional: Negative. HENT:        Postnasal drip   Respiratory: Positive for cough. Negative for shortness of breath. Cardiovascular: Negative for chest pain. Gastrointestinal: Negative. Genitourinary: Negative. Musculoskeletal: Negative. Neurological: Negative. Psychiatric/Behavioral: Negative.         Objective:     Patient-Reported Vitals 12/30/2020   Patient-Reported Weight 215 lb        [INSTRUCTIONS:  \"[x]\" Indicates a positive item  \"[]\" Indicates a negative item  -- DELETE ALL ITEMS NOT EXAMINED]    Constitutional: [x] Appears well-developed and well-nourished [x] No apparent distress      [] Abnormal -     Mental status: [x] Alert and awake  [x] Oriented to person/place/time [x] Able to follow commands    [] Abnormal -     Eyes:   EOM    [x]  Normal    [] Abnormal -   Sclera  [x]  Normal    [] Abnormal -          Discharge [x]  None visible   [] Abnormal -     HENT: [x] Normocephalic, atraumatic  [] Abnormal -   [x] Mouth/Throat: Mucous membranes are moist    External Ears [x] Normal  [] Abnormal -    Neck: [x] No visualized mass [] Abnormal -     Pulmonary/Chest: [x] Respiratory effort normal   [x] No visualized signs of difficulty breathing or respiratory distress        [] Abnormal -      Musculoskeletal:   [x] Normal gait with no signs of ataxia         [x] Normal range of motion of neck        [] Abnormal -     Neurological:        [x] No Facial Asymmetry (Cranial nerve 7 motor function) (limited exam due to video visit)          [x] No gaze palsy        [] Abnormal -          Skin:        [x] No significant exanthematous lesions or discoloration noted on facial skin         [] Abnormal - Psychiatric:       [x] Normal Affect [] Abnormal -        [x] No Hallucinations    Other pertinent observable physical exam findings:-        We discussed the expected course, resolution and complications of the diagnosis(es) in detail. Medication risks, benefits, costs, interactions, and alternatives were discussed as indicated. I advised him to contact the office if his condition worsens, changes or fails to improve as anticipated. He expressed understanding with the diagnosis(es) and plan. [place] God[place], was evaluated through a synchronous (real-time) audio-video encounter. The patient (or guardian if applicable) is aware that this is a billable service. Verbal consent to proceed has been obtained within the past 12 months. The visit was conducted pursuant to the emergency declaration under the [address] [place] [address] authority and the [employer] Act. Patient identification was verified, and a caregiver was present when appropriate. The patient was located in a state where the provider was credentialed to provide care.       Shane Bussing, as dictated by Douglas Crabtree MD.

## 2021-04-07 NOTE — PROGRESS NOTES
This is an established visit conducted via telemedicine. The patient has been instructed that this meets HIPAA criteria and acknowledges and agrees to this method of visitation. Identified pt with two pt identifiers(name and ). Chief Complaint   Patient presents with    Cough     cough been going on since 2 weeks later from first covid shot- dry hacky cough-        Health Maintenance Due   Topic Date Due    COVID-19 Vaccine (1) Never done    Shingles Vaccine (1 of 2) Never done    Eye Exam  2019    Diabetic Foot Care  10/03/2020    Albumin Urine Test  10/03/2020    Colorectal Screening  2021       Mr. Sandra Shankar has a reminder for a \"due or due soon\" health maintenance. I have asked that he discuss this further with his primary care provider for follow-up on this health maintenance.

## 2021-04-08 ENCOUNTER — HOSPITAL ENCOUNTER (OUTPATIENT)
Dept: GENERAL RADIOLOGY | Age: 71
Discharge: HOME | End: 2021-04-08
Payer: MEDICARE

## 2021-04-08 DIAGNOSIS — R05.9 COUGH: ICD-10-CM

## 2021-04-08 PROCEDURE — 71046 X-RAY EXAM CHEST 2 VIEWS: CPT

## 2021-04-13 ENCOUNTER — PATIENT MESSAGE (OUTPATIENT)
Dept: INTERNAL MEDICINE CLINIC | Age: 71
End: 2021-04-13

## 2021-06-06 DIAGNOSIS — I10 ESSENTIAL HYPERTENSION: ICD-10-CM

## 2021-06-06 DIAGNOSIS — I25.10 CORONARY ARTERY DISEASE INVOLVING NATIVE CORONARY ARTERY OF NATIVE HEART WITHOUT ANGINA PECTORIS: ICD-10-CM

## 2021-06-06 RX ORDER — BUPROPION HYDROCHLORIDE 150 MG/1
TABLET, EXTENDED RELEASE ORAL
Qty: 180 TABLET | Refills: 2 | Status: SHIPPED | OUTPATIENT
Start: 2021-06-06 | End: 2021-12-22

## 2021-06-10 DIAGNOSIS — R60.0 LOCALIZED EDEMA: ICD-10-CM

## 2021-06-10 DIAGNOSIS — Z00.00 ROUTINE GENERAL MEDICAL EXAMINATION AT A HEALTH CARE FACILITY: ICD-10-CM

## 2021-06-10 DIAGNOSIS — E78.00 HYPERCHOLESTEROLEMIA: ICD-10-CM

## 2021-06-10 DIAGNOSIS — I10 ESSENTIAL HYPERTENSION: ICD-10-CM

## 2021-06-10 DIAGNOSIS — I25.10 CORONARY ARTERY DISEASE INVOLVING NATIVE CORONARY ARTERY OF NATIVE HEART WITHOUT ANGINA PECTORIS: ICD-10-CM

## 2021-06-11 RX ORDER — TAMSULOSIN HYDROCHLORIDE 0.4 MG/1
CAPSULE ORAL
Qty: 180 CAPSULE | Refills: 2 | Status: SHIPPED | OUTPATIENT
Start: 2021-06-11 | End: 2022-02-09

## 2021-06-11 RX ORDER — FUROSEMIDE 40 MG/1
TABLET ORAL
Qty: 90 TABLET | Refills: 2 | Status: SHIPPED | OUTPATIENT
Start: 2021-06-11 | End: 2022-02-09

## 2021-06-11 RX ORDER — EZETIMIBE 10 MG/1
TABLET ORAL
Qty: 90 TABLET | Refills: 2 | Status: SHIPPED | OUTPATIENT
Start: 2021-06-11 | End: 2022-02-09

## 2021-06-11 RX ORDER — AMLODIPINE AND BENAZEPRIL HYDROCHLORIDE 5; 20 MG/1; MG/1
CAPSULE ORAL
Qty: 90 CAPSULE | Refills: 2 | Status: SHIPPED | OUTPATIENT
Start: 2021-06-11 | End: 2022-02-09

## 2021-06-11 RX ORDER — METFORMIN HYDROCHLORIDE 500 MG/1
TABLET ORAL
Qty: 90 TABLET | Refills: 2 | Status: SHIPPED | OUTPATIENT
Start: 2021-06-11 | End: 2022-02-09

## 2021-06-11 RX ORDER — ATORVASTATIN CALCIUM 40 MG/1
TABLET, FILM COATED ORAL
Qty: 90 TABLET | Refills: 1 | Status: SHIPPED | OUTPATIENT
Start: 2021-06-11

## 2021-06-11 RX ORDER — FINASTERIDE 5 MG/1
TABLET, FILM COATED ORAL
Qty: 90 TABLET | Refills: 2 | Status: SHIPPED | OUTPATIENT
Start: 2021-06-11 | End: 2022-02-09

## 2021-06-11 RX ORDER — CARVEDILOL 12.5 MG/1
TABLET ORAL
Qty: 180 TABLET | Refills: 2 | Status: SHIPPED | OUTPATIENT
Start: 2021-06-11 | End: 2022-02-09

## 2021-06-11 RX ORDER — HYDROCHLOROTHIAZIDE 25 MG/1
TABLET ORAL
Qty: 90 TABLET | Refills: 2 | Status: SHIPPED | OUTPATIENT
Start: 2021-06-11 | End: 2021-07-08

## 2021-07-07 NOTE — PROGRESS NOTES
HISTORY OF PRESENT ILLNESS  Pj Krueger is a 65 y.o. male.   HPI     F/u Dm-2 with microalbuminuuria, HTN HLD hx cva with hemorrhage with left weakness, CKD3-Dr PRASHANT Leo MDD/anxiety bph mildPAD -Dr Broderick Kelley -here with wife  Last a1c 6.4 LDL 69  Cr 1.22  fsbs-none  On neurontin for low back pain/spinal stenosis    Depression on wellbutrin-controlled  Due for 3 yr colonosocpy d/t polyps-Dr Durant  Not very symptomatic from PAD-Dr Broderick Kelley  No falls this year  Last OV   -Last a1c 7.2 LDL 70  fsbs--none recently  No cp sob or sxs of neuropathy   on wellbutirn for MDD--contrlled  No BPH sxs on flomax     Has back and leg letty s/p kyphoplasty after a fall and has severe lumbar spinal stenosis--Dr Carlo Bo  Pain is better now-completed PT  No falls since last OV  His wife is undergoing cancer treatment     Sees Dr Tahira Louise for CKD 3     Walks for exercise for miles    Patient Active Problem List    Diagnosis Date Noted    Left low back pain 08/15/2016    Gross hematuria 08/15/2016    Calculus of kidney 04/18/2016    Benign prostatic hyperplasia with lower urinary tract symptoms 06/22/2014    Nocturia 07/30/2014    Slowing of urinary stream 07/30/2014    Urinary frequency 06/22/2014    Erectile dysfunction 04/17/2016    Moderate major depression ([address] 07/24/2018    Full code but no chest compressions 07/23/2018    Localized edema 07/23/2018    Type 2 diabetes mellitus with nephropathy ([address] 01/23/2018    Fracture of multiple ribs of right side 07/26/2017    Essential hypertension 03/28/2017    Hypercholesterolemia 03/28/2017    Controlled type 2 diabetes mellitus without complication ([address] 97/09/9647    History of stroke 03/28/2017    Coronary artery disease involving native coronary artery without angina pectoris 03/28/2017    Obesity 06/22/2014     Current Outpatient Medications   Medication Sig Dispense Refill    metFORMIN (GLUCOPHAGE) 500 mg tablet TAKE 1 TABLET EVERY DAY WITH DINNER 90 Tablet 2    atorvastatin (LIPITOR) 40 mg tablet TAKE 1 TABLET EVERY DAY 90 Tablet 1    carvediloL (COREG) 12.5 mg tablet TAKE 1 TABLET TWICE DAILY WITH MEALS 180 Tablet 2    tamsulosin (FLOMAX) 0.4 mg capsule TAKE 2 CAPSULES EVERY  Capsule 2    finasteride (PROSCAR) 5 mg tablet TAKE 1 TABLET EVERY NIGHT 90 Tablet 2    ezetimibe (ZETIA) 10 mg tablet TAKE 1 TABLET EVERY DAY 90 Tablet 2    furosemide (LASIX) 40 mg tablet TAKE 1 TABLET EVERY DAY 90 Tablet 2    amLODIPine-benazepril (LOTREL) 5-20 mg per capsule TAKE 1 CAPSULE EVERY DAY 90 Capsule 2    buPROPion SR (WELLBUTRIN SR) 150 mg SR tablet TAKE 1 TABLET TWICE DAILY 180 Tablet 2    potassium chloride (K-DUR, KLOR-CON) 20 mEq tablet TAKE 1 TABLET EVERY DAY 90 Tab 3    aspirin delayed-release 81 mg tablet Take 1 Tab by mouth daily. 30 Tab 0    therapeutic multivitamin (THERA) tablet Take 1 Tab by mouth daily. 90 Tab 1    hydroCHLOROthiazide (HYDRODIURIL) 25 mg tablet TAKE 1 TABLET EVERY DAY (Patient not taking: Reported on 7/8/2021) 90 Tablet 2    gabapentin (NEURONTIN) 300 mg capsule Take 2 Caps by mouth nightly. Max Daily Amount: 600 mg. 180 Cap 1    diazePAM (VALIUM) 10 mg tablet       traMADol (ULTRAM) 50 mg tablet Take 50 mg by mouth every six (6) hours as needed for Pain.  Blood-Glucose Meter monitoring kit Check fsbs every day 1 Kit 0    glucose blood VI test strips (ASCENSIA AUTODISC VI, ONE TOUCH ULTRA TEST VI) strip Check fsbs every day 250.00 50 Strip 11    lancets misc check fsbs every day 250.00 1 Each 11     No Known Allergies  Social History     Tobacco Use    Smoking status: Never Smoker    Smokeless tobacco: Never Used   Substance Use Topics    Alcohol use:  Yes     Alcohol/week: 5.0 standard drinks     Types: 5 Cans of beer per week     Comment: a week      Lab Results   Component Value Date/Time    WBC 7.5 01/11/2021 01:03 PM    HGB 15.0 01/11/2021 01:03 PM    HCT 45.0 01/11/2021 01:03 PM    PLATELET 152 49/59/8115 01:03 PM    MCV 94 01/11/2021 01:03 PM     Lab Results   Component Value Date/Time    Hemoglobin A1c 6.4 (H) 01/11/2021 01:03 PM    Hemoglobin A1c 7.2 (H) 10/03/2019 11:13 AM    Hemoglobin A1c 6.1 06/24/2017 05:59 AM    Glucose 132 (H) 01/11/2021 01:03 PM    Glucose (POC) 110 (H) 06/24/2017 06:39 AM    Glucose  (A) 12/05/2019 05:48 PM    Microalb/Creat ratio (ug/mg creat.) <8.3 10/03/2019 11:13 AM    LDL, calculated 69 01/11/2021 01:03 PM    LDL, calculated 70 10/03/2019 11:13 AM    Creatinine 1.22 01/11/2021 01:03 PM      Lab Results   Component Value Date/Time    Cholesterol, total 144 01/11/2021 01:03 PM    HDL Cholesterol 56 01/11/2021 01:03 PM    LDL, calculated 69 01/11/2021 01:03 PM    LDL, calculated 70 10/03/2019 11:13 AM    Triglyceride 104 01/11/2021 01:03 PM    CHOL/HDL Ratio 2.9 06/24/2017 05:59 AM     Lab Results   Component Value Date/Time    GFR est non-AA 59 (L) 01/11/2021 01:03 PM    GFR est AA 68 01/11/2021 01:03 PM    Creatinine 1.22 01/11/2021 01:03 PM    BUN 18 01/11/2021 01:03 PM    Sodium 146 (H) 01/11/2021 01:03 PM    Potassium 4.4 01/11/2021 01:03 PM    Chloride 106 01/11/2021 01:03 PM    CO2 28 01/11/2021 01:03 PM        ROS    Physical Exam  Vitals and nursing note reviewed. Constitutional:       General: He is not in acute distress. Appearance: He is well-developed. Comments: Appears stated age   HENT:      Head: Normocephalic. Cardiovascular:      Rate and Rhythm: Normal rate and regular rhythm. Heart sounds: Normal heart sounds. Pulmonary:      Effort: Pulmonary effort is normal.      Breath sounds: Normal breath sounds. Abdominal:      Palpations: Abdomen is soft. Skin:         Neurological:      Mental Status: He is alert. ASSESSMENT and PLAN  Diagnoses and all orders for this visit:    1. Type 2 diabetes mellitus with microalbuminuria, without long-term current use of insulin (HCC)  -     HEMOGLOBIN A1C WITH EAG;  Future  -      DIABETES FOOT EXAM  - METABOLIC PANEL, COMPREHENSIVE; Future   Recommended fsbs monitoring  2. Essential hypertension  -     METABOLIC PANEL, COMPREHENSIVE; Future   controlled  3. Pure hypercholesterolemia  -     METABOLIC PANEL, COMPREHENSIVE; Future    4. Monoparesis of lower extremity affecting left nondominant side ([facility] [address]    5. Moderate major depression (HCC)   Controlled on wellbutrin  6. PAD (peripheral artery disease) (HCC)   mild  7. Polyp of colon, unspecified part of colon, unspecified type  -     REFERRAL TO GASTROENTEROLOGY    8. Skin lesion of left arm  -     REFERRAL TO DERMATOLOGY-hyperpigmented lesion -r/o melanoma  -     REFERRAL TO DERMATOLOGY      Follow-up and Dispositions    · Return in about 6 months (around 1/8/2022) for medicare wellness.

## 2021-07-08 ENCOUNTER — OFFICE VISIT (OUTPATIENT)
Dept: INTERNAL MEDICINE CLINIC | Age: 71
End: 2021-07-08
Payer: MEDICARE

## 2021-07-08 VITALS
BODY MASS INDEX: 30.92 KG/M2 | SYSTOLIC BLOOD PRESSURE: 130 MMHG | HEIGHT: 70 IN | TEMPERATURE: 99.1 F | DIASTOLIC BLOOD PRESSURE: 80 MMHG | OXYGEN SATURATION: 97 % | HEART RATE: 61 BPM | RESPIRATION RATE: 16 BRPM | WEIGHT: 216 LBS

## 2021-07-08 DIAGNOSIS — K63.5 POLYP OF COLON, UNSPECIFIED PART OF COLON, UNSPECIFIED TYPE: ICD-10-CM

## 2021-07-08 DIAGNOSIS — E78.00 PURE HYPERCHOLESTEROLEMIA: ICD-10-CM

## 2021-07-08 DIAGNOSIS — R80.9 TYPE 2 DIABETES MELLITUS WITH MICROALBUMINURIA, WITHOUT LONG-TERM CURRENT USE OF INSULIN (HCC): Primary | ICD-10-CM

## 2021-07-08 DIAGNOSIS — I10 ESSENTIAL HYPERTENSION: ICD-10-CM

## 2021-07-08 DIAGNOSIS — E11.29 TYPE 2 DIABETES MELLITUS WITH MICROALBUMINURIA, WITHOUT LONG-TERM CURRENT USE OF INSULIN (HCC): Primary | ICD-10-CM

## 2021-07-08 DIAGNOSIS — L98.9 SKIN LESION OF LEFT ARM: ICD-10-CM

## 2021-07-08 DIAGNOSIS — F32.1 MODERATE MAJOR DEPRESSION (HCC): ICD-10-CM

## 2021-07-08 DIAGNOSIS — I73.9 PAD (PERIPHERAL ARTERY DISEASE) (HCC): ICD-10-CM

## 2021-07-08 DIAGNOSIS — G83.14 MONOPARESIS OF LOWER EXTREMITY AFFECTING LEFT NONDOMINANT SIDE (HCC): ICD-10-CM

## 2021-07-08 LAB
ALBUMIN SERPL-MCNC: 3.8 G/DL (ref 3.5–5)
ALBUMIN/GLOB SERPL: 1.2 {RATIO} (ref 1.1–2.2)
ALP SERPL-CCNC: 99 U/L (ref 45–117)
ALT SERPL-CCNC: 30 U/L (ref 12–78)
ANION GAP SERPL CALC-SCNC: 4 MMOL/L (ref 5–15)
AST SERPL-CCNC: 16 U/L (ref 15–37)
BILIRUB SERPL-MCNC: 0.6 MG/DL (ref 0.2–1)
BUN SERPL-MCNC: 16 MG/DL (ref 6–20)
BUN/CREAT SERPL: 13 (ref 12–20)
CALCIUM SERPL-MCNC: 9.5 MG/DL (ref 8.5–10.1)
CHLORIDE SERPL-SCNC: 108 MMOL/L (ref 97–108)
CO2 SERPL-SCNC: 30 MMOL/L (ref 21–32)
CREAT SERPL-MCNC: 1.27 MG/DL (ref 0.7–1.3)
EST. AVERAGE GLUCOSE BLD GHB EST-MCNC: 134 MG/DL
GLOBULIN SER CALC-MCNC: 3.2 G/DL (ref 2–4)
GLUCOSE SERPL-MCNC: 165 MG/DL (ref 65–100)
HBA1C MFR BLD: 6.3 % (ref 4–5.6)
POTASSIUM SERPL-SCNC: 4.5 MMOL/L (ref 3.5–5.1)
PROT SERPL-MCNC: 7 G/DL (ref 6.4–8.2)
SODIUM SERPL-SCNC: 142 MMOL/L (ref 136–145)

## 2021-07-08 PROCEDURE — G8752 SYS BP LESS 140: HCPCS | Performed by: INTERNAL MEDICINE

## 2021-07-08 PROCEDURE — G9717 DOC PT DX DEP/BP F/U NT REQ: HCPCS | Performed by: INTERNAL MEDICINE

## 2021-07-08 PROCEDURE — 3044F HG A1C LEVEL LT 7.0%: CPT | Performed by: INTERNAL MEDICINE

## 2021-07-08 PROCEDURE — G8417 CALC BMI ABV UP PARAM F/U: HCPCS | Performed by: INTERNAL MEDICINE

## 2021-07-08 PROCEDURE — G8427 DOCREV CUR MEDS BY ELIG CLIN: HCPCS | Performed by: INTERNAL MEDICINE

## 2021-07-08 PROCEDURE — 1101F PT FALLS ASSESS-DOCD LE1/YR: CPT | Performed by: INTERNAL MEDICINE

## 2021-07-08 PROCEDURE — G8536 NO DOC ELDER MAL SCRN: HCPCS | Performed by: INTERNAL MEDICINE

## 2021-07-08 PROCEDURE — 99213 OFFICE O/P EST LOW 20 MIN: CPT | Performed by: INTERNAL MEDICINE

## 2021-07-08 PROCEDURE — 2022F DILAT RTA XM EVC RTNOPTHY: CPT | Performed by: INTERNAL MEDICINE

## 2021-07-08 PROCEDURE — G8754 DIAS BP LESS 90: HCPCS | Performed by: INTERNAL MEDICINE

## 2021-07-08 PROCEDURE — 3017F COLORECTAL CA SCREEN DOC REV: CPT | Performed by: INTERNAL MEDICINE

## 2021-07-08 NOTE — PATIENT INSTRUCTIONS
Office Policies    Phone calls/patient messages:            Please allow up to 24 hours for someone in the office to contact you about your call or message. Be mindful your provider may be out of the office or your message may require further review. We encourage you to use [employer] for your messages as this is a faster, more efficient way to communicate with our office                         Medication Refills:            Prescription medications require 48-72 business hours to process. We encourage you to use [facility] for your refills. For controlled medications: Please allow 72 business hours to process. Certain medications may require you to  a written prescription at our office. NO narcotic/controlled medications will be prescribed after 4pm Monday through Friday or on weekends              Form/Paperwork Completion:            Please note a $25 fee may incur for all paperwork for completed by our providers. We ask that you allow 7-10 business days. Pre-payment is due prior to picking up/faxing the completed form. You may also download your forms to [facility] to have your doctor print off.

## 2021-08-01 ENCOUNTER — HOSPITAL ENCOUNTER (EMERGENCY)
Age: 71
Discharge: HOME | End: 2021-08-01
Attending: STUDENT IN AN ORGANIZED HEALTH CARE EDUCATION/TRAINING PROGRAM
Payer: MEDICARE

## 2021-08-01 ENCOUNTER — APPOINTMENT (OUTPATIENT)
Dept: CT IMAGING | Age: 71
End: 2021-08-01
Attending: PHYSICIAN ASSISTANT
Payer: MEDICARE

## 2021-08-01 VITALS
RESPIRATION RATE: 16 BRPM | TEMPERATURE: 97.6 F | BODY MASS INDEX: 31.67 KG/M2 | DIASTOLIC BLOOD PRESSURE: 75 MMHG | HEIGHT: 69 IN | WEIGHT: 213.85 LBS | HEART RATE: 75 BPM | SYSTOLIC BLOOD PRESSURE: 133 MMHG | OXYGEN SATURATION: 99 %

## 2021-08-01 DIAGNOSIS — M62.830 LUMBAR PARASPINAL MUSCLE SPASM: Primary | ICD-10-CM

## 2021-08-01 PROCEDURE — 96372 THER/PROPH/DIAG INJ SC/IM: CPT

## 2021-08-01 PROCEDURE — 74011250636 HC RX REV CODE- 250/636: Performed by: PHYSICIAN ASSISTANT

## 2021-08-01 PROCEDURE — 74011250637 HC RX REV CODE- 250/637: Performed by: PHYSICIAN ASSISTANT

## 2021-08-01 PROCEDURE — 72131 CT LUMBAR SPINE W/O DYE: CPT

## 2021-08-01 PROCEDURE — 99283 EMERGENCY DEPT VISIT LOW MDM: CPT

## 2021-08-01 RX ORDER — KETOROLAC TROMETHAMINE 30 MG/ML
30 INJECTION, SOLUTION INTRAMUSCULAR; INTRAVENOUS
Status: COMPLETED | OUTPATIENT
Start: 2021-08-01 | End: 2021-08-01

## 2021-08-01 RX ORDER — METHOCARBAMOL 500 MG/1
750 TABLET, FILM COATED ORAL ONCE
Status: COMPLETED | OUTPATIENT
Start: 2021-08-01 | End: 2021-08-01

## 2021-08-01 RX ORDER — METHOCARBAMOL 750 MG/1
750 TABLET, FILM COATED ORAL 3 TIMES DAILY
Qty: 15 TABLET | Refills: 0 | Status: SHIPPED | OUTPATIENT
Start: 2021-08-01 | End: 2021-08-06

## 2021-08-01 RX ADMIN — METHOCARBAMOL TABLETS 750 MG: 500 TABLET, COATED ORAL at 12:41

## 2021-08-01 RX ADMIN — KETOROLAC TROMETHAMINE 30 MG: 30 INJECTION, SOLUTION INTRAMUSCULAR; INTRAVENOUS at 12:41

## 2021-08-01 NOTE — ED TRIAGE NOTES
Triage Note: Patient is coming in with lower left back pain since 7/14/2021 when he fell.  Patient was seen at [facility] and has no fractures but the pain is getting worse

## 2021-08-01 NOTE — ED PROVIDER NOTES
Date of Service:  8/1/2021    Patient:  Yenifer Vicente    Chief Complaint:  Back Pain       HPI:  Yenifer Vicente is a 65 y.o.  male who presents for evaluation of left lower back pain x 5 days, worsening. Sharp pain, burning, worse with movement such as twisting, bending, changing position. No radiation to abdomen or down the leg. No fever, chest pain/sob, abd pain, v/d, dysuria, numbness/tingling/weakness/weakness. Ambulatory with cane, this is baseline. Fall on July 14th, onto lower back. Pain started 2 weeks later after fall.               Past Medical History:   Diagnosis Date    Agoraphobia with panic disorder     Anxiety     Arthritis     spine    BPH (benign prostatic hypertrophy) with urinary obstruction     CAD (coronary artery disease)     no previous MI or stents    Chronic edema     Coronary artery disease involving native coronary artery without angina pectoris 3/28/2017    CVD (cardiovascular disease)     Depression     Diabetes ([address]     no longer medicated    Erectile dysfunction     Essential hypertension 3/28/2017    Falls     Fracture of multiple ribs of right side 7/26/2017    Full code but no chest compressions 7/23/2018    GERD (gastroesophageal reflux disease)     Hemiparesis ([address]     History of stroke 3/28/2017    Hypercholesterolemia     Hypercholesterolemia 3/28/2017    Hypertension     Joint pain     Localized edema 7/23/2018    Muscle pain     Muscle weakness     Nocturia     Panic disorder     Ringing in the ears     Seizures ([address]     at age 3, facial    Stroke [facility]) 1996    left sided weakness       Past Surgical History:   Procedure Laterality Date    COLONOSCOPY N/A 5/4/2018    COLONOSCOPY performed by [facility]ah [facility]os [facility] at [facility] ENDOSCOPY    HX COLONOSCOPY      HX LUMBAR DISKECTOMY  1970's    HX ORTHOPAEDIC      right finger repair    HX ORTHOPAEDIC  12/2019    spinal surgery    IR INJ SPINE THER SUBST [facility] MERCADO IMG  2/4/2020    IR KYPHOPLASTY LUMBAR  12/20/2019    IR KYPHOPLASTY LUMBAR  1/24/2020    IL ABDOMEN SURGERY PROC UNLISTED  2008    Lap band         Family History:   Problem Relation Age of Onset    Cancer Father         lung    Stroke Father     Hypertension Father     Cancer Mother         lung    Hypertension Sister     Heart Disease Brother         chf--aicd       Social History     Socioeconomic History    Marital status:      Spouse name: Not on file    Number of children: Not on file    Years of education: Not on file    Highest education level: Not on file   Occupational History    Not on file   Tobacco Use    Smoking status: Never Smoker    Smokeless tobacco: Never Used   Vaping Use    Vaping Use: Never used   Substance and Sexual Activity    Alcohol use: Yes     Alcohol/week: 5.0 standard drinks     Types: 5 Cans of beer per week     Comment: a week    Drug use: Yes     Types: Marijuana     Comment: Gummies Marijuana    Sexual activity: Yes     Partners: Female   Other Topics Concern    Not on file   Social History Narrative    Not on file     Social Determinants of Health     Financial Resource Strain:     Difficulty of Paying Living Expenses:    Food Insecurity:     Worried About Running Out of Food in the Last Year:     [address] in the Last Year:    Transportation Needs:     Lack of Transportation (Medical):      Lack of Transportation (Non-Medical):    Physical Activity:     Days of Exercise per Week:     Minutes of Exercise per Session:    Stress:     Feeling of Stress :    Social Connections:     Frequency of Communication with Friends and Family:     Frequency of Social Gatherings with Friends and Family:     Attends [employer] Services:     Active Member of Clubs or Organizations:     Attends Club or Organization Meetings:     Marital Status:    Intimate Partner Violence:     Fear of Current or Ex-Partner:     Emotionally Abused:     Physically Abused:     Sexually Abused: ALLERGIES: Patient has no known allergies. Review of Systems   Constitutional: Negative for chills and fever. HENT: Negative for trouble swallowing. Eyes: Negative for redness. Respiratory: Negative for shortness of breath. Cardiovascular: Negative for chest pain. Gastrointestinal: Negative for abdominal pain, diarrhea, nausea and vomiting. Genitourinary: Negative for dysuria. Musculoskeletal: Positive for back pain. Negative for gait problem. Skin: Negative for rash. Neurological: Negative for syncope. Vitals:    08/01/21 1010   BP: (!) 142/80   Pulse: 60   Resp: 20   Temp: 97.6 °F (36.4 °C)   SpO2: 98%   Weight: 97 kg (213 lb 13.5 oz)   Height: 5' 9\" (1.753 m)            Physical Exam  Vitals and nursing note reviewed. Constitutional:       Appearance: Normal appearance. HENT:      Head: Normocephalic and atraumatic. Nose: Nose normal.   Eyes:      Extraocular Movements: Extraocular movements intact. Conjunctiva/sclera: Conjunctivae normal.      Pupils: Pupils are equal, round, and reactive to light. Cardiovascular:      Rate and Rhythm: Normal rate and regular rhythm. Pulses: Normal pulses. Radial pulses are 2+ on the right side and 2+ on the left side. Posterior tibial pulses are 2+ on the right side and 2+ on the left side. Heart sounds: Normal heart sounds. Pulmonary:      Effort: Pulmonary effort is normal.      Breath sounds: Normal breath sounds. Abdominal:      General: Abdomen is flat. Bowel sounds are normal.      Palpations: Abdomen is soft. Tenderness: There is no abdominal tenderness. There is no right CVA tenderness or left CVA tenderness. Musculoskeletal:         General: Normal range of motion. Cervical back: Normal range of motion and neck supple.         Back:       Comments: No midline c/t spine TTP, midline lumbar TTP noted, left sided lumbar paravertebral TTP   Skin:     General: Skin is warm and dry. Neurological:      General: No focal deficit present. Mental Status: He is alert and oriented to person, place, and time. Mental status is at baseline. Psychiatric:         Mood and Affect: Mood normal.         Behavior: Behavior normal.         Thought Content: Thought content normal.          MDM  Number of Diagnoses or Management Options  Lumbar paraspinal muscle spasm  Diagnosis management comments: IMAGING:  CT SPINE LUMB WO CONT   Final Result        No acute fracture    Treated L2 and L3 fractures    Severe central canal stenosis L3-4    Moderate severe left L5-S1     Severe right L5-S1 neural foraminal stenosis    Swayback syndrome         Medications During Visit:  Medications  ketorolac (TORADOL) injection 30 mg (30 mg IntraMUSCular Given 8/1/21 1241)  methocarbamoL (ROBAXIN) tablet 750 mg (750 mg Oral Given 8/1/21 1241)      DECISION MAKING:  Sweta Mcgregor is a 65 y.o. male who comes in as above. Is a 68-year-old male presenting with left lower back pain for the past 5 days. Characterizes sharp, burning pain. Worse with movement such as twisting, going from sitting to standing, movement. No radiation around to the abdomen or down the leg. Denies fever, chills, chest pain, shortness of breath, abdominal pain, vomiting, diarrhea, dysuria, numbness, tingling, weakness. Amatory with cane, this is his baseline. Endorses a fall 3 weeks ago. Vitals stable, patient resting comfortably no acute distress. No midline C/T/L-spine tenderness. Left paravertebral lumbar tenderness to palpation, muscle spasm noted. No CVA tenderness. Abdomen soft, nontender. No focal neuro deficits. No rash noted. Patient given Toradol, Robaxin with some improvement. CT shows no acute fracture, treated L2 and L3 fractures. Central canal stenosis at L3/4. Moderate to severe left L5/S1 neuroforaminal stenosis. All results were discussed with patient.   Patient also seen and evaluated by attending physician. Follow-up with orthopedics and PCP within 1 week. Strict ED return precautions discussed. IMPRESSION:  Lumbar paraspinal muscle spasm  (primary encounter diagnosis)    DISPOSITION:  Discharged      Discharge Medication List as of 8/1/2021  1:30 PM    START taking these medications    methocarbamoL (ROBAXIN) 750 mg tablet  Take 1 Tablet by mouth three (3) times daily for 5 days. , Print, Disp-15 Tablet, R-0      CONTINUE these medications which have NOT CHANGED    metFORMIN (GLUCOPHAGE) 500 mg tablet  TAKE 1 TABLET EVERY DAY WITH DINNER, Normal, Disp-90 Tablet, R-2    atorvastatin (LIPITOR) 40 mg tablet  TAKE 1 TABLET EVERY DAY, Normal, Disp-90 Tablet, R-1    carvediloL (COREG) 12.5 mg tablet  TAKE 1 TABLET TWICE DAILY WITH MEALS, Normal, Disp-180 Tablet, R-2    tamsulosin (FLOMAX) 0.4 mg capsule  TAKE 2 CAPSULES EVERY DAY, Normal, Disp-180 Capsule, R-2    finasteride (PROSCAR) 5 mg tablet  TAKE 1 TABLET EVERY NIGHT, Normal, Disp-90 Tablet, R-2    ezetimibe (ZETIA) 10 mg tablet  TAKE 1 TABLET EVERY DAY, Normal, Disp-90 Tablet, R-2    furosemide (LASIX) 40 mg tablet  TAKE 1 TABLET EVERY DAY, Normal, Disp-90 Tablet, R-2    amLODIPine-benazepril (LOTREL) 5-20 mg per capsule  TAKE 1 CAPSULE EVERY DAY, Normal, Disp-90 Capsule, R-2    buPROPion SR (WELLBUTRIN SR) 150 mg SR tablet  TAKE 1 TABLET TWICE DAILY, Normal, Disp-180 Tablet, R-2    potassium chloride (K-DUR, KLOR-CON) 20 mEq tablet  TAKE 1 TABLET EVERY DAY, Normal, Disp-90 Tab, R-3    gabapentin (NEURONTIN) 300 mg capsule  Take 2 Caps by mouth nightly.  Max Daily Amount: 600 mg., Normal, Disp-180 Cap, R-1    Blood-Glucose Meter monitoring kit  Check fsbs every day, Normal, Disp-1 Kit, R-0    glucose blood VI test strips (ASCENSIA AUTODISC VI, ONE TOUCH ULTRA TEST VI) strip  Check fsbs every day 250.00, Normal, Disp-50 Strip, R-11    lancets misc  check fsbs every day 250.00, Normal, Disp-1 Each, R-11    aspirin delayed-release 81 mg tablet  Take 1 Tab by mouth daily. , No Print, Disp-30 Tab, R-0    therapeutic multivitamin (THERA) tablet  Take 1 Tab by mouth daily. , Normal, Disp-90 Tab, R-1           Follow-up Information     Follow up With Specialties Details Why Contact Info    Lucero Tripathi MD Internal Medicine Schedule an appointment as soon as   possible for a visit in 3 days  [address] [facility]  [phone]      [address] [facility] [address] [facility] Emergency Medicine Go to  If symptoms worsen [address] [facility]  [phone]          The patient is asked to follow-up with their primary care provider in the next several days. They are to call tomorrow for an appointment. The patient is asked to return promptly for any increased concerns or worsening of symptoms. They can return to this emergency department or any other emergency department. ED Course as of Aug 01 2020   Pam Arias Aug 01, 2021   1149 CT SPINE LUMB WO CONT [EK]   1330 Patient reevaluated, discussed treatment. Patient in no questions.     [AL]      ED Course User Index  [AL] Shane Mitchell MD  [EK] Fior Ramirez PA-C       Procedures

## 2021-08-04 PROBLEM — S32.030A COMPRESSION FRACTURE OF L3 VERTEBRA (HCC): Status: ACTIVE | Noted: 2021-08-04

## 2021-08-04 PROBLEM — M48.061 LUMBAR STENOSIS: Status: ACTIVE | Noted: 2021-08-04

## 2021-08-05 ENCOUNTER — OFFICE VISIT (OUTPATIENT)
Dept: INTERNAL MEDICINE CLINIC | Age: 71
End: 2021-08-05
Payer: MEDICARE

## 2021-08-05 VITALS
WEIGHT: 213 LBS | BODY MASS INDEX: 31.55 KG/M2 | SYSTOLIC BLOOD PRESSURE: 118 MMHG | OXYGEN SATURATION: 97 % | HEIGHT: 69 IN | DIASTOLIC BLOOD PRESSURE: 70 MMHG | RESPIRATION RATE: 16 BRPM | HEART RATE: 70 BPM | TEMPERATURE: 98.4 F

## 2021-08-05 DIAGNOSIS — M48.062 SPINAL STENOSIS OF LUMBAR REGION WITH NEUROGENIC CLAUDICATION: ICD-10-CM

## 2021-08-05 DIAGNOSIS — S32.030D COMPRESSION FRACTURE OF L3 VERTEBRA WITH ROUTINE HEALING, SUBSEQUENT ENCOUNTER: ICD-10-CM

## 2021-08-05 DIAGNOSIS — M54.50 ACUTE LEFT-SIDED LOW BACK PAIN WITHOUT SCIATICA: Primary | ICD-10-CM

## 2021-08-05 PROCEDURE — G8427 DOCREV CUR MEDS BY ELIG CLIN: HCPCS | Performed by: INTERNAL MEDICINE

## 2021-08-05 PROCEDURE — 99213 OFFICE O/P EST LOW 20 MIN: CPT | Performed by: INTERNAL MEDICINE

## 2021-08-05 PROCEDURE — G8752 SYS BP LESS 140: HCPCS | Performed by: INTERNAL MEDICINE

## 2021-08-05 PROCEDURE — G9717 DOC PT DX DEP/BP F/U NT REQ: HCPCS | Performed by: INTERNAL MEDICINE

## 2021-08-05 PROCEDURE — G8754 DIAS BP LESS 90: HCPCS | Performed by: INTERNAL MEDICINE

## 2021-08-05 PROCEDURE — 3017F COLORECTAL CA SCREEN DOC REV: CPT | Performed by: INTERNAL MEDICINE

## 2021-08-05 PROCEDURE — 1101F PT FALLS ASSESS-DOCD LE1/YR: CPT | Performed by: INTERNAL MEDICINE

## 2021-08-05 PROCEDURE — G8417 CALC BMI ABV UP PARAM F/U: HCPCS | Performed by: INTERNAL MEDICINE

## 2021-08-05 PROCEDURE — G8536 NO DOC ELDER MAL SCRN: HCPCS | Performed by: INTERNAL MEDICINE

## 2021-08-05 RX ORDER — HYDROCODONE BITARTRATE AND ACETAMINOPHEN 5; 325 MG/1; MG/1
1 TABLET ORAL
Qty: 20 TABLET | Refills: 0 | Status: SHIPPED | OUTPATIENT
Start: 2021-08-05 | End: 2021-08-12

## 2021-08-05 NOTE — PATIENT INSTRUCTIONS
Office Policies    Phone calls/patient messages:            Please allow up to 24 hours for someone in the office to contact you about your call or message. Be mindful your provider may be out of the office or your message may require further review. We encourage you to use [employer] for your messages as this is a faster, more efficient way to communicate with our office                         Medication Refills:            Prescription medications require 48-72 business hours to process. We encourage you to use [employer] for your refills. For controlled medications: Please allow 72 business hours to process. Certain medications may require you to  a written prescription at our office. NO narcotic/controlled medications will be prescribed after 4pm Monday through Friday or on weekends              Form/Paperwork Completion:            Please note a $25 fee may incur for all paperwork for completed by our providers. We ask that you allow 7-10 business days. Pre-payment is due prior to picking up/faxing the completed form. You may also download your forms to [employer] to have your doctor print off.

## 2021-08-05 NOTE — PROGRESS NOTES
HISTORY OF PRESENT ILLNESS  Di French is a 65 y.o. male.   HPI   Hx  Dm-2 with microalbuminuuria, HTN HLD hx cva with hemorrhage with left weakness, CKD3-Dr PRASHANT Leo MDD/anxiety bph mildPAD -Dr Lopez Him -here with wife  h xL3 kyphoplasty x 2, hx severe lumbar stenosis-Dr Azeb Benjamin    ER fu for left lower back pain  S/p fall 7/14--back pain started 2 weeks after the fall  ER CT spine-no acute fx, treated L2 L3 fxs, severe spinal stenosis L3/L4 and mod-severe b/l neural foraminal stenosis    Treated for spasm with robaxin  Still has sharp knife like pain with movement of the back   Today some mild pain in left leg but not c/o much of sciatic pain per wife  Unable to sleep much at night due to the pain  Last OV       Last a1c 6.4 LDL 69  Cr 1.22  fsbs-none  On neurontin for low back pain/spinal stenosis     Depression on wellbutrin-controlled  Due for 3 yr colonosocpy d/t polyps-Dr Durant  Not very symptomatic from PAD-Dr Lopez Him  No falls this year    Patient Active Problem List    Diagnosis Date Noted    Left low back pain 08/15/2016    Gross hematuria 08/15/2016    Calculus of kidney 04/18/2016    Benign prostatic hyperplasia with lower urinary tract symptoms 06/22/2014    Nocturia 07/30/2014    Slowing of urinary stream 07/30/2014    Urinary frequency 06/22/2014    Erectile dysfunction 04/17/2016    Compression fracture of L3 vertebra ([address] 08/04/2021    Lumbar stenosis 08/04/2021    Moderate major depression ([address] 07/24/2018    Full code but no chest compressions 07/23/2018    Localized edema 07/23/2018    Type 2 diabetes mellitus with nephropathy ([address] 01/23/2018    Fracture of multiple ribs of right side 07/26/2017    Essential hypertension 03/28/2017    Hypercholesterolemia 03/28/2017    Controlled type 2 diabetes mellitus without complication ([address] 09/14/2440    History of stroke 03/28/2017    Coronary artery disease involving native coronary artery without angina pectoris 03/28/2017    Obesity 06/22/2014     Current Outpatient Medications   Medication Sig Dispense Refill    HYDROcodone-acetaminophen (NORCO) 5-325 mg per tablet Take 1 Tablet by mouth every eight (8) hours as needed for Pain for up to 7 days. Max Daily Amount: 3 Tablets. 20 Tablet 0    methocarbamoL (ROBAXIN) 750 mg tablet Take 1 Tablet by mouth three (3) times daily for 5 days. 15 Tablet 0    metFORMIN (GLUCOPHAGE) 500 mg tablet TAKE 1 TABLET EVERY DAY WITH DINNER 90 Tablet 2    atorvastatin (LIPITOR) 40 mg tablet TAKE 1 TABLET EVERY DAY 90 Tablet 1    carvediloL (COREG) 12.5 mg tablet TAKE 1 TABLET TWICE DAILY WITH MEALS 180 Tablet 2    tamsulosin (FLOMAX) 0.4 mg capsule TAKE 2 CAPSULES EVERY  Capsule 2    finasteride (PROSCAR) 5 mg tablet TAKE 1 TABLET EVERY NIGHT 90 Tablet 2    ezetimibe (ZETIA) 10 mg tablet TAKE 1 TABLET EVERY DAY 90 Tablet 2    furosemide (LASIX) 40 mg tablet TAKE 1 TABLET EVERY DAY 90 Tablet 2    amLODIPine-benazepril (LOTREL) 5-20 mg per capsule TAKE 1 CAPSULE EVERY DAY 90 Capsule 2    buPROPion SR (WELLBUTRIN SR) 150 mg SR tablet TAKE 1 TABLET TWICE DAILY 180 Tablet 2    potassium chloride (K-DUR, KLOR-CON) 20 mEq tablet TAKE 1 TABLET EVERY DAY 90 Tab 3    gabapentin (NEURONTIN) 300 mg capsule Take 2 Caps by mouth nightly. Max Daily Amount: 600 mg. 180 Cap 1    glucose blood VI test strips (ASCENSIA AUTODISC VI, ONE TOUCH ULTRA TEST VI) strip Check fsbs every day 250.00 50 Strip 11    lancets misc check fsbs every day 250.00 1 Each 11    aspirin delayed-release 81 mg tablet Take 1 Tab by mouth daily. 30 Tab 0    therapeutic multivitamin (THERA) tablet Take 1 Tab by mouth daily.  90 Tab 1    Blood-Glucose Meter monitoring kit Check fsbs every day (Patient not taking: Reported on 8/5/2021) 1 Kit 0     No Known Allergies   Lab Results   Component Value Date/Time    WBC 7.5 01/11/2021 01:03 PM    HGB 15.0 01/11/2021 01:03 PM    HCT 45.0 01/11/2021 01:03 PM    PLATELET 837 61/22/3999 01:03 PM    MCV 94 01/11/2021 01:03 PM     Lab Results   Component Value Date/Time    Hemoglobin A1c 6.3 (H) 07/08/2021 10:32 AM    Hemoglobin A1c 6.4 (H) 01/11/2021 01:03 PM    Hemoglobin A1c 7.2 (H) 10/03/2019 11:13 AM    Hemoglobin A1c, External 6.2 09/25/2020 12:00 AM    Glucose 165 (H) 07/08/2021 10:32 AM    Glucose (POC) 110 (H) 06/24/2017 06:39 AM    Glucose  (A) 12/05/2019 05:48 PM    Microalb/Creat ratio (ug/mg creat.) <8.3 10/03/2019 11:13 AM    LDL, calculated 69 01/11/2021 01:03 PM    LDL, calculated 70 10/03/2019 11:13 AM    Creatinine 1.27 07/08/2021 10:32 AM      Lab Results   Component Value Date/Time    Cholesterol, total 144 01/11/2021 01:03 PM    HDL Cholesterol 56 01/11/2021 01:03 PM    LDL, calculated 69 01/11/2021 01:03 PM    LDL, calculated 70 10/03/2019 11:13 AM    Triglyceride 104 01/11/2021 01:03 PM    CHOL/HDL Ratio 2.9 06/24/2017 05:59 AM     Lab Results   Component Value Date/Time    GFR est non-AA 56 (L) 07/08/2021 10:32 AM    GFR est AA >60 07/08/2021 10:32 AM    Creatinine 1.27 07/08/2021 10:32 AM    BUN 16 07/08/2021 10:32 AM    Sodium 142 07/08/2021 10:32 AM    Potassium 4.5 07/08/2021 10:32 AM    Chloride 108 07/08/2021 10:32 AM    CO2 30 07/08/2021 10:32 AM        ROS    Physical Exam  Vitals and nursing note reviewed. Constitutional:       General: He is not in acute distress. Appearance: He is well-developed. Comments: Appears stated age   HENT:      Head: Normocephalic. Cardiovascular:      Rate and Rhythm: Normal rate and regular rhythm. Heart sounds: Normal heart sounds. Pulmonary:      Effort: Pulmonary effort is normal.      Breath sounds: Normal breath sounds. Abdominal:      Palpations: Abdomen is soft. Musculoskeletal:      Comments: Mild TTP left lumbar area. No Lumbar spine TTP  Back pain with ROM   Neurological:      Mental Status: He is alert. ASSESSMENT and PLAN  Diagnoses and all orders for this visit:    1.  Acute left-sided low back pain without sciatica  -     HYDROcodone-acetaminophen (NORCO) 5-325 mg per tablet; Take 1 Tablet by mouth every eight (8) hours as needed for Pain for up to 7 days. Max Daily Amount: 3 Tablets. 20 tabs /nr   Back strain   Take with miralax -discussed    2. Compression fracture of L3 vertebra with routine healing, subsequent encounter   S/p kypholpasty x 2--stable  3. Spinal stenosis of lumbar region with neurogenic claudication  -     HYDROcodone-acetaminophen (NORCO) 5-325 mg per tablet; Take 1 Tablet by mouth every eight (8) hours as needed for Pain for up to 7 days. Max Daily Amount: 3 Tablets.    To see neurosurgeon if back pain and or leg pain not improved in 1-2 weeks      rtc 5 months medicare wellness

## 2021-08-18 ENCOUNTER — VIRTUAL VISIT (OUTPATIENT)
Dept: INTERNAL MEDICINE CLINIC | Age: 71
End: 2021-08-18
Payer: MEDICARE

## 2021-08-18 DIAGNOSIS — M79.606 PAIN OF LOWER EXTREMITY, UNSPECIFIED LATERALITY: ICD-10-CM

## 2021-08-18 DIAGNOSIS — M48.062 SPINAL STENOSIS OF LUMBAR REGION WITH NEUROGENIC CLAUDICATION: ICD-10-CM

## 2021-08-18 DIAGNOSIS — M54.32 SCIATICA OF LEFT SIDE: Primary | ICD-10-CM

## 2021-08-18 DIAGNOSIS — F11.99 OPIOID USE, UNSPECIFIED WITH UNSPECIFIED OPIOID-INDUCED DISORDER (HCC): ICD-10-CM

## 2021-08-18 PROCEDURE — 99442 PR PHYS/QHP TELEPHONE EVALUATION 11-20 MIN: CPT | Performed by: INTERNAL MEDICINE

## 2021-08-18 RX ORDER — METHYLPREDNISOLONE 4 MG/1
TABLET ORAL
Qty: 1 DOSE PACK | Refills: 0 | Status: SHIPPED | OUTPATIENT
Start: 2021-08-18 | End: 2022-01-12

## 2021-08-18 RX ORDER — GABAPENTIN 300 MG/1
600 CAPSULE ORAL
Qty: 180 CAPSULE | Refills: 1 | Status: SHIPPED | OUTPATIENT
Start: 2021-08-18 | End: 2022-01-12 | Stop reason: ALTCHOICE

## 2021-08-18 RX ORDER — HYDROCODONE BITARTRATE AND ACETAMINOPHEN 5; 325 MG/1; MG/1
1 TABLET ORAL
Qty: 30 TABLET | Refills: 0 | Status: SHIPPED | OUTPATIENT
Start: 2021-08-18 | End: 2021-09-01

## 2021-08-18 NOTE — PATIENT INSTRUCTIONS
This is an established visit conducted via telemedicine. The patient has been instructed that this meets HIPAA criteria and acknowledges and agrees to this method of visitation.     Cas Balderas [place]  76/78/57  4:54 AM

## 2021-08-18 NOTE — PROGRESS NOTES
HISTORY OF PRESENT ILLNESS  Milly Devine is a 65 y.o. male. HPI   Milly Devine, who was evaluated through a synchronous (real-time) audio only encounter, and/or his healthcare decision maker, is aware that it is a billable service, with coverage as determined by his insurance carrier. He provided verbal consent to proceed: Yes, and patient identification was verified. This visit was conducted pursuant to the emergency declaration under the [facility] [address] and the [employer] Act. A caregiver was present when appropriate. Ability to conduct physical exam was limited. The patient was located in a state where the provider was credentialed to provide care. --Sal Sheets MD on 8/18/2021 at 10:20 AM              Vv via telephone encounter x 15 minutes    Fu left  low back pain without sciatica  rx for norco last OV  ERCT-severe spinal stenosis L3/L4 abd b/l neuroforaminal stenosis  Has pain with walking going down left leg   No improvement since ER visit.  Pain is still severe  Saw Dr Lila Trinidad last year   Last OV  Hx  Dm-2 with microalbuminuuria, HTN HLD hx cva with hemorrhage with left weakness, CKD3-Dr PRASHANT Leo MDD/anxiety bph mildPAD - Rachellebrody Acevedo with wife  h xL3 kyphoplasty x 2, hx severe lumbar stenosis-Dr Fady Canales     ER fu for left lower back pain  S/p fall 7/14--back pain started 2 weeks after the fall  ER CT spine-no acute fx, treated L2 L3 fxs, severe spinal stenosis L3/L4 and mod-severe b/l neural foraminal stenosis     Treated for spasm with robaxin  Still has sharp knife like pain with movement of the back   Today some mild pain in left leg but not c/o much of sciatic pain per wife  Unable to sleep much at night due to the pain    Patient Active Problem List    Diagnosis Date Noted    Left low back pain 08/15/2016    Gross hematuria 08/15/2016    Calculus of kidney 04/18/2016    Benign prostatic hyperplasia with lower urinary tract symptoms 06/22/2014    Nocturia 07/30/2014    Slowing of urinary stream 07/30/2014    Urinary frequency 06/22/2014    Erectile dysfunction 04/17/2016    Compression fracture of L3 vertebra (HCC) 08/04/2021    Lumbar stenosis 08/04/2021    Moderate major depression ([facility] 07/24/2018    Full code but no chest compressions 07/23/2018    Localized edema 07/23/2018    Type 2 diabetes mellitus with nephropathy ([facility] 01/23/2018    Fracture of multiple ribs of right side 07/26/2017    Essential hypertension 03/28/2017    Hypercholesterolemia 03/28/2017    Controlled type 2 diabetes mellitus without complication ([facility] 37/30/0189    History of stroke 03/28/2017    Coronary artery disease involving native coronary artery without angina pectoris 03/28/2017    Obesity 06/22/2014     Current Outpatient Medications   Medication Sig Dispense Refill    metFORMIN (GLUCOPHAGE) 500 mg tablet TAKE 1 TABLET EVERY DAY WITH DINNER 90 Tablet 2    atorvastatin (LIPITOR) 40 mg tablet TAKE 1 TABLET EVERY DAY 90 Tablet 1    carvediloL (COREG) 12.5 mg tablet TAKE 1 TABLET TWICE DAILY WITH MEALS 180 Tablet 2    tamsulosin (FLOMAX) 0.4 mg capsule TAKE 2 CAPSULES EVERY  Capsule 2    finasteride (PROSCAR) 5 mg tablet TAKE 1 TABLET EVERY NIGHT 90 Tablet 2    ezetimibe (ZETIA) 10 mg tablet TAKE 1 TABLET EVERY DAY 90 Tablet 2    furosemide (LASIX) 40 mg tablet TAKE 1 TABLET EVERY DAY 90 Tablet 2    amLODIPine-benazepril (LOTREL) 5-20 mg per capsule TAKE 1 CAPSULE EVERY DAY 90 Capsule 2    buPROPion SR (WELLBUTRIN SR) 150 mg SR tablet TAKE 1 TABLET TWICE DAILY 180 Tablet 2    potassium chloride (K-DUR, KLOR-CON) 20 mEq tablet TAKE 1 TABLET EVERY DAY 90 Tab 3    gabapentin (NEURONTIN) 300 mg capsule Take 2 Caps by mouth nightly. Max Daily Amount: 600 mg. 180 Cap 1    aspirin delayed-release 81 mg tablet Take 1 Tab by mouth daily.  30 Tab 0    therapeutic multivitamin (THERA) tablet Take 1 Tab by mouth daily. 90 Tab 1    Blood-Glucose Meter monitoring kit Check fsbs every day (Patient not taking: Reported on 8/5/2021) 1 Kit 0    glucose blood VI test strips (ASCENSIA AUTODISC VI, ONE TOUCH ULTRA TEST VI) strip Check fsbs every day 250.00 (Patient not taking: Reported on 8/18/2021) 50 Strip 11    lancets misc check fsbs every day 250.00 (Patient not taking: Reported on 8/18/2021) 1 Each 11     No Known Allergies   Lab Results   Component Value Date/Time    GFR est non-AA 56 (L) 07/08/2021 10:32 AM    GFR est AA >60 07/08/2021 10:32 AM    Creatinine 1.27 07/08/2021 10:32 AM    BUN 16 07/08/2021 10:32 AM    Sodium 142 07/08/2021 10:32 AM    Potassium 4.5 07/08/2021 10:32 AM    Chloride 108 07/08/2021 10:32 AM    CO2 30 07/08/2021 10:32 AM        ROS    Physical Exam    ASSESSMENT and PLAN  Diagnoses and all orders for this visit:    1. Sciatica of left side  -     REFERRAL TO NEUROSURGERY   Dr Zuri Pickering   Staff will assist with appt   Medrol lisa, refill neurontin   Refill norco 30 tabs       2. Pain of lower extremity, unspecified laterality  -     gabapentin (NEURONTIN) 300 mg capsule; Take 2 Capsules by mouth nightly. Max Daily Amount: 600 mg.    3. Spinal stenosis of lumbar region with neurogenic claudication  -     gabapentin (NEURONTIN) 300 mg capsule; Take 2 Capsules by mouth nightly. Max Daily Amount: 600 mg. Other orders  -     methylPREDNISolone (MEDROL DOSEPACK) 4 mg tablet;  As directed

## 2021-11-17 DIAGNOSIS — R60.0 LOCALIZED EDEMA: ICD-10-CM

## 2021-11-17 RX ORDER — POTASSIUM CHLORIDE 20 MEQ/1
TABLET, EXTENDED RELEASE ORAL
Qty: 90 TABLET | Refills: 3 | Status: SHIPPED | OUTPATIENT
Start: 2021-11-17 | End: 2022-11-02

## 2021-12-22 DIAGNOSIS — I25.10 CORONARY ARTERY DISEASE INVOLVING NATIVE CORONARY ARTERY OF NATIVE HEART WITHOUT ANGINA PECTORIS: ICD-10-CM

## 2021-12-22 DIAGNOSIS — I10 ESSENTIAL HYPERTENSION: ICD-10-CM

## 2021-12-22 RX ORDER — BUPROPION HYDROCHLORIDE 150 MG/1
TABLET, EXTENDED RELEASE ORAL
Qty: 180 TABLET | Refills: 2 | Status: SHIPPED | OUTPATIENT
Start: 2021-12-22

## 2022-01-12 ENCOUNTER — OFFICE VISIT (OUTPATIENT)
Dept: INTERNAL MEDICINE CLINIC | Age: 72
End: 2022-01-12
Payer: MEDICARE

## 2022-01-12 VITALS
WEIGHT: 185.6 LBS | BODY MASS INDEX: 28.13 KG/M2 | HEIGHT: 68 IN | SYSTOLIC BLOOD PRESSURE: 118 MMHG | OXYGEN SATURATION: 96 % | DIASTOLIC BLOOD PRESSURE: 70 MMHG | HEART RATE: 70 BPM | RESPIRATION RATE: 16 BRPM | TEMPERATURE: 97.2 F

## 2022-01-12 DIAGNOSIS — Z12.5 PROSTATE CANCER SCREENING: ICD-10-CM

## 2022-01-12 DIAGNOSIS — I73.9 PAD (PERIPHERAL ARTERY DISEASE) (HCC): ICD-10-CM

## 2022-01-12 DIAGNOSIS — D22.9 MULTIPLE NEVI: ICD-10-CM

## 2022-01-12 DIAGNOSIS — F32.1 MODERATE MAJOR DEPRESSION (HCC): ICD-10-CM

## 2022-01-12 DIAGNOSIS — R80.9 TYPE 2 DIABETES MELLITUS WITH MICROALBUMINURIA, WITHOUT LONG-TERM CURRENT USE OF INSULIN (HCC): Primary | ICD-10-CM

## 2022-01-12 DIAGNOSIS — G83.14 MONOPARESIS OF LOWER EXTREMITY AFFECTING LEFT NONDOMINANT SIDE (HCC): ICD-10-CM

## 2022-01-12 DIAGNOSIS — Z00.00 MEDICARE ANNUAL WELLNESS VISIT, SUBSEQUENT: ICD-10-CM

## 2022-01-12 DIAGNOSIS — K63.5 POLYP OF COLON, UNSPECIFIED PART OF COLON, UNSPECIFIED TYPE: ICD-10-CM

## 2022-01-12 DIAGNOSIS — E11.29 TYPE 2 DIABETES MELLITUS WITH MICROALBUMINURIA, WITHOUT LONG-TERM CURRENT USE OF INSULIN (HCC): Primary | ICD-10-CM

## 2022-01-12 DIAGNOSIS — Z86.73 HISTORY OF CVA (CEREBROVASCULAR ACCIDENT): ICD-10-CM

## 2022-01-12 DIAGNOSIS — N18.30 STAGE 3 CHRONIC KIDNEY DISEASE, UNSPECIFIED WHETHER STAGE 3A OR 3B CKD (HCC): ICD-10-CM

## 2022-01-12 DIAGNOSIS — E78.00 PURE HYPERCHOLESTEROLEMIA: ICD-10-CM

## 2022-01-12 DIAGNOSIS — I10 HYPERTENSION, UNSPECIFIED TYPE: ICD-10-CM

## 2022-01-12 PROCEDURE — G8417 CALC BMI ABV UP PARAM F/U: HCPCS | Performed by: INTERNAL MEDICINE

## 2022-01-12 PROCEDURE — 2022F DILAT RTA XM EVC RTNOPTHY: CPT | Performed by: INTERNAL MEDICINE

## 2022-01-12 PROCEDURE — 99213 OFFICE O/P EST LOW 20 MIN: CPT | Performed by: INTERNAL MEDICINE

## 2022-01-12 PROCEDURE — 1101F PT FALLS ASSESS-DOCD LE1/YR: CPT | Performed by: INTERNAL MEDICINE

## 2022-01-12 PROCEDURE — G8536 NO DOC ELDER MAL SCRN: HCPCS | Performed by: INTERNAL MEDICINE

## 2022-01-12 PROCEDURE — G8754 DIAS BP LESS 90: HCPCS | Performed by: INTERNAL MEDICINE

## 2022-01-12 PROCEDURE — G0439 PPPS, SUBSEQ VISIT: HCPCS | Performed by: INTERNAL MEDICINE

## 2022-01-12 PROCEDURE — G8752 SYS BP LESS 140: HCPCS | Performed by: INTERNAL MEDICINE

## 2022-01-12 PROCEDURE — G9717 DOC PT DX DEP/BP F/U NT REQ: HCPCS | Performed by: INTERNAL MEDICINE

## 2022-01-12 PROCEDURE — 3046F HEMOGLOBIN A1C LEVEL >9.0%: CPT | Performed by: INTERNAL MEDICINE

## 2022-01-12 PROCEDURE — G8427 DOCREV CUR MEDS BY ELIG CLIN: HCPCS | Performed by: INTERNAL MEDICINE

## 2022-01-12 PROCEDURE — 3017F COLORECTAL CA SCREEN DOC REV: CPT | Performed by: INTERNAL MEDICINE

## 2022-01-12 NOTE — PROGRESS NOTES
HISTORY OF PRESENT ILLNESS  Jeffery Mcleod is a 66 y.o. male.   HPI   Hx  Dm-2 with microalbuminuuria, HTN HLD hx cva with hemorrhage with left weakness, CKD3-Dr PRASHANT Leo MDD/anxiety bph mildPAD -Dr Bo Fritz with wife  Hx L3 kyphoplasty x 2, hx severe lumbar stenosis-Dr Araujo Pu     Was referred to Dr Rianna Mclain last [address] for severe left lower back pain with sciatica-pain has resolved without any intervention  Due for colonoscopy due to 3 polyps 2018-Dr Durant  Last a1c 6.3 LDL 69  No fsbs checks    Hx nonobstrcutive CAD-sees Dr Lay Jimenez for stable ckd 3 yearly    Feels lump left side abdomen area-no pain    Weight down almost 30 lbs with keto type diet  Uses cane for ambulation -left leg weakness, no falls recently  Last OV  ER fu for left lower back pain  S/p fall 7/14--back pain started 2 weeks after the fall  ER CT spine-no acute fx, treated L2 L3 fxs, severe spinal stenosis L3/L4 and mod-severe b/l neural foraminal stenosis     Treated for spasm with robaxin  Still has sharp knife like pain with movement of the back   Today some mild pain in left leg but not c/o much of sciatic pain per wife  Unable to sleep much at night due to the pain    Patient Active Problem List    Diagnosis Date Noted    Left low back pain 08/15/2016    Gross hematuria 08/15/2016    Calculus of kidney 04/18/2016    Benign prostatic hyperplasia with lower urinary tract symptoms 06/22/2014    Nocturia 07/30/2014    Slowing of urinary stream 07/30/2014    Urinary frequency 06/22/2014    Erectile dysfunction 04/17/2016    Opioid use, unspecified with unspecified opioid-induced disorder 08/18/2021    Compression fracture of L3 vertebra ([address] 08/04/2021    Lumbar stenosis 08/04/2021    Moderate major depression ([address] 07/24/2018    Full code but no chest compressions 07/23/2018    Localized edema 07/23/2018    Type 2 diabetes mellitus with nephropathy ([address] 01/23/2018    Fracture of multiple ribs of right side 07/26/2017    Essential hypertension 03/28/2017    Hypercholesterolemia 03/28/2017    Controlled type 2 diabetes mellitus without complication ([facility] 77/85/7304    History of stroke 03/28/2017    Coronary artery disease involving native coronary artery without angina pectoris 03/28/2017    Obesity 06/22/2014     Current Outpatient Medications   Medication Sig Dispense Refill    buPROPion SR (WELLBUTRIN SR) 150 mg SR tablet TAKE 1 TABLET TWICE DAILY 180 Tablet 2    potassium chloride (K-DUR, KLOR-CON M20) 20 mEq tablet TAKE 1 TABLET EVERY DAY 90 Tablet 3    metFORMIN (GLUCOPHAGE) 500 mg tablet TAKE 1 TABLET EVERY DAY WITH DINNER 90 Tablet 2    atorvastatin (LIPITOR) 40 mg tablet TAKE 1 TABLET EVERY DAY 90 Tablet 1    carvediloL (COREG) 12.5 mg tablet TAKE 1 TABLET TWICE DAILY WITH MEALS 180 Tablet 2    tamsulosin (FLOMAX) 0.4 mg capsule TAKE 2 CAPSULES EVERY  Capsule 2    finasteride (PROSCAR) 5 mg tablet TAKE 1 TABLET EVERY NIGHT 90 Tablet 2    ezetimibe (ZETIA) 10 mg tablet TAKE 1 TABLET EVERY DAY 90 Tablet 2    furosemide (LASIX) 40 mg tablet TAKE 1 TABLET EVERY DAY 90 Tablet 2    amLODIPine-benazepril (LOTREL) 5-20 mg per capsule TAKE 1 CAPSULE EVERY DAY 90 Capsule 2    aspirin delayed-release 81 mg tablet Take 1 Tab by mouth daily. 30 Tab 0    therapeutic multivitamin (THERA) tablet Take 1 Tab by mouth daily. 90 Tab 1    gabapentin (NEURONTIN) 300 mg capsule Take 2 Capsules by mouth nightly. Max Daily Amount: 600 mg.  (Patient not taking: Reported on 1/12/2022) 180 Capsule 1    Blood-Glucose Meter monitoring kit Check fsbs every day (Patient not taking: Reported on 8/5/2021) 1 Kit 0    glucose blood VI test strips (ASCENSIA AUTODISC VI, ONE TOUCH ULTRA TEST VI) strip Check fsbs every day 250.00 (Patient not taking: Reported on 8/18/2021) 50 Strip 11    lancets misc check fsbs every day 250.00 (Patient not taking: Reported on 8/18/2021) 1 Each 11     No Known Allergies   Lab Results Component Value Date/Time    WBC 7.5 01/11/2021 01:03 PM    HGB 15.0 01/11/2021 01:03 PM    HCT 45.0 01/11/2021 01:03 PM    PLATELET 456 97/71/9791 01:03 PM    MCV 94 01/11/2021 01:03 PM     Lab Results   Component Value Date/Time    Hemoglobin A1c 6.3 (H) 07/08/2021 10:32 AM    Hemoglobin A1c 6.4 (H) 01/11/2021 01:03 PM    Hemoglobin A1c 7.2 (H) 10/03/2019 11:13 AM    Hemoglobin A1c, External 6.2 09/25/2020 12:00 AM    Glucose 165 (H) 07/08/2021 10:32 AM    Glucose (POC) 110 (H) 06/24/2017 06:39 AM    Glucose  (A) 12/05/2019 05:48 PM    Microalb/Creat ratio (ug/mg creat.) <8.3 10/03/2019 11:13 AM    LDL, calculated 69 01/11/2021 01:03 PM    LDL, calculated 70 10/03/2019 11:13 AM    Creatinine 1.27 07/08/2021 10:32 AM      Lab Results   Component Value Date/Time    Cholesterol, total 144 01/11/2021 01:03 PM    HDL Cholesterol 56 01/11/2021 01:03 PM    LDL, calculated 69 01/11/2021 01:03 PM    LDL, calculated 70 10/03/2019 11:13 AM    Triglyceride 104 01/11/2021 01:03 PM    CHOL/HDL Ratio 2.9 06/24/2017 05:59 AM     Lab Results   Component Value Date/Time    GFR est non-AA 56 (L) 07/08/2021 10:32 AM    GFR est AA >60 07/08/2021 10:32 AM    Creatinine 1.27 07/08/2021 10:32 AM    BUN 16 07/08/2021 10:32 AM    Sodium 142 07/08/2021 10:32 AM    Potassium 4.5 07/08/2021 10:32 AM    Chloride 108 07/08/2021 10:32 AM    CO2 30 07/08/2021 10:32 AM        ROS    Physical Exam  Vitals and nursing note reviewed. Constitutional:       General: He is not in acute distress. Appearance: He is well-developed. Comments: Appears stated age   HENT:      Head: Normocephalic. Cardiovascular:      Rate and Rhythm: Normal rate and regular rhythm. Heart sounds: Normal heart sounds. Pulmonary:      Effort: Pulmonary effort is normal.      Breath sounds: Normal breath sounds. Abdominal:      Palpations: Abdomen is soft. Skin:         Neurological:      Mental Status: He is alert.       [facility]         ASSESSMENT and PLAN  Diagnoses and all orders for this visit:    1. Type 2 diabetes mellitus with microalbuminuria, without long-term current use of insulin (HCC)  -     HEMOGLOBIN A1C WITH EAG; Future  -     METABOLIC PANEL, COMPREHENSIVE; Future  -     MICROALBUMIN, UR, RAND W/ MICROALB/CREAT RATIO; Future    2. Hypertension, unspecified type  -     METABOLIC PANEL, COMPREHENSIVE; Future  -     CBC W/O DIFF; Future    3. Pure hypercholesterolemia  -     LIPID PANEL; Future  -     METABOLIC PANEL, COMPREHENSIVE; Future    4. Polyp of colon, unspecified part of colon, unspecified type    5. History of CVA (cerebrovascular accident)    6. Prostate cancer screening  -     PSA SCREENING (SCREENING); Future    7. Monoparesis of lower extremity affecting left nondominant side ([facility]    8. Moderate major depression ([facility]    9. PAD (peripheral artery disease) ([facility] [facility]    10. Stage 3 chronic kidney disease, unspecified whether stage 3a or 3b CKD ([facility] [facility]           This is the Subsequent Medicare Annual Wellness Exam, performed 12 months or more after the Initial AWV or the last Subsequent AWV    I have reviewed the patient's medical history in detail and updated the computerized patient record. Assessment/Plan   Education and counseling provided:  Are appropriate based on today's review and evaluation  End-of-Life planning (with patient's consent)  Prostate cancer screening tests (PSA, covered annually)  Colorectal cancer screening tests-refer GI MD villanueva recommended    1. Type 2 diabetes mellitus with microalbuminuria, without long-term current use of insulin (HCC)  -     HEMOGLOBIN A1C WITH EAG; Future  -     METABOLIC PANEL, COMPREHENSIVE; Future  -     MICROALBUMIN, UR, RAND W/ MICROALB/CREAT RATIO; Future  2. Hypertension, unspecified type  -     METABOLIC PANEL, COMPREHENSIVE; Future  -     CBC W/O DIFF; Future   controlled  3. Pure hypercholesterolemia  -     LIPID PANEL;  Future  - METABOLIC PANEL, COMPREHENSIVE; Future  4. Polyp of colon, unspecified part of colon, unspecified type-refer to GI  5. History of CVA (cerebrovascular accident)-control risk factors  6. Prostate cancer screening  -     PSA SCREENING (SCREENING); Future  7. Monoparesis of lower extremity affecting left nondominant side ([facility] [address]  8. Moderate major depression (HCC)-controlled on ssri  9. PAD (peripheral artery disease) (HCC)-no claudication symptoms  10. Stage 3 chronic kidney disease, unspecified whether stage 3a or 3b CKD ([facility]  11. Skin lesion left forearm--refer to DERM--r/o melanoma     Depression Risk Factor Screening     3 most recent PHQ Screens 1/12/2022   PHQ Not Done -   Little interest or pleasure in doing things Not at all   Feeling down, depressed, irritable, or hopeless Not at all   Total Score PHQ 2 0   Trouble falling or staying asleep, or sleeping too much -   Feeling tired or having little energy -   Poor appetite, weight loss, or overeating -   Feeling bad about yourself - or that you are a failure or have let yourself or your family down -   Trouble concentrating on things such as school, work, reading, or watching TV -   Moving or speaking so slowly that other people could have noticed; or the opposite being so fidgety that others notice -   Thoughts of being better off dead, or hurting yourself in some way -   PHQ 9 Score -       Alcohol & Drug Abuse Risk Screen    Do you average more than 1 drink per night or more than 7 drinks a week: No    In the past three months have you have had more than 4 drinks containing alcohol on one occasion: No          Functional Ability and Level of Safety    Hearing: Hearing is good. Activities of Daily Living: The home contains: no safety equipment. Patient does total self care      Ambulation: with difficulty, uses a cane     Fall Risk:  Fall Risk Assessment, last 12 mths 1/12/2022   Able to walk? Yes   Fall in past 12 months? 0   Do you feel unsteady?  0 Are you worried about falling 0   Number of falls in past 12 months -   Fall with injury?  -      Abuse Screen:  Patient is not abused       Cognitive Screening    Has your family/caregiver stated any concerns about your memory: no     Cognitive Screening: Normal - serial 3    Health Maintenance Due     Health Maintenance Due   Topic Date Due    Shingrix Vaccine Age 49> (1 of 2) Never done    Eye Exam Retinal or Dilated  09/07/2019    MICROALBUMIN Q1  10/03/2020    Colorectal Cancer Screening Combo  05/04/2021    Medicare Yearly Exam  12/31/2021    Lipid Screen  01/11/2022       Patient Care Team   Patient Care Team:  Olaf Perez MD as PCP - General (Internal Medicine)  Olaf Perez MD as PCP - [facility] Provider  Brie Dawkins MD (Urology)  Aj Castro MD as Physician (Cardiology)  Nilam Mora RN as Ambulatory Care Manager    History     Patient Active Problem List   Diagnosis Code    Benign prostatic hyperplasia with lower urinary tract symptoms N40.1    Urinary frequency R35.0    Obesity E66.9    Nocturia R35.1    Slowing of urinary stream R39.198    Erectile dysfunction N52.9    Calculus of kidney N20.0    Left low back pain M54.50    Gross hematuria R31.0    Essential hypertension I10    Hypercholesterolemia E78.00    Controlled type 2 diabetes mellitus without complication ([address] S43.5    History of stroke Z86.73    Coronary artery disease involving native coronary artery without angina pectoris I25.10    Fracture of multiple ribs of right side S22.41XA    Type 2 diabetes mellitus with nephropathy ([address] E11.21    Full code but no chest compressions Z78.9    Localized edema R60.0    Moderate major depression ([address] F32.1    Compression fracture of L3 vertebra ([address] S32.030A    Lumbar stenosis M48.061    Opioid use, unspecified with unspecified opioid-induced disorder F11.99     Past Medical History:   Diagnosis Date    Agoraphobia with panic disorder     Anxiety     Arthritis     spine    BPH (benign prostatic hypertrophy) with urinary obstruction     CAD (coronary artery disease)     no previous MI or stents    Chronic edema     Coronary artery disease involving native coronary artery without angina pectoris 3/28/2017    CVD (cardiovascular disease)     Depression     Diabetes (HCC)     no longer medicated    Erectile dysfunction     Essential hypertension 3/28/2017    Falls     Fracture of multiple ribs of right side 7/26/2017    Full code but no chest compressions 7/23/2018    GERD (gastroesophageal reflux disease)     Hemiparesis (HCC)     History of stroke 3/28/2017    Hypercholesterolemia     Hypercholesterolemia 3/28/2017    Hypertension     Joint pain     Localized edema 7/23/2018    Muscle pain     Muscle weakness     Nocturia     Panic disorder     Ringing in the ears     Seizures ([address]     at age 3, facial    Stroke ([address] 1996    left sided weakness      Past Surgical History:   Procedure Laterality Date    COLONOSCOPY N/A 5/4/2018    COLONOSCOPY performed by [facility]y Glorai [facility] at [facility] ENDOSCOPY    HX COLONOSCOPY      HX LUMBAR DISKECTOMY  1970's    HX ORTHOPAEDIC      right finger repair    HX ORTHOPAEDIC  12/2019    spinal surgery    IR INJ SPINE THER SUBST LUM/SAC W IMG  2/4/2020    IR KYPHOPLASTY LUMBAR  12/20/2019    IR KYPHOPLASTY LUMBAR  1/24/2020    CO ABDOMEN SURGERY PROC UNLISTED  2008    Lap band     Current Outpatient Medications   Medication Sig Dispense Refill    buPROPion SR (WELLBUTRIN SR) 150 mg SR tablet TAKE 1 TABLET TWICE DAILY 180 Tablet 2    potassium chloride (K-DUR, KLOR-CON M20) 20 mEq tablet TAKE 1 TABLET EVERY DAY 90 Tablet 3    metFORMIN (GLUCOPHAGE) 500 mg tablet TAKE 1 TABLET EVERY DAY WITH DINNER 90 Tablet 2    atorvastatin (LIPITOR) 40 mg tablet TAKE 1 TABLET EVERY DAY 90 Tablet 1    carvediloL (COREG) 12.5 mg tablet TAKE 1 TABLET TWICE DAILY WITH MEALS 180 Tablet 2    tamsulosin (FLOMAX) 0.4 mg capsule TAKE 2 CAPSULES EVERY  Capsule 2    finasteride (PROSCAR) 5 mg tablet TAKE 1 TABLET EVERY NIGHT 90 Tablet 2    ezetimibe (ZETIA) 10 mg tablet TAKE 1 TABLET EVERY DAY 90 Tablet 2    furosemide (LASIX) 40 mg tablet TAKE 1 TABLET EVERY DAY 90 Tablet 2    amLODIPine-benazepril (LOTREL) 5-20 mg per capsule TAKE 1 CAPSULE EVERY DAY 90 Capsule 2    aspirin delayed-release 81 mg tablet Take 1 Tab by mouth daily. 30 Tab 0    therapeutic multivitamin (THERA) tablet Take 1 Tab by mouth daily. 90 Tab 1    gabapentin (NEURONTIN) 300 mg capsule Take 2 Capsules by mouth nightly. Max Daily Amount: 600 mg. (Patient not taking: Reported on 1/12/2022) 180 Capsule 1    Blood-Glucose Meter monitoring kit Check fsbs every day (Patient not taking: Reported on 8/5/2021) 1 Kit 0    glucose blood VI test strips (ASCENSIA AUTODISC VI, ONE TOUCH ULTRA TEST VI) strip Check fsbs every day 250.00 (Patient not taking: Reported on 8/18/2021) 50 Strip 11    lancets misc check fsbs every day 250.00 (Patient not taking: Reported on 8/18/2021) 1 Each 11     No Known Allergies    Family History   Problem Relation Age of Onset    Cancer Father         lung    Stroke Father     Hypertension Father     Cancer Mother         lung    Hypertension Sister     Heart Disease Brother         chf--aicd     Social History     Tobacco Use    Smoking status: Never Smoker    Smokeless tobacco: Never Used   Substance Use Topics    Alcohol use:  Yes     Alcohol/week: 5.0 standard drinks     Types: 5 Cans of beer per week     Comment: a week         Donte Mello MD

## 2022-01-12 NOTE — PATIENT INSTRUCTIONS
Office Policies    Phone calls/patient messages:            Please allow up to 24 hours for someone in the office to contact you about your call or message. Be mindful your provider may be out of the office or your message may require further review. We encourage you to use [employer] for your messages as this is a faster, more efficient way to communicate with our office                         Medication Refills:            Prescription medications require 48-72 business hours to process. We encourage you to use [employer] for your refills. For controlled medications: Please allow 72 business hours to process. Certain medications may require you to  a written prescription at our office. NO narcotic/controlled medications will be prescribed after 4pm Monday through Friday or on weekends              Form/Paperwork Completion:            Please note a $25 fee may incur for all paperwork for completed by our providers. We ask that you allow 7-10 business days. Pre-payment is due prior to picking up/faxing the completed form. You may also download your forms to [employer] to have your doctor print off. Medicare Wellness Visit, Male    The best way to live healthy is to have a lifestyle where you eat a well-balanced diet, exercise regularly, limit alcohol use, and quit all forms of tobacco/nicotine, if applicable. Regular preventive services are another way to keep healthy. Preventive services (vaccines, screening tests, monitoring & exams) can help personalize your care plan, which helps you manage your own care. Screening tests can find health problems at the earliest stages, when they are easiest to treat. [facility] follows the current, evidence-based guidelines published by the [facility] (USPSTF) when recommending preventive services for our patients.  Because we follow these guidelines, sometimes recommendations change over time as research supports it. (For example, a prostate screening blood test is no longer routinely recommended for men with no symptoms). Of course, you and your doctor may decide to screen more often for some diseases, based on your risk and co-morbidities (chronic disease you are already diagnosed with). Preventive services for you include:  - Medicare offers their members a free annual wellness visit, which is time for you and your primary care provider to discuss and plan for your preventive service needs. Take advantage of this benefit every year!  -All adults over age 72 should receive the recommended pneumonia vaccines. Current USPSTF guidelines recommend a series of two vaccines for the best pneumonia protection.   -All adults should have a flu vaccine yearly and tetanus vaccine every 10 years.  -All adults age 48 and older should receive the shingles vaccines (series of two vaccines). -All adults age 38-68 who are overweight should have a diabetes screening test once every three years.   -Other screening tests & preventive services for persons with diabetes include: an eye exam to screen for diabetic retinopathy, a kidney function test, a foot exam, and stricter control over your cholesterol.   -Cardiovascular screening for adults with routine risk involves an electrocardiogram (ECG) at intervals determined by the provider.   -Colorectal cancer screening should be done for adults age 54-65 with no increased risk factors for colorectal cancer. There are a number of acceptable methods of screening for this type of cancer. Each test has its own benefits and drawbacks. Discuss with your provider what is most appropriate for you during your annual wellness visit.  The different tests include: colonoscopy (considered the best screening method), a fecal occult blood test, a fecal DNA test, and sigmoidoscopy.  -All adults born between [facility] should be screened once for Hepatitis C.  -An Abdominal Aortic Aneurysm (AAA) Screening is recommended for men age 73-68 who has ever smoked in their lifetime.      Here is a list of your current Health Maintenance items (your personalized list of preventive services) with a due date:  Health Maintenance Due   Topic Date Due    Shingles Vaccine (1 of 2) Never done    Eye Exam  09/07/2019    Albumin Urine Test  10/03/2020    Colorectal Screening  05/04/2021    Cholesterol Test   01/11/2022

## 2022-01-13 ENCOUNTER — TELEPHONE (OUTPATIENT)
Dept: INTERNAL MEDICINE CLINIC | Age: 72
End: 2022-01-13

## 2022-01-13 LAB
ALBUMIN SERPL-MCNC: 3.8 G/DL (ref 3.5–5)
ALBUMIN/GLOB SERPL: 1.2 {RATIO} (ref 1.1–2.2)
ALP SERPL-CCNC: 87 U/L (ref 45–117)
ALT SERPL-CCNC: 19 U/L (ref 12–78)
ANION GAP SERPL CALC-SCNC: 6 MMOL/L (ref 5–15)
AST SERPL-CCNC: 12 U/L (ref 15–37)
BILIRUB SERPL-MCNC: 0.7 MG/DL (ref 0.2–1)
BUN SERPL-MCNC: 20 MG/DL (ref 6–20)
BUN/CREAT SERPL: 16 (ref 12–20)
CALCIUM SERPL-MCNC: 9.4 MG/DL (ref 8.5–10.1)
CHLORIDE SERPL-SCNC: 107 MMOL/L (ref 97–108)
CHOLEST SERPL-MCNC: 142 MG/DL
CO2 SERPL-SCNC: 28 MMOL/L (ref 21–32)
CREAT SERPL-MCNC: 1.28 MG/DL (ref 0.7–1.3)
CREAT UR-MCNC: 18.5 MG/DL
ERYTHROCYTE [DISTWIDTH] IN BLOOD BY AUTOMATED COUNT: 12.9 % (ref 11.5–14.5)
EST. AVERAGE GLUCOSE BLD GHB EST-MCNC: 111 MG/DL
GLOBULIN SER CALC-MCNC: 3.2 G/DL (ref 2–4)
GLUCOSE SERPL-MCNC: 114 MG/DL (ref 65–100)
HBA1C MFR BLD: 5.5 % (ref 4–5.6)
HCT VFR BLD AUTO: 43.5 % (ref 36.6–50.3)
HDLC SERPL-MCNC: 64 MG/DL
HDLC SERPL: 2.2 {RATIO} (ref 0–5)
HGB BLD-MCNC: 13.7 G/DL (ref 12.1–17)
LDLC SERPL CALC-MCNC: 60.6 MG/DL (ref 0–100)
MCH RBC QN AUTO: 31.4 PG (ref 26–34)
MCHC RBC AUTO-ENTMCNC: 31.5 G/DL (ref 30–36.5)
MCV RBC AUTO: 99.5 FL (ref 80–99)
MICROALBUMIN UR-MCNC: 1.29 MG/DL
MICROALBUMIN/CREAT UR-RTO: 70 MG/G (ref 0–30)
NRBC # BLD: 0 K/UL (ref 0–0.01)
NRBC BLD-RTO: 0 PER 100 WBC
PLATELET # BLD AUTO: 213 K/UL (ref 150–400)
PMV BLD AUTO: 12.3 FL (ref 8.9–12.9)
POTASSIUM SERPL-SCNC: 4 MMOL/L (ref 3.5–5.1)
PROT SERPL-MCNC: 7 G/DL (ref 6.4–8.2)
PSA SERPL-MCNC: 1 NG/ML (ref 0.01–4)
RBC # BLD AUTO: 4.37 M/UL (ref 4.1–5.7)
SODIUM SERPL-SCNC: 141 MMOL/L (ref 136–145)
TRIGL SERPL-MCNC: 87 MG/DL (ref ?–150)
VLDLC SERPL CALC-MCNC: 17.4 MG/DL
WBC # BLD AUTO: 6.7 K/UL (ref 4.1–11.1)

## 2022-01-13 NOTE — TELEPHONE ENCOUNTER
----- Message from Ferny Villa sent at 1/13/2022 11:50 AM EST -----  Subject: Message to Provider    QUESTIONS  Information for Provider? Pt wife calling, regarding referral from appt on   1/12/2022. Was having difficulty getting in with the particular   dermatologist. Would like to know if PCP could get pt in sooner or refer   him to other dermatologist. Please contact as soon as possible.  ---------------------------------------------------------------------------  --------------  CALL BACK INFO  What is the best way for the office to contact you? OK to leave message on   voicemail  Preferred Call Back Phone Number? [phone]  ---------------------------------------------------------------------------  --------------  SCRIPT ANSWERS  Relationship to Patient? Other  Representative Name? Alison Young   Is the Representative on the appropriate HIPAA document in Epic?  Yes

## 2022-01-14 ENCOUNTER — TELEPHONE (OUTPATIENT)
Dept: INTERNAL MEDICINE CLINIC | Age: 72
End: 2022-01-14

## 2022-01-14 DIAGNOSIS — D22.9 MULTIPLE NEVI: Primary | ICD-10-CM

## 2022-02-08 ENCOUNTER — OFFICE VISIT (OUTPATIENT)
Dept: CARDIOLOGY CLINIC | Age: 72
End: 2022-02-08
Payer: MEDICARE

## 2022-02-08 VITALS
HEIGHT: 68 IN | SYSTOLIC BLOOD PRESSURE: 120 MMHG | OXYGEN SATURATION: 98 % | BODY MASS INDEX: 30.58 KG/M2 | DIASTOLIC BLOOD PRESSURE: 80 MMHG | HEART RATE: 70 BPM | WEIGHT: 201.8 LBS | RESPIRATION RATE: 16 BRPM

## 2022-02-08 DIAGNOSIS — I25.10 CORONARY ARTERY DISEASE INVOLVING NATIVE CORONARY ARTERY OF NATIVE HEART WITHOUT ANGINA PECTORIS: Primary | ICD-10-CM

## 2022-02-08 DIAGNOSIS — E78.00 HYPERCHOLESTEROLEMIA: ICD-10-CM

## 2022-02-08 DIAGNOSIS — I10 ESSENTIAL HYPERTENSION: ICD-10-CM

## 2022-02-08 PROCEDURE — 99203 OFFICE O/P NEW LOW 30 MIN: CPT | Performed by: INTERNAL MEDICINE

## 2022-02-08 PROCEDURE — 93000 ELECTROCARDIOGRAM COMPLETE: CPT | Performed by: INTERNAL MEDICINE

## 2022-02-08 PROCEDURE — G8417 CALC BMI ABV UP PARAM F/U: HCPCS | Performed by: INTERNAL MEDICINE

## 2022-02-08 PROCEDURE — G8754 DIAS BP LESS 90: HCPCS | Performed by: INTERNAL MEDICINE

## 2022-02-08 PROCEDURE — 1101F PT FALLS ASSESS-DOCD LE1/YR: CPT | Performed by: INTERNAL MEDICINE

## 2022-02-08 PROCEDURE — G8536 NO DOC ELDER MAL SCRN: HCPCS | Performed by: INTERNAL MEDICINE

## 2022-02-08 PROCEDURE — G9717 DOC PT DX DEP/BP F/U NT REQ: HCPCS | Performed by: INTERNAL MEDICINE

## 2022-02-08 PROCEDURE — G8427 DOCREV CUR MEDS BY ELIG CLIN: HCPCS | Performed by: INTERNAL MEDICINE

## 2022-02-08 PROCEDURE — G8752 SYS BP LESS 140: HCPCS | Performed by: INTERNAL MEDICINE

## 2022-02-08 PROCEDURE — 3017F COLORECTAL CA SCREEN DOC REV: CPT | Performed by: INTERNAL MEDICINE

## 2022-02-08 RX ORDER — CHOLECALCIFEROL (VITAMIN D3) 125 MCG
2000 CAPSULE ORAL DAILY
COMMUNITY

## 2022-02-08 RX ORDER — SODIUM, POTASSIUM,MAG SULFATES 17.5-3.13G
SOLUTION, RECONSTITUTED, ORAL ORAL
COMMUNITY
Start: 2022-01-31 | End: 2022-02-08

## 2022-02-08 NOTE — LETTER
2/8/2022    Patient: Bridgette Pain   YOB: 1950   Date of Visit: 2/8/2022     Bettie Franks MD  [address]  [address] [address]  [place]    Dear Bettie Franks MD,      Thank you for referring Mr. Georgina Villaseñor to [address] [place] [address] for evaluation. My notes for this consultation are attached. If you have questions, please do not hesitate to call me. I look forward to following your patient along with you.       Sincerely,    Janine Perrin MD

## 2022-02-08 NOTE — PROGRESS NOTES
Eren Guerrero, FNP-BC    Subjective/HPI:     Nate Lipscomb is a 66 y.o. male is here to re-establish care. He has a PMHx of non-obstructive CAD, CVA, HTN, HLD and pre-DM. Previously seen in 2018. Noted to have some pain in his back radiating to the front of his left chest that started a month ago. Symptoms occur with stretching motions, such as reaching onto a grab bar when getting out of bed. He has not had any recurrent symptoms in a few weeks. Can exert himself without any complaints of chest pains, shortness of breath. Current Outpatient Medications on File Prior to Visit   Medication Sig Dispense Refill    cholecalciferol, vitamin D3, (Vitamin D3) 50 mcg (2,000 unit) tab Take 2,000 Units by mouth daily.  buPROPion SR (WELLBUTRIN SR) 150 mg SR tablet TAKE 1 TABLET TWICE DAILY 180 Tablet 2    potassium chloride (K-DUR, KLOR-CON M20) 20 mEq tablet TAKE 1 TABLET EVERY DAY 90 Tablet 3    metFORMIN (GLUCOPHAGE) 500 mg tablet TAKE 1 TABLET EVERY DAY WITH DINNER 90 Tablet 2    atorvastatin (LIPITOR) 40 mg tablet TAKE 1 TABLET EVERY DAY 90 Tablet 1    carvediloL (COREG) 12.5 mg tablet TAKE 1 TABLET TWICE DAILY WITH MEALS 180 Tablet 2    tamsulosin (FLOMAX) 0.4 mg capsule TAKE 2 CAPSULES EVERY  Capsule 2    finasteride (PROSCAR) 5 mg tablet TAKE 1 TABLET EVERY NIGHT 90 Tablet 2    ezetimibe (ZETIA) 10 mg tablet TAKE 1 TABLET EVERY DAY 90 Tablet 2    furosemide (LASIX) 40 mg tablet TAKE 1 TABLET EVERY DAY 90 Tablet 2    amLODIPine-benazepril (LOTREL) 5-20 mg per capsule TAKE 1 CAPSULE EVERY DAY 90 Capsule 2    aspirin delayed-release 81 mg tablet Take 1 Tab by mouth daily. 30 Tab 0    therapeutic multivitamin (THERA) tablet Take 1 Tab by mouth daily. 90 Tab 1     No current facility-administered medications on file prior to visit. Review of Symptoms:    Review of Systems   Constitutional: Negative for chills, fever and weight loss.    HENT: Negative for nosebleeds. Eyes: Negative for blurred vision and double vision. Respiratory: Negative for cough, shortness of breath and wheezing. Cardiovascular: Negative for chest pain, palpitations, orthopnea, leg swelling and PND. Skin: Negative for rash. Neurological: Negative for dizziness and loss of consciousness. Physical Exam:      General: Well developed, in no acute distress, cooperative and alert  Heart:  reg rate and rhythm; normal S1/S2; no murmurs, no gallops or rubs. Respiratory: Clear bilaterally x 4, no wheezing or rales  Extremities:  Normal cap refill, no cyanosis, atraumatic. No edema. Vascular: 2+ pulses symmetric in all extremities    Vitals:    02/08/22 1404   BP: 120/80   BP 1 Location: Right arm   BP Patient Position: Sitting   BP Cuff Size: Adult   Pulse: 70   Resp: 16   Height: 5' 8\" (1.727 m)   Weight: 201 lb 12.8 oz (91.5 kg)   SpO2: 98%       ECG done today shows sinus rhythm     Assessment:       ICD-10-CM ICD-9-CM    1. Coronary artery disease involving native coronary artery of native heart without angina pectoris  I25.10 414.01 AMB POC EKG ROUTINE W/ 12 LEADS, INTER & REP   2. Essential hypertension  I10 401.9 AMB POC EKG ROUTINE W/ 12 LEADS, INTER & REP   3. Hypercholesterolemia  E78.00 272.0 AMB POC EKG ROUTINE W/ 12 LEADS, INTER & REP        Plan:     1. Coronary artery disease involving native coronary artery of native heart without angina pectoris  Non-obstructive CAD in the remote past  Symptoms atypical and now resolved  Echo done 3/2018 with preserved LVEF 55-60%  Continue BB, ASA, statin therapy  Would consider stress testing if new symptoms occur    2. Essential hypertension  BP controlled. Continue anti-hypertensive therapy and low sodium diet    3.  Hypercholesterolemia  LDL 60 in 1/2022  Continue statin, Zetia therapy and low fat, low cholesterol diet  Lipids managed by PCP     Patient seen and examined by me with nurse practitioner. Praful Pandey personally performed all components of the history, physical, and medical decision making and agree with the assessment and plan with minor modifications as noted. CP radiating from his back; unlikely to represent angina. Do not rec cardiac w/u at present.       Radha Morataya MD

## 2022-02-08 NOTE — PROGRESS NOTES
Chief Complaint   Patient presents with    Follow-up     Seen by Dr Remi Estrada 2018    Coronary Artery Disease    Hypertension    Cholesterol Problem       Patient C/O chest discomfort with movement radiating to back.

## 2022-02-09 DIAGNOSIS — I10 ESSENTIAL HYPERTENSION: ICD-10-CM

## 2022-02-09 DIAGNOSIS — R60.0 LOCALIZED EDEMA: ICD-10-CM

## 2022-02-09 DIAGNOSIS — E78.00 HYPERCHOLESTEROLEMIA: ICD-10-CM

## 2022-02-09 DIAGNOSIS — Z00.00 ROUTINE GENERAL MEDICAL EXAMINATION AT A HEALTH CARE FACILITY: ICD-10-CM

## 2022-02-09 RX ORDER — CARVEDILOL 12.5 MG/1
TABLET ORAL
Qty: 180 TABLET | Refills: 2 | Status: SHIPPED | OUTPATIENT
Start: 2022-02-09 | End: 2022-11-02

## 2022-02-09 RX ORDER — AMLODIPINE AND BENAZEPRIL HYDROCHLORIDE 5; 20 MG/1; MG/1
CAPSULE ORAL
Qty: 90 CAPSULE | Refills: 2 | Status: SHIPPED | OUTPATIENT
Start: 2022-02-09 | End: 2022-07-11 | Stop reason: ALTCHOICE

## 2022-02-09 RX ORDER — EZETIMIBE 10 MG/1
TABLET ORAL
Qty: 90 TABLET | Refills: 2 | Status: SHIPPED | OUTPATIENT
Start: 2022-02-09 | End: 2022-11-02

## 2022-02-09 RX ORDER — TAMSULOSIN HYDROCHLORIDE 0.4 MG/1
CAPSULE ORAL
Qty: 180 CAPSULE | Refills: 2 | Status: SHIPPED | OUTPATIENT
Start: 2022-02-09 | End: 2022-11-02

## 2022-02-09 RX ORDER — FUROSEMIDE 40 MG/1
TABLET ORAL
Qty: 90 TABLET | Refills: 2 | Status: SHIPPED | OUTPATIENT
Start: 2022-02-09 | End: 2022-11-02

## 2022-02-09 RX ORDER — METFORMIN HYDROCHLORIDE 500 MG/1
TABLET ORAL
Qty: 90 TABLET | Refills: 2 | Status: SHIPPED | OUTPATIENT
Start: 2022-02-09 | End: 2022-11-02

## 2022-02-09 RX ORDER — FINASTERIDE 5 MG/1
TABLET, FILM COATED ORAL
Qty: 90 TABLET | Refills: 2 | Status: SHIPPED | OUTPATIENT
Start: 2022-02-09 | End: 2022-11-02

## 2022-03-18 PROBLEM — F32.1 MODERATE MAJOR DEPRESSION (HCC): Status: ACTIVE | Noted: 2018-07-24

## 2022-03-18 PROBLEM — M48.061 LUMBAR STENOSIS: Status: ACTIVE | Noted: 2021-08-04

## 2022-03-18 PROBLEM — E11.9 CONTROLLED TYPE 2 DIABETES MELLITUS WITHOUT COMPLICATION (HCC): Status: ACTIVE | Noted: 2017-03-28

## 2022-03-18 PROBLEM — R60.0 LOCALIZED EDEMA: Status: ACTIVE | Noted: 2018-07-23

## 2022-03-19 PROBLEM — I10 ESSENTIAL HYPERTENSION: Status: ACTIVE | Noted: 2017-03-28

## 2022-03-19 PROBLEM — I25.10 CORONARY ARTERY DISEASE INVOLVING NATIVE CORONARY ARTERY WITHOUT ANGINA PECTORIS: Status: ACTIVE | Noted: 2017-03-28

## 2022-03-19 PROBLEM — Z86.73 HISTORY OF STROKE: Status: ACTIVE | Noted: 2017-03-28

## 2022-03-19 PROBLEM — S32.030A COMPRESSION FRACTURE OF L3 VERTEBRA (HCC): Status: ACTIVE | Noted: 2021-08-04

## 2022-03-19 PROBLEM — S22.41XA FRACTURE OF MULTIPLE RIBS OF RIGHT SIDE: Status: ACTIVE | Noted: 2017-07-26

## 2022-03-19 PROBLEM — E78.00 HYPERCHOLESTEROLEMIA: Status: ACTIVE | Noted: 2017-03-28

## 2022-03-19 PROBLEM — F11.99 OPIOID USE, UNSPECIFIED WITH UNSPECIFIED OPIOID-INDUCED DISORDER (HCC): Status: ACTIVE | Noted: 2021-08-18

## 2022-03-20 PROBLEM — E11.21 TYPE 2 DIABETES MELLITUS WITH NEPHROPATHY (HCC): Status: ACTIVE | Noted: 2018-01-23

## 2022-04-24 ENCOUNTER — APPOINTMENT (OUTPATIENT)
Dept: GENERAL RADIOLOGY | Age: 72
DRG: 522 | End: 2022-04-24
Attending: STUDENT IN AN ORGANIZED HEALTH CARE EDUCATION/TRAINING PROGRAM
Payer: MEDICARE

## 2022-04-24 ENCOUNTER — HOSPITAL ENCOUNTER (INPATIENT)
Age: 72
LOS: 8 days | Discharge: NURSING, REHAB OR LONG-TERM CARE | DRG: 522 | End: 2022-05-02
Attending: STUDENT IN AN ORGANIZED HEALTH CARE EDUCATION/TRAINING PROGRAM | Admitting: HOSPITALIST
Payer: MEDICARE

## 2022-04-24 ENCOUNTER — APPOINTMENT (OUTPATIENT)
Dept: MRI IMAGING | Age: 72
DRG: 522 | End: 2022-04-24
Attending: PHYSICIAN ASSISTANT
Payer: MEDICARE
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
## 2022-04-24 DIAGNOSIS — S72.002A CLOSED FRACTURE OF LEFT HIP, INITIAL ENCOUNTER (HCC): Primary | ICD-10-CM

## 2022-04-24 DIAGNOSIS — S89.91XA INJURY OF RIGHT KNEE, INITIAL ENCOUNTER: ICD-10-CM

## 2022-04-24 LAB
ALBUMIN SERPL-MCNC: 3.6 G/DL (ref 3.5–5)
ALBUMIN/GLOB SERPL: 1.2 {RATIO} (ref 1.1–2.2)
ALP SERPL-CCNC: 75 U/L (ref 45–117)
ALT SERPL-CCNC: 30 U/L (ref 12–78)
ANION GAP SERPL CALC-SCNC: 4 MMOL/L (ref 5–15)
AST SERPL-CCNC: 21 U/L (ref 15–37)
BASOPHILS # BLD: 0.1 K/UL (ref 0–0.1)
BASOPHILS NFR BLD: 0 % (ref 0–1)
BILIRUB SERPL-MCNC: 0.6 MG/DL (ref 0.2–1)
BUN SERPL-MCNC: 21 MG/DL (ref 6–20)
BUN/CREAT SERPL: 15 (ref 12–20)
CALCIUM SERPL-MCNC: 9 MG/DL (ref 8.5–10.1)
CHLORIDE SERPL-SCNC: 104 MMOL/L (ref 97–108)
CO2 SERPL-SCNC: 33 MMOL/L (ref 21–32)
CREAT SERPL-MCNC: 1.36 MG/DL (ref 0.7–1.3)
DIFFERENTIAL METHOD BLD: ABNORMAL
EOSINOPHIL # BLD: 0.2 K/UL (ref 0–0.4)
EOSINOPHIL NFR BLD: 1 % (ref 0–7)
ERYTHROCYTE [DISTWIDTH] IN BLOOD BY AUTOMATED COUNT: 12.9 % (ref 11.5–14.5)
GLOBULIN SER CALC-MCNC: 3.1 G/DL (ref 2–4)
GLUCOSE BLD STRIP.AUTO-MCNC: 165 MG/DL (ref 65–117)
GLUCOSE SERPL-MCNC: 125 MG/DL (ref 65–100)
HCT VFR BLD AUTO: 38 % (ref 36.6–50.3)
HGB BLD-MCNC: 12.9 G/DL (ref 12.1–17)
IMM GRANULOCYTES # BLD AUTO: 0.1 K/UL (ref 0–0.04)
IMM GRANULOCYTES NFR BLD AUTO: 1 % (ref 0–0.5)
LYMPHOCYTES # BLD: 1.1 K/UL (ref 0.8–3.5)
LYMPHOCYTES NFR BLD: 9 % (ref 12–49)
MCH RBC QN AUTO: 31.9 PG (ref 26–34)
MCHC RBC AUTO-ENTMCNC: 33.9 G/DL (ref 30–36.5)
MCV RBC AUTO: 94.1 FL (ref 80–99)
MONOCYTES # BLD: 0.9 K/UL (ref 0–1)
MONOCYTES NFR BLD: 7 % (ref 5–13)
NEUTS SEG # BLD: 10.1 K/UL (ref 1.8–8)
NEUTS SEG NFR BLD: 82 % (ref 32–75)
NRBC # BLD: 0 K/UL (ref 0–0.01)
NRBC BLD-RTO: 0 PER 100 WBC
PLATELET # BLD AUTO: 212 K/UL (ref 150–400)
PMV BLD AUTO: 11.1 FL (ref 8.9–12.9)
POTASSIUM SERPL-SCNC: 3.2 MMOL/L (ref 3.5–5.1)
PROT SERPL-MCNC: 6.7 G/DL (ref 6.4–8.2)
RBC # BLD AUTO: 4.04 M/UL (ref 4.1–5.7)
SERVICE CMNT-IMP: ABNORMAL
SODIUM SERPL-SCNC: 141 MMOL/L (ref 136–145)
WBC # BLD AUTO: 12.4 K/UL (ref 4.1–11.1)

## 2022-04-24 PROCEDURE — 80053 COMPREHEN METABOLIC PANEL: CPT

## 2022-04-24 PROCEDURE — 71045 X-RAY EXAM CHEST 1 VIEW: CPT

## 2022-04-24 PROCEDURE — 93005 ELECTROCARDIOGRAM TRACING: CPT

## 2022-04-24 PROCEDURE — 96374 THER/PROPH/DIAG INJ IV PUSH: CPT

## 2022-04-24 PROCEDURE — 73721 MRI JNT OF LWR EXTRE W/O DYE: CPT

## 2022-04-24 PROCEDURE — 99285 EMERGENCY DEPT VISIT HI MDM: CPT

## 2022-04-24 PROCEDURE — 85025 COMPLETE CBC W/AUTO DIFF WBC: CPT

## 2022-04-24 PROCEDURE — 74011250636 HC RX REV CODE- 250/636: Performed by: STUDENT IN AN ORGANIZED HEALTH CARE EDUCATION/TRAINING PROGRAM

## 2022-04-24 PROCEDURE — 96375 TX/PRO/DX INJ NEW DRUG ADDON: CPT

## 2022-04-24 PROCEDURE — 74011000250 HC RX REV CODE- 250: Performed by: HOSPITALIST

## 2022-04-24 PROCEDURE — 73562 X-RAY EXAM OF KNEE 3: CPT

## 2022-04-24 PROCEDURE — 65270000029 HC RM PRIVATE

## 2022-04-24 PROCEDURE — 36415 COLL VENOUS BLD VENIPUNCTURE: CPT

## 2022-04-24 PROCEDURE — 73502 X-RAY EXAM HIP UNI 2-3 VIEWS: CPT

## 2022-04-24 PROCEDURE — 82962 GLUCOSE BLOOD TEST: CPT

## 2022-04-24 PROCEDURE — 74011250637 HC RX REV CODE- 250/637: Performed by: HOSPITALIST

## 2022-04-24 RX ORDER — INSULIN LISPRO 100 [IU]/ML
INJECTION, SOLUTION INTRAVENOUS; SUBCUTANEOUS
Status: DISCONTINUED | OUTPATIENT
Start: 2022-04-24 | End: 2022-05-02 | Stop reason: HOSPADM

## 2022-04-24 RX ORDER — LORAZEPAM 2 MG/ML
0.5 INJECTION INTRAMUSCULAR
Status: ACTIVE | OUTPATIENT
Start: 2022-04-24 | End: 2022-04-25

## 2022-04-24 RX ORDER — ONDANSETRON 2 MG/ML
4 INJECTION INTRAMUSCULAR; INTRAVENOUS
Status: COMPLETED | OUTPATIENT
Start: 2022-04-24 | End: 2022-04-24

## 2022-04-24 RX ORDER — ACETAMINOPHEN 650 MG/1
650 SUPPOSITORY RECTAL
Status: DISCONTINUED | OUTPATIENT
Start: 2022-04-24 | End: 2022-05-02 | Stop reason: HOSPADM

## 2022-04-24 RX ORDER — ACETAMINOPHEN 325 MG/1
650 TABLET ORAL
Status: DISCONTINUED | OUTPATIENT
Start: 2022-04-24 | End: 2022-05-02 | Stop reason: HOSPADM

## 2022-04-24 RX ORDER — ONDANSETRON 2 MG/ML
4 INJECTION INTRAMUSCULAR; INTRAVENOUS
Status: DISCONTINUED | OUTPATIENT
Start: 2022-04-24 | End: 2022-05-02 | Stop reason: HOSPADM

## 2022-04-24 RX ORDER — CARVEDILOL 6.25 MG/1
12.5 TABLET ORAL 2 TIMES DAILY WITH MEALS
Status: DISCONTINUED | OUTPATIENT
Start: 2022-04-25 | End: 2022-05-02 | Stop reason: HOSPADM

## 2022-04-24 RX ORDER — MAGNESIUM SULFATE 100 %
4 CRYSTALS MISCELLANEOUS AS NEEDED
Status: DISCONTINUED | OUTPATIENT
Start: 2022-04-24 | End: 2022-05-02 | Stop reason: HOSPADM

## 2022-04-24 RX ORDER — MORPHINE SULFATE 2 MG/ML
2 INJECTION, SOLUTION INTRAMUSCULAR; INTRAVENOUS
Status: COMPLETED | OUTPATIENT
Start: 2022-04-24 | End: 2022-04-24

## 2022-04-24 RX ORDER — FENTANYL CITRATE 50 UG/ML
50 INJECTION, SOLUTION INTRAMUSCULAR; INTRAVENOUS
Status: DISCONTINUED | OUTPATIENT
Start: 2022-04-24 | End: 2022-05-02 | Stop reason: HOSPADM

## 2022-04-24 RX ORDER — FINASTERIDE 5 MG/1
5 TABLET, FILM COATED ORAL
Status: DISCONTINUED | OUTPATIENT
Start: 2022-04-24 | End: 2022-05-02 | Stop reason: HOSPADM

## 2022-04-24 RX ORDER — SODIUM CHLORIDE 0.9 % (FLUSH) 0.9 %
5-40 SYRINGE (ML) INJECTION EVERY 8 HOURS
Status: DISCONTINUED | OUTPATIENT
Start: 2022-04-24 | End: 2022-05-02 | Stop reason: HOSPADM

## 2022-04-24 RX ORDER — ATORVASTATIN CALCIUM 40 MG/1
40 TABLET, FILM COATED ORAL DAILY
Status: DISCONTINUED | OUTPATIENT
Start: 2022-04-25 | End: 2022-05-02 | Stop reason: HOSPADM

## 2022-04-24 RX ORDER — ONDANSETRON 4 MG/1
4 TABLET, ORALLY DISINTEGRATING ORAL
Status: DISCONTINUED | OUTPATIENT
Start: 2022-04-24 | End: 2022-05-02 | Stop reason: HOSPADM

## 2022-04-24 RX ORDER — SODIUM CHLORIDE 0.9 % (FLUSH) 0.9 %
5-40 SYRINGE (ML) INJECTION AS NEEDED
Status: DISCONTINUED | OUTPATIENT
Start: 2022-04-24 | End: 2022-05-02 | Stop reason: HOSPADM

## 2022-04-24 RX ORDER — BUPROPION HYDROCHLORIDE 150 MG/1
150 TABLET, EXTENDED RELEASE ORAL 2 TIMES DAILY
Status: DISCONTINUED | OUTPATIENT
Start: 2022-04-24 | End: 2022-05-02 | Stop reason: HOSPADM

## 2022-04-24 RX ORDER — POLYETHYLENE GLYCOL 3350 17 G/17G
17 POWDER, FOR SOLUTION ORAL DAILY PRN
Status: DISCONTINUED | OUTPATIENT
Start: 2022-04-24 | End: 2022-05-02 | Stop reason: HOSPADM

## 2022-04-24 RX ORDER — TAMSULOSIN HYDROCHLORIDE 0.4 MG/1
0.4 CAPSULE ORAL DAILY
Status: DISCONTINUED | OUTPATIENT
Start: 2022-04-25 | End: 2022-05-02 | Stop reason: HOSPADM

## 2022-04-24 RX ADMIN — FINASTERIDE 5 MG: 5 TABLET, FILM COATED ORAL at 23:00

## 2022-04-24 RX ADMIN — ONDANSETRON 4 MG: 2 INJECTION INTRAMUSCULAR; INTRAVENOUS at 19:08

## 2022-04-24 RX ADMIN — SODIUM CHLORIDE 500 ML: 9 INJECTION, SOLUTION INTRAVENOUS at 19:07

## 2022-04-24 RX ADMIN — MORPHINE SULFATE 2 MG: 2 INJECTION, SOLUTION INTRAMUSCULAR; INTRAVENOUS at 18:48

## 2022-04-24 RX ADMIN — BUPROPION HYDROCHLORIDE 150 MG: 150 TABLET, EXTENDED RELEASE ORAL at 22:59

## 2022-04-24 RX ADMIN — ACETAMINOPHEN 650 MG: 325 TABLET ORAL at 22:59

## 2022-04-24 RX ADMIN — Medication 10 ML: at 22:00

## 2022-04-24 NOTE — ED PROVIDER NOTES
The history is provided by the patient. Fall  The accident occurred 1 to 2 hours ago. The fall occurred while walking. He fell from a height of ground level. He landed on hard floor. There was no blood loss. The point of impact was the left hip. The pain is moderate. He was not ambulatory at the scene. There was no entrapment after the fall. There was no alcohol use involved in the accident. Associated symptoms include extremity weakness (baseline LLE weakness). Pertinent negatives include no vomiting, no headaches, no loss of consciousness and no laceration. The risk factors include being elderly and recurrent falls (previous stroke). Associated symptoms comments: +R knee pain. The symptoms are aggravated by activity, use of injured limb and pressure on injury. Prehospitalization: rest. He has tried rest for the symptoms. The treatment provided mild relief.         Past Medical History:   Diagnosis Date    Agoraphobia with panic disorder     Anxiety     Arthritis     spine    BPH (benign prostatic hypertrophy) with urinary obstruction     CAD (coronary artery disease)     no previous MI or stents    Cancer ([address]     skin pre cancer    Chronic edema     Coronary artery disease involving native coronary artery without angina pectoris 3/28/2017    CVD (cardiovascular disease)     Depression     Diabetes ([address]     no longer medicated    Erectile dysfunction     Essential hypertension 3/28/2017    Falls     Fracture of multiple ribs of right side 7/26/2017    Full code but no chest compressions 7/23/2018    GERD (gastroesophageal reflux disease)     Hemiparesis ([address]     History of stroke 3/28/2017    Hypercholesterolemia     Hypercholesterolemia 3/28/2017    Hypertension     Joint pain     Localized edema 7/23/2018    Long term current use of anticoagulant therapy     ASA 81 mg    Murmur     Muscle pain     Muscle weakness     Nocturia     Panic disorder     Ringing in the ears     Seizures ([facility] [address]     at age 3, facial    Stroke [facility]) 1996    left sided weakness       Past Surgical History:   Procedure Laterality Date    COLONOSCOPY N/A 5/4/2018    COLON[facility]ullen [facility] END[facility]COPY    HX COLONOSCOPY      HX LUMBAR DISKECTOMY  1970's    HX ORTHOPAEDIC      right finger repair    HX ORTHOPAEDIC  12/2019    spinal surgery    IR INJ SPINE THER SUBST LUM/SAC W IMG  2/4/2020    IR KYPHOPLASTY LUMBAR  12/20/2019    IR KYPHOPLASTY LUMBAR  1/24/2020    RI ABDOMEN SURGERY PROC UNLISTED  2008    Lap band         Family History:   Problem Relation Age of Onset    Cancer Father         lung    Stroke Father     Hypertension Father     Cancer Mother         lung    Hypertension Sister     Heart Disease Brother         chf--aicd       Social History     Socioeconomic History    Marital status:      Spouse name: Not on file    Number of children: Not on file    Years of education: Not on file    Highest education level: Not on file   Occupational History    Not on file   Tobacco Use    Smoking status: Never Smoker    Smokeless tobacco: Never Used   Vaping Use    Vaping Use: Never used   Substance and Sexual Activity    Alcohol use: Yes     Alcohol/week: 5.0 standard drinks     Types: 5 Cans of beer per week     Comment: a week    Drug use: Yes     Types: Marijuana     Comment: Gummies Marijuana    Sexual activity: Yes     Partners: Female   Other Topics Concern    Not on file   Social History Narrative    Not on file     Social Determinants of Health     Financial Resource Strain:     Difficulty of Paying Living Expenses: Not on file   Food Insecurity:     Worried About Running Out of Food in the Last Year: Not on file    Fuad of Food in the Last Year: Not on file   Transportation Needs:     Lack of Transportation (Medical): Not on file    Lack of Transportation (Non-Medical):  Not on file   Physical Activity:     Days of Exercise per Week: Not on file    Minutes of Exercise per Session: Not on file   Stress:     Feeling of Stress : Not on file   Social Connections:     Frequency of Communication with Friends and Family: Not on file    Frequency of Social Gatherings with Friends and Family: Not on file    Attends Congregation Services: Not on file    Active Member of [address] [place] [address] or Organizations: Not on file    Attends Club or Organization Meetings: Not on file    Marital Status: Not on file   Intimate Partner Violence:     Fear of Current or Ex-Partner: Not on file    Emotionally Abused: Not on file    Physically Abused: Not on file    Sexually Abused: Not on file   Housing Stability:     Unable to Pay for Housing in the Last Year: Not on file    Number of [place] in the Last Year: Not on file    Unstable Housing in the Last Year: Not on file         ALLERGIES: Patient has no known allergies. Review of Systems   Respiratory: Negative for shortness of breath. Cardiovascular: Negative for chest pain. Gastrointestinal: Negative for vomiting. Musculoskeletal: Positive for arthralgias (L hip, R knee), extremity weakness (baseline LLE weakness) and gait problem. Neurological: Positive for weakness. Negative for loss of consciousness and headaches. All other systems reviewed and are negative. Vitals:    04/24/22 1704   BP: (!) 140/83   Pulse: 69   Resp: 18   Temp: 98 °F (36.7 °C)   SpO2: 99%            Physical Exam  Vitals and nursing note reviewed. Constitutional:       General: He is not in acute distress. Appearance: He is well-developed. HENT:      Head: Normocephalic and atraumatic. Eyes:      Conjunctiva/sclera: Conjunctivae normal.   Cardiovascular:      Rate and Rhythm: Normal rate and regular rhythm. Pulmonary:      Effort: Pulmonary effort is normal. No respiratory distress. Abdominal:      Palpations: Abdomen is soft. Tenderness: There is no abdominal tenderness. There is no guarding. Musculoskeletal:         General: Swelling (R knee with an effusion, mild TTP), tenderness and deformity (L leg is internally rotated. ) present. No signs of injury. Normal range of motion. Cervical back: Normal range of motion and neck supple. Skin:     General: Skin is warm and dry. Findings: No laceration. Neurological:      Mental Status: He is alert and oriented to person, place, and time. Mental status is at baseline. Motor: Weakness (L leg, baseline) present. [facility]    EKG interpretation: 18:07  Rhythm: sinus rhythm; and regular . Rate (approx.): 66; Axis: normal; Intervals: shortened TX interval at 110 ms; ST/T wave: non-specific findings; EKG documented and interpreted by Sheryle Roulette, MD, ED MD.    Procedures    [facility] for Admission  6:31 PM    ED Room Number: HP95/60  Patient Name and age:  Maribell Ryan 66 y.o.  male  Working Diagnosis:   1. Closed fracture of left hip, initial encounter ([facility] [address] [facility]    2. Fall, initial encounter    3. Injury of right knee, initial encounter        COVID-19 Suspicion:  no  Sepsis present:  no  Reassessment needed: no  Code Status:  Full Code  Readmission: no  Isolation Requirements:  no  Recommended Level of Care:  med/surg  Department:[facility] Adult ED - 21   Other:  Ortho consulting.

## 2022-04-24 NOTE — ED TRIAGE NOTES
Pt arrives via EMS from home after tripping and falling backward while vacuuming. Pt reports LEFT sided deficits d/t previous stroke. Pt arrives with shortened and externally rotated LEFT leg.

## 2022-04-24 NOTE — CONSULTS
ORTHOPEDIC CONSULT NOTE    Subjective:     Date of Consultation:  April 24, 2022  Referring Physician:  Humphrey Heimlich, MD    Rick Baker is a 66 y.o. male who is being seen for left hip pain and right knee pain. Pts wife is at bedside. The patient suffered a GLF while ambulating. He reports falling hard onto the right knee and then the left hip. He was unable to ambulate following the fall. Ambulates with a cane at baseline. He has baseline LLE weakness. Denies distal paresthesias.      Patient Active Problem List    Diagnosis Date Noted    Left low back pain 08/15/2016    Gross hematuria 08/15/2016    Calculus of kidney 04/18/2016    Benign prostatic hyperplasia with lower urinary tract symptoms 06/22/2014    Nocturia 07/30/2014    Slowing of urinary stream 07/30/2014    Urinary frequency 06/22/2014    Erectile dysfunction 04/17/2016    Opioid use, unspecified with unspecified opioid-induced disorder 08/18/2021    Compression fracture of L3 vertebra ([address] 08/04/2021    Lumbar stenosis 08/04/2021    Moderate major depression ([address] 07/24/2018    Full code but no chest compressions 07/23/2018    Localized edema 07/23/2018    Type 2 diabetes mellitus with nephropathy ([address] 01/23/2018    Fracture of multiple ribs of right side 07/26/2017    Essential hypertension 03/28/2017    Hypercholesterolemia 03/28/2017    Controlled type 2 diabetes mellitus without complication ([address] 61/76/8817    History of stroke 03/28/2017    Coronary artery disease involving native coronary artery without angina pectoris 03/28/2017    Obesity 06/22/2014       Family History   Problem Relation Age of Onset    Cancer Father         lung    Stroke Father     Hypertension Father     Cancer Mother         lung    Hypertension Sister     Heart Disease Brother         chf--aicd      Social History     Tobacco Use    Smoking status: Never Smoker    Smokeless tobacco: Never Used   Substance Use Topics    Alcohol use: Yes     Alcohol/week: 5.0 standard drinks     Types: 5 Cans of beer per week     Comment: a week     Past Medical History:   Diagnosis Date    Agoraphobia with panic disorder     Anxiety     Arthritis     spine    BPH (benign prostatic hypertrophy) with urinary obstruction     CAD (coronary artery disease)     no previous MI or stents    Cancer ([address]     skin pre cancer    Chronic edema     Coronary artery disease involving native coronary artery without angina pectoris 3/28/2017    CVD (cardiovascular disease)     Depression     Diabetes ([facility]     no longer medicated    Erectile dysfunction     Essential hypertension 3/28/2017    Falls     Fracture of multiple ribs of right side 7/26/2017    Full code but no chest compressions 7/23/2018    GERD (gastroesophageal reflux disease)     Hemiparesis ([facility]     History of stroke 3/28/2017    Hypercholesterolemia     Hypercholesterolemia 3/28/2017    Hypertension     Joint pain     Localized edema 7/23/2018    Long term current use of anticoagulant therapy     ASA 81 mg    Murmur     Muscle pain     Muscle weakness     Nocturia     Panic disorder     Ringing in the ears     Seizures ([facility]     at age 3, facial    Stroke [facility]) 1996    left sided weakness      Past Surgical History:   Procedure Laterality Date    COLONOSCOPY N/A 5/4/2018    COL[facility]ile [facility]ullen [facility] END[facility]COPY    HX COLONOSCOPY      HX LUMBAR DISKECTOMY  1970's    HX ORTHOPAEDIC      right finger repair    HX ORTHOPAEDIC  12/2019    spinal surgery    IR INJ SPINE THER SUBST LUM/SAC W IMG  2/4/2020    IR KYPHOPLASTY LUMBAR  12/20/2019    IR KYPHOPLASTY LUMBAR  1/24/2020    NE ABDOMEN SURGERY PROC UNLISTED  2008    Lap band      Prior to Admission medications    Medication Sig Start Date End Date Taking?  Authorizing Provider   amLODIPine-benazepril (LOTREL) 5-20 mg per capsule TAKE 1 CAPSULE EVERY DAY 2/9/22   Charles Peck MD   furosemide (LASIX) 40 mg tablet TAKE 1 TABLET EVERY DAY 2/9/22   Ayden Hammer MD   metFORMIN (GLUCOPHAGE) 500 mg tablet TAKE 1 TABLET EVERY DAY WITH DINNER 2/9/22   Ayden Hammer MD   tamsulosin [facility]) 0.4 mg capsule TAKE 2 CAPSULES EVERY DAY 2/9/22   Ayden Hammer MD   carvediloL (COREG) 12.5 mg tablet TAKE 1 TABLET TWICE DAILY WITH MEALS 2/9/22   Ayden Hammer MD   ezetimibe (ZETIA) 10 mg tablet TAKE 1 TABLET EVERY DAY 2/9/22   Ayden Hammer MD   finasteride (PROSCAR) 5 mg tablet TAKE 1 TABLET EVERY NIGHT 2/9/22   Ayden Hammer MD   cholecalciferol, vitamin D3, (Vitamin D3) 50 mcg (2,000 unit) tab Take 2,000 Units by mouth daily. Provider, Historical   buPROPion SR [facility] SR) 150 mg SR tablet TAKE 1 TABLET TWICE DAILY 12/22/21   Ayden Hammer MD   potassium chloride (K-DUR, KLOR-CON M20) 20 mEq tablet TAKE 1 TABLET EVERY DAY 11/17/21   Ayden Hammer MD   atorvastatin (LIPITOR) 40 mg tablet TAKE 1 TABLET EVERY DAY 6/11/21   Ayden Hammer MD   aspirin delayed-release 81 mg tablet Take 1 Tab by mouth daily. 10/3/19   Ayden Hammer MD   therapeutic multivitamin (THERA) tablet Take 1 Tab by mouth daily. 1/23/18   Adrienne Allen MD     No current facility-administered medications for this encounter. Current Outpatient Medications   Medication Sig    amLODIPine-benazepril (LOTREL) 5-20 mg per capsule TAKE 1 CAPSULE EVERY DAY    furosemide (LASIX) 40 mg tablet TAKE 1 TABLET EVERY DAY    metFORMIN (GLUCOPHAGE) 500 mg tablet TAKE 1 TABLET EVERY DAY WITH DINNER    tamsulosin (FLOMAX) 0.4 mg capsule TAKE 2 CAPSULES EVERY DAY    carvediloL (COREG) 12.5 mg tablet TAKE 1 TABLET TWICE DAILY WITH MEALS    ezetimibe (ZETIA) 10 mg tablet TAKE 1 TABLET EVERY DAY    finasteride (PROSCAR) 5 mg tablet TAKE 1 TABLET EVERY NIGHT    cholecalciferol, vitamin D3, (Vitamin D3) 50 mcg (2,000 unit) tab Take 2,000 Units by mouth daily.     buPROPion SR (WELLBUTRIN SR) 150 mg SR tablet TAKE 1 TABLET TWICE DAILY    potassium chloride (K-DUR, KLOR-CON M20) 20 mEq tablet TAKE 1 TABLET EVERY DAY    atorvastatin (LIPITOR) 40 mg tablet TAKE 1 TABLET EVERY DAY    aspirin delayed-release 81 mg tablet Take 1 Tab by mouth daily.  therapeutic multivitamin (THERA) tablet Take 1 Tab by mouth daily. No Known Allergies     Review of Systems:  A comprehensive review of systems was negative except for that written in the HPI. Objective:     Patient Vitals for the past 8 hrs:   BP Temp Pulse Resp SpO2   22 1704 (!) 140/83 98 °F (36.7 °C) 69 18 99 %     Temp (24hrs), Av °F (36.7 °C), Min:98 °F (36.7 °C), Max:98 °F (36.7 °C)        ORTHO EXAM: alert, cooperative, no distress, appears stated age  RLE MSK: Right knee with significant swelling and palpable defect superior to the patella. No area of fluctuance. Without erythema. Compartments of the RLE are soft and compressible, without pain. Right knee is TTP, globally. Unable to actively extend the knee, unable to SLR. Passive extension without pain. Unable to flex at the knee due to pain. SILT to the lower extremity and foot. 2+ DP pulses. Cap refill brisk. Able to wiggle toes. LLE MSK: Left hip without abrasions. Bony tenderness to the left groin. Mild pain on anterior and lateral hip palpation. No obvious deformity at the hip noted. Left leg is shortened with external rotation. Sensation to light touch intact to the medial/lat/posterior aspects of the foot. Compartments of the thigh and calf are soft and compressible, without pain. Able to wiggle toes. Passive flexion of great toe without pain. Pain with minimal passive internal/external rotation of the hip. 2+ DP pulses. Cap refill brisk.       IMAGING REVIEW:  EXAM: XR KNEE RT 3 V     INDICATION: fall, R knee pain and swelling.     COMPARISON: None.     FINDINGS: Three views of the right knee demonstrate ossific fragment above the  patella and donor site at the superior patella is suspicious for acute avulsion  fracture with retraction of the quadriceps tendon with marked soft tissue  swelling. No other fracture or other acute osseous or articular abnormality. There is no effusion.     IMPRESSION  Suspect acute avulsion fracture course of insertion on the patella  with retraction. EXAM: XR HIP LT W OR WO PELV 2-3 VWS     INDICATION: L hip injury, pain.     COMPARISON: None.     FINDINGS: AP view of the pelvis and a frogleg lateral view of the left hip  demonstrate acute left femur neck fracture with varus angulation and  foreshortening. There is no other fracture, dislocation or other acute  abnormality.     IMPRESSION  Acute left femur fracture. Labs:   Recent Results (from the past 24 hour(s))   CBC WITH AUTOMATED DIFF    Collection Time: 04/24/22  5:31 PM   Result Value Ref Range    WBC 12.4 (H) 4.1 - 11.1 K/uL    RBC 4.04 (L) 4.10 - 5.70 M/uL    HGB 12.9 12.1 - 17.0 g/dL    HCT 38.0 36.6 - 50.3 %    MCV 94.1 80.0 - 99.0 FL    MCH 31.9 26.0 - 34.0 PG    MCHC 33.9 30.0 - 36.5 g/dL    RDW 12.9 11.5 - 14.5 %    PLATELET 187 887 - 928 K/uL    MPV 11.1 8.9 - 12.9 FL    NRBC 0.0 0  WBC    ABSOLUTE NRBC 0.00 0.00 - 0.01 K/uL    NEUTROPHILS 82 (H) 32 - 75 %    LYMPHOCYTES 9 (L) 12 - 49 %    MONOCYTES 7 5 - 13 %    EOSINOPHILS 1 0 - 7 %    BASOPHILS 0 0 - 1 %    IMMATURE GRANULOCYTES 1 (H) 0.0 - 0.5 %    ABS. NEUTROPHILS 10.1 (H) 1.8 - 8.0 K/UL    ABS. LYMPHOCYTES 1.1 0.8 - 3.5 K/UL    ABS. MONOCYTES 0.9 0.0 - 1.0 K/UL    ABS. EOSINOPHILS 0.2 0.0 - 0.4 K/UL    ABS. BASOPHILS 0.1 0.0 - 0.1 K/UL    ABS. IMM.  GRANS. 0.1 (H) 0.00 - 0.04 K/UL    DF AUTOMATED     METABOLIC PANEL, COMPREHENSIVE    Collection Time: 04/24/22  5:31 PM   Result Value Ref Range    Sodium 141 136 - 145 mmol/L    Potassium 3.2 (L) 3.5 - 5.1 mmol/L    Chloride 104 97 - 108 mmol/L    CO2 33 (H) 21 - 32 mmol/L    Anion gap 4 (L) 5 - 15 mmol/L    Glucose 125 (H) 65 - 100 mg/dL    BUN 21 (H) 6 - 20 MG/DL    Creatinine 1.36 (H) 0.70 - 1.30 MG/DL BUN/Creatinine ratio 15 12 - 20      GFR est AA >60 >60 ml/min/1.73m2    GFR est non-AA 51 (L) >60 ml/min/1.73m2    Calcium 9.0 8.5 - 10.1 MG/DL    Bilirubin, total 0.6 0.2 - 1.0 MG/DL    ALT (SGPT) 30 12 - 78 U/L    AST (SGOT) 21 15 - 37 U/L    Alk.  phosphatase 75 45 - 117 U/L    Protein, total 6.7 6.4 - 8.2 g/dL    Albumin 3.6 3.5 - 5.0 g/dL    Globulin 3.1 2.0 - 4.0 g/dL    A-G Ratio 1.2 1.1 - 2.2     EKG, 12 LEAD, INITIAL    Collection Time: 04/24/22  6:07 PM   Result Value Ref Range    Ventricular Rate 66 BPM    Atrial Rate 66 BPM    P-R Interval 110 ms    QRS Duration 96 ms    Q-T Interval 424 ms    QTC Calculation (Bezet) 444 ms    Calculated P Axis 31 degrees    Calculated R Axis -22 degrees    Calculated T Axis 7 degrees    Diagnosis       Sinus rhythm with short MD  Nonspecific T wave abnormality  Abnormal ECG  When compared with ECG of 23-JUN-2017 16:42,  No significant change was found           Impression:     Patient Active Problem List    Diagnosis Date Noted    Left low back pain 08/15/2016    Gross hematuria 08/15/2016    Calculus of kidney 04/18/2016    Benign prostatic hyperplasia with lower urinary tract symptoms 06/22/2014    Nocturia 07/30/2014    Slowing of urinary stream 07/30/2014    Urinary frequency 06/22/2014    Erectile dysfunction 04/17/2016    Opioid use, unspecified with unspecified opioid-induced disorder 08/18/2021    Compression fracture of L3 vertebra ([address] 08/04/2021    Lumbar stenosis 08/04/2021    Moderate major depression ([address] 07/24/2018    Full code but no chest compressions 07/23/2018    Localized edema 07/23/2018    Type 2 diabetes mellitus with nephropathy ([address] 01/23/2018    Fracture of multiple ribs of right side 07/26/2017    Essential hypertension 03/28/2017    Hypercholesterolemia 03/28/2017    Controlled type 2 diabetes mellitus without complication ([address] 01/02/9635    History of stroke 03/28/2017    Coronary artery disease involving native coronary artery without angina pectoris 03/28/2017    Obesity 06/22/2014       Active Problems:    * No active hospital problems. *        ASSESSMENT:   Left femur neck fracture  Right avulsion fx at the superior pole of the patella with possible quadriceps tendon tear    Plan:   ORTHOPEDIC PLAN:  1. D/w Dr. Danilo Canales  2. Ortho plan: Plan to take the pt to the OR for Left hip hemiarthroplasty tomorrow with Dr. Jacques Arana or Dr. Mi Roper. Additionally, and MRI of the Right knee ordered and pending to r/o quadriceps tendon rupture. Will f/u with ortho surgeon in the am to determine if R quad tendon requires surgical repair. 3. NPO after midnight, CXR, EKG, rapid COVID ordered.       [place], [address]

## 2022-04-24 NOTE — ED NOTES
Bedside and Verbal shift change report given to Juan Diego Aguilera RN (oncoming nurse) by Robert Lopez RN (offgoing nurse). Report included the following information ED Summary.

## 2022-04-25 ENCOUNTER — ANESTHESIA EVENT (OUTPATIENT)
Dept: SURGERY | Age: 72
DRG: 522 | End: 2022-04-25
Payer: MEDICARE

## 2022-04-25 ENCOUNTER — ANESTHESIA (OUTPATIENT)
Dept: SURGERY | Age: 72
DRG: 522 | End: 2022-04-25
Payer: MEDICARE

## 2022-04-25 ENCOUNTER — APPOINTMENT (OUTPATIENT)
Dept: GENERAL RADIOLOGY | Age: 72
DRG: 522 | End: 2022-04-25
Attending: ORTHOPAEDIC SURGERY
Payer: MEDICARE

## 2022-04-25 PROBLEM — S76.111A QUADRICEPS TENDON RUPTURE, RIGHT, INITIAL ENCOUNTER: Status: ACTIVE | Noted: 2022-04-25

## 2022-04-25 PROBLEM — S72.002A LEFT DISPLACED FEMORAL NECK FRACTURE (HCC): Status: ACTIVE | Noted: 2022-04-24

## 2022-04-25 LAB
ALBUMIN SERPL-MCNC: 3.4 G/DL (ref 3.5–5)
ALBUMIN/GLOB SERPL: 1.1 {RATIO} (ref 1.1–2.2)
ALP SERPL-CCNC: 66 U/L (ref 45–117)
ALT SERPL-CCNC: 26 U/L (ref 12–78)
ANION GAP SERPL CALC-SCNC: 6 MMOL/L (ref 5–15)
AST SERPL-CCNC: 15 U/L (ref 15–37)
ATRIAL RATE: 64 BPM
ATRIAL RATE: 66 BPM
BASOPHILS # BLD: 0 K/UL (ref 0–0.1)
BASOPHILS NFR BLD: 0 % (ref 0–1)
BILIRUB SERPL-MCNC: 0.8 MG/DL (ref 0.2–1)
BUN SERPL-MCNC: 22 MG/DL (ref 6–20)
BUN/CREAT SERPL: 16 (ref 12–20)
CALCIUM SERPL-MCNC: 8.7 MG/DL (ref 8.5–10.1)
CALCULATED P AXIS, ECG09: 31 DEGREES
CALCULATED P AXIS, ECG09: 73 DEGREES
CALCULATED R AXIS, ECG10: -22 DEGREES
CALCULATED R AXIS, ECG10: 14 DEGREES
CALCULATED T AXIS, ECG11: 3 DEGREES
CALCULATED T AXIS, ECG11: 7 DEGREES
CHLORIDE SERPL-SCNC: 103 MMOL/L (ref 97–108)
CO2 SERPL-SCNC: 31 MMOL/L (ref 21–32)
COVID-19 RAPID TEST, COVR: NOT DETECTED
CREAT SERPL-MCNC: 1.38 MG/DL (ref 0.7–1.3)
DIAGNOSIS, 93000: NORMAL
DIAGNOSIS, 93000: NORMAL
DIFFERENTIAL METHOD BLD: ABNORMAL
EOSINOPHIL # BLD: 0 K/UL (ref 0–0.4)
EOSINOPHIL NFR BLD: 0 % (ref 0–7)
ERYTHROCYTE [DISTWIDTH] IN BLOOD BY AUTOMATED COUNT: 12.7 % (ref 11.5–14.5)
GLOBULIN SER CALC-MCNC: 3 G/DL (ref 2–4)
GLUCOSE BLD STRIP.AUTO-MCNC: 100 MG/DL (ref 65–117)
GLUCOSE BLD STRIP.AUTO-MCNC: 116 MG/DL (ref 65–117)
GLUCOSE BLD STRIP.AUTO-MCNC: 117 MG/DL (ref 65–117)
GLUCOSE BLD STRIP.AUTO-MCNC: 141 MG/DL (ref 65–117)
GLUCOSE BLD STRIP.AUTO-MCNC: 96 MG/DL (ref 65–117)
GLUCOSE SERPL-MCNC: 162 MG/DL (ref 65–100)
HCT VFR BLD AUTO: 36.1 % (ref 36.6–50.3)
HGB BLD-MCNC: 11.9 G/DL (ref 12.1–17)
IMM GRANULOCYTES # BLD AUTO: 0.1 K/UL (ref 0–0.04)
IMM GRANULOCYTES NFR BLD AUTO: 0 % (ref 0–0.5)
LYMPHOCYTES # BLD: 0.9 K/UL (ref 0.8–3.5)
LYMPHOCYTES NFR BLD: 7 % (ref 12–49)
MCH RBC QN AUTO: 31.6 PG (ref 26–34)
MCHC RBC AUTO-ENTMCNC: 33 G/DL (ref 30–36.5)
MCV RBC AUTO: 95.8 FL (ref 80–99)
MONOCYTES # BLD: 1 K/UL (ref 0–1)
MONOCYTES NFR BLD: 7 % (ref 5–13)
NEUTS SEG # BLD: 11 K/UL (ref 1.8–8)
NEUTS SEG NFR BLD: 86 % (ref 32–75)
NRBC # BLD: 0 K/UL (ref 0–0.01)
NRBC BLD-RTO: 0 PER 100 WBC
P-R INTERVAL, ECG05: 110 MS
P-R INTERVAL, ECG05: 126 MS
PLATELET # BLD AUTO: 202 K/UL (ref 150–400)
PMV BLD AUTO: 11.3 FL (ref 8.9–12.9)
POTASSIUM SERPL-SCNC: 3 MMOL/L (ref 3.5–5.1)
PROT SERPL-MCNC: 6.4 G/DL (ref 6.4–8.2)
Q-T INTERVAL, ECG07: 340 MS
Q-T INTERVAL, ECG07: 424 MS
QRS DURATION, ECG06: 86 MS
QRS DURATION, ECG06: 96 MS
QTC CALCULATION (BEZET), ECG08: 350 MS
QTC CALCULATION (BEZET), ECG08: 444 MS
RBC # BLD AUTO: 3.77 M/UL (ref 4.1–5.7)
SERVICE CMNT-IMP: ABNORMAL
SERVICE CMNT-IMP: NORMAL
SODIUM SERPL-SCNC: 140 MMOL/L (ref 136–145)
SOURCE, COVRS: NORMAL
VENTRICULAR RATE, ECG03: 64 BPM
VENTRICULAR RATE, ECG03: 66 BPM
WBC # BLD AUTO: 13 K/UL (ref 4.1–11.1)

## 2022-04-25 PROCEDURE — 74011250636 HC RX REV CODE- 250/636: Performed by: NURSE ANESTHETIST, CERTIFIED REGISTERED

## 2022-04-25 PROCEDURE — 74011000250 HC RX REV CODE- 250: Performed by: ORTHOPAEDIC SURGERY

## 2022-04-25 PROCEDURE — 74011250636 HC RX REV CODE- 250/636: Performed by: PHYSICIAN ASSISTANT

## 2022-04-25 PROCEDURE — 82962 GLUCOSE BLOOD TEST: CPT

## 2022-04-25 PROCEDURE — 74011000250 HC RX REV CODE- 250: Performed by: NURSE ANESTHETIST, CERTIFIED REGISTERED

## 2022-04-25 PROCEDURE — 77030019905 HC CATH URETH INTMIT MDII -A

## 2022-04-25 PROCEDURE — 77030002922 HC SUT FBRWRE ARTH -B: Performed by: ORTHOPAEDIC SURGERY

## 2022-04-25 PROCEDURE — 77030031139 HC SUT VCRL2 J&J -A: Performed by: ORTHOPAEDIC SURGERY

## 2022-04-25 PROCEDURE — 74011250636 HC RX REV CODE- 250/636: Performed by: ANESTHESIOLOGY

## 2022-04-25 PROCEDURE — 76010000131 HC OR TIME 2 TO 2.5 HR: Performed by: ORTHOPAEDIC SURGERY

## 2022-04-25 PROCEDURE — 85025 COMPLETE CBC W/AUTO DIFF WBC: CPT

## 2022-04-25 PROCEDURE — 77030040361 HC SLV COMPR DVT MDII -B

## 2022-04-25 PROCEDURE — 77030026438 HC STYL ET INTUB CARD -A: Performed by: ANESTHESIOLOGY

## 2022-04-25 PROCEDURE — 74011000250 HC RX REV CODE- 250: Performed by: HOSPITALIST

## 2022-04-25 PROCEDURE — 77030008684 HC TU ET CUF COVD -B: Performed by: ANESTHESIOLOGY

## 2022-04-25 PROCEDURE — 73501 X-RAY EXAM HIP UNI 1 VIEW: CPT

## 2022-04-25 PROCEDURE — 36415 COLL VENOUS BLD VENIPUNCTURE: CPT

## 2022-04-25 PROCEDURE — 77030006784 HC BLD SAW OSC MCRA -B: Performed by: ORTHOPAEDIC SURGERY

## 2022-04-25 PROCEDURE — 0LQL0ZZ REPAIR RIGHT UPPER LEG TENDON, OPEN APPROACH: ICD-10-PCS | Performed by: ORTHOPAEDIC SURGERY

## 2022-04-25 PROCEDURE — 77030035236 HC SUT PDS STRATFX BARB J&J -B: Performed by: ORTHOPAEDIC SURGERY

## 2022-04-25 PROCEDURE — 77030002933 HC SUT MCRYL J&J -A: Performed by: ORTHOPAEDIC SURGERY

## 2022-04-25 PROCEDURE — 0SRS01Z REPLACEMENT OF LEFT HIP JOINT, FEMORAL SURFACE WITH METAL SYNTHETIC SUBSTITUTE, OPEN APPROACH: ICD-10-PCS | Performed by: ORTHOPAEDIC SURGERY

## 2022-04-25 PROCEDURE — C1776 JOINT DEVICE (IMPLANTABLE): HCPCS | Performed by: ORTHOPAEDIC SURGERY

## 2022-04-25 PROCEDURE — 74011250636 HC RX REV CODE- 250/636: Performed by: HOSPITALIST

## 2022-04-25 PROCEDURE — L1830 KO IMMOB CANVAS LONG PRE OTS: HCPCS | Performed by: ORTHOPAEDIC SURGERY

## 2022-04-25 PROCEDURE — 76210000016 HC OR PH I REC 1 TO 1.5 HR: Performed by: ORTHOPAEDIC SURGERY

## 2022-04-25 PROCEDURE — 77030020788: Performed by: ORTHOPAEDIC SURGERY

## 2022-04-25 PROCEDURE — 77030040361 HC SLV COMPR DVT MDII -B: Performed by: ORTHOPAEDIC SURGERY

## 2022-04-25 PROCEDURE — 87635 SARS-COV-2 COVID-19 AMP PRB: CPT

## 2022-04-25 PROCEDURE — 77030010507 HC ADH SKN DERMBND J&J -B: Performed by: ORTHOPAEDIC SURGERY

## 2022-04-25 PROCEDURE — 65270000029 HC RM PRIVATE

## 2022-04-25 PROCEDURE — 76060000035 HC ANESTHESIA 2 TO 2.5 HR: Performed by: ORTHOPAEDIC SURGERY

## 2022-04-25 PROCEDURE — 74011000250 HC RX REV CODE- 250: Performed by: PHYSICIAN ASSISTANT

## 2022-04-25 PROCEDURE — 2709999900 HC NON-CHARGEABLE SUPPLY: Performed by: ORTHOPAEDIC SURGERY

## 2022-04-25 PROCEDURE — 77030005513 HC CATH URETH FOL11 MDII -B: Performed by: ORTHOPAEDIC SURGERY

## 2022-04-25 PROCEDURE — 80053 COMPREHEN METABOLIC PANEL: CPT

## 2022-04-25 PROCEDURE — 74011250637 HC RX REV CODE- 250/637: Performed by: HOSPITALIST

## 2022-04-25 DEVICE — SUMMIT FEMORAL STEM 12/14 TAPER TAPER ED W/POROCOAT SIZE 7 STD 155MM
Type: IMPLANTABLE DEVICE | Site: HIP | Status: FUNCTIONAL
Brand: SUMMIT POROCOAT

## 2022-04-25 DEVICE — HIP H4 HEMI UNI BIPLR IMPL CAPPED H4: Type: IMPLANTABLE DEVICE | Status: FUNCTIONAL

## 2022-04-25 DEVICE — SELF CENTERING BI-POLAR HEAD 28MM ID 52MM OD
Type: IMPLANTABLE DEVICE | Site: HIP | Status: FUNCTIONAL
Brand: SELF CENTERING

## 2022-04-25 DEVICE — ARTICUL/EZE FEMORAL HEAD DIAMETER 28MM +1.5 12/14 TAPER
Type: IMPLANTABLE DEVICE | Site: HIP | Status: FUNCTIONAL
Brand: ARTICUL/EZE

## 2022-04-25 RX ORDER — MIDAZOLAM HYDROCHLORIDE 1 MG/ML
1 INJECTION, SOLUTION INTRAMUSCULAR; INTRAVENOUS AS NEEDED
Status: DISCONTINUED | OUTPATIENT
Start: 2022-04-25 | End: 2022-04-25 | Stop reason: HOSPADM

## 2022-04-25 RX ORDER — HYDROMORPHONE HYDROCHLORIDE 2 MG/ML
INJECTION, SOLUTION INTRAMUSCULAR; INTRAVENOUS; SUBCUTANEOUS AS NEEDED
Status: DISCONTINUED | OUTPATIENT
Start: 2022-04-25 | End: 2022-04-25 | Stop reason: HOSPADM

## 2022-04-25 RX ORDER — SUCCINYLCHOLINE CHLORIDE 20 MG/ML
INJECTION INTRAMUSCULAR; INTRAVENOUS AS NEEDED
Status: DISCONTINUED | OUTPATIENT
Start: 2022-04-25 | End: 2022-04-25 | Stop reason: HOSPADM

## 2022-04-25 RX ORDER — SODIUM CHLORIDE 0.9 % (FLUSH) 0.9 %
5-40 SYRINGE (ML) INJECTION AS NEEDED
Status: DISCONTINUED | OUTPATIENT
Start: 2022-04-25 | End: 2022-04-25 | Stop reason: HOSPADM

## 2022-04-25 RX ORDER — DIPHENHYDRAMINE HYDROCHLORIDE 50 MG/ML
12.5 INJECTION, SOLUTION INTRAMUSCULAR; INTRAVENOUS AS NEEDED
Status: DISCONTINUED | OUTPATIENT
Start: 2022-04-25 | End: 2022-04-25 | Stop reason: HOSPADM

## 2022-04-25 RX ORDER — FENTANYL CITRATE 50 UG/ML
50 INJECTION, SOLUTION INTRAMUSCULAR; INTRAVENOUS AS NEEDED
Status: DISCONTINUED | OUTPATIENT
Start: 2022-04-25 | End: 2022-04-25 | Stop reason: HOSPADM

## 2022-04-25 RX ORDER — LIDOCAINE HYDROCHLORIDE 10 MG/ML
0.1 INJECTION, SOLUTION EPIDURAL; INFILTRATION; INTRACAUDAL; PERINEURAL AS NEEDED
Status: DISCONTINUED | OUTPATIENT
Start: 2022-04-25 | End: 2022-04-25 | Stop reason: HOSPADM

## 2022-04-25 RX ORDER — HYDROMORPHONE HYDROCHLORIDE 1 MG/ML
0.2 INJECTION, SOLUTION INTRAMUSCULAR; INTRAVENOUS; SUBCUTANEOUS
Status: DISCONTINUED | OUTPATIENT
Start: 2022-04-25 | End: 2022-04-25 | Stop reason: HOSPADM

## 2022-04-25 RX ORDER — MIDAZOLAM HYDROCHLORIDE 1 MG/ML
0.5 INJECTION, SOLUTION INTRAMUSCULAR; INTRAVENOUS
Status: DISCONTINUED | OUTPATIENT
Start: 2022-04-25 | End: 2022-04-25 | Stop reason: HOSPADM

## 2022-04-25 RX ORDER — SODIUM CHLORIDE, SODIUM LACTATE, POTASSIUM CHLORIDE, CALCIUM CHLORIDE 600; 310; 30; 20 MG/100ML; MG/100ML; MG/100ML; MG/100ML
INJECTION, SOLUTION INTRAVENOUS
Status: DISCONTINUED | OUTPATIENT
Start: 2022-04-25 | End: 2022-04-25 | Stop reason: HOSPADM

## 2022-04-25 RX ORDER — MORPHINE SULFATE 2 MG/ML
2 INJECTION, SOLUTION INTRAMUSCULAR; INTRAVENOUS
Status: DISCONTINUED | OUTPATIENT
Start: 2022-04-25 | End: 2022-04-25 | Stop reason: HOSPADM

## 2022-04-25 RX ORDER — DEXAMETHASONE SODIUM PHOSPHATE 4 MG/ML
INJECTION, SOLUTION INTRA-ARTICULAR; INTRALESIONAL; INTRAMUSCULAR; INTRAVENOUS; SOFT TISSUE AS NEEDED
Status: DISCONTINUED | OUTPATIENT
Start: 2022-04-25 | End: 2022-04-25 | Stop reason: HOSPADM

## 2022-04-25 RX ORDER — SODIUM CHLORIDE 9 MG/ML
50 INJECTION, SOLUTION INTRAVENOUS CONTINUOUS
Status: DISCONTINUED | OUTPATIENT
Start: 2022-04-25 | End: 2022-04-25 | Stop reason: HOSPADM

## 2022-04-25 RX ORDER — ROCURONIUM BROMIDE 10 MG/ML
INJECTION, SOLUTION INTRAVENOUS AS NEEDED
Status: DISCONTINUED | OUTPATIENT
Start: 2022-04-25 | End: 2022-04-25 | Stop reason: HOSPADM

## 2022-04-25 RX ORDER — MIDAZOLAM HYDROCHLORIDE 1 MG/ML
INJECTION, SOLUTION INTRAMUSCULAR; INTRAVENOUS AS NEEDED
Status: DISCONTINUED | OUTPATIENT
Start: 2022-04-25 | End: 2022-04-25 | Stop reason: HOSPADM

## 2022-04-25 RX ORDER — SODIUM CHLORIDE 9 MG/ML
125 INJECTION, SOLUTION INTRAVENOUS CONTINUOUS
Status: DISPENSED | OUTPATIENT
Start: 2022-04-25 | End: 2022-04-26

## 2022-04-25 RX ORDER — PROPOFOL 10 MG/ML
INJECTION, EMULSION INTRAVENOUS AS NEEDED
Status: DISCONTINUED | OUTPATIENT
Start: 2022-04-25 | End: 2022-04-25 | Stop reason: HOSPADM

## 2022-04-25 RX ORDER — POTASSIUM CHLORIDE AND SODIUM CHLORIDE 900; 300 MG/100ML; MG/100ML
INJECTION, SOLUTION INTRAVENOUS CONTINUOUS
Status: DISCONTINUED | OUTPATIENT
Start: 2022-04-25 | End: 2022-04-28

## 2022-04-25 RX ORDER — OXYCODONE AND ACETAMINOPHEN 5; 325 MG/1; MG/1
1 TABLET ORAL AS NEEDED
Status: DISCONTINUED | OUTPATIENT
Start: 2022-04-25 | End: 2022-04-25 | Stop reason: HOSPADM

## 2022-04-25 RX ORDER — FENTANYL CITRATE 50 UG/ML
INJECTION, SOLUTION INTRAMUSCULAR; INTRAVENOUS AS NEEDED
Status: DISCONTINUED | OUTPATIENT
Start: 2022-04-25 | End: 2022-04-25 | Stop reason: HOSPADM

## 2022-04-25 RX ORDER — ONDANSETRON 2 MG/ML
4 INJECTION INTRAMUSCULAR; INTRAVENOUS AS NEEDED
Status: DISCONTINUED | OUTPATIENT
Start: 2022-04-25 | End: 2022-04-25 | Stop reason: HOSPADM

## 2022-04-25 RX ORDER — SODIUM CHLORIDE, SODIUM LACTATE, POTASSIUM CHLORIDE, CALCIUM CHLORIDE 600; 310; 30; 20 MG/100ML; MG/100ML; MG/100ML; MG/100ML
75 INJECTION, SOLUTION INTRAVENOUS CONTINUOUS
Status: DISCONTINUED | OUTPATIENT
Start: 2022-04-25 | End: 2022-04-25 | Stop reason: HOSPADM

## 2022-04-25 RX ORDER — POTASSIUM CHLORIDE 7.45 MG/ML
10 INJECTION INTRAVENOUS
Status: COMPLETED | OUTPATIENT
Start: 2022-04-25 | End: 2022-04-25

## 2022-04-25 RX ORDER — GLYCOPYRROLATE 0.2 MG/ML
INJECTION INTRAMUSCULAR; INTRAVENOUS AS NEEDED
Status: DISCONTINUED | OUTPATIENT
Start: 2022-04-25 | End: 2022-04-25 | Stop reason: HOSPADM

## 2022-04-25 RX ORDER — SODIUM CHLORIDE 0.9 % (FLUSH) 0.9 %
5-40 SYRINGE (ML) INJECTION EVERY 8 HOURS
Status: DISCONTINUED | OUTPATIENT
Start: 2022-04-25 | End: 2022-04-25 | Stop reason: HOSPADM

## 2022-04-25 RX ORDER — ONDANSETRON 2 MG/ML
INJECTION INTRAMUSCULAR; INTRAVENOUS AS NEEDED
Status: DISCONTINUED | OUTPATIENT
Start: 2022-04-25 | End: 2022-04-25 | Stop reason: HOSPADM

## 2022-04-25 RX ORDER — PHENYLEPHRINE HCL IN 0.9% NACL 0.4MG/10ML
SYRINGE (ML) INTRAVENOUS AS NEEDED
Status: DISCONTINUED | OUTPATIENT
Start: 2022-04-25 | End: 2022-04-25 | Stop reason: HOSPADM

## 2022-04-25 RX ORDER — NEOSTIGMINE METHYLSULFATE 1 MG/ML
INJECTION, SOLUTION INTRAVENOUS AS NEEDED
Status: DISCONTINUED | OUTPATIENT
Start: 2022-04-25 | End: 2022-04-25 | Stop reason: HOSPADM

## 2022-04-25 RX ORDER — FENTANYL CITRATE 50 UG/ML
25 INJECTION, SOLUTION INTRAMUSCULAR; INTRAVENOUS
Status: DISCONTINUED | OUTPATIENT
Start: 2022-04-25 | End: 2022-04-25 | Stop reason: HOSPADM

## 2022-04-25 RX ORDER — LIDOCAINE HYDROCHLORIDE 20 MG/ML
INJECTION, SOLUTION EPIDURAL; INFILTRATION; INTRACAUDAL; PERINEURAL AS NEEDED
Status: DISCONTINUED | OUTPATIENT
Start: 2022-04-25 | End: 2022-04-25 | Stop reason: HOSPADM

## 2022-04-25 RX ORDER — BUPIVACAINE HYDROCHLORIDE AND EPINEPHRINE 5; 5 MG/ML; UG/ML
INJECTION, SOLUTION EPIDURAL; INTRACAUDAL; PERINEURAL AS NEEDED
Status: DISCONTINUED | OUTPATIENT
Start: 2022-04-25 | End: 2022-04-25 | Stop reason: HOSPADM

## 2022-04-25 RX ORDER — KETAMINE HYDROCHLORIDE 10 MG/ML
INJECTION, SOLUTION INTRAMUSCULAR; INTRAVENOUS AS NEEDED
Status: DISCONTINUED | OUTPATIENT
Start: 2022-04-25 | End: 2022-04-25 | Stop reason: HOSPADM

## 2022-04-25 RX ADMIN — SODIUM CHLORIDE, POTASSIUM CHLORIDE, SODIUM LACTATE AND CALCIUM CHLORIDE: 600; 310; 30; 20 INJECTION, SOLUTION INTRAVENOUS at 19:10

## 2022-04-25 RX ADMIN — SODIUM CHLORIDE, POTASSIUM CHLORIDE, SODIUM LACTATE AND CALCIUM CHLORIDE 75 ML/HR: 600; 310; 30; 20 INJECTION, SOLUTION INTRAVENOUS at 18:00

## 2022-04-25 RX ADMIN — PROPOFOL 180 MG: 10 INJECTION, EMULSION INTRAVENOUS at 19:35

## 2022-04-25 RX ADMIN — ROCURONIUM BROMIDE 30 MG: 10 SOLUTION INTRAVENOUS at 19:45

## 2022-04-25 RX ADMIN — PHENYLEPHRINE HYDROCHLORIDE 40 MCG/MIN: 10 INJECTION INTRAVENOUS at 19:56

## 2022-04-25 RX ADMIN — ATORVASTATIN CALCIUM 40 MG: 40 TABLET, FILM COATED ORAL at 11:25

## 2022-04-25 RX ADMIN — SUCCINYLCHOLINE CHLORIDE 140 MG: 20 INJECTION, SOLUTION INTRAMUSCULAR; INTRAVENOUS at 19:35

## 2022-04-25 RX ADMIN — ROCURONIUM BROMIDE 10 MG: 10 SOLUTION INTRAVENOUS at 19:35

## 2022-04-25 RX ADMIN — LIDOCAINE HYDROCHLORIDE 80 MG: 20 INJECTION, SOLUTION EPIDURAL; INFILTRATION; INTRACAUDAL; PERINEURAL at 19:35

## 2022-04-25 RX ADMIN — SODIUM CHLORIDE 125 ML/HR: 9 INJECTION, SOLUTION INTRAVENOUS at 23:55

## 2022-04-25 RX ADMIN — Medication 80 MCG: at 19:35

## 2022-04-25 RX ADMIN — ROCURONIUM BROMIDE 10 MG: 10 SOLUTION INTRAVENOUS at 20:00

## 2022-04-25 RX ADMIN — ONDANSETRON HYDROCHLORIDE 4 MG: 2 INJECTION, SOLUTION INTRAMUSCULAR; INTRAVENOUS at 20:38

## 2022-04-25 RX ADMIN — Medication 30 MG: at 20:05

## 2022-04-25 RX ADMIN — ACETAMINOPHEN 650 MG: 325 TABLET ORAL at 03:59

## 2022-04-25 RX ADMIN — FENTANYL CITRATE 50 MCG: 50 INJECTION, SOLUTION INTRAMUSCULAR; INTRAVENOUS at 21:09

## 2022-04-25 RX ADMIN — HYDROMORPHONE HYDROCHLORIDE 1 MG: 2 INJECTION, SOLUTION INTRAMUSCULAR; INTRAVENOUS; SUBCUTANEOUS at 20:56

## 2022-04-25 RX ADMIN — Medication 160 MCG: at 20:04

## 2022-04-25 RX ADMIN — POTASSIUM CHLORIDE AND SODIUM CHLORIDE: 900; 300 INJECTION, SOLUTION INTRAVENOUS at 14:01

## 2022-04-25 RX ADMIN — WATER 2 G: 1 INJECTION INTRAMUSCULAR; INTRAVENOUS; SUBCUTANEOUS at 20:00

## 2022-04-25 RX ADMIN — POTASSIUM CHLORIDE 10 MEQ: 7.46 INJECTION, SOLUTION INTRAVENOUS at 08:22

## 2022-04-25 RX ADMIN — NEOSTIGMINE METHYLSULFATE 2 MG: 1 INJECTION, SOLUTION INTRAVENOUS at 21:35

## 2022-04-25 RX ADMIN — Medication 10 ML: at 06:00

## 2022-04-25 RX ADMIN — FENTANYL CITRATE 50 MCG: 50 INJECTION, SOLUTION INTRAMUSCULAR; INTRAVENOUS at 19:35

## 2022-04-25 RX ADMIN — BUPROPION HYDROCHLORIDE 150 MG: 150 TABLET, EXTENDED RELEASE ORAL at 11:25

## 2022-04-25 RX ADMIN — Medication 10 ML: at 23:55

## 2022-04-25 RX ADMIN — MIDAZOLAM 2 MG: 1 INJECTION INTRAMUSCULAR; INTRAVENOUS at 19:30

## 2022-04-25 RX ADMIN — Medication 160 MCG: at 19:56

## 2022-04-25 RX ADMIN — Medication 20 MG: at 20:16

## 2022-04-25 RX ADMIN — DEXAMETHASONE SODIUM PHOSPHATE 4 MG: 4 INJECTION, SOLUTION INTRAMUSCULAR; INTRAVENOUS at 19:50

## 2022-04-25 RX ADMIN — TAMSULOSIN HYDROCHLORIDE 0.4 MG: 0.4 CAPSULE ORAL at 11:25

## 2022-04-25 RX ADMIN — GLYCOPYRROLATE 0.4 MG: 0.2 INJECTION, SOLUTION INTRAMUSCULAR; INTRAVENOUS at 21:35

## 2022-04-25 RX ADMIN — POTASSIUM CHLORIDE 10 MEQ: 7.46 INJECTION, SOLUTION INTRAVENOUS at 09:00

## 2022-04-25 RX ADMIN — Medication 120 MCG: at 20:25

## 2022-04-25 RX ADMIN — ACETAMINOPHEN 650 MG: 325 TABLET ORAL at 18:02

## 2022-04-25 NOTE — H&P
[address] [facility] [facility] Adult  Hospitalist Group  History and Physical    Date of Service:  4/24/2022  Primary Care Provider: Lucero Tripathi MD  Source of information: The patient    Chief Complaint: Fall and Hip Pain      History of Presenting Illness:   Yenifer Vicente is a 66 y.o. male who presents with fall and right hip,  [age]Y male with a history of CAD, CVA with mild left side weakness diabetes, hypertension and BPH. Patient presented to the ED after a fall he said he tripped and fall backwards while vacuuming at home. The accident occurred 1 to 2 hours ago. He landed on hard floor. There was no blood loss. The point of impact was the left hip. The pain is moderate. He was not ambulatory at the scene. There was no entrapment after the fall. There was no alcohol use involved in the accident. Associated symptoms include extremity weakness (baseline LLE weakness). Pertinent negatives include no vomiting, no headaches, no loss of consciousness and no laceration. The risk factors include being elderly and recurrent falls (previous stroke). Associated symptoms comments: +R knee pain. The symptoms are aggravated by activity, use of injured limb and pressure on injury. REVIEW OF SYSTEMS:  General:hpi, no changes of weight  HEENT: no headache, no vision changes, no nose discharge, no hearing changes   RES: no wheezing, no cough, no sob  CVS: no cp, no palpitation.   Muscular: HPI   Skin: no rash, no itching   GI: no vomiting, no diarrhea  : no dysuria, no hematuria  Hemo: no gum bleeding, no petechial   Neuro: no sensation changes, no focal weakness   Endo: no polydipsia   Psych: denied depression      Past Medical History:   Diagnosis Date    Agoraphobia with panic disorder     Anxiety     Arthritis     spine    BPH (benign prostatic hypertrophy) with urinary obstruction     CAD (coronary artery disease)     no previous MI or stents    Cancer [facility]     skin pre cancer    Chronic edema     Coronary artery disease involving native coronary artery without angina pectoris 3/28/2017    CVD (cardiovascular disease)     Depression     Diabetes ([facility]     no longer medicated    Erectile dysfunction     Essential hypertension 3/28/2017    Falls     Fracture of multiple ribs of right side 7/26/2017    Full code but no chest compressions 7/23/2018    GERD (gastroesophageal reflux disease)     Hemiparesis ([address]     History of stroke 3/28/2017    Hypercholesterolemia     Hypercholesterolemia 3/28/2017    Hypertension     Joint pain     Localized edema 7/23/2018    Long term current use of anticoagulant therapy     ASA 81 mg    Murmur     Muscle pain     Muscle weakness     Nocturia     Panic disorder     Ringing in the ears     Seizures ([address]     at age 3, facial    Stroke [facility]) 1996    left sided weakness      Past Surgical History:   Procedure Laterality Date    COLONOSCOPY N/A 5/4/2018    COLON[facility]    HX COLONOSCOPY      HX LUMBAR DISKECTOMY  1970's    HX ORTHOPAEDIC      right finger repair    HX ORTHOPAEDIC  12/2019    spinal surgery    IR INJ SPINE THER SUBST LUM/SAC W IMG  2/4/2020    IR KYPHOPLASTY LUMBAR  12/20/2019    IR KYPHOPLASTY LUMBAR  1/24/2020    MI ABDOMEN SURGERY PROC UNLISTED  2008    Lap band     Prior to Admission medications    Medication Sig Start Date End Date Taking?  Authorizing Provider   amLODIPine-benazepril (LOTREL) 5-20 mg per capsule TAKE 1 CAPSULE EVERY DAY 2/9/22   Yudi Morris MD   furosemide (LASIX) 40 mg tablet TAKE 1 TABLET EVERY DAY 2/9/22   Yudi Morris MD   metFORMIN (GLUCOPHAGE) 500 mg tablet TAKE 1 TABLET EVERY DAY WITH DINNER 2/9/22   Yudi Morris MD   tamsulosin (FLOMAX) 0.4 mg capsule TAKE 2 CAPSULES EVERY DAY 2/9/22   Yudi Morris MD   carvediloL (COREG) 12.5 mg tablet TAKE 1 TABLET TWICE DAILY WITH MEALS 2/9/22   Yudi Morris MD   ezetimibe (ZETIA) 10 mg tablet TAKE 1 TABLET EVERY DAY 2/9/22   Denice Morrissey MD   finasteride (PROSCAR) 5 mg tablet TAKE 1 TABLET EVERY NIGHT 2/9/22   Denice Morrissey MD   cholecalciferol, vitamin D3, (Vitamin D3) 50 mcg (2,000 unit) tab Take 2,000 Units by mouth daily. Provider, Historical   buPROPion SR [facility] SR) 150 mg SR tablet TAKE 1 TABLET TWICE DAILY 12/22/21   Denice Morrissey MD   potassium chloride (K-DUR, KLOR-CON M20) 20 mEq tablet TAKE 1 TABLET EVERY DAY 11/17/21   Denice Morrissey MD   atorvastatin (LIPITOR) 40 mg tablet TAKE 1 TABLET EVERY DAY 6/11/21   Denice Morrissey MD   aspirin delayed-release 81 mg tablet Take 1 Tab by mouth daily. 10/3/19   Denice Morrissey MD   therapeutic multivitamin (THERA) tablet Take 1 Tab by mouth daily. 1/23/18   Joann Maciel MD     No Known Allergies   Family History   Problem Relation Age of Onset    Cancer Father         lung    Stroke Father     Hypertension Father     Cancer Mother         lung    Hypertension Sister     Heart Disease Brother         chf--aicd      Social History:  reports that he has never smoked. He has never used smokeless tobacco. He reports current alcohol use of about 5.0 standard drinks of alcohol per week. He reports current drug use. Drug: Marijuana. Family and social history were personally reviewed, all pertinent and relevant details are outlined as above.     Objective:     Visit Vitals  BP (!) 140/83 (BP 1 Location: Left upper arm, BP Patient Position: Sitting)   Pulse 69   Temp 98 °F (36.7 °C)   Resp 18   SpO2 99%      O2 Device: None (Room air)    PHYSICAL EXAM:   General: Alert x oriented x 3, awake   HEENT: PEERL, EOMI, moist mucus membranes  Neck: Supple, no JVD, no meningeal signs  Chest: Clear to auscultation bilaterally   CVS: RRR, S1 S2 heard, no murmurs/rubs/gallops  Abd: Soft, non-tender, non-distended, +bowel sounds   Ext: Right knee swelling, tenderness, left leg internally rotated and shorter  Neuro/Psych: Left leg weakness is at baseline  Cap refill: Brisk, less than 3 seconds  Pulses: 2+, symmetric in all extremities  Skin: Warm, dry, without rashes or lesions    Data Review: All diagnostic labs and studies have been reviewed. Abnormal Labs Reviewed   CBC WITH AUTOMATED DIFF - Abnormal; Notable for the following components:       Result Value    WBC 12.4 (*)     RBC 4.04 (*)     NEUTROPHILS 82 (*)     LYMPHOCYTES 9 (*)     IMMATURE GRANULOCYTES 1 (*)     ABS. NEUTROPHILS 10.1 (*)     ABS. IMM. GRANS. 0.1 (*)     All other components within normal limits   METABOLIC PANEL, COMPREHENSIVE - Abnormal; Notable for the following components:    Potassium 3.2 (*)     CO2 33 (*)     Anion gap 4 (*)     Glucose 125 (*)     BUN 21 (*)     Creatinine 1.36 (*)     GFR est non-AA 51 (*)     All other components within normal limits       All Micro Results     None          IMAGING:   XR HIP LT W OR WO PELV 2-3 VWS   Final Result   Acute left femur fracture. XR KNEE RT 3 V   Final Result   Suspect acute avulsion fracture course of insertion on the patella   with retraction. XR CHEST PORT   Final Result   No acute findings. MRI KNEE RT WO CONT    (Results Pending)        ECG/ECHO:    Results for orders placed or performed during the hospital encounter of 04/24/22   EKG, 12 LEAD, INITIAL   Result Value Ref Range    Ventricular Rate 66 BPM    Atrial Rate 66 BPM    P-R Interval 110 ms    QRS Duration 96 ms    Q-T Interval 424 ms    QTC Calculation (Bezet) 444 ms    Calculated P Axis 31 degrees    Calculated R Axis -22 degrees    Calculated T Axis 7 degrees    Diagnosis       Sinus rhythm with short VT  Nonspecific T wave abnormality  Abnormal ECG  When compared with ECG of 23-JUN-2017 16:42,  No significant change was found          Assessment:   Given the patient's current clinical presentation, there is a high level of concern for decompensation if discharged from the emergency department.  Complex decision making was performed, which includes reviewing the patient's available past medical records, laboratory results, and imaging studies. Active Problems:    Closed left hip fracture ([address] (4/24/2022)      Plan:     1. Acute left hip fracture: Ortho consulted, n.p.o. after midnight for OR tomorrow pain control with IV fentanyl,  2. Right hip acute injury: Suspect acute avulsion fracture on the patella. consulted, ordered the knee MRI further plan orthopedics  3. Diabetes hold metformin, cover with sliding scale  4. Hypertension, continue Coreg with holding parameter will hold her home Lotrel due to relative low BP now  5. CAD: No chest pain, EKG reviewed no further cardiac work preop. .  Will resume aspirin postop        DIET: DIET NPO  ADULT DIET Regular; Low Sodium (2 gm)   ISOLATION PRECAUTIONS: There are currently no Active Isolations  CODE STATUS: Full Code   DVT PROPHYLAXIS: SCDs  FUNCTIONAL STATUS PRIOR TO HOSPITALIZATION: Ambulatory and capable of self-care but relies on assistive devices (rolling walker/cane). EARLY MOBILITY ASSESSMENT: Recommend an assessment from physical therapy and/or occupational therapy  ANTICIPATED DISCHARGE: Greater than 48 hours. EMERGENCY CONTACT/SURROGATE DECISION MAKER: pt makes his own decision     CRITICAL CARE WAS PERFORMED FOR THIS ENCOUNTER: NO.      Signed By: Juanis Roca MD     April 24, 2022         Please note that this dictation may have been completed with Dragon, the computer voice recognition software. Quite often unanticipated grammatical, syntax, homophones, and other interpretive errors are inadvertently transcribed by the computer software. Please disregard these errors. Please excuse any errors that have escaped final proofreading.

## 2022-04-25 NOTE — PROGRESS NOTES
[address] [facility] Adult  Hospitalist Group                                                                                          Hospitalist Progress Note  Janice Bobo MD  Answering service: [phone] from in house phone        Date of Service:  2022  NAME:  Tona Her  :  1950  MRN:  [id number]    This documentation was facilitated by a Voice Recognition software and may contain inadvertent typographical errors. Admission Summary:   Tona Her is a 66 y.o. male who presents with fall and right hip,  [age]Y male with a history of CAD, CVA with mild left side weakness diabetes, hypertension and BPH. Patient presented to the ED after a fall he said he tripped and fall backwards while vacuuming at home. Interval history / Subjective:        Have seen and examined patient earlier, his wife was present at the bedside. Patient is currently complaining of urinary retention and suprapubic tenderness. He already had a one-time straight cath earlier, discussed with RN to BladderScan and straight cath. Discussed with patient, his wife and RN that patient will probably need Hamm placement. Assessment & Plan:   Acute left hip fracture, traumatic  Right quadriceps tendon rupture with retraction and associated muscle strain, large joint effusion with synovitis and intra-articular debris/blood products  --Pain control. --Surgical treatment per Ortho,Plan for left hip hemiarthroplasty vs total hip arthroplasty and right quadriceps tendon repair   --SCD for DVT prophylaxis for now, pharmacologic  prophylaxis postoperatively per Ortho    Acute renal retention. Patient history of BPH. -Bladder scan and straight cath. -Will probably end up with Hamm catheter and I have discussed this with patient and his wife    Leukocytosis likely due to reactive from the fall. Not septic. Monitor    Hypokalemia: Replace and monitor.   CKD 3: Slight bump in creatinine: Continue IV fluid   Diabetes hold metformin, cover with sliding scale  Hypertension, continue Coreg with holding parameter will hold her home Lotrel due to relative low BP now  CAD: No chest pain, EKG reviewed no further cardiac work preop. .  Will resume aspirin postop           DIET: DIET NPO 4 OR  ADULT DIET Regular; Low Sodium (2 gm)  when able to resume diet  ISOLATION PRECAUTIONS: There are currently no Active Isolations  CODE STATUS: Full Code   DVT PROPHYLAXIS: SCDs  FUNCTIONAL STATUS PRIOR TO HOSPITALIZATION: Ambulatory and capable of self-care but relies on assistive devices (rolling walker/cane). EARLY MOBILITY ASSESSMENT: Recommend an assessment from physical therapy and/or occupational therapy  ANTICIPATED DISCHARGE: Greater than 48 hours. EMERGENCY CONTACT/SURROGATE DECISION MAKER: pt makes his own decision       Hospital Problems  Date Reviewed: 4/25/2022          Codes Class Noted POA    Closed left hip fracture [facility]) ICD-10-CM: P62.484C  ICD-9-CM: 820.8  4/24/2022 Unknown                Review of Systems:   A comprehensive review of systems was negative except for that written in the HPI. Vital Signs:    Last 24hrs VS reviewed since prior progress note. Most recent are:        Intake/Output Summary (Last 24 hours) at 4/25/2022 1547  Last data filed at 4/25/2022 1345  Gross per 24 hour   Intake --   Output 700 ml   Net -700 ml        Physical Examination:     I had a face to face encounter with this patient and independently examined them on4/25/2022 as outlined below:        Constitutional:  Alert, uncomfortable from urinary tension     HEENT:  Atraumatic. Oral mucosa moist,. Non icteric sclera. No pallor. Resp:  On room air  No accessory muscle use  Symmetrical air entry bilaterally  No wheezing/rhonchi/rales. Chest Wall: No deformity     CV:  Regular rhythm, normal rate, no murmurs, gallops, rubs      GI:  Normoactive  Soft, non distended, non tender.    No appreciable organomegaly      : Suprapubic discomfort due to retention      Musculoskeletal:  No edema  No deformity  ROM intact      Neurologic:  Mental status:AAOx3,   Cranial nerves II-XII : WNL  Motor exam:Moves all extremities symmetrically  Gait and balance: Not tested              Data Review:    Review and/or order of clinical lab test  Review and/or order of tests in the radiology section of CPT  Review and/or order of tests in the medicine section of CPT      Available Lab and Imaging results reviewed and used to formulate the current care plan.       Medications Reviewed:     Current Facility-Administered Medications   Medication Dose Route Frequency    0.9% sodium chloride with KCl 40 mEq/L infusion   IntraVENous CONTINUOUS    fentaNYL citrate (PF) injection 50 mcg  50 mcg IntraVENous Q4H PRN    prochlorperazine (COMPAZINE) with saline injection 5 mg  5 mg IntraVENous Q6H PRN    sodium chloride (NS) flush 5-40 mL  5-40 mL IntraVENous Q8H    sodium chloride (NS) flush 5-40 mL  5-40 mL IntraVENous PRN    acetaminophen (TYLENOL) tablet 650 mg  650 mg Oral Q6H PRN    Or    acetaminophen (TYLENOL) suppository 650 mg  650 mg Rectal Q6H PRN    polyethylene glycol (MIRALAX) packet 17 g  17 g Oral DAILY PRN    ondansetron (ZOFRAN ODT) tablet 4 mg  4 mg Oral Q8H PRN    Or    ondansetron (ZOFRAN) injection 4 mg  4 mg IntraVENous Q6H PRN    atorvastatin (LIPITOR) tablet 40 mg  40 mg Oral DAILY    buPROPion SR (WELLBUTRIN SR) tablet 150 mg  150 mg Oral BID    finasteride (PROSCAR) tablet 5 mg  5 mg Oral QHS    tamsulosin (FLOMAX) capsule 0.4 mg  0.4 mg Oral DAILY    carvediloL (COREG) tablet 12.5 mg  12.5 mg Oral BID WITH MEALS    glucose chewable tablet 16 g  4 Tablet Oral PRN    dextrose 10 % infusion 0-250 mL  0-250 mL IntraVENous PRN    glucagon (GLUCAGEN) injection 1 mg  1 mg IntraMUSCular PRN    insulin lispro (HUMALOG) injection   SubCUTAneous AC&HS ______________________________________________________________________  EXPECTED LENGTH OF STAY: - - -  ACTUAL LENGTH OF STAY:          1                 Michelle Stafford MD

## 2022-04-25 NOTE — PROGRESS NOTES
Ortho Daily Progress Note      Patient: Luis Alberto Orosco                   MRN: [id number]  Sex: male  YOB: 1950           Age: 66 y.o.    77yo male,s/p GLF yesterday with acute left femoral neck fracture and right quadriceps tendon rupture. Discussed with patient and his wife that we recommend surgical repair of both injuries. Met with Dr. Isidro Uriarte this morning. Plan for left hip hemiarthroplasty vs total hip arthroplasty and right quadriceps tendon repair this afternoon. Keep NPO. Consent signed and on chart.      [address] [place]  4/25/2022   11:27 AM      .

## 2022-04-25 NOTE — PERIOP NOTES
TRANSFER - IN REPORT:    Verbal report received from [facility]) on [address]  being received from [facility]) for ordered procedure      Report consisted of patients Situation, Background, Assessment and   Recommendations(SBAR). Information from the following report(s) SBAR and Kardex was reviewed with the receiving nurse. Opportunity for questions and clarification was provided. Assessment completed upon patients arrival to unit and care assumed.

## 2022-04-25 NOTE — ANESTHESIA PREPROCEDURE EVALUATION
Anesthetic History   No history of anesthetic complications            Review of Systems / Medical History  Patient summary reviewed, nursing notes reviewed and pertinent labs reviewed    Pulmonary  Within defined limits                 Neuro/Psych     seizures  CVA  TIA and psychiatric history    Comments: Drug use: Marijuana  Yes; 5.0 standard drinks per week  Compression fracture of L3 vertebra (HCC)  Lumbar stenosis   Cardiovascular    Hypertension          CAD    Exercise tolerance: >4 METS     GI/Hepatic/Renal     GERD: well controlled           Endo/Other    Diabetes    Arthritis     Other Findings   Comments: BPH  Closed left hip fracture (HCC)           Physical Exam    Airway  Mallampati: III  TM Distance: 4 - 6 cm  Neck ROM: decreased range of motion   Mouth opening: Normal     Cardiovascular  Regular rate and rhythm,  S1 and S2 normal,  no murmur, click, rub, or gallop  Rhythm: regular  Rate: normal         Dental  No notable dental hx       Pulmonary  Breath sounds clear to auscultation               Abdominal  GI exam deferred       Other Findings            Anesthetic Plan    ASA: 3  Anesthesia type: general          Induction: Intravenous  Anesthetic plan and risks discussed with: Patient

## 2022-04-25 NOTE — ROUTINE PROCESS
TRANSFER - OUT REPORT:    Verbal report given to Brittani Larkin (name) on [facility] Gale  being transferred to [address] [place] [address] (unit) for routine progression of care       Report consisted of patients Situation, Background, Assessment and   Recommendations(SBAR). Information from the following report(s) SBAR, ED Summary, Intake/Output, MAR, Recent Results, Cardiac Rhythm NSR/ Sinus alma delia and Procedure Verification was reviewed with the receiving nurse. Lines:   Peripheral IV 04/24/22 Right Antecubital (Active)   Site Assessment Clean, dry, & intact 04/24/22 1743   Phlebitis Assessment 0 04/24/22 1743   Infiltration Assessment 0 04/24/22 1743   Dressing Status Clean, dry, & intact 04/24/22 1743   Dressing Type Transparent 04/24/22 1743   Action Taken Blood drawn 04/24/22 1743   Alcohol Cap Used Yes 04/24/22 1743        Opportunity for questions and clarification was provided.       Patient transported with:   transport

## 2022-04-26 LAB
ANION GAP SERPL CALC-SCNC: 4 MMOL/L (ref 5–15)
BASOPHILS # BLD: 0 K/UL (ref 0–0.1)
BASOPHILS NFR BLD: 0 % (ref 0–1)
BUN SERPL-MCNC: 18 MG/DL (ref 6–20)
BUN/CREAT SERPL: 16 (ref 12–20)
CALCIUM SERPL-MCNC: 8.5 MG/DL (ref 8.5–10.1)
CHLORIDE SERPL-SCNC: 109 MMOL/L (ref 97–108)
CO2 SERPL-SCNC: 29 MMOL/L (ref 21–32)
CREAT SERPL-MCNC: 1.16 MG/DL (ref 0.7–1.3)
DIFFERENTIAL METHOD BLD: ABNORMAL
EOSINOPHIL # BLD: 0 K/UL (ref 0–0.4)
EOSINOPHIL NFR BLD: 0 % (ref 0–7)
ERYTHROCYTE [DISTWIDTH] IN BLOOD BY AUTOMATED COUNT: 13 % (ref 11.5–14.5)
GLUCOSE BLD STRIP.AUTO-MCNC: 144 MG/DL (ref 65–117)
GLUCOSE BLD STRIP.AUTO-MCNC: 148 MG/DL (ref 65–117)
GLUCOSE BLD STRIP.AUTO-MCNC: 161 MG/DL (ref 65–117)
GLUCOSE BLD STRIP.AUTO-MCNC: 205 MG/DL (ref 65–117)
GLUCOSE SERPL-MCNC: 185 MG/DL (ref 65–100)
HCT VFR BLD AUTO: 33 % (ref 36.6–50.3)
HGB BLD-MCNC: 10.7 G/DL (ref 12.1–17)
IMM GRANULOCYTES # BLD AUTO: 0 K/UL (ref 0–0.04)
IMM GRANULOCYTES NFR BLD AUTO: 0 % (ref 0–0.5)
LYMPHOCYTES # BLD: 0.5 K/UL (ref 0.8–3.5)
LYMPHOCYTES NFR BLD: 3 % (ref 12–49)
MCH RBC QN AUTO: 31.4 PG (ref 26–34)
MCHC RBC AUTO-ENTMCNC: 32.4 G/DL (ref 30–36.5)
MCV RBC AUTO: 96.8 FL (ref 80–99)
MONOCYTES # BLD: 1.4 K/UL (ref 0–1)
MONOCYTES NFR BLD: 9 % (ref 5–13)
NEUTS SEG # BLD: 13.5 K/UL (ref 1.8–8)
NEUTS SEG NFR BLD: 88 % (ref 32–75)
NRBC # BLD: 0 K/UL (ref 0–0.01)
NRBC BLD-RTO: 0 PER 100 WBC
PLATELET # BLD AUTO: 186 K/UL (ref 150–400)
PMV BLD AUTO: 11.8 FL (ref 8.9–12.9)
POTASSIUM SERPL-SCNC: 3.5 MMOL/L (ref 3.5–5.1)
RBC # BLD AUTO: 3.41 M/UL (ref 4.1–5.7)
RBC MORPH BLD: ABNORMAL
SERVICE CMNT-IMP: ABNORMAL
SODIUM SERPL-SCNC: 142 MMOL/L (ref 136–145)
WBC # BLD AUTO: 15.4 K/UL (ref 4.1–11.1)

## 2022-04-26 PROCEDURE — 80048 BASIC METABOLIC PNL TOTAL CA: CPT

## 2022-04-26 PROCEDURE — 74011636637 HC RX REV CODE- 636/637: Performed by: HOSPITALIST

## 2022-04-26 PROCEDURE — 36415 COLL VENOUS BLD VENIPUNCTURE: CPT

## 2022-04-26 PROCEDURE — 65270000029 HC RM PRIVATE

## 2022-04-26 PROCEDURE — 74011250636 HC RX REV CODE- 250/636: Performed by: PHYSICIAN ASSISTANT

## 2022-04-26 PROCEDURE — 74011250637 HC RX REV CODE- 250/637: Performed by: HOSPITALIST

## 2022-04-26 PROCEDURE — 82962 GLUCOSE BLOOD TEST: CPT

## 2022-04-26 PROCEDURE — 97530 THERAPEUTIC ACTIVITIES: CPT | Performed by: PHYSICAL THERAPIST

## 2022-04-26 PROCEDURE — 74011250637 HC RX REV CODE- 250/637: Performed by: PHYSICIAN ASSISTANT

## 2022-04-26 PROCEDURE — 74011000250 HC RX REV CODE- 250: Performed by: HOSPITALIST

## 2022-04-26 PROCEDURE — 74011000250 HC RX REV CODE- 250: Performed by: PHYSICIAN ASSISTANT

## 2022-04-26 PROCEDURE — 74011636637 HC RX REV CODE- 636/637: Performed by: PHYSICIAN ASSISTANT

## 2022-04-26 PROCEDURE — 85025 COMPLETE CBC W/AUTO DIFF WBC: CPT

## 2022-04-26 PROCEDURE — 74011250636 HC RX REV CODE- 250/636: Performed by: HOSPITALIST

## 2022-04-26 PROCEDURE — 97162 PT EVAL MOD COMPLEX 30 MIN: CPT | Performed by: PHYSICAL THERAPIST

## 2022-04-26 RX ORDER — SODIUM CHLORIDE 0.9 % (FLUSH) 0.9 %
5-40 SYRINGE (ML) INJECTION AS NEEDED
Status: DISCONTINUED | OUTPATIENT
Start: 2022-04-26 | End: 2022-05-02 | Stop reason: HOSPADM

## 2022-04-26 RX ORDER — OXYCODONE HYDROCHLORIDE 5 MG/1
10 TABLET ORAL
Status: DISCONTINUED | OUTPATIENT
Start: 2022-04-26 | End: 2022-05-02 | Stop reason: HOSPADM

## 2022-04-26 RX ORDER — AMOXICILLIN 250 MG
1 CAPSULE ORAL 2 TIMES DAILY
Status: DISCONTINUED | OUTPATIENT
Start: 2022-04-26 | End: 2022-05-02 | Stop reason: HOSPADM

## 2022-04-26 RX ORDER — ENOXAPARIN SODIUM 100 MG/ML
40 INJECTION SUBCUTANEOUS DAILY
Status: DISCONTINUED | OUTPATIENT
Start: 2022-04-26 | End: 2022-04-26 | Stop reason: DRUGHIGH

## 2022-04-26 RX ORDER — FACIAL-BODY WIPES
10 EACH TOPICAL DAILY PRN
Status: DISCONTINUED | OUTPATIENT
Start: 2022-04-28 | End: 2022-05-02 | Stop reason: HOSPADM

## 2022-04-26 RX ORDER — OXYCODONE HYDROCHLORIDE 5 MG/1
2.5 TABLET ORAL
Status: DISCONTINUED | OUTPATIENT
Start: 2022-04-26 | End: 2022-05-02 | Stop reason: HOSPADM

## 2022-04-26 RX ORDER — POLYETHYLENE GLYCOL 3350 17 G/17G
17 POWDER, FOR SOLUTION ORAL DAILY
Status: DISCONTINUED | OUTPATIENT
Start: 2022-04-26 | End: 2022-05-02 | Stop reason: HOSPADM

## 2022-04-26 RX ORDER — ENOXAPARIN SODIUM 100 MG/ML
30 INJECTION SUBCUTANEOUS EVERY 12 HOURS
Status: DISCONTINUED | OUTPATIENT
Start: 2022-04-26 | End: 2022-05-02 | Stop reason: HOSPADM

## 2022-04-26 RX ORDER — SODIUM CHLORIDE 0.9 % (FLUSH) 0.9 %
5-40 SYRINGE (ML) INJECTION EVERY 8 HOURS
Status: DISCONTINUED | OUTPATIENT
Start: 2022-04-26 | End: 2022-05-02 | Stop reason: HOSPADM

## 2022-04-26 RX ORDER — OXYCODONE HYDROCHLORIDE 5 MG/1
5 TABLET ORAL
Status: DISCONTINUED | OUTPATIENT
Start: 2022-04-26 | End: 2022-05-02 | Stop reason: HOSPADM

## 2022-04-26 RX ORDER — NALOXONE HYDROCHLORIDE 0.4 MG/ML
0.4 INJECTION, SOLUTION INTRAMUSCULAR; INTRAVENOUS; SUBCUTANEOUS AS NEEDED
Status: DISCONTINUED | OUTPATIENT
Start: 2022-04-26 | End: 2022-05-02 | Stop reason: HOSPADM

## 2022-04-26 RX ADMIN — TAMSULOSIN HYDROCHLORIDE 0.4 MG: 0.4 CAPSULE ORAL at 08:44

## 2022-04-26 RX ADMIN — BUPROPION HYDROCHLORIDE 150 MG: 150 TABLET, EXTENDED RELEASE ORAL at 08:35

## 2022-04-26 RX ADMIN — Medication 2 UNITS: at 11:55

## 2022-04-26 RX ADMIN — CARVEDILOL 12.5 MG: 6.25 TABLET, FILM COATED ORAL at 08:00

## 2022-04-26 RX ADMIN — SODIUM CHLORIDE 125 ML/HR: 9 INJECTION, SOLUTION INTRAVENOUS at 07:00

## 2022-04-26 RX ADMIN — ATORVASTATIN CALCIUM 40 MG: 40 TABLET, FILM COATED ORAL at 08:35

## 2022-04-26 RX ADMIN — FENTANYL CITRATE 50 MCG: 50 INJECTION, SOLUTION INTRAMUSCULAR; INTRAVENOUS at 01:43

## 2022-04-26 RX ADMIN — ACETAMINOPHEN 650 MG: 325 TABLET ORAL at 16:56

## 2022-04-26 RX ADMIN — CARVEDILOL 12.5 MG: 6.25 TABLET, FILM COATED ORAL at 16:56

## 2022-04-26 RX ADMIN — SODIUM CHLORIDE, PRESERVATIVE FREE 10 ML: 5 INJECTION INTRAVENOUS at 22:47

## 2022-04-26 RX ADMIN — Medication 2 UNITS: at 16:30

## 2022-04-26 RX ADMIN — SENNOSIDES AND DOCUSATE SODIUM 1 TABLET: 50; 8.6 TABLET ORAL at 11:26

## 2022-04-26 RX ADMIN — SENNOSIDES AND DOCUSATE SODIUM 1 TABLET: 50; 8.6 TABLET ORAL at 17:00

## 2022-04-26 RX ADMIN — ACETAMINOPHEN 650 MG: 325 TABLET ORAL at 03:51

## 2022-04-26 RX ADMIN — ENOXAPARIN SODIUM 30 MG: 100 INJECTION SUBCUTANEOUS at 11:25

## 2022-04-26 RX ADMIN — Medication 3 UNITS: at 07:01

## 2022-04-26 RX ADMIN — Medication 10 ML: at 06:16

## 2022-04-26 RX ADMIN — POLYETHYLENE GLYCOL 3350 17 G: 17 POWDER, FOR SOLUTION ORAL at 11:25

## 2022-04-26 RX ADMIN — FENTANYL CITRATE 50 MCG: 50 INJECTION, SOLUTION INTRAMUSCULAR; INTRAVENOUS at 06:15

## 2022-04-26 RX ADMIN — BUPROPION HYDROCHLORIDE 150 MG: 150 TABLET, EXTENDED RELEASE ORAL at 17:00

## 2022-04-26 RX ADMIN — FENTANYL CITRATE 50 MCG: 50 INJECTION, SOLUTION INTRAMUSCULAR; INTRAVENOUS at 22:46

## 2022-04-26 RX ADMIN — ACETAMINOPHEN 650 MG: 325 TABLET ORAL at 08:34

## 2022-04-26 RX ADMIN — FINASTERIDE 5 MG: 5 TABLET, FILM COATED ORAL at 22:46

## 2022-04-26 RX ADMIN — ENOXAPARIN SODIUM 30 MG: 100 INJECTION SUBCUTANEOUS at 22:46

## 2022-04-26 NOTE — OP NOTES
Operative Report    Patient Name: Isaac Uribe    Patient YOB: 1950    Patient[facility] MRN: [id number]    Date of Procedure: 04/25/22    Surgical Location: [facility]    Pre-operative Diagnosis: Right quadriceps tendon rupture    Post-operative Diagnosis: Right quadriceps tendon rupture    Procedure: Right quadriceps tendon repair    Surgeon: Matt Morin MD    Assistant/Staff: Circ-1: Reg Cervantes RN  Scrub Tech-1: Le Smith  Scrub RN-1: Yaima Vicente  Surg Asst-1: [facility]  Surg Asst-Relief: Abdias Dejordi    Anesthesia: General    Preoperative IV Antibiotics: Ancef 2g    Estimated Blood Loss: 50 ml from the right knee    Implants: Arthrex Fiberwire    Drains: None    Findings: Complete quadriceps tendon rupture. Able to be debrided and reduced to the patella. Specimens: None    Complications: None    Disposition: PACU - hemodynamically stable. Condition: stable      Indications for procedure:  Mr. Yaron Thapa is a 75-year-old male who experienced a fall and has a right quadriceps tendon rupture and a left closed, displaced femoral neck fracture. We discussed the patient's diagnoses and the recommended management for each. I coordinated with my partner Dr. Kate Marie to do surgery on contralateral limbs at the same time to minimize operating room time and patient exposure to anesthesia. The patient agreed to proceed with surgery doing these 2 procedures at the same time. Description of procedure: The patient was met in the preoperative holding area. The appropriate surgical sites were marked. Consent form was signed. The patient was brought into the operating room and given general anesthesia. He was placed on the [place] table for a direct anterior approach to the left hip with the right leg supported under the knee providing a small amount of flexion. The right knee and left hip were prepped and draped in the usual sterile fashion.   A multidisciplinary timeout was performed and prophylactic antibiotics were dosed. I began with an incision directly over the midline of the right knee. I elevated skin flaps and incised the fascia overlying the quadriceps and was met with a copious amount of coagulated blood. I irrigated the joint and removed all clotted blood. Superficial bleeders were exsanguinated using Bovie electrocautery. I then inspected the quadriceps tendon rupture. It traveled from the superior lateral vastus lateralis, and distally and transversely across the patella and medial through the medial retinaculum. I inspected the quadriceps stump and removed the very end, necrotic/frayed tissue. The tendinous portion was intact and reducible to the patella. I debrided the tissue off of the superior pole of the patella. I used a rasp to prepare the patellar surface. I then passed two #5 FiberWire sutures in GLAUBENDORF fashion. I had excellent fixation with these suture fixations. Next I used a 2 mm drill bit to drill 3 holes through the patella parallel to the articular surface. No articular penetration was noted. I made a small vertical incision through the patellar tendon in order to access the drill bit. I then placed a [place]ira suture passer through the central drill tunnel, and placed a shuttle suture back to the top. Then the central 2 tails from each of the [place] stitches were pulled through the central tunnel. I then repeated the process for each lateral tail and its individual tunnels. I then reduced the quadriceps tendon to the patella and tied the sutures to hold fixation. I then tied contralateral tails for backup fixation. The quadriceps tendon appeared well-reduced to the patella. Next I closed the medial and lateral retinacular tears as well as the retinacular fascia using #2 FiberWire suture in simple, figure-of-eight fashion. I then closed the patellar arthrotomy using a 0 Vicryl suture.       I copiously irrigated the knee. I then closed the skin using 2-0 Vicryl suture subcutaneously followed by staples superficially. The wound was covered with Xeroform, sterile gauze, ABD, will Ace wrap. The leg was then placed in a knee immobilizer. Throughout the procedure, Dr. Aimee Cruz performed the left hip hemiarthroplasty. When he had completed his side of the surgery, dressings were applied, the patient was moved onto postoperative bed and awakened from anesthesia. He was brought to the PACU in stable condition. Postoperative Plan:  Regarding the right leg, he will remain in the knee immobilizer at all times. It can be opened when in bed for comfort/ice application but he should not be allowed to bend the knee. He can ambulate weightbearing on a straight leg.       Jamir Eagle MD      04/25/22

## 2022-04-26 NOTE — PROGRESS NOTES
Problem: Falls - Risk of  Goal: *Absence of Falls  Description: Document Cristino Dietz Fall Risk and appropriate interventions in the flowsheet.   Outcome: Progressing Towards Goal  Note: Fall Risk Interventions:  Mobility Interventions: Patient to call before getting OOB,PT Consult for mobility concerns,Communicate number of staff needed for ambulation/transfer,Bed/chair exit alarm,Utilize walker, cane, or other assistive device    Mentation Interventions: Door open when patient unattended,Bed/chair exit alarm,Evaluate medications/consider consulting pharmacy,Room close to nurse's station,Toileting rounds,Family/sitter at bedside    Medication Interventions: Evaluate medications/consider consulting pharmacy,Bed/chair exit alarm,Patient to call before getting OOB,Teach patient to arise slowly    Elimination Interventions: Patient to call for help with toileting needs,Call light in reach,Bed/chair exit alarm,Urinal in reach    History of Falls Interventions: Bed/chair exit alarm,Door open when patient unattended,Room close to nurse's station

## 2022-04-26 NOTE — PROGRESS NOTES
[facility]  [facility]ist [facility]                                                                                          Hospitalist Progress Note  Amish Knapp MD  Answering service: [phone] from in house phone        Date of Service:  2022  NAME:  Isha Jon  :  1950  MRN:  [id number]    This documentation was facilitated by a Voice Recognition software and may contain inadvertent typographical errors. Admission Summary:   Isha Jon is a 66 y.o. male who presents with fall and right hip,  77 3Y male with a history of CAD, CVA with mild left side weakness diabetes, hypertension and BPH. Patient presented to the ED after a fall he said he tripped and fall backwards while vacuuming at home. Interval history / Subjective:        POD #1 s/p left hip  arthroplasty and right quadriceps repair. No pain while not moving. Wife at the bedside, updated. Assessment & Plan:   Acute left hip fracture, traumatic  Right quadriceps tendon rupture with retraction and associated muscle strain, large joint effusion with synovitis and intra-articular debris/blood products  -- Status post left hip hemiarthroplasty and right quadriceps repair on .  --SCD and Lovenox for DVT prophylaxis   -- PT and OT. Acute renal retention. Patient history of BPH.  -Failed intermittent catheterization, Hamm catheter placed on . Voiding trial in 5-7 days in rehab. Leukocytosis likely due to reactive from the fall. Not septic. Monitor    Hypokalemia: Replace and monitor. CKD 3: Slight bump in creatinine: Creatinine improved, is taking orally fine. DC IV fluid. Diabetes hold metformin, cover with sliding scale    Hypertension, continue Coreg with holding parameter will hold her home Lotrel due to relative low BP now  CAD: No chest pain, EKG reviewed no further cardiac work preop. .  Will resume aspirin postop             ADULT DIET Regular;  Low Sodium (2 gm) when able to resume diet  ISOLATION PRECAUTIONS: There are currently no Active Isolations  CODE STATUS: Full Code   DVT PROPHYLAXIS: SCDs  FUNCTIONAL STATUS PRIOR TO HOSPITALIZATION: Ambulatory and capable of self-care but relies on assistive devices (rolling walker/cane). EARLY MOBILITY ASSESSMENT:  He will need rehab. ANTICIPATED DISCHARGE: Greater than 48 hours. EMERGENCY CONTACT/SURROGATE DECISION MAKER: pt makes his own decision       Hospital Problems  Date Reviewed: 4/25/2022          Codes Class Noted POA    Quadriceps tendon rupture, right, initial encounter ICD-10-CM: C49.190R  ICD-9-CM: 843.8  4/25/2022 Yes        * (Principal) Left displaced femoral neck fracture ([facility] [address] ICD-10-CM: E20.631G  ICD-9-CM: 820.8  4/24/2022 Yes                Review of Systems:   A comprehensive review of systems was negative except for that written in the HPI. Vital Signs:    Last 24hrs VS reviewed since prior progress note. Most recent are:        Intake/Output Summary (Last 24 hours) at 4/26/2022 1519  Last data filed at 4/26/2022 3666  Gross per 24 hour   Intake 20 ml   Output 1080 ml   Net -1060 ml        Physical Examination:     I had a face to face encounter with this patient and independently examined them on4/26/2022 as outlined below:        Constitutional:  Alert, uncomfortable from urinary tension     HEENT:  Atraumatic. Oral mucosa moist,. Non icteric sclera. No pallor. Resp:  On room air  No accessory muscle use  Symmetrical air entry bilaterally  No wheezing/rhonchi/rales. Chest Wall: No deformity     CV:  Regular rhythm, normal rate, no murmurs, gallops, rubs      GI:  Normoactive  Soft, non distended, non tender. No appreciable organomegaly      :  Suprapubic discomfort due to retention      Musculoskeletal:  Left hip surgical site clean, dry and intact. Right lower extremity in immobilizer. Distal pulses and sensation intact.       Neurologic:  Mental status:AAOx3,   Cranial nerves II-XII : WNL  Motor exam:Moves all extremities symmetrically  Gait and balance: Not tested              Data Review:    Review and/or order of clinical lab test  Review and/or order of tests in the radiology section of CPT  Review and/or order of tests in the medicine section of CPT      Available Lab and Imaging results reviewed and used to formulate the current care plan.       Medications Reviewed:     Current Facility-Administered Medications   Medication Dose Route Frequency    sodium chloride (NS) flush 5-40 mL  5-40 mL IntraVENous Q8H    sodium chloride (NS) flush 5-40 mL  5-40 mL IntraVENous PRN    naloxone (NARCAN) injection 0.4 mg  0.4 mg IntraVENous PRN    senna-docusate (PERICOLACE) 8.6-50 mg per tablet 1 Tablet  1 Tablet Oral BID    polyethylene glycol (MIRALAX) packet 17 g  17 g Oral DAILY    [START ON 4/28/2022] bisacodyL (DULCOLAX) suppository 10 mg  10 mg Rectal DAILY PRN    oxyCODONE IR (ROXICODONE) tablet 2.5 mg  2.5 mg Oral Q4H PRN    oxyCODONE IR (ROXICODONE) tablet 5 mg  5 mg Oral Q4H PRN    oxyCODONE IR (ROXICODONE) tablet 10 mg  10 mg Oral Q4H PRN    enoxaparin (LOVENOX) injection 30 mg  30 mg SubCUTAneous Q12H    0.9% sodium chloride with KCl 40 mEq/L infusion   IntraVENous CONTINUOUS    0.9% sodium chloride infusion  125 mL/hr IntraVENous CONTINUOUS    fentaNYL citrate (PF) injection 50 mcg  50 mcg IntraVENous Q4H PRN    prochlorperazine (COMPAZINE) with saline injection 5 mg  5 mg IntraVENous Q6H PRN    sodium chloride (NS) flush 5-40 mL  5-40 mL IntraVENous Q8H    sodium chloride (NS) flush 5-40 mL  5-40 mL IntraVENous PRN    acetaminophen (TYLENOL) tablet 650 mg  650 mg Oral Q6H PRN    Or    acetaminophen (TYLENOL) suppository 650 mg  650 mg Rectal Q6H PRN    polyethylene glycol (MIRALAX) packet 17 g  17 g Oral DAILY PRN    ondansetron (ZOFRAN ODT) tablet 4 mg  4 mg Oral Q8H PRN    Or    ondansetron (ZOFRAN) injection 4 mg  4 mg IntraVENous Q6H PRN    atorvastatin (LIPITOR) tablet 40 mg  40 mg Oral DAILY    buPROPion SR (WELLBUTRIN SR) tablet 150 mg  150 mg Oral BID    finasteride (PROSCAR) tablet 5 mg  5 mg Oral QHS    tamsulosin (FLOMAX) capsule 0.4 mg  0.4 mg Oral DAILY    carvediloL (COREG) tablet 12.5 mg  12.5 mg Oral BID WITH MEALS    glucose chewable tablet 16 g  4 Tablet Oral PRN    dextrose 10 % infusion 0-250 mL  0-250 mL IntraVENous PRN    glucagon (GLUCAGEN) injection 1 mg  1 mg IntraMUSCular PRN    insulin lispro (HUMALOG) injection   SubCUTAneous AC&HS     ______________________________________________________________________  EXPECTED LENGTH OF STAY: 4d 2h  ACTUAL LENGTH OF STAY:          2                 Becca Mansfield MD

## 2022-04-26 NOTE — ANESTHESIA POSTPROCEDURE EVALUATION
Procedure(s):  LEFT HIP HEMIARTHROPLASTY  QUADRICEPS REPAIR. general    Anesthesia Post Evaluation      Multimodal analgesia: multimodal analgesia used between 6 hours prior to anesthesia start to PACU discharge  Patient location during evaluation: PACU  Patient participation: complete - patient participated  Level of consciousness: awake  Pain management: adequate  Airway patency: patent  Anesthetic complications: no  Cardiovascular status: acceptable  Respiratory status: acceptable  Hydration status: acceptable  Comments: Seen, no acute issues   Post anesthesia nausea and vomiting:  none  Final Post Anesthesia Temperature Assessment:  Normothermia (36.0-37.5 degrees C)      INITIAL Post-op Vital signs:   Vitals Value Taken Time   /69 04/25/22 2300   Temp 36.9 °C (98.5 °F) 04/25/22 2245   Pulse 73 04/25/22 2301   Resp 11 04/25/22 2301   SpO2 95 % 04/25/22 2301   Vitals shown include unvalidated device data.

## 2022-04-26 NOTE — PROGRESS NOTES
Problem: Mobility Impaired (Adult and Pediatric)  Goal: *Acute Goals and Plan of Care (Insert Text)  Description: FUNCTIONAL STATUS PRIOR TO ADMISSION: Patient was modified independent using a single point cane for functional mobility. 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 HOME SUPPORT PRIOR TO ADMISSION: The patient lived with wife but did not require assist.    Physical Therapy Goals  Initiated 4/26/2022  1. Patient will move from supine to sit and sit to supine  in bed with moderate assistance  within 7 day(s). 2.  Patient will transfer from bed to chair and chair to bed with moderate assistance  using the least restrictive device within 7 day(s). 3.  Patient will perform sit to stand with maximal assistance within 7 day(s). 4.  Patient will ambulate with maximal assistance for 5 feet with the least restrictive device within 7 day(s). Outcome: Progressing Towards Goal   PHYSICAL THERAPY EVALUATION  Patient: Allan Lopez (65 y.o. male)  Date: 4/26/2022  Primary Diagnosis: Closed left hip fracture ([facility] [address] [S72.002A]  Procedure(s) (LRB):  LEFT HIP HEMIARTHROPLASTY (Left)  QUADRICEPS REPAIR (Right) 1 Day Post-Op   Precautions:   Fall,WBAT (WBAT B LE, R LE locked in knee ext)    ASSESSMENT  Based on the objective data described below, the patient presents with decreased functional mobility from baseline level of function following L hip fracture pito arthroplasty and R quad tendon repair. Patient also with residual L sided weakness following a CVA in past and states that his L LE is his \"weak leg\" at baseline. Needing maxA x 2 to come to EOB and has fair sitting balance secondary to pain and having difficulty maintaining sitting without external support. Unable to attempt standing secondary to pain and poor sitting balance.   Patient is well below his independent functional baseline and will benefit from IPR to progress patient to more independent level of function. Will continue to follow. Other factors to consider for discharge: at risk for falls, below baseline, residual L sided weakness from old CVA     Patient will benefit from skilled therapy intervention to address the above noted impairments. PLAN :  Recommendations and Planned Interventions: bed mobility training, transfer training, gait training, therapeutic exercises, neuromuscular re-education, patient and family training/education, and therapeutic activities      Frequency/Duration: Patient will be followed by physical therapy:  daily to address goals. Recommendation for discharge: (in order for the patient to meet his/her long term goals)  Therapy 3 hours per day 5-7 days per week        IF patient discharges home will need the following DME: to be determined (TBD)         SUBJECTIVE:   Patient stated I don't know if I can do this.     OBJECTIVE DATA SUMMARY:   HISTORY:    Past Medical History:   Diagnosis Date    Agoraphobia with panic disorder     Anxiety     Arthritis     spine    BPH (benign prostatic hypertrophy) with urinary obstruction     CAD (coronary artery disease)     no previous MI or stents    Cancer (HCC)     skin pre cancer    Chronic edema     Coronary artery disease involving native coronary artery without angina pectoris 3/28/2017    CVD (cardiovascular disease)     Depression     Diabetes ([address]     no longer medicated    Erectile dysfunction     Essential hypertension 3/28/2017    Falls     Fracture of multiple ribs of right side 7/26/2017    Full code but no chest compressions 7/23/2018    GERD (gastroesophageal reflux disease)     Hemiparesis ([address]     History of stroke 3/28/2017    Hypercholesterolemia     Hypercholesterolemia 3/28/2017    Hypertension     Joint pain     Localized edema 7/23/2018    Long term current use of anticoagulant therapy     ASA 81 mg    Murmur     Muscle pain     Muscle weakness     Nocturia     Panic disorder     Ringing in the ears     Seizures ([address]     at age 3, facial    Stroke [facility]) 1996    left sided weakness     Past Surgical History:   Procedure Laterality Date    COLONOSCOPY N/A 5/4/2018    COLONOSCOPY performed by Vasile Mullen MD at [facility]    HX COLONOSCOPY      HX LUMBAR DISKECTOMY  1970's    HX ORTHOPAEDIC      right finger repair    HX ORTHOPAEDIC  12/2019    spinal surgery    IR INJ SPINE THER SUBST LUM/SAC W IMG  2/4/2020    IR KYPHOPLASTY LUMBAR  12/20/2019    IR KYPHOPLASTY LUMBAR  1/24/2020    AR ABDOMEN SURGERY PROC UNLISTED  2008    Lap band       Personal factors and/or comorbidities impacting plan of care:     Home Situation  Home Environment: Private residence  # Steps to Enter: 0  One/Two Story Residence: One story  Living Alone: No  Support Systems: Spouse/Significant Other  Patient Expects to be Discharged to[de-identified] Home with home health  Current DME Used/Available at Home: Cane, straight    EXAMINATION/PRESENTATION/DECISION MAKING:   Critical Behavior:  Neurologic State: Alert  Orientation Level: Oriented X4  Cognition: Follows commands     Hearing:   Auditory  Auditory Impairment: None    Range Of Motion:  AROM: Generally decreased, functional                       Strength:    Strength: Generally decreased, functional       Functional Mobility:  Bed Mobility:     Supine to Sit: Maximum assistance;Assist x2  Sit to Supine: Maximum assistance;Assist x2  Scooting: Maximum assistance;Assist x2  Transfers:  Sit to Stand:  (not tested)                          Balance:   Sitting: Impaired  Sitting - Static: Poor (constant support)  Sitting - Dynamic: Poor (constant support)  Standing:  (not tested)  Ambulation/Gait Training:              Gait Description (WDL):  (not tested)     Right Side Weight Bearing: As tolerated  Left Side Weight Bearing: As tolerated          Pain Rating:  Reports high pain levels    Activity Tolerance:   Fair and requires rest breaks    After treatment patient left in no apparent distress:   Supine in bed, Call bell within reach, Caregiver / family present, and Side rails x 3    COMMUNICATION/EDUCATION:   The patients plan of care was discussed with: Physical therapist, Occupational therapist, and Registered nurse. Fall prevention education was provided and the patient/caregiver indicated understanding., Patient/family have participated as able in goal setting and plan of care. , and Patient/family agree to work toward stated goals and plan of care.     Thank you for this referral.  Braden Martinez, PT, DPT   Time Calculation: 21 mins

## 2022-04-26 NOTE — PERIOP NOTES
TRANSFER - OUT REPORT:    Verbal report given to RN(name) on [place]  being transferred to 562(unit) for routine post - op       Report consisted of patients Situation, Background, Assessment and   Recommendations(SBAR). Time Pre op antibiotic given:20:00 Ancef  Anesthesia Stop time: 21:55  Hamm Present on Transfer to floor:yes  Order for Hamm on Chart:yes  Discharge Prescriptions with Chart:no    Information from the following report(s) SBAR, Kardex, OR Summary, Procedure Summary, Intake/Output, MAR, Recent Results, Med Rec Status and Cardiac Rhythm SR was reviewed with the receiving nurse. Opportunity for questions and clarification was provided. Is the patient on 02? YES       L/Min 3       Other     Is the patient on a monitor? NO    Is the nurse transporting with the patient? YES    Surgical Waiting Area notified of patient's transfer from PACU?  NO, wife in room nurse will inform her      The following personal items collected during your admission accompanied patient upon transfer:   Dental Appliance: Dental Appliances: None  Vision: Visual Aid: None  Hearing Aid:    Jewelry:    Clothing:    Other Valuables:    Valuables sent to safe:

## 2022-04-26 NOTE — OP NOTES
[address]   [address] [facility], [address] [place] [address]     OP NOTE       Name: Diana Troncoso  MR#: [id number]  : 1950  Account #: [de-identified] Surgery Date: 2022  Date of Adm: 2022         ATTENDING PHYSICIAN: Cy Mcgarry MD     ASSISTANT: Andre Roman, [facility]     PREOPERATIVE DIAGNOSIS: Left hip displaced femoral neck   fracture. POSTOPERATIVE DIAGNOSIS: Left hip displaced femoral neck   fracture. PROCEDURES PERFORMED: Left hip hemiarthroplasty. ANESTHESIA: general anesthesia. ESTIMATED BLOOD LOSS: 100 mL. SPECIMENS REMOVED: Femoral head. INDICATIONS: A 66 y.o. patient with a displaced   femoral neck fracture. OPERATION: The patient taken to the operating room, underwent   general anesthesia anesthesia. She was placed on the [place] table   with both feet in boots. Intermittent compression hose were placed on   bilateral lower legs. Right hip and leg were prepped and draped in the   usual fashion. A standard anterior approach was made to the hip down through skin   and subcutaneous tissue. Fascia was incised longitudinally. Tensor   muscle was dissected off the fascia and retracted posterolaterally. Fascia was incised and the rectus muscle was retracted   anteromedially. The circumflex vessels were clamped and cauterized. Rectus was elevated off the anterior capsule and the capsule was   incised in a T-shaped fashion. The capsule was dissected   subperiosteally. Each side of the capsule was tagged with #2 Vicryl   suture. Retractors were placed intracapsular. The hip was externally   rotated to 50 degrees. Femoral neck was recut at the appropriate level. The fracture had partially healed. Corkscrew was used to capture the   femoral head and remove it from the acetabulum. The femoral head   was sized to a size 52. Femoral elevator was placed around the femur   and femoral retractor was used to retract the femur anterolaterally. The   leg was brought down into hyperextension and external rotation. Capsular releases were performed. Femoral elevator was used to   bring the proximal femur up into the wound. After further capsular   releases, we had excellent exposure of the proximal femur. Canal was   reamed up to a size 7 tapered reamer. Canal was broached up to a   size 7 broach. Susanne Rodriguez was removed. Canal was cleansed using   pulsatile lavage system with antibiotic solution. A size 7 [place] stem   was impacted down the intramedullary canal with good fixation. Size   52  bipolar component with a 1.5 head was impacted onto the femoral   component. The hip was then reduced and excellent range of motion   and stability was noted. Fluoroscopy was used to confirm placement of   our implants and leg lengths. The joint was extensively irrigated with antibiotic solution. Capsule was   repaired anatomically using #2 Vicryl interrupted figure-of-eight   fashion. Fascia was repaired using #1 Stratafix in a running fashion. Subcutaneous tissue was approximated using 2-0 Vicryl in interrupted   fashion. The skin edges were approximated using 3-0 Monocryl in   running subcuticular fashion, followed by Dermabond. Sterile dressing   was applied and the patient was awakened, extubated and transferred   to the recovery room in stable condition. There were no complications. The Physician Assistant was critical during the procedure by assisting with positioning of the leg, retraction, hemostasis, and wound closure.         Cristiana Sy MD

## 2022-04-26 NOTE — PROGRESS NOTES
Ortho Daily Progress Note      Patient: Paramjit Gallardo                   MRN: [id number]  Sex: male  YOB: 1950           Age: 66 y.o.     1 Day Post-Op     Procedure(s):  LEFT HIP HEMIARTHROPLASTY  RIGHT QUADRICEPS REPAIR    Subjective:    -Pain:  pt in moderate pain.  -No complaints overnight. Patient yet to ambulate with PT  -Patient tolerating PO diet, no nausea. Pain meds tolerated. -Patient with travis after acute urinary retention yesterday requiring 2 straight caths. Currently on flomax and finasteride. -WBCs elevated and febrile yesterday. No fever/chills this morning. Visit Vitals  /70   Pulse 71   Temp 99.5 °F (37.5 °C)   Resp 13   SpO2 91%        Recent Labs     04/26/22  0358 04/25/22  0402 04/25/22  0402 04/24/22  1731 04/24/22  1731 01/12/22  1245 01/12/22  1245 07/08/21  1032 07/08/21  1032   HGB 10.7*   < > 11.9*   < > 12.9   < > 13.7  --   --    CREA 1.16   < > 1.38*   < > 1.36*   < > 1.28   < > 1.27   BUN 18  --  22*  --  21*  --  20  --  16    < > = values in this interval not displayed. Physical Exam:    GENERAL: alert, no acute distress  NEURO:  Sensation grossly intact  VASCULAR: DP/TP pulses 2+. Extremities warm. mild edema of BLE  DRESSING:  Left hip aquacel c/d/i. Right knee dry dressing without breakthru drainage. MSK:  Right knee locked in full extension in knee immobilizer; range of motion assessment deferred. Baseline weakness of the left leg; active knee and hip flexion/extension. Unrestricted passive left hip range of motion without pain on motion. DVT EXAM: No posterior calf tenderness, tightness, erythema, palpable cords, or pain upon active dorsi/plantar flexion of the foot. Plan:    DVT ppx: Lovenox 30mg BID  Activity: WBAT with PT-mobilization. Right knee to be kept in full extension in the knee immobilizer.   Pain Control: stable, mild-to-moderate joint symptoms intermittently, reasonably well controlled by current meds  Dressing: Continue aquacel x7 days unless the integrity of the seal is lost.  Replace w/ abd pads that are to be changed daily if this is the case. Hgb:  Stable, repeat in AM  Urinary Retention: Keep travis. Voiding trial to be scheduled with urologist in 2 weeks. Patient currently not seeing a urologist.  Fever of unknown origin: Per hospitalist.  Discharge: Pending PT recommendation.      Mayito Dan PA-C  4/26/2022   12:02 PM

## 2022-04-26 NOTE — BRIEF OP NOTE
Brief Postoperative Note    Patient: Mathew Gale  YOB: 1950  MRN: [id number]    Date of Procedure: 4/25/2022     Pre-Op Diagnosis: FRACTURED LEFT HIP FEMORAL NECK    Post-Op Diagnosis: Same as preoperative diagnosis. Procedure(s):  LEFT HIP HEMIARTHROPLASTY  QUADRICEPS REPAIR    Surgeon(s):  Moses Fletcher MD    Surgical Assistant: Yris Birmingham, [facility]    Anesthesia: General     Estimated Blood Loss (mL): less than 112     Complications: None    Specimens: * No specimens in log *     Implants:   Implant Name Type Inv.  Item Serial No.  Lot No. LRB No. Used Action   HEAD BPLR OD52MM [id number] FEM HIP ANIKET POS ECC SELF CNTR - SNA  HEAD BPLR OD52MM [id number] FEM HIP AINKET POS ECC SELF CNTR NA [employer] [id number] Left 1 Implanted   HEAD FEM [id number] NK L+1.5MM HIP MTL ON MTL 12/14 TAPR - SNA  HEAD FEM [id number] NK L+1.5MM HIP MTL ON MTL 12/14 TAPR NA [employer] [id number] Left 1 Implanted   STEM FEM SZ 7 L155MM 130DEG STD OFFSET CALCAR HIP BILAT TI - SNA  STEM FEM SZ 7 L155MM 130DEG STD OFFSET CALCAR HIP BILAT TI NA [employer] [id number] Left 1 Implanted       Drains: * No LDAs found *    Findings: as above    Electronically Signed by John Ornelas MD on 4/25/2022 at 9:06 PM

## 2022-04-27 LAB
ANION GAP SERPL CALC-SCNC: 5 MMOL/L (ref 5–15)
BASOPHILS # BLD: 0.1 K/UL (ref 0–0.1)
BASOPHILS NFR BLD: 1 % (ref 0–1)
BUN SERPL-MCNC: 16 MG/DL (ref 6–20)
BUN/CREAT SERPL: 15 (ref 12–20)
CALCIUM SERPL-MCNC: 7.8 MG/DL (ref 8.5–10.1)
CHLORIDE SERPL-SCNC: 107 MMOL/L (ref 97–108)
CO2 SERPL-SCNC: 27 MMOL/L (ref 21–32)
CREAT SERPL-MCNC: 1.06 MG/DL (ref 0.7–1.3)
DIFFERENTIAL METHOD BLD: ABNORMAL
EOSINOPHIL # BLD: 0.1 K/UL (ref 0–0.4)
EOSINOPHIL NFR BLD: 1 % (ref 0–7)
ERYTHROCYTE [DISTWIDTH] IN BLOOD BY AUTOMATED COUNT: 13 % (ref 11.5–14.5)
GLUCOSE BLD STRIP.AUTO-MCNC: 117 MG/DL (ref 65–117)
GLUCOSE BLD STRIP.AUTO-MCNC: 132 MG/DL (ref 65–117)
GLUCOSE BLD STRIP.AUTO-MCNC: 149 MG/DL (ref 65–117)
GLUCOSE BLD STRIP.AUTO-MCNC: 156 MG/DL (ref 65–117)
GLUCOSE SERPL-MCNC: 157 MG/DL (ref 65–100)
HCT VFR BLD AUTO: 26.3 % (ref 36.6–50.3)
HGB BLD-MCNC: 8.8 G/DL (ref 12.1–17)
IMM GRANULOCYTES # BLD AUTO: 0 K/UL (ref 0–0.04)
IMM GRANULOCYTES NFR BLD AUTO: 0 % (ref 0–0.5)
LYMPHOCYTES # BLD: 1.4 K/UL (ref 0.8–3.5)
LYMPHOCYTES NFR BLD: 13 % (ref 12–49)
MCH RBC QN AUTO: 32.1 PG (ref 26–34)
MCHC RBC AUTO-ENTMCNC: 33.5 G/DL (ref 30–36.5)
MCV RBC AUTO: 96 FL (ref 80–99)
MONOCYTES # BLD: 1.5 K/UL (ref 0–1)
MONOCYTES NFR BLD: 14 % (ref 5–13)
NEUTS SEG # BLD: 7.5 K/UL (ref 1.8–8)
NEUTS SEG NFR BLD: 71 % (ref 32–75)
NRBC # BLD: 0 K/UL (ref 0–0.01)
NRBC BLD-RTO: 0 PER 100 WBC
PLATELET # BLD AUTO: 151 K/UL (ref 150–400)
PMV BLD AUTO: 11.4 FL (ref 8.9–12.9)
POTASSIUM SERPL-SCNC: 3.4 MMOL/L (ref 3.5–5.1)
RBC # BLD AUTO: 2.74 M/UL (ref 4.1–5.7)
SERVICE CMNT-IMP: ABNORMAL
SERVICE CMNT-IMP: NORMAL
SODIUM SERPL-SCNC: 139 MMOL/L (ref 136–145)
WBC # BLD AUTO: 10.6 K/UL (ref 4.1–11.1)

## 2022-04-27 PROCEDURE — 85025 COMPLETE CBC W/AUTO DIFF WBC: CPT

## 2022-04-27 PROCEDURE — 74011250636 HC RX REV CODE- 250/636: Performed by: PHYSICIAN ASSISTANT

## 2022-04-27 PROCEDURE — 65270000029 HC RM PRIVATE

## 2022-04-27 PROCEDURE — 74011250637 HC RX REV CODE- 250/637: Performed by: PHYSICIAN ASSISTANT

## 2022-04-27 PROCEDURE — 74011000250 HC RX REV CODE- 250: Performed by: PHYSICIAN ASSISTANT

## 2022-04-27 PROCEDURE — 36415 COLL VENOUS BLD VENIPUNCTURE: CPT

## 2022-04-27 PROCEDURE — 97530 THERAPEUTIC ACTIVITIES: CPT

## 2022-04-27 PROCEDURE — 80048 BASIC METABOLIC PNL TOTAL CA: CPT

## 2022-04-27 PROCEDURE — 74011250637 HC RX REV CODE- 250/637: Performed by: HOSPITALIST

## 2022-04-27 PROCEDURE — 77010033678 HC OXYGEN DAILY

## 2022-04-27 PROCEDURE — 74011636637 HC RX REV CODE- 636/637: Performed by: PHYSICIAN ASSISTANT

## 2022-04-27 PROCEDURE — 82962 GLUCOSE BLOOD TEST: CPT

## 2022-04-27 PROCEDURE — 97535 SELF CARE MNGMENT TRAINING: CPT

## 2022-04-27 PROCEDURE — 97165 OT EVAL LOW COMPLEX 30 MIN: CPT

## 2022-04-27 RX ORDER — POTASSIUM CHLORIDE 750 MG/1
40 TABLET, FILM COATED, EXTENDED RELEASE ORAL DAILY
Status: COMPLETED | OUTPATIENT
Start: 2022-04-27 | End: 2022-04-29

## 2022-04-27 RX ADMIN — SENNOSIDES AND DOCUSATE SODIUM 1 TABLET: 50; 8.6 TABLET ORAL at 10:19

## 2022-04-27 RX ADMIN — ACETAMINOPHEN 650 MG: 325 TABLET ORAL at 22:53

## 2022-04-27 RX ADMIN — ENOXAPARIN SODIUM 30 MG: 100 INJECTION SUBCUTANEOUS at 22:53

## 2022-04-27 RX ADMIN — ENOXAPARIN SODIUM 30 MG: 100 INJECTION SUBCUTANEOUS at 18:14

## 2022-04-27 RX ADMIN — ATORVASTATIN CALCIUM 40 MG: 40 TABLET, FILM COATED ORAL at 10:20

## 2022-04-27 RX ADMIN — Medication 10 ML: at 14:00

## 2022-04-27 RX ADMIN — CARVEDILOL 12.5 MG: 6.25 TABLET, FILM COATED ORAL at 07:13

## 2022-04-27 RX ADMIN — BUPROPION HYDROCHLORIDE 150 MG: 150 TABLET, EXTENDED RELEASE ORAL at 18:14

## 2022-04-27 RX ADMIN — SODIUM CHLORIDE, PRESERVATIVE FREE 10 ML: 5 INJECTION INTRAVENOUS at 18:16

## 2022-04-27 RX ADMIN — POLYETHYLENE GLYCOL 3350 17 G: 17 POWDER, FOR SOLUTION ORAL at 10:20

## 2022-04-27 RX ADMIN — SODIUM CHLORIDE, PRESERVATIVE FREE 10 ML: 5 INJECTION INTRAVENOUS at 22:54

## 2022-04-27 RX ADMIN — Medication 2 UNITS: at 07:13

## 2022-04-27 RX ADMIN — FINASTERIDE 5 MG: 5 TABLET, FILM COATED ORAL at 22:53

## 2022-04-27 RX ADMIN — SENNOSIDES AND DOCUSATE SODIUM 1 TABLET: 50; 8.6 TABLET ORAL at 18:14

## 2022-04-27 RX ADMIN — CARVEDILOL 12.5 MG: 6.25 TABLET, FILM COATED ORAL at 18:14

## 2022-04-27 RX ADMIN — ACETAMINOPHEN 650 MG: 325 TABLET ORAL at 07:13

## 2022-04-27 RX ADMIN — POTASSIUM CHLORIDE 40 MEQ: 750 TABLET, EXTENDED RELEASE ORAL at 10:19

## 2022-04-27 RX ADMIN — FENTANYL CITRATE 50 MCG: 50 INJECTION, SOLUTION INTRAMUSCULAR; INTRAVENOUS at 03:41

## 2022-04-27 RX ADMIN — SODIUM CHLORIDE, PRESERVATIVE FREE 10 ML: 5 INJECTION INTRAVENOUS at 06:00

## 2022-04-27 RX ADMIN — BUPROPION HYDROCHLORIDE 150 MG: 150 TABLET, EXTENDED RELEASE ORAL at 10:20

## 2022-04-27 RX ADMIN — TAMSULOSIN HYDROCHLORIDE 0.4 MG: 0.4 CAPSULE ORAL at 10:20

## 2022-04-27 NOTE — PROGRESS NOTES
RUR 12%    Transitions of Care: Family requesting IPR. They do not want SNF placement. Patient has [employer]. If patient does not qualify for IPR, family prefers home with [facility]. CM spoke with attending MD who is in agreement with referral being sent to [facility]. Referrals sent to [address] [place] [address]. Insurance [facility] will need to be obtained prior to discharge. BLS to transport at discharge. Care Management Interventions  PCP Verified by CM: Yes  Mode of Transport at Discharge: BLS  Transition of Care Consult (CM Consult): Discharge Planning,Acute Rehab  MyChart Signup: No  Discharge Durable Medical Equipment: No  Physical Therapy Consult: Yes  Occupational Therapy Consult: Yes  Support Systems: Spouse/Significant Other  Confirm Follow Up Transport: Other (see comment) (BLS)  The Patient and/or Patient Representative was Provided with a Choice of Provider and Agrees with the Discharge Plan?: Yes  Freedom of Choice List was Provided with Basic Dialogue that Supports the Patient's Individualized Plan of Care/Goals, Treatment Preferences and Shares the Quality Data Associated with the Providers?: Yes  Discharge Location  Patient Expects to be Discharged to[de-identified] Rehab hospital/unit acute     Reason for Admission:  Closed left hip fracture. LEFT HIP HEMIARTHROPLASTY (Left)  QUADRICEPS REPAIR (Right) 2 Days Post-Op                      RUR Score:    12%                 Plan for utilizing home health:   Planning for IPR.       PCP: First and Last name:  Charles Peck MD     Name of Practice:    Are you a current patient: Yes/No:    Approximate date of last visit:    Can you participate in a virtual visit with your PCP:                     Current Advanced Directive/Advance Care Plan: Full Code      Healthcare Decision Maker:   Click here to complete [address] including selection of the Healthcare Decision Maker Relationship (ie \"Primary\")             Primary Decision Maker: Gina Wright Spouse - [phone]                  Transition of Care Plan:    Referrals pending with IPR. Chart reviewed. CM met with patient and wife at bedside. Patient lives at home with his wife. Patient was using a cane for ambulation prior to admission, and was able to complete ADLs. Patient has history of CVA in 1996 and has residual L sided weakness and impaired balance. Patient/wife stated patient has been to [facility] about 4 years ago, and they would really like patient to go to [facility]. CM provided education about IPR vs. SNF and insurance authorization requirements (patient has [employer]). Patient/wife do not want SNF placement, and prefer home with [facility] if patient does not meet IPR criteria. The Plan for Transition of Care is related to the following treatment goals: IPR    The Patient and/or patient representative was provided with a choice of provider and agrees   with the discharge plan. [x] Yes [] No    Freedom of choice list was provided with basic dialogue that supports the patient's individualized plan of care/goals, treatment preferences and shares the quality data associated with the providers.  [x] Yes [] No    JUAREZ Chavez/KARTIK

## 2022-04-27 NOTE — PROGRESS NOTES
[facility] Adult  Hospitalist Group                                                                                          Hospitalist Progress Note  Rosenda Reese MD  Answering service: [phone] from in house phone        Date of Service:  2022  NAME:  Bridget Watters  :  1950  MRN:  [id number]    This documentation was facilitated by a Voice Recognition software and may contain inadvertent typographical errors. Admission Summary:   Bridget Watters is a 66 y.o. male who presents with fall and right hip,  [age]Y male with a history of CAD, CVA with mild left side weakness diabetes, hypertension and BPH. Patient presented to the ED after a fall he said he tripped and fall backwards while vacuuming at home. Interval history / Subjective:        POD #2 s/p left hip  arthroplasty and right quadriceps repair. I have seen and examined him earlier today. He has no complaints. Pain control reasonable. Assessment & Plan:   Acute left hip fracture, traumatic  Right quadriceps tendon rupture with retraction and associated muscle strain, large joint effusion with synovitis and intra-articular debris/blood products  -- Status post left hip hemiarthroplasty and right quadriceps repair on .  --SCD and Lovenox for DVT prophylaxis   -- PT and OT. Ortho recommendations:  1. Followed by Dr. Ladi Baxter (hip) and Dr. Jose Manuel Diaz (quadricep)  2. Ortho plan: No further ortho surgical plans at this time. 3. DVT ppx: Lovenox 30mg twice day; SCDs  4. Pain control: Adequate; per admitting team, ice and elevation. Ace wrap prn.   5. Dressing: May leave dressing and ace wrap in place if remains C/D/I. Change prn for saturation with dry sterile dressing and ace wrap. 6. Activity: WBAT LLE, WBAT RLE in KI at all times. KI can be left open only when in bed for comfort and ice. No bending of the right knee. Discussed hinge brace for the patient with Dr. Jose Manuel Diaz.  Pt will receive fitted hinge brace at his f/u appt. 7.   Dispo: Awaiting PT/OT recs. Continue to follow. Acute blood loss anemia. Monitor hemoglobin. No indication for transfusion at this time      Acute renal retention. Patient history of BPH.  -Failed intermittent catheterization, Hamm catheter placed on 4/25. Voiding trial in 5-7 days in rehab. Leukocytosis likely due to reactive from the fall. Not septic. Monitor    Hypokalemia: Replace and monitor. CKD 3: Slight bump in creatinine: Creatinine improved, is taking orally fine. DC IV fluid. Diabetes hold metformin, cover with sliding scale    Hypertension, continue Coreg with holding parameter will hold her home Lotrel due to relative low BP now  CAD: No chest pain, EKG reviewed no further cardiac work preop. .  Will resume aspirin postop             ADULT DIET Regular; Low Sodium (2 gm)  when able to resume diet  ISOLATION PRECAUTIONS: There are currently no Active Isolations  CODE STATUS: Full Code   DVT PROPHYLAXIS: SCDs  FUNCTIONAL STATUS PRIOR TO HOSPITALIZATION: Ambulatory and capable of self-care but relies on assistive devices (rolling walker/cane). EARLY MOBILITY ASSESSMENT:  He will need rehab. ANTICIPATED DISCHARGE: Greater than 48 hours. EMERGENCY CONTACT/SURROGATE DECISION MAKER: pt makes his own decision       Hospital Problems  Date Reviewed: 4/25/2022          Codes Class Noted POA    Quadriceps tendon rupture, right, initial encounter ICD-10-CM: A92.049T  ICD-9-CM: 843.8  4/25/2022 Yes        * (Principal) Left displaced femoral neck fracture ([facility] [address] ICD-10-CM: Z81.035E  ICD-9-CM: 820.8  4/24/2022 Yes                Review of Systems:   A comprehensive review of systems was negative except for that written in the HPI. Vital Signs:    Last 24hrs VS reviewed since prior progress note.  Most recent are:        Intake/Output Summary (Last 24 hours) at 4/27/2022 1705  Last data filed at 4/27/2022 1531  Gross per 24 hour   Intake --   Output 1750 ml Net -1750 ml        Physical Examination:     I had a face to face encounter with this patient and independently examined them on4/27/2022 as outlined below:        Constitutional:  Alert, uncomfortable from urinary tension     HEENT:  Atraumatic. Oral mucosa moist,. Non icteric sclera. No pallor. Resp:  On room air  No accessory muscle use  Symmetrical air entry bilaterally  No wheezing/rhonchi/rales. Chest Wall: No deformity     CV:  Regular rhythm, normal rate, no murmurs, gallops, rubs      GI:  Normoactive  Soft, non distended, non tender. No appreciable organomegaly      :  Suprapubic discomfort due to retention      Musculoskeletal:  Left hip surgical site clean, dry and intact. Right lower extremity in immobilizer. Distal pulses and sensation intact. Neurologic:  Mental status:AAOx3,   Cranial nerves II-XII : WNL  Motor exam:Moves all extremities symmetrically  Gait and balance: Not tested              Data Review:    Review and/or order of clinical lab test  Review and/or order of tests in the radiology section of CPT  Review and/or order of tests in the medicine section of CPT      Available Lab and Imaging results reviewed and used to formulate the current care plan.       Medications Reviewed:     Current Facility-Administered Medications   Medication Dose Route Frequency    potassium chloride SR (KLOR-CON 10) tablet 40 mEq  40 mEq Oral DAILY    sodium chloride (NS) flush 5-40 mL  5-40 mL IntraVENous Q8H    sodium chloride (NS) flush 5-40 mL  5-40 mL IntraVENous PRN    naloxone (NARCAN) injection 0.4 mg  0.4 mg IntraVENous PRN    senna-docusate (PERICOLACE) 8.6-50 mg per tablet 1 Tablet  1 Tablet Oral BID    polyethylene glycol (MIRALAX) packet 17 g  17 g Oral DAILY    [START ON 4/28/2022] bisacodyL (DULCOLAX) suppository 10 mg  10 mg Rectal DAILY PRN    oxyCODONE IR (ROXICODONE) tablet 2.5 mg  2.5 mg Oral Q4H PRN    oxyCODONE IR (ROXICODONE) tablet 5 mg  5 mg Oral Q4H PRN  oxyCODONE IR (ROXICODONE) tablet 10 mg  10 mg Oral Q4H PRN    enoxaparin (LOVENOX) injection 30 mg  30 mg SubCUTAneous Q12H    0.9% sodium chloride with KCl 40 mEq/L infusion   IntraVENous CONTINUOUS    fentaNYL citrate (PF) injection 50 mcg  50 mcg IntraVENous Q4H PRN    prochlorperazine (COMPAZINE) with saline injection 5 mg  5 mg IntraVENous Q6H PRN    sodium chloride (NS) flush 5-40 mL  5-40 mL IntraVENous Q8H    sodium chloride (NS) flush 5-40 mL  5-40 mL IntraVENous PRN    acetaminophen (TYLENOL) tablet 650 mg  650 mg Oral Q6H PRN    Or    acetaminophen (TYLENOL) suppository 650 mg  650 mg Rectal Q6H PRN    polyethylene glycol (MIRALAX) packet 17 g  17 g Oral DAILY PRN    ondansetron (ZOFRAN ODT) tablet 4 mg  4 mg Oral Q8H PRN    Or    ondansetron (ZOFRAN) injection 4 mg  4 mg IntraVENous Q6H PRN    atorvastatin (LIPITOR) tablet 40 mg  40 mg Oral DAILY    buPROPion SR (WELLBUTRIN SR) tablet 150 mg  150 mg Oral BID    finasteride (PROSCAR) tablet 5 mg  5 mg Oral QHS    tamsulosin (FLOMAX) capsule 0.4 mg  0.4 mg Oral DAILY    carvediloL (COREG) tablet 12.5 mg  12.5 mg Oral BID WITH MEALS    glucose chewable tablet 16 g  4 Tablet Oral PRN    dextrose 10 % infusion 0-250 mL  0-250 mL IntraVENous PRN    glucagon (GLUCAGEN) injection 1 mg  1 mg IntraMUSCular PRN    insulin lispro (HUMALOG) injection   SubCUTAneous AC&HS     ______________________________________________________________________  EXPECTED LENGTH OF STAY: 4d 2h  ACTUAL LENGTH OF STAY:          3                 Arielle Garcia MD

## 2022-04-27 NOTE — PROGRESS NOTES
Problem: Self Care Deficits Care Plan (Adult)  Goal: *Acute Goals and Plan of Care (Insert Text)  Description: FUNCTIONAL STATUS PRIOR TO ADMISSION: Patient was modified independent using a single point cane for functional mobility and ADL completion. Reports standing to shower at walk-in shower. HOME SUPPORT: The patient lived with wife but did not require assist.    Occupational Therapy Goals  Initiated 4/27/2022  1. Patient will perform bathing with moderate assistance  within 7 day(s). 2.  Patient will perform lower body dressing with maximal assistance  within 7 day(s). 3.  Patient will perform RW grooming with minimal assistance within 7 day(s). 4.  Patient will perform RW toilet transfers, EOB to [facility], with moderate assistance within 7 day(s). 5.  Patient will perform all aspects of RW toileting with moderate assistance within 7 day(s). Outcome: Not Met    OCCUPATIONAL THERAPY EVALUATION  Patient: Shakeel Falk (75 y.o. male)  Date: 4/27/2022  Primary Diagnosis: Closed left hip fracture (HCC) [S72.002A]  Procedure(s) (LRB):  LEFT HIP HEMIARTHROPLASTY (Left)  QUADRICEPS REPAIR (Right) 2 Days Post-Op   Precautions:  Fall,WBAT (WBAT B LE, R LE locked in knee ext)    ASSESSMENT  Based on the objective data described below, the patient presents with decreased strength/endurance (LLE residual weakness from prior stroke), decreased mobility/balance, decreased activity tolerance, c/o R thigh and L hip pain with movement (4/10 resting and 8/10 with movement) and decreased distal LE reaching, all of which limit pt's ability to complete self-care routine at level congruent with PLOF. Currently, pt is Independent with self-feeding, S/U for long-sitting grooming, Mod A for long-sitting UB dressing, Max A for bathing and Total A for LE dressing and toileting. Pt reports Mod Indep at [address] at baseline, with good PRN assist from wife.   Pt noted with good occupational output this date, demonstrating ability to stand with Mod Ax1 and Min Ax1, with Max A x2 to/from EOB. Pt benefits from skilled OT to address functional deficits during acute hospitalization, with reporting therapist believing pt would benefit from rehab upon discharge. Current Level of Function Impacting Discharge (ADLs/self-care): Independent with self-feeding, S/U for long-sitting grooming, Mod A for long-sitting UB dressing, Max A for bathing and Total A for LE dressing and toileting    Functional Outcome Measure: The patient scored 30/100 on the Barthel Index outcome measure. Other factors to consider for discharge: below baseline     Patient will benefit from skilled therapy intervention to address the above noted impairments. PLAN :  Recommendations and Planned Interventions: self care training, functional mobility training, therapeutic exercise, balance training, therapeutic activities, endurance activities, and patient education    Frequency/Duration: Patient will be followed by occupational therapy 5 times a week to address goals. Recommendation for discharge: (in order for the patient to meet his/her long term goals)  To be determined: rehab    This discharge recommendation:  Has been made in collaboration with the attending provider and/or case management    IF patient discharges home will need the following DME: TBD       SUBJECTIVE:   Patient stated I normally use a cane to get around.     OBJECTIVE DATA SUMMARY:   HISTORY:   Past Medical History:   Diagnosis Date    Agoraphobia with panic disorder     Anxiety     Arthritis     spine    BPH (benign prostatic hypertrophy) with urinary obstruction     CAD (coronary artery disease)     no previous MI or stents    Cancer ([address]     skin pre cancer    Chronic edema     Coronary artery disease involving native coronary artery without angina pectoris 3/28/2017    CVD (cardiovascular disease)     Depression     Diabetes ([address]     no longer medicated    Erectile dysfunction Essential hypertension 3/28/2017    Falls     Fracture of multiple ribs of right side 7/26/2017    Full code but no chest compressions 7/23/2018    GERD (gastroesophageal reflux disease)     Hemiparesis ([facility] [address]     History of stroke 3/28/2017    Hypercholesterolemia     Hypercholesterolemia 3/28/2017    Hypertension     Joint pain     Localized edema 7/23/2018    Long term current use of anticoagulant therapy     ASA 81 mg    Murmur     Muscle pain     Muscle weakness     Nocturia     Panic disorder     Ringing in the ears     Seizures ([address]     at age 3, facial    Stroke [facility]) 1996    left sided weakness     Past Surgical History:   Procedure Laterality Date    COLONOSCOPY N/A 5/4/2018    COLONOSCOPY performed by Alex Crystal MD at [facility] ENDOSCOPY    HX COLONOSCOPY      HX LUMBAR DISKECTOMY  1970's    HX ORTHOPAEDIC      right finger repair    HX ORTHOPAEDIC  12/2019    spinal surgery    IR INJ SPINE THER SUBST LUM/SAC W IMG  2/4/2020    IR KYPHOPLASTY LUMBAR  12/20/2019    IR KYPHOPLASTY LUMBAR  1/24/2020    ID ABDOMEN SURGERY PROC UNLISTED  2008    Lap band       Expanded or extensive additional review of patient history:     Home Situation  Home Environment: Private residence  # Steps to Enter: 0  One/Two Story Residence: One story  Living Alone: No  Support Systems: Spouse/Significant Other  Patient Expects to be Discharged to[de-identified] Skilled nursing facility  Current DME Used/Available at Home: Cane, straight  Tub or Shower Type: Shower    Hand dominance: Right    EXAMINATION OF PERFORMANCE DEFICITS:  Cognitive/Behavioral Status:  Neurologic State: Alert  Orientation Level: Oriented X4  Cognition: Appropriate decision making; Appropriate for age attention/concentration; Appropriate safety awareness  Perception: Appears intact  Perseveration: No perseveration noted  Safety/Judgement: Awareness of environment; Insight into deficits;Home safety    Hearing:   Auditory  Auditory Impairment: None    Vision/Perceptual: Corrective Lenses: Glasses    Range of Motion:  AROM: Generally decreased, functional                         Strength:  Strength: Generally decreased, functional                Coordination:     Fine Motor Skills-Upper: Left Intact; Right Intact    Gross Motor Skills-Upper: Left Intact; Right Intact    Balance:  Sitting: Impaired; With support  Sitting - Static: Fair (occasional)  Sitting - Dynamic: Fair (occasional); Poor (constant support)  Standing: Impaired; With support  Standing - Static: Fair;Constant support  Standing - Dynamic : Poor;Constant support    Functional Mobility and Transfers for ADLs:  Bed Mobility:  Rolling: Maximum assistance  Supine to Sit: Maximum assistance;Assist x2  Sit to Supine: Maximum assistance;Assist x2  Scooting: Maximum assistance    Transfers:  Sit to Stand: Moderate assistance;Minimum assistance;Assist x2  Stand to Sit: Moderate assistance;Minimum assistance;Assist x2    ADL Assessment:  Patient recalled and demonstrated avoiding extreme planes of movement with Left LE during ADLs and functional mobility with Min cues. Feeding: Independent    Oral Facial Hygiene/Grooming: Setup (long-sitting)    Bathing: Maximum assistance    Upper Body Dressing: Moderate assistance    Lower Body Dressing: Total assistance    Toileting: Total assistance    ADL Intervention and task modifications:  Pt educated on safe transfer techniques, with specific emphasis on proper hand placement to push up from seated surface rather than attempt to pull self up, fully positioning self in-front of desired seated location, feeling chair on back of legs and reaching back with 1-2 UE to slowly lower self to seated position. Cognitive Retraining  Safety/Judgement: Awareness of environment; Insight into deficits;Home safety    Bathing: Patient instructed when bathing to not submerge wound in water, stand to shower or sponge bathe, cover wound with plastic and tape to ensure no water reaches bandage/wound without cues. Patient indicated understanding. Dressing joint: Patient instructed and demonstrated to don/doff Left LE first/last Max cues. Patient instructed and demonstrated to don all clothing while sitting prior to standing, doff all clothing to knees while standing, then sit to doff clothing off from knees to feet in order to facilitate fall prevention, pain management, and energy conservation with Total assistance. Home safety: Patient instructed on home modifications and safety (raise height of ADL objects, appropriate height of chair surfaces, recliner safety, change of floor surfaces, clear pathways) to increase independence and fall prevention. Patient indicated understanding. Standing: Patient instructed and demonstrated to walk up to sink/counter top/surfaces, step into walker to increase safety of joint and fall prevention with Minimum assistance, Moderate Assistance, and Assist x2. Instructed to apply concept of hip contraindications to ADLs within the home (no extreme movements, square off while using objects, slide objects along surfaces). Patient instructed to increase amount of time standing, observe standing position during ADLs in order to increase even weight bearing through bilateral LEs in order to increase independence with ADLs. Goal to be reached 30 days post - op, per orthopedic surgeon or per PT. Patient indicated understanding. Tub transfer: Patient instructed regarding when it is safe to begin transfer into tub (complete stairs with PT, advance exercises with PT high enough to clear tub height). Patient instructed to use the same technique as used with stairs when entering and exiting tub (\"up with the non-surgical, down with the surgical leg\"). Patient indicated understanding.     Functional Measure:    Barthel Index:  Bathin  Bladder: 5  Bowels: 5  Groomin  Dressin  Feeding: 10  Mobility: 0  Stairs: 0  Toilet Use: 0  Transfer (Bed to Chair and Back): 5  Total: 30/100      The Barthel ADL Index: Guidelines  1. The index should be used as a record of what a patient does, not as a record of what a patient could do. 2. The main aim is to establish degree of independence from any help, physical or verbal, however minor and for whatever reason. 3. The need for supervision renders the patient not independent. 4. A patient's performance should be established using the best available evidence. Asking the patient, friends/relatives and nurses are the usual sources, but direct observation and common sense are also important. However direct testing is not needed. 5. Usually the patient's performance over the preceding 24-48 hours is important, but occasionally longer periods will be relevant. 6. Middle categories imply that the patient supplies over 50 per cent of the effort. 7. Use of aids to be independent is allowed. Score Interpretation (from [address] [facility])    Independent   60-79 Minimally independent   40-59 Partially dependent   20-39 Very dependent   <20 Totally dependent     -Alicia Fields., Barthel, D.W. (1965). Functional evaluation: the Barthel Index. [address] [facility] ([address] (1997). The Barthel activities of daily living index: self-reporting versus actual performance in the old (> or = 75 years). Journal [address] 45(7), [facility], [facility].., Lindsey Kevin.Annamarie. (1999). Measuring the change in disability after inpatient rehabilitation; comparison of the responsiveness of the Barthel Index and Functional Bethel Measure. Journal of Neurology, Neurosurgery, and Psychiatry, 66(4), 644-532. Cody Mckeon N.J.A, NEGRA KaurJ.SHAUN, & Augustine Corrigan, M.A. (2004) Assessment of post-stroke quality of life in cost-effectiveness studies: The usefulness of the Barthel Index and the EuroQoL-5D.  Quality of Life Research, 13, 622-54        Occupational Therapy Evaluation Charge Determination   History Examination Decision-Making   LOW Complexity : Brief history review  HIGH Complexity : 5 or more performance deficits relating to physical, cognitive , or psychosocial skils that result in activity limitations and / or participation restrictions LOW Complexity : No comorbidities that affect functional and no verbal or physical assistance needed to complete eval tasks       Based on the above components, the patient evaluation is determined to be of the following complexity level: LOW   Pain Ratin/10 resting, 8/10 with movement; RN aware and following    Activity Tolerance:   Good, Fair, and requires frequent rest breaks    After treatment patient left in no apparent distress:    Supine in bed, Call bell within reach, Caregiver / family present, and Side rails x 3    COMMUNICATION/EDUCATION:   The patients plan of care was discussed with: Physical therapy assistant, Registered nurse, and Case management. Home safety education was provided and the patient/caregiver indicated understanding., Patient/family have participated as able in goal setting and plan of care. , and Patient/family agree to work toward stated goals and plan of care. This patients plan of care is appropriate for delegation to [facility].     Thank you for this referral.  Clair Figueroa OT  Time Calculation: 31 mins

## 2022-04-27 NOTE — PROGRESS NOTES
Problem: Mobility Impaired (Adult and Pediatric)  Goal: *Acute Goals and Plan of Care (Insert Text)  Description: FUNCTIONAL STATUS PRIOR TO ADMISSION: Patient was modified independent using a single point cane for functional mobility. 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 HOME SUPPORT PRIOR TO ADMISSION: The patient lived with wife but did not require assist.    Physical Therapy Goals  Initiated 4/26/2022  1. Patient will move from supine to sit and sit to supine  in bed with moderate assistance  within 7 day(s). 2.  Patient will transfer from bed to chair and chair to bed with moderate assistance  using the least restrictive device within 7 day(s). 3.  Patient will perform sit to stand with maximal assistance within 7 day(s). 4.  Patient will ambulate with maximal assistance for 5 feet with the least restrictive device within 7 day(s). Outcome: Progressing Towards Goal   PHYSICAL THERAPY TREATMENT  Patient: Armand Apley (89 y.o. male)  Date: 4/27/2022  Diagnosis: Closed left hip fracture ([facility] [address] [S72.002A] Left displaced femoral neck fracture (HCC)  Procedure(s) (LRB):  LEFT HIP HEMIARTHROPLASTY (Left)  QUADRICEPS REPAIR (Right) 2 Days Post-Op  Precautions: Fall,WBAT (WBAT B LE, R LE locked in knee ext)  Chart, physical therapy assessment, plan of care and goals were reviewed. ASSESSMENT  Patient continues with skilled PT services and is progressing towards goals this afternoon. He continues to require assist x2 to complete bed mobility, w/ support to RLE and trunk. This afternoon, his static sittting balance improved while seated EOB w/ occasional support to maintain (Min A). Pt was able to stand 2x this afternoon w/ Mod Ax2 to RW w/ seated rest break x1 between reps. During stand x2, pt able to take side steps to [facility] w/ Min Ax2.  Pt would be a great candidate for IPR given pt will be able to tolerate 3hrs of therapy and  PMH of CVA w/residual Left side weakness as well as pt being POD 3 s/p Left Hip Hemiarthroplasty and Right Quadriceps Repair w/ R knee immobilizer in place. Therapies will continue to follow for progression of mobility as able and appropriate. Current Level of Function Impacting Discharge (mobility/balance): Mod-Max Ax2    Other factors to consider for discharge: WBAT: R Knee immobilizer; Left HEATHER. H/o CVA w/ Left hemiparesis (LLE>LUE). PLAN :  Patient continues to benefit from skilled intervention to address the above impairments. Continue treatment per established plan of care. to address goals. Recommendation for discharge: (in order for the patient to meet his/her long term goals)  Therapy 3 hours per day 5-7 days per week. If pt unable to attend IPR, recommend SNF (rehab). This discharge recommendation:  Has been made in collaboration with the attending provider and/or case management    IF patient discharges home will need the following DME: patient owns DME required for discharge       SUBJECTIVE:   Patient stated I've been through a lot before. Veria Proper    OBJECTIVE DATA SUMMARY:   Critical Behavior:  Neurologic State: Alert  Orientation Level: Oriented X4  Cognition: Appropriate decision making,Appropriate for age attention/concentration,Appropriate safety awareness  Safety/Judgement: Awareness of environment,Insight into deficits,Home safety  Functional Mobility Training:  Bed Mobility:  Rolling: Maximum assistance  Supine to Sit: Maximum assistance;Assist x2  Sit to Supine: Maximum assistance;Assist x2  Scooting: Maximum assistance        Transfers:  Sit to Stand: Moderate assistance;Minimum assistance;Assist x2  Stand to Sit: Moderate assistance;Minimum assistance;Assist x2                             Balance:  Sitting: Impaired; With support  Sitting - Static: Fair (occasional)  Sitting - Dynamic: Fair (occasional); Poor (constant support)  Standing: Impaired; With support  Standing - Static: Fair;Constant support  Standing - Dynamic : Poor;Constant support  Ambulation/Gait Training:  Distance (ft):  (side stepped to [facility])  Assistive Device: Gait belt;Walker, rolling          Pain Ratin-8/10    Activity Tolerance:   Good    After treatment patient left in no apparent distress:   Supine in bed, Call bell within reach, and Side rails x 3    COMMUNICATION/COLLABORATION:   The patients plan of care was discussed with: Registered nurse.      Ivelisse Green,PTA   Time Calculation: 29 mins

## 2022-04-27 NOTE — PROGRESS NOTES
Ortho Daily Progress Note    4/27/2022    POD:  2 Days Post-Op  S/P:  Procedure(s):  Left hip hemiarthroplasty + Right quadriceps tendon repair    HPI: 65 yo M that is POD2, s/p Left hip hemiarthroplasty with Dr. Preethi Owens and Right quadrieps repair with Dr. Tone Laboy on 4/25/22. He reports that his pain is well controlled on medications. He is having pain when attempting to stand on the right leg. He is concerned about his ambulation and getting into and out of cars due to surgery on both lower extremities. He does have a left dropfoot at baseline and wears special shoes to keep his foot planted. LABS:  Lab Results   Component Value Date/Time    HGB 8.8 (L) 04/27/2022 03:49 AM    INR 1.0 06/23/2017 05:37 PM    INR (POC) <0.9 06/23/2017 04:50 PM     Recent Results (from the past 12 hour(s))   CBC WITH AUTOMATED DIFF    Collection Time: 04/27/22  3:49 AM   Result Value Ref Range    WBC 10.6 4.1 - 11.1 K/uL    RBC 2.74 (L) 4.10 - 5.70 M/uL    HGB 8.8 (L) 12.1 - 17.0 g/dL    HCT 26.3 (L) 36.6 - 50.3 %    MCV 96.0 80.0 - 99.0 FL    MCH 32.1 26.0 - 34.0 PG    MCHC 33.5 30.0 - 36.5 g/dL    RDW 13.0 11.5 - 14.5 %    PLATELET 678 345 - 844 K/uL    MPV 11.4 8.9 - 12.9 FL    NRBC 0.0 0  WBC    ABSOLUTE NRBC 0.00 0.00 - 0.01 K/uL    NEUTROPHILS 71 32 - 75 %    LYMPHOCYTES 13 12 - 49 %    MONOCYTES 14 (H) 5 - 13 %    EOSINOPHILS 1 0 - 7 %    BASOPHILS 1 0 - 1 %    IMMATURE GRANULOCYTES 0 0.0 - 0.5 %    ABS. NEUTROPHILS 7.5 1.8 - 8.0 K/UL    ABS. LYMPHOCYTES 1.4 0.8 - 3.5 K/UL    ABS. MONOCYTES 1.5 (H) 0.0 - 1.0 K/UL    ABS. EOSINOPHILS 0.1 0.0 - 0.4 K/UL    ABS. BASOPHILS 0.1 0.0 - 0.1 K/UL    ABS. IMM.  GRANS. 0.0 0.00 - 0.04 K/UL    DF AUTOMATED     METABOLIC PANEL, BASIC    Collection Time: 04/27/22  3:50 AM   Result Value Ref Range    Sodium 139 136 - 145 mmol/L    Potassium 3.4 (L) 3.5 - 5.1 mmol/L    Chloride 107 97 - 108 mmol/L    CO2 27 21 - 32 mmol/L    Anion gap 5 5 - 15 mmol/L    Glucose 157 (H) 65 - 100 mg/dL BUN 16 6 - 20 MG/DL    Creatinine 1.06 0.70 - 1.30 MG/DL    BUN/Creatinine ratio 15 12 - 20      GFR est AA >60 >60 ml/min/1.73m2    GFR est non-AA >60 >60 ml/min/1.73m2    Calcium 7.8 (L) 8.5 - 10.1 MG/DL   GLUCOSE, POC    Collection Time: 04/27/22  6:11 AM   Result Value Ref Range    Glucose (POC) 149 (H) 65 - 117 mg/dL    Performed by Lucy Gonzalez (CON)          ORTHOPEDIC PHYSICAL EXAM:  LLE MSK: Left hip with dressing to the lateral hip. Dressing is C/D/I. Compartments of the LLE are soft and compressible, without pain. 1+ DP pulse. Cap refill is brisk. Sensation to light touch intact to the medial/lateral/posterior LLE. Unable to DF/PF (baseline drop foot), however with occasional spasm. Negative Hommans sign. RLE MSK: Right knee in knee immobilizer. Right knee with dressing and ace wrap. Dressings are C/D/I. Compartments of the RLE are soft and compressible, without pain. Mild TTP to the right aspect of the knee. SILT to the lower extremity and foot. 2+ DP pulses. Cap refill brisk. Able to wiggle toes. ORTHOPEDIC ASSESSMENT:   Left hip displaced femoral neck   Fracture that is now s/p Left hip hemiarthroplasty  Right quadriceps tendon rupture that is s/p Right quadriceps tendon repair    ORTHOPEDIC PLAN:  1. Followed by Dr. Ladi Baxter (hip) and Dr. Jose Manuel Diaz (quadricep)  2. Ortho plan: No further ortho surgical plans at this time. 3. DVT ppx: Lovenox 30mg twice day; SCDs  4. Pain control: Adequate; per admitting team, ice and elevation. Ace wrap prn.   5. Dressing: May leave dressing and ace wrap in place if remains C/D/I. Change prn for saturation with dry sterile dressing and ace wrap. 6. Activity: WBAT LLE, WBAT RLE in KI at all times. KI can be left open only when in bed for comfort and ice. No bending of the right knee. Discussed hinge brace for the patient with Dr. Jose Manuel Diaz. Pt will receive fitted hinge brace at his f/u appt. 7.   Dispo: Awaiting PT/OT recs. Continue to follow.       [facility], [address] [facility]

## 2022-04-27 NOTE — PROGRESS NOTES
Problem: Pressure Injury - Risk of  Goal: *Prevention of pressure injury  Description: Document Ilya Scale and appropriate interventions in the flowsheet.   Outcome: Progressing Towards Goal  Note: Pressure Injury Interventions:  Sensory Interventions: Assess changes in LOC    Moisture Interventions: Absorbent underpads    Activity Interventions: Pressure redistribution bed/mattress(bed type),PT/OT evaluation    Mobility Interventions: HOB 30 degrees or less,Pressure redistribution bed/mattress (bed type)    Nutrition Interventions: Document food/fluid/supplement intake,Offer support with meals,snacks and hydration    Friction and Shear Interventions: HOB 30 degrees or less

## 2022-04-28 LAB
ANION GAP SERPL CALC-SCNC: 6 MMOL/L (ref 5–15)
BUN SERPL-MCNC: 14 MG/DL (ref 6–20)
BUN/CREAT SERPL: 15 (ref 12–20)
CALCIUM SERPL-MCNC: 8.3 MG/DL (ref 8.5–10.1)
CHLORIDE SERPL-SCNC: 109 MMOL/L (ref 97–108)
CO2 SERPL-SCNC: 27 MMOL/L (ref 21–32)
CREAT SERPL-MCNC: 0.95 MG/DL (ref 0.7–1.3)
GLUCOSE BLD STRIP.AUTO-MCNC: 105 MG/DL (ref 65–117)
GLUCOSE BLD STRIP.AUTO-MCNC: 125 MG/DL (ref 65–117)
GLUCOSE BLD STRIP.AUTO-MCNC: 135 MG/DL (ref 65–117)
GLUCOSE BLD STRIP.AUTO-MCNC: 175 MG/DL (ref 65–117)
GLUCOSE SERPL-MCNC: 113 MG/DL (ref 65–100)
HGB BLD-MCNC: 8.7 G/DL (ref 12.1–17)
POTASSIUM SERPL-SCNC: 3.6 MMOL/L (ref 3.5–5.1)
SERVICE CMNT-IMP: ABNORMAL
SERVICE CMNT-IMP: NORMAL
SODIUM SERPL-SCNC: 142 MMOL/L (ref 136–145)

## 2022-04-28 PROCEDURE — 85018 HEMOGLOBIN: CPT

## 2022-04-28 PROCEDURE — 97535 SELF CARE MNGMENT TRAINING: CPT

## 2022-04-28 PROCEDURE — 80048 BASIC METABOLIC PNL TOTAL CA: CPT

## 2022-04-28 PROCEDURE — 74011000250 HC RX REV CODE- 250: Performed by: PHYSICIAN ASSISTANT

## 2022-04-28 PROCEDURE — 97530 THERAPEUTIC ACTIVITIES: CPT

## 2022-04-28 PROCEDURE — 2709999900 HC NON-CHARGEABLE SUPPLY

## 2022-04-28 PROCEDURE — 74011250637 HC RX REV CODE- 250/637: Performed by: HOSPITALIST

## 2022-04-28 PROCEDURE — 74011250637 HC RX REV CODE- 250/637: Performed by: PHYSICIAN ASSISTANT

## 2022-04-28 PROCEDURE — 74011636637 HC RX REV CODE- 636/637: Performed by: PHYSICIAN ASSISTANT

## 2022-04-28 PROCEDURE — 74011250636 HC RX REV CODE- 250/636: Performed by: PHYSICIAN ASSISTANT

## 2022-04-28 PROCEDURE — 65270000029 HC RM PRIVATE

## 2022-04-28 PROCEDURE — 36415 COLL VENOUS BLD VENIPUNCTURE: CPT

## 2022-04-28 PROCEDURE — 82962 GLUCOSE BLOOD TEST: CPT

## 2022-04-28 RX ADMIN — SENNOSIDES AND DOCUSATE SODIUM 1 TABLET: 50; 8.6 TABLET ORAL at 18:04

## 2022-04-28 RX ADMIN — POTASSIUM CHLORIDE 40 MEQ: 750 TABLET, EXTENDED RELEASE ORAL at 09:59

## 2022-04-28 RX ADMIN — SODIUM CHLORIDE, PRESERVATIVE FREE 10 ML: 5 INJECTION INTRAVENOUS at 06:28

## 2022-04-28 RX ADMIN — ACETAMINOPHEN 650 MG: 325 TABLET ORAL at 06:38

## 2022-04-28 RX ADMIN — BUPROPION HYDROCHLORIDE 150 MG: 150 TABLET, EXTENDED RELEASE ORAL at 09:59

## 2022-04-28 RX ADMIN — ATORVASTATIN CALCIUM 40 MG: 40 TABLET, FILM COATED ORAL at 09:59

## 2022-04-28 RX ADMIN — CARVEDILOL 12.5 MG: 6.25 TABLET, FILM COATED ORAL at 18:08

## 2022-04-28 RX ADMIN — ENOXAPARIN SODIUM 30 MG: 100 INJECTION SUBCUTANEOUS at 12:28

## 2022-04-28 RX ADMIN — TAMSULOSIN HYDROCHLORIDE 0.4 MG: 0.4 CAPSULE ORAL at 09:59

## 2022-04-28 RX ADMIN — ENOXAPARIN SODIUM 30 MG: 100 INJECTION SUBCUTANEOUS at 22:46

## 2022-04-28 RX ADMIN — CARVEDILOL 12.5 MG: 6.25 TABLET, FILM COATED ORAL at 06:28

## 2022-04-28 RX ADMIN — SODIUM CHLORIDE, PRESERVATIVE FREE 10 ML: 5 INJECTION INTRAVENOUS at 22:46

## 2022-04-28 RX ADMIN — OXYCODONE 5 MG: 5 TABLET ORAL at 10:00

## 2022-04-28 RX ADMIN — POLYETHYLENE GLYCOL 3350 17 G: 17 POWDER, FOR SOLUTION ORAL at 09:00

## 2022-04-28 RX ADMIN — Medication 2 UNITS: at 12:28

## 2022-04-28 RX ADMIN — FINASTERIDE 5 MG: 5 TABLET, FILM COATED ORAL at 22:46

## 2022-04-28 RX ADMIN — SENNOSIDES AND DOCUSATE SODIUM 1 TABLET: 50; 8.6 TABLET ORAL at 09:59

## 2022-04-28 RX ADMIN — BUPROPION HYDROCHLORIDE 150 MG: 150 TABLET, EXTENDED RELEASE ORAL at 18:04

## 2022-04-28 NOTE — PROGRESS NOTES
[facility] Adult  Hospitalist Group                                                                                          Hospitalist Progress Note  Susanne Merlos NP  Answering service: [phone] OR 36 from in house phone        Date of Service:  2022  NAME:  Rick Baker  :  1950  MRN:  [id number]      Admission Summary:   Per H&P, Rick Baker is a 66 y.o. male who presents with fall and right hip,  [age]Y male with a history of CAD, CVA with mild left side weakness diabetes, hypertension and BPH. Patient presented to the ED after a fall he said he tripped and fall backwards while vacuuming at home. The accident occurred 1 to 2 hours ago. He landed on hard floor. There was no blood loss. The point of impact was the left hip. The pain is moderate. He was not ambulatory at the scene. There was no entrapment after the fall. There was no alcohol use involved in the accident. Associated symptoms include extremity weakness (baseline LLE weakness). Pertinent negatives include no vomiting, no headaches, no loss of consciousness and no laceration. The risk factors include being elderly and recurrent falls (previous stroke).  Associated symptoms comments: +R knee pain. The symptoms are aggravated by activity, use of injured limb and pressure on injury. Interval history / Subjective:   Patient seen on morning rounds.   No complaints the moment of the encounter     Assessment & Plan:     Left hip fracture  Right quadriceps tendon rupture  S/p hemiarthroplasty and quadriceps repair on   Continuing multimodal pain control  Continuing DVT prophylaxis  Orthopedics following, thank you for recommendations  To follow-up with Dr. Aimee Cruz for hip and Dr. Vimal Salvador for quadricep  Weightbearing as tolerated  Knee immobilizer      Acute urinary retention  Failed intermittent catheterization  Indwelling catheter placed   Voiding trial and 5 to 7 days  Continuing tamsulosin  Continuing finasteride      Acute blood loss anemia  Likely due to OR losses  Hemoglobin was 12 at presentation  For the past 2 days has been in the 8 range  We will transfuse for hemoglobin less than 7  We will continue to monitor    CKd stage III  Did have mildly elevated creatinine, 1.38, baseline is somewhere around 1.2  Has resolved with IV hydration     Type 2 diabetes  Blood sugar currently controlled holding oral hypoglycemics  SSI for now      Hypertension  Pressure currently controlled  Continue carvedilol        Code status: FULL  DVT prophylaxis: LMWH    Care Plan discussed with: Patient/Family, Nurse and   Anticipated Disposition: [facility]/Rehab  Anticipated Discharge: 24 hours to 50 hours     Hospital Problems  Date Reviewed: 4/25/2022          Codes Class Noted POA    Quadriceps tendon rupture, right, initial encounter ICD-10-CM: W15.003H  ICD-9-CM: 843.8  4/25/2022 Yes        * (Principal) Left displaced femoral neck fracture ([facility] [address] ICD-10-CM: I80.894C  ICD-9-CM: 820.8  4/24/2022 Yes                Review of Systems:   A comprehensive review of systems was negative except for that written in the HPI. Vital Signs:    Last 24hrs VS reviewed since prior progress note. Most recent are:  Visit Vitals  /68 (BP 1 Location: Right upper arm, BP Patient Position: At rest)   Pulse 70   Temp 98 °F (36.7 °C)   Resp 16   SpO2 95%         Intake/Output Summary (Last 24 hours) at 4/28/2022 1715  Last data filed at 4/28/2022 1119  Gross per 24 hour   Intake --   Output 1000 ml   Net -1000 ml        Physical Examination:     I had a face to face encounter with this patient and independently examined them on 4/28/2022 as outlined below:          Constitutional:  No acute distress, cooperative, pleasant    ENT:  Oral mucosa moist, oropharynx benign. Resp:  CTA bilaterally. No wheezing/rhonchi/rales.  No accessory muscle use   CV:  Regular rhythm, normal rate, no murmurs, gallops, rubs    GI:  Soft, non distended, non tender. normoactive bowel sounds, no hepatosplenomegaly     Musculoskeletal:  No edema, warm, 2+ pulses throughout    Neurologic:  Moves all extremities. AAOx3, CN II-XII reviewed            Data Review:    Review and/or order of clinical lab test  Review and/or order of tests in the radiology section of [facility]      Labs:     Recent Labs     04/28/22  0404 04/27/22  0349 04/26/22  0358 04/26/22  0358   WBC  --  10.6  --  15.4*   HGB 8.7* 8.8*   < > 10.7*   HCT  --  26.3*  --  33.0*   PLT  --  151  --  186    < > = values in this interval not displayed. Recent Labs     04/28/22  0404 04/27/22  0350 04/26/22  0358    139 142   K 3.6 3.4* 3.5   * 107 109*   CO2 27 27 29   BUN 14 16 18   CREA 0.95 1.06 1.16   * 157* 185*   CA 8.3* 7.8* 8.5     No results for input(s): ALT, AP, TBIL, TBILI, TP, ALB, GLOB, GGT, AML, LPSE in the last 72 hours. No lab exists for component: SGOT, GPT, AMYP, HLPSE  No results for input(s): INR, PTP, APTT, INREXT in the last 72 hours. No results for input(s): FE, TIBC, PSAT, FERR in the last 72 hours. No results found for: FOL, RBCF   No results for input(s): PH, PCO2, PO2 in the last 72 hours. No results for input(s): CPK, CKNDX, TROIQ in the last 72 hours.     No lab exists for component: CPKMB  Lab Results   Component Value Date/Time    Cholesterol, total 142 01/12/2022 12:45 PM    HDL Cholesterol 64 01/12/2022 12:45 PM    LDL, calculated 60.6 01/12/2022 12:45 PM    Triglyceride 87 01/12/2022 12:45 PM    CHOL/HDL Ratio 2.2 01/12/2022 12:45 PM     Lab Results   Component Value Date/Time    Glucose (POC) 105 04/28/2022 04:20 PM    Glucose (POC) 175 (H) 04/28/2022 11:09 AM    Glucose (POC) 135 (H) 04/28/2022 06:26 AM    Glucose (POC) 156 (H) 04/27/2022 09:48 PM    Glucose (POC) 132 (H) 04/27/2022 05:07 PM     Lab Results   Component Value Date/Time    Color YELLOW/STRAW 07/18/2017 05:29 PM    Appearance CLEAR 07/18/2017 05:29 PM    Specific gravity 1.022 07/18/2017 05:29 PM    pH (UA) 5.5 07/18/2017 05:29 PM    Protein 30 (A) 07/18/2017 05:29 PM    Glucose NEGATIVE  07/18/2017 05:29 PM    Ketone NEGATIVE  07/18/2017 05:29 PM    Bilirubin NEGATIVE  07/18/2017 05:29 PM    Urobilinogen 0.2 07/18/2017 05:29 PM    Nitrites NEGATIVE  07/18/2017 05:29 PM    Leukocyte Esterase NEGATIVE  07/18/2017 05:29 PM    Epithelial cells FEW 07/18/2017 05:29 PM    Bacteria NEGATIVE  07/18/2017 05:29 PM    WBC 0-4 07/18/2017 05:29 PM    RBC 0-5 07/18/2017 05:29 PM         Medications Reviewed:     Current Facility-Administered Medications   Medication Dose Route Frequency    potassium chloride SR (KLOR-CON 10) tablet 40 mEq  40 mEq Oral DAILY    sodium chloride (NS) flush 5-40 mL  5-40 mL IntraVENous Q8H    sodium chloride (NS) flush 5-40 mL  5-40 mL IntraVENous PRN    naloxone (NARCAN) injection 0.4 mg  0.4 mg IntraVENous PRN    senna-docusate (PERICOLACE) 8.6-50 mg per tablet 1 Tablet  1 Tablet Oral BID    polyethylene glycol (MIRALAX) packet 17 g  17 g Oral DAILY    bisacodyL (DULCOLAX) suppository 10 mg  10 mg Rectal DAILY PRN    oxyCODONE IR (ROXICODONE) tablet 2.5 mg  2.5 mg Oral Q4H PRN    oxyCODONE IR (ROXICODONE) tablet 5 mg  5 mg Oral Q4H PRN    oxyCODONE IR (ROXICODONE) tablet 10 mg  10 mg Oral Q4H PRN    enoxaparin (LOVENOX) injection 30 mg  30 mg SubCUTAneous Q12H    0.9% sodium chloride with KCl 40 mEq/L infusion   IntraVENous CONTINUOUS    fentaNYL citrate (PF) injection 50 mcg  50 mcg IntraVENous Q4H PRN    prochlorperazine (COMPAZINE) with saline injection 5 mg  5 mg IntraVENous Q6H PRN    sodium chloride (NS) flush 5-40 mL  5-40 mL IntraVENous Q8H    sodium chloride (NS) flush 5-40 mL  5-40 mL IntraVENous PRN    acetaminophen (TYLENOL) tablet 650 mg  650 mg Oral Q6H PRN    Or    acetaminophen (TYLENOL) suppository 650 mg  650 mg Rectal Q6H PRN    polyethylene glycol (MIRALAX) packet 17 g  17 g Oral DAILY PRN    ondansetron (ZOFRAN ODT) tablet 4 mg  4 mg Oral Q8H PRN    Or    ondansetron (ZOFRAN) injection 4 mg  4 mg IntraVENous Q6H PRN    atorvastatin (LIPITOR) tablet 40 mg  40 mg Oral DAILY    buPROPion SR (WELLBUTRIN SR) tablet 150 mg  150 mg Oral BID    finasteride (PROSCAR) tablet 5 mg  5 mg Oral QHS    tamsulosin (FLOMAX) capsule 0.4 mg  0.4 mg Oral DAILY    carvediloL (COREG) tablet 12.5 mg  12.5 mg Oral BID WITH MEALS    glucose chewable tablet 16 g  4 Tablet Oral PRN    dextrose 10 % infusion 0-250 mL  0-250 mL IntraVENous PRN    glucagon (GLUCAGEN) injection 1 mg  1 mg IntraMUSCular PRN    insulin lispro (HUMALOG) injection   SubCUTAneous AC&HS     ______________________________________________________________________  EXPECTED LENGTH OF STAY: 4d 2h  ACTUAL LENGTH OF STAY:          4                 Jw Walters NP

## 2022-04-28 NOTE — PROGRESS NOTES
Ortho Daily Progress Note    4/28/2022    POD:  3 Days Post-Op  S/P:  Procedure(s):  Left hip hemiarthroplasty + Right quadriceps tendon repair    HPI: 65 yo M that is POD3, s/p Left hip hemiarthroplasty with Dr. Boone Martel and Right quadrieps repair with Dr. Charbel Holder on 4/25/22. He reports that his pain is well controlled on medications. He does have a left dropfoot at baseline and wears special shoes to keep his foot planted. LABS:  Lab Results   Component Value Date/Time    HGB 8.7 (L) 04/28/2022 04:04 AM    INR 1.0 06/23/2017 05:37 PM    INR (POC) <0.9 06/23/2017 04:50 PM     Recent Results (from the past 12 hour(s))   HEMOGLOBIN    Collection Time: 04/28/22  4:04 AM   Result Value Ref Range    HGB 8.7 (L) 12.1 - 76.6 g/dL   METABOLIC PANEL, BASIC    Collection Time: 04/28/22  4:04 AM   Result Value Ref Range    Sodium 142 136 - 145 mmol/L    Potassium 3.6 3.5 - 5.1 mmol/L    Chloride 109 (H) 97 - 108 mmol/L    CO2 27 21 - 32 mmol/L    Anion gap 6 5 - 15 mmol/L    Glucose 113 (H) 65 - 100 mg/dL    BUN 14 6 - 20 MG/DL    Creatinine 0.95 0.70 - 1.30 MG/DL    BUN/Creatinine ratio 15 12 - 20      GFR est AA >60 >60 ml/min/1.73m2    GFR est non-AA >60 >60 ml/min/1.73m2    Calcium 8.3 (L) 8.5 - 10.1 MG/DL   GLUCOSE, POC    Collection Time: 04/28/22  6:26 AM   Result Value Ref Range    Glucose (POC) 135 (H) 65 - 117 mg/dL    Performed by Criss Collet travel RN          ORTHOPEDIC PHYSICAL EXAM:  LLE MSK: Left hip with dressing to the lateral hip. Dressing is C/D/I. Compartments of the LLE are soft and compressible, without pain. 1+ DP pulse. Cap refill is brisk. Sensation to light touch intact to the medial/lateral/posterior LLE. Unable to DF/PF (baseline drop foot). Negative Hommans sign. RLE MSK: Right knee in knee immobilizer. Right knee with dressing and ace wrap. Dressings are C/D/I, removed to reveal midline surgical incision over the knee, closed with staples.  Surgical incision without drainage. Mild swelling to the knee. Without erythema and skin borders without evidence of dehiscence. Compartments of the RLE are soft and compressible, without pain. Mild TTP to the right aspect of the knee. SILT to the lower extremity and foot. 2+ DP pulses. Cap refill brisk. Able to wiggle toes. ORTHOPEDIC ASSESSMENT:   Left hip displaced femoral neck   Fracture that is now s/p Left hip hemiarthroplasty  Right quadriceps tendon rupture that is s/p Right quadriceps tendon repair    ORTHOPEDIC PLAN:  1. Followed by Dr. Yvette Nevarez (hip) and Dr. Nadia Valenzuela (quadricep)  2. Ortho plan: No further ortho surgical plans at this time. 3. DVT ppx: Lovenox 30mg twice day; SCDs  4. Pain control: Adequate; per admitting team, ice and elevation. Ace wrap prn.   5. Dressing: May leave dressing and ace wrap in place if remains C/D/I. Change prn for saturation with dry sterile dressing and ace wrap. 6. Activity: WBAT LLE, WBAT RLE in KI at all times. KI can be left open only when in bed for comfort and ice. No bending of the right knee. Hinge knee brace to be placed by Dr. Amanda Grewal team today. 7.   Dispo: Family plans for IPR, tbd. Pt is okay to be d/c from an ortho standpoint. Pt to f/u with Dr. Nadia Valenzuela in 14 days. Pt to f/u with Dr. Yvette Nevarez in 3 weeks.       [place], [address]

## 2022-04-28 NOTE — PROGRESS NOTES
Problem: Pressure Injury - Risk of  Goal: *Prevention of pressure injury  Description: Document Ilya Scale and appropriate interventions in the flowsheet. Outcome: Progressing Towards Goal  Note: Pressure Injury Interventions:  Sensory Interventions: Assess changes in LOC    Moisture Interventions: Absorbent underpads,Internal/External urinary devices    Activity Interventions: Pressure redistribution bed/mattress(bed type)    Mobility Interventions: HOB 30 degrees or less    Nutrition Interventions: Offer support with meals,snacks and hydration    Friction and Shear Interventions: HOB 30 degrees or less                Problem: Falls - Risk of  Goal: *Absence of Falls  Description: Document Ginger Fall Risk and appropriate interventions in the flowsheet.   Note: Fall Risk Interventions:  Mobility Interventions: PT Consult for mobility concerns    Mentation Interventions: Bed/chair exit alarm    Medication Interventions: Bed/chair exit alarm    Elimination Interventions: Call light in reach    History of Falls Interventions: Bed/chair exit alarm

## 2022-04-28 NOTE — PROGRESS NOTES
Problem: Self Care Deficits Care Plan (Adult)  Goal: *Acute Goals and Plan of Care (Insert Text)  Description: FUNCTIONAL STATUS PRIOR TO ADMISSION: Patient was modified independent using a single point cane for functional mobility and ADL completion. Reports standing to shower at walk-in shower. HOME SUPPORT: The patient lived with wife but did not require assist.    Occupational Therapy Goals  Initiated 4/27/2022  1. Patient will perform bathing with moderate assistance  within 7 day(s). 2.  Patient will perform lower body dressing with maximal assistance  within 7 day(s). 3.  Patient will perform RW grooming with minimal assistance within 7 day(s). 4.  Patient will perform RW toilet transfers, EOB to [facility], with moderate assistance within 7 day(s). 5.  Patient will perform all aspects of RW toileting with moderate assistance within 7 day(s). Outcome: Progressing Towards Goal     OCCUPATIONAL THERAPY TREATMENT  Patient: Jessica Carpenter (1 y.o. male)  Date: 4/28/2022  Diagnosis: Closed left hip fracture ([address] [S72.002A] Left displaced femoral neck fracture (HCC)  Procedure(s) (LRB):  LEFT HIP HEMIARTHROPLASTY (Left)  QUADRICEPS REPAIR (Right) 3 Days Post-Op  Precautions: Fall,WBAT (WBAT B LE, R LE locked in knee ext)  Chart, occupational therapy assessment, plan of care, and goals were reviewed. ASSESSMENT  Patient continues with skilled OT services and is progressing towards goals. Pt progressing with mobility/balance and activity tolerance, with great engagement with presented challenges this date. Pt with decreased c/o pain with movement, as well as, noted with increased stand tolerance (~5 minutes at RW with no LOB). Pt also progressing with safe transfer technique, benefiting from less cues for hand placement pre/post transfer.   Given complexity of pt's current medical hx, as well as, pt's active engagement with presented challenges, reporting therapist believes pt would greatly benefit from inpatient rehab to assist in return of functional independence. Acute OT to continue to follow during acute hospitalization. Current Level of Function Impacting Discharge (ADLs): Total A LE ADLs    Other factors to consider for discharge: hx CVA, RLE locked in knee extension with L hip hemiarthroplasty         PLAN :  Patient continues to benefit from skilled intervention to address the above impairments. Continue treatment per established plan of care to address goals. Recommend with staff: Bed level ADL engagement    Recommend next OT session: POC progression    Recommendation for discharge: (in order for the patient to meet his/her long term goals)  Therapy 3 hours per day 5-7 days per week    This discharge recommendation:  Has been made in collaboration with the attending provider and/or case management    IF patient discharges home will need the following DME: TBD       SUBJECTIVE:   Patient stated Tatiana Dumas was a little easier today.     OBJECTIVE DATA SUMMARY:   Cognitive/Behavioral Status:  Neurologic State: Alert  Orientation Level: Oriented X4  Cognition: Appropriate decision making; Appropriate for age attention/concentration; Appropriate safety awareness  Perception: Appears intact  Perseveration: No perseveration noted  Safety/Judgement: Awareness of environment; Insight into deficits;Home safety    Functional Mobility and Transfers for ADLs:  Bed Mobility:  Supine to Sit: Maximum assistance; Moderate assistance;Assist x2  Sit to Supine: Maximum assistance; Moderate assistance;Assist x2  Scooting: Maximum assistance    Transfers:  Sit to Stand:  Moderate assistance;Assist x1 (Min Ax2)     Pt educated on safe transfer techniques, with specific emphasis on proper hand placement to push up from seated surface rather than attempt to pull self up, fully positioning self in-front of desired seated location, feeling chair on back of legs and reaching back with 1-2 UE to slowly lower self to seated position. Balance:  Sitting: Impaired  Sitting - Static: Fair (occasional); Good (unsupported)  Sitting - Dynamic: Fair (occasional)  Standing: Impaired; With support  Standing - Static: Constant support; Fair  Standing - Dynamic : Constant support    ADL Intervention:  Lower Body Dressing Assistance  Socks: Total assistance (dependent)  Shoes with Elastic Laces: Total assistance (dependent)  Position Performed: Seated edge of bed    Cognitive Retraining  Safety/Judgement: Awareness of environment; Insight into deficits;Home safety    Pain:  Pt with c/o L hip pain and R thigh pain during movement, did not quantify; RN aware and following    Activity Tolerance:   Fair and requires frequent rest breaks    After treatment patient left in no apparent distress:   Supine in bed, Call bell within reach, Caregiver / family present, and Side rails x 3    COMMUNICATION/COLLABORATION:   The patients plan of care was discussed with: Physical therapy assistant, Registered nurse, and Case management.      Elo Jean OT  Time Calculation: 26 mins

## 2022-04-28 NOTE — PROGRESS NOTES
Problem: Falls - Risk of  Goal: *Absence of Falls  Description: Document Cindia Neigh Fall Risk and appropriate interventions in the flowsheet. Outcome: Progressing Towards Goal  Note: Fall Risk Interventions:  Mobility Interventions: Communicate number of staff needed for ambulation/transfer,OT consult for ADLs,Patient to call before getting OOB,PT Consult for mobility concerns,Bed/chair exit alarm    Mentation Interventions: Adequate sleep, hydration, pain control,Bed/chair exit alarm,Door open when patient unattended    Medication Interventions: Patient to call before getting OOB,Teach patient to arise slowly,Bed/chair exit alarm    Elimination Interventions: Call light in reach,Patient to call for help with toileting needs,Stay With Me (per policy),Toilet paper/wipes in reach    History of Falls Interventions: Door open when patient unattended,Investigate reason for fall,Room close to nurse's station,Bed/chair exit alarm         Problem: Patient Education: Go to Patient Education Activity  Goal: Patient/Family Education  Outcome: Progressing Towards Goal     Problem: Pressure Injury - Risk of  Goal: *Prevention of pressure injury  Description: Document Ilya Scale and appropriate interventions in the flowsheet.   Outcome: Progressing Towards Goal  Note: Pressure Injury Interventions:  Sensory Interventions: Chair cushion,Float heels,Minimize linen layers    Moisture Interventions: Absorbent underpads,Internal/External urinary devices    Activity Interventions: Pressure redistribution bed/mattress(bed type),PT/OT evaluation,Increase time out of bed    Mobility Interventions: Pressure redistribution bed/mattress (bed type),PT/OT evaluation,HOB 30 degrees or less    Nutrition Interventions: Offer support with meals,snacks and hydration,Document food/fluid/supplement intake    Friction and Shear Interventions: Lift sheet                Problem: Lower Extremity Fracture:Post-op Day 4  Goal: Activity/Safety  Outcome: Progressing Towards Goal  Note: Fall precautions.  PT evals    Goal: Nutrition/Diet  Outcome: Progressing Towards Goal  Goal: Respiratory  Outcome: Progressing Towards Goal  Note: Tolerating incentive spirometry x10/hr  Goal: Treatments/Interventions/Procedures  Outcome: Progressing Towards Goal  Goal: Psychosocial  Outcome: Progressing Towards Goal

## 2022-04-28 NOTE — PROGRESS NOTES
Problem: Falls - Risk of  Goal: *Absence of Falls  Description: Document Foll[place]bee [place] Fall Risk and appropriate interventions in the flowsheet. Outcome: Progressing Towards Goal  Variance Patient Condition  Note: Fall Risk Interventions:  Mobility Interventions: PT Consult for mobility concerns    Mentation Interventions: Bed/chair exit alarm    Medication Interventions: Bed/chair exit alarm    Elimination Interventions: Call light in reach    History of Falls Interventions: Bed/chair exit alarm         Problem: Patient Education: Go to Patient Education Activity  Goal: Patient/Family Education  Outcome: Progressing Towards Goal     Problem: Pressure Injury - Risk of  Goal: *Prevention of pressure injury  Description: Document Ilya Scale and appropriate interventions in the flowsheet.   Outcome: Progressing Towards Goal  Note: Pressure Injury Interventions:  Sensory Interventions: Assess changes in LOC    Moisture Interventions: Absorbent underpads,Internal/External urinary devices    Activity Interventions: Pressure redistribution bed/mattress(bed type)    Mobility Interventions: HOB 30 degrees or less    Nutrition Interventions: Offer support with meals,snacks and hydration    Friction and Shear Interventions: HOB 30 degrees or less                Problem: Lower Extremity Fracture:Post-op Day 2  Goal: Activity/Safety  Outcome: Progressing Towards Goal  Goal: Discharge Planning  Outcome: Progressing Towards Goal  Goal: Medications  Outcome: Progressing Towards Goal  Goal: Respiratory  Outcome: Progressing Towards Goal  Goal: *Optimal pain control at patient's stated goal  Outcome: Progressing Towards Goal  Goal: *Demonstrates progressive activity  Outcome: Progressing Towards Goal  Goal: *Voiding  Outcome: Progressing Towards Goal  Goal: *Bowel movement  Outcome: Progressing Towards Goal  Goal: *Tolerating diet  Outcome: Progressing Towards Goal

## 2022-04-28 NOTE — PROGRESS NOTES
Problem: Mobility Impaired (Adult and Pediatric)  Goal: *Acute Goals and Plan of Care (Insert Text)  Description: FUNCTIONAL STATUS PRIOR TO ADMISSION: Patient was modified independent using a single point cane for functional mobility. 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 HOME SUPPORT PRIOR TO ADMISSION: The patient lived with wife but did not require assist.    Physical Therapy Goals  Initiated 4/26/2022  1. Patient will move from supine to sit and sit to supine  in bed with moderate assistance  within 7 day(s). 2.  Patient will transfer from bed to chair and chair to bed with moderate assistance  using the least restrictive device within 7 day(s). 3.  Patient will perform sit to stand with maximal assistance within 7 day(s). 4.  Patient will ambulate with maximal assistance for 5 feet with the least restrictive device within 7 day(s). Outcome: Progressing Towards Goal   PHYSICAL THERAPY TREATMENT  Patient: Armand Apley (86 y.o. male)  Date: 4/28/2022  Diagnosis: Closed left hip fracture ([facility] [address] [S72.002A] Left displaced femoral neck fracture (HCC)  Procedure(s) (LRB):  LEFT HIP HEMIARTHROPLASTY (Left)  QUADRICEPS REPAIR (Right) 3 Days Post-Op  Precautions: Fall,WBAT (WBAT B LE, R LE locked in knee ext)  Chart, physical therapy assessment, plan of care and goals were reviewed. ASSESSMENT  Patient continues with skilled PT services and is progressing towards goals. Continues to  require heavy Assist x2 to complete bed mobility and Mod Ax1 (min Ax2) to stand from elevated bed height to RW. Pt required less verbal cuing this afternoon and demonstrated improved hand and RLE placement during sit<>stand transfer. During stand x1, he stood for approx 5-7 minutes w/ CGA Ax1-2. He completed two additional stands after seated rest at EOB. Will plan for bed<>chair transfer tomorrow as appropriate.      Current Level of Function Impacting Discharge (mobility/balance): Mod-Max Ax2 for bed mobility; Mod Ax1 (Min Ax2) for sit<>stand x3 at bedside. Other factors to consider for discharge: Left Hip Rodney (WBAT; RLE Quadriceps repair w/RLE locked in extension in hinged knee brace         PLAN :  Patient continues to benefit from skilled intervention to address the above impairments. Continue treatment per established plan of care. to address goals. Recommendation for discharge: (in order for the patient to meet his/her long term goals)  Therapy 3 hours per day 5-7 days per week    This discharge recommendation:  Has been made in collaboration with the attending provider and/or case management    IF patient discharges home will need the following DME: patient owns DME required for discharge       SUBJECTIVE:   Patient stated My arm wasn't quite long enough. I'll work on that.     OBJECTIVE DATA SUMMARY:   Critical Behavior:  Neurologic State: Alert  Orientation Level: Oriented X4  Cognition: Appropriate decision making,Appropriate for age attention/concentration,Appropriate safety awareness  Safety/Judgement: Awareness of environment,Insight into deficits,Home safety  Functional Mobility Training:  Bed Mobility:     Supine to Sit: Maximum assistance; Moderate assistance;Assist x2  Sit to Supine: Maximum assistance; Moderate assistance;Assist x2  Scooting: Maximum assistance        Transfers:  Sit to Stand: Moderate assistance;Assist x1 (Min Ax2)  Stand to Sit: Moderate assistance;Assist x2 (Min Ax2; cues to advance RLE forward)                             Balance:  Sitting: Impaired  Sitting - Static: Fair (occasional); Good (unsupported)  Sitting - Dynamic: Fair (occasional)  Standing: Impaired; With support  Standing - Static: Constant support; Fair  Standing - Dynamic : Constant support  Ambulation/Gait Training:                    Right Side Weight Bearing: As tolerated (w/ hinged knee brace locked in extension)  Left Side Weight Bearing: As tolerated Pain Rating:  Pt did not have verbal c/o pain this session    Activity Tolerance:   Good    After treatment patient left in no apparent distress:   Supine in bed, Call bell within reach, Caregiver / family present, and Side rails x 3    COMMUNICATION/COLLABORATION:   The patients plan of care was discussed with: Occupational therapist and Registered nurse.      Ivelisse Green,PTA   Time Calculation: 33 mins

## 2022-04-29 LAB
ALBUMIN SERPL-MCNC: 2.2 G/DL (ref 3.5–5)
ALBUMIN/GLOB SERPL: 0.6 {RATIO} (ref 1.1–2.2)
ALP SERPL-CCNC: 59 U/L (ref 45–117)
ALT SERPL-CCNC: 35 U/L (ref 12–78)
ANION GAP SERPL CALC-SCNC: 6 MMOL/L (ref 5–15)
AST SERPL-CCNC: 31 U/L (ref 15–37)
BASOPHILS # BLD: 0.1 K/UL (ref 0–0.1)
BASOPHILS NFR BLD: 1 % (ref 0–1)
BILIRUB SERPL-MCNC: 0.6 MG/DL (ref 0.2–1)
BUN SERPL-MCNC: 16 MG/DL (ref 6–20)
BUN/CREAT SERPL: 18 (ref 12–20)
CALCIUM SERPL-MCNC: 8.3 MG/DL (ref 8.5–10.1)
CHLORIDE SERPL-SCNC: 107 MMOL/L (ref 97–108)
CO2 SERPL-SCNC: 26 MMOL/L (ref 21–32)
CREAT SERPL-MCNC: 0.91 MG/DL (ref 0.7–1.3)
DIFFERENTIAL METHOD BLD: ABNORMAL
EOSINOPHIL # BLD: 0.4 K/UL (ref 0–0.4)
EOSINOPHIL NFR BLD: 4 % (ref 0–7)
ERYTHROCYTE [DISTWIDTH] IN BLOOD BY AUTOMATED COUNT: 12.8 % (ref 11.5–14.5)
GLOBULIN SER CALC-MCNC: 3.4 G/DL (ref 2–4)
GLUCOSE BLD STRIP.AUTO-MCNC: 121 MG/DL (ref 65–117)
GLUCOSE BLD STRIP.AUTO-MCNC: 144 MG/DL (ref 65–117)
GLUCOSE BLD STRIP.AUTO-MCNC: 155 MG/DL (ref 65–117)
GLUCOSE BLD STRIP.AUTO-MCNC: 161 MG/DL (ref 65–117)
GLUCOSE SERPL-MCNC: 120 MG/DL (ref 65–100)
HCT VFR BLD AUTO: 24.8 % (ref 36.6–50.3)
HGB BLD-MCNC: 8.1 G/DL (ref 12.1–17)
IMM GRANULOCYTES # BLD AUTO: 0.1 K/UL (ref 0–0.04)
IMM GRANULOCYTES NFR BLD AUTO: 1 % (ref 0–0.5)
LYMPHOCYTES # BLD: 1.2 K/UL (ref 0.8–3.5)
LYMPHOCYTES NFR BLD: 12 % (ref 12–49)
MCH RBC QN AUTO: 31.6 PG (ref 26–34)
MCHC RBC AUTO-ENTMCNC: 32.7 G/DL (ref 30–36.5)
MCV RBC AUTO: 96.9 FL (ref 80–99)
MONOCYTES # BLD: 1.3 K/UL (ref 0–1)
MONOCYTES NFR BLD: 13 % (ref 5–13)
NEUTS SEG # BLD: 6.7 K/UL (ref 1.8–8)
NEUTS SEG NFR BLD: 69 % (ref 32–75)
NRBC # BLD: 0 K/UL (ref 0–0.01)
NRBC BLD-RTO: 0 PER 100 WBC
PLATELET # BLD AUTO: 182 K/UL (ref 150–400)
PMV BLD AUTO: 11.4 FL (ref 8.9–12.9)
POTASSIUM SERPL-SCNC: 4 MMOL/L (ref 3.5–5.1)
PROT SERPL-MCNC: 5.6 G/DL (ref 6.4–8.2)
RBC # BLD AUTO: 2.56 M/UL (ref 4.1–5.7)
SERVICE CMNT-IMP: ABNORMAL
SODIUM SERPL-SCNC: 139 MMOL/L (ref 136–145)
WBC # BLD AUTO: 9.7 K/UL (ref 4.1–11.1)

## 2022-04-29 PROCEDURE — 85025 COMPLETE CBC W/AUTO DIFF WBC: CPT

## 2022-04-29 PROCEDURE — 74011636637 HC RX REV CODE- 636/637: Performed by: PHYSICIAN ASSISTANT

## 2022-04-29 PROCEDURE — 80053 COMPREHEN METABOLIC PANEL: CPT

## 2022-04-29 PROCEDURE — 74011250637 HC RX REV CODE- 250/637: Performed by: HOSPITALIST

## 2022-04-29 PROCEDURE — 97530 THERAPEUTIC ACTIVITIES: CPT

## 2022-04-29 PROCEDURE — 36415 COLL VENOUS BLD VENIPUNCTURE: CPT

## 2022-04-29 PROCEDURE — 82962 GLUCOSE BLOOD TEST: CPT

## 2022-04-29 PROCEDURE — 74011000250 HC RX REV CODE- 250: Performed by: PHYSICIAN ASSISTANT

## 2022-04-29 PROCEDURE — 65270000029 HC RM PRIVATE

## 2022-04-29 PROCEDURE — 74011250637 HC RX REV CODE- 250/637: Performed by: PHYSICIAN ASSISTANT

## 2022-04-29 PROCEDURE — 74011250636 HC RX REV CODE- 250/636: Performed by: PHYSICIAN ASSISTANT

## 2022-04-29 RX ORDER — ASPIRIN 81 MG/1
81 TABLET ORAL DAILY
Qty: 30 TABLET | Refills: 0 | Status: SHIPPED
Start: 2022-04-29

## 2022-04-29 RX ADMIN — POLYETHYLENE GLYCOL 3350 17 G: 17 POWDER, FOR SOLUTION ORAL at 10:50

## 2022-04-29 RX ADMIN — OXYCODONE 2.5 MG: 5 TABLET ORAL at 03:23

## 2022-04-29 RX ADMIN — SENNOSIDES AND DOCUSATE SODIUM 1 TABLET: 50; 8.6 TABLET ORAL at 10:50

## 2022-04-29 RX ADMIN — ATORVASTATIN CALCIUM 40 MG: 40 TABLET, FILM COATED ORAL at 10:50

## 2022-04-29 RX ADMIN — POTASSIUM CHLORIDE 40 MEQ: 750 TABLET, EXTENDED RELEASE ORAL at 10:49

## 2022-04-29 RX ADMIN — Medication 2 UNITS: at 07:38

## 2022-04-29 RX ADMIN — BUPROPION HYDROCHLORIDE 150 MG: 150 TABLET, EXTENDED RELEASE ORAL at 19:23

## 2022-04-29 RX ADMIN — BUPROPION HYDROCHLORIDE 150 MG: 150 TABLET, EXTENDED RELEASE ORAL at 10:50

## 2022-04-29 RX ADMIN — OXYCODONE 5 MG: 5 TABLET ORAL at 19:23

## 2022-04-29 RX ADMIN — ENOXAPARIN SODIUM 30 MG: 100 INJECTION SUBCUTANEOUS at 22:15

## 2022-04-29 RX ADMIN — CARVEDILOL 12.5 MG: 6.25 TABLET, FILM COATED ORAL at 19:23

## 2022-04-29 RX ADMIN — ENOXAPARIN SODIUM 30 MG: 100 INJECTION SUBCUTANEOUS at 10:50

## 2022-04-29 RX ADMIN — TAMSULOSIN HYDROCHLORIDE 0.4 MG: 0.4 CAPSULE ORAL at 10:50

## 2022-04-29 RX ADMIN — FINASTERIDE 5 MG: 5 TABLET, FILM COATED ORAL at 22:15

## 2022-04-29 RX ADMIN — SENNOSIDES AND DOCUSATE SODIUM 1 TABLET: 50; 8.6 TABLET ORAL at 19:23

## 2022-04-29 RX ADMIN — SODIUM CHLORIDE, PRESERVATIVE FREE 10 ML: 5 INJECTION INTRAVENOUS at 07:39

## 2022-04-29 RX ADMIN — CARVEDILOL 12.5 MG: 6.25 TABLET, FILM COATED ORAL at 07:38

## 2022-04-29 RX ADMIN — Medication 2 UNITS: at 12:56

## 2022-04-29 NOTE — PROGRESS NOTES
[id number] Adult  Hospitalist Group                                                                                          Hospitalist Progress Note  Giselle Virgen NP  Answering service: [phone] from in house phone        Date of Service:  2022  NAME:  Rashi Velasco  :  1950  MRN:  [id number]      Admission Summary:   Per H&P, Rashi Velasco is a 66 y.o. male who presents with fall and right hip,  [age]Y male with a history of CAD, CVA with mild left side weakness diabetes, hypertension and BPH. Patient presented to the ED after a fall he said he tripped and fall backwards while vacuuming at home. The accident occurred 1 to 2 hours ago. He landed on hard floor. There was no blood loss. The point of impact was the left hip. The pain is moderate. He was not ambulatory at the scene. There was no entrapment after the fall. There was no alcohol use involved in the accident. Associated symptoms include extremity weakness (baseline LLE weakness). Pertinent negatives include no vomiting, no headaches, no loss of consciousness and no laceration. The risk factors include being elderly and recurrent falls (previous stroke).  Associated symptoms comments: +R knee pain. The symptoms are aggravated by activity, use of injured limb and pressure on injury. Interval history / Subjective:   I saw the patient this morning on rounds.   No acute events overnight     Assessment & Plan:     Left hip fracture  Right quadriceps tendon rupture  S/p hemiarthroplasty and quadriceps repair on   Orthopedics following, thank you for recommendations  To follow-up with Dr. Silvia Mills for hip and Dr. Mia Funk for quadricep  Weightbearing as tolerated  Knee immobilizer  Continuing multimodal pain control  Working with physical therapy      Acute urinary retention  Failed intermittent catheterization  Indwelling catheter placed   Continue finasteride  Continue tamsulosin  Voiding trial with urology follow-up in 5 to 7 days          Acute blood loss anemia  Likely due to OR losses  Hemoglobin was 12 at presentation  Hemoglobin remains in the 8 range  We will transfuse for hemoglobin less than 7  We will continue to monitor    CKd stage III  Did have mildly elevated creatinine, 1.38, baseline is somewhere around 1.2  Has resolved with IV hydration creatinine today 0.91    Type 2 diabetes  Blood sugar currently controlled   Holding oral hypoglycemics  SSI for now         Hypertension  Pressure currently controlled  Continue carvedilol        Code status: FULL  DVT prophylaxis: LMWH    Care Plan discussed with: Patient/Family, Nurse and   Anticipated Disposition: SNF versus home with home health, inpatient rehab was denied, a peer to peer was done today with the medical director of the insurance company, denied    Anticipated Discharge: 24 hours to 48 hours     Hospital Problems  Date Reviewed: 4/25/2022          Codes Class Noted POA    Quadriceps tendon rupture, right, initial encounter ICD-10-CM: K95.275R  ICD-9-CM: 843.8  4/25/2022 Yes        * (Principal) Left displaced femoral neck fracture ([facility] [address] ICD-10-CM: E78.417Q  ICD-9-CM: 820.8  4/24/2022 Yes                Review of Systems:   A comprehensive review of systems was negative except for that written in the HPI. Vital Signs:    Last 24hrs VS reviewed since prior progress note.  Most recent are:  Visit Vitals  BP (!) 144/75 (BP 1 Location: Right upper arm, BP Patient Position: At rest;Supine)   Pulse 74   Temp 98 °F (36.7 °C)   Resp 16   SpO2 99%         Intake/Output Summary (Last 24 hours) at 4/29/2022 1100  Last data filed at 4/29/2022 9917  Gross per 24 hour   Intake 600 ml   Output 1000 ml   Net -400 ml        Physical Examination:     I had a face to face encounter with this patient and independently examined them on 4/29/2022 as outlined below:          Constitutional:  No acute distress, cooperative, pleasant    ENT:  Oral mucosa moist, oropharynx benign. Resp:  CTA bilaterally. No wheezing/rhonchi/rales. No accessory muscle use   CV:  Regular rhythm, normal rate, no murmurs, gallops, rubs    GI:  Soft, non distended, non tender. normoactive bowel sounds, no hepatosplenomegaly     Musculoskeletal:  No edema, warm, 2+ pulses throughout    Neurologic:  Moves all extremities. AAOx3, CN II-XII reviewed            Data Review:    Review and/or order of clinical lab test  Review and/or order of tests in the radiology section of [facility]      Labs:     Recent Labs     04/29/22 0334 04/28/22  0404 04/27/22  0349 04/27/22  0349   WBC 9.7  --   --  10.6   HGB 8.1* 8.7*   < > 8.8*   HCT 24.8*  --   --  26.3*     --   --  151    < > = values in this interval not displayed. Recent Labs     04/29/22 0334 04/28/22 0404 04/27/22  0350    142 139   K 4.0 3.6 3.4*    109* 107   CO2 26 27 27   BUN 16 14 16   CREA 0.91 0.95 1.06   * 113* 157*   CA 8.3* 8.3* 7.8*     Recent Labs     04/29/22 0334   ALT 35   AP 59   TBILI 0.6   TP 5.6*   ALB 2.2*   GLOB 3.4     No results for input(s): INR, PTP, APTT, INREXT, INREXT in the last 72 hours. No results for input(s): FE, TIBC, PSAT, FERR in the last 72 hours. No results found for: FOL, RBCF   No results for input(s): PH, PCO2, PO2 in the last 72 hours. No results for input(s): CPK, CKNDX, TROIQ in the last 72 hours.     No lab exists for component: CPKMB  Lab Results   Component Value Date/Time    Cholesterol, total 142 01/12/2022 12:45 PM    HDL Cholesterol 64 01/12/2022 12:45 PM    LDL, calculated 60.6 01/12/2022 12:45 PM    Triglyceride 87 01/12/2022 12:45 PM    CHOL/HDL Ratio 2.2 01/12/2022 12:45 PM     Lab Results   Component Value Date/Time    Glucose (POC) 144 (H) 04/29/2022 06:26 AM    Glucose (POC) 125 (H) 04/28/2022 08:57 PM    Glucose (POC) 105 04/28/2022 04:20 PM    Glucose (POC) 175 (H) 04/28/2022 11:09 AM    Glucose (POC) 135 (H) 04/28/2022 06:26 AM     Lab Results Component Value Date/Time    Color YELLOW/STRAW 07/18/2017 05:29 PM    Appearance CLEAR 07/18/2017 05:29 PM    Specific gravity 1.022 07/18/2017 05:29 PM    pH (UA) 5.5 07/18/2017 05:29 PM    Protein 30 (A) 07/18/2017 05:29 PM    Glucose NEGATIVE  07/18/2017 05:29 PM    Ketone NEGATIVE  07/18/2017 05:29 PM    Bilirubin NEGATIVE  07/18/2017 05:29 PM    Urobilinogen 0.2 07/18/2017 05:29 PM    Nitrites NEGATIVE  07/18/2017 05:29 PM    Leukocyte Esterase NEGATIVE  07/18/2017 05:29 PM    Epithelial cells FEW 07/18/2017 05:29 PM    Bacteria NEGATIVE  07/18/2017 05:29 PM    WBC 0-4 07/18/2017 05:29 PM    RBC 0-5 07/18/2017 05:29 PM         Medications Reviewed:     Current Facility-Administered Medications   Medication Dose Route Frequency    sodium chloride (NS) flush 5-40 mL  5-40 mL IntraVENous Q8H    sodium chloride (NS) flush 5-40 mL  5-40 mL IntraVENous PRN    naloxone (NARCAN) injection 0.4 mg  0.4 mg IntraVENous PRN    senna-docusate (PERICOLACE) 8.6-50 mg per tablet 1 Tablet  1 Tablet Oral BID    polyethylene glycol (MIRALAX) packet 17 g  17 g Oral DAILY    bisacodyL (DULCOLAX) suppository 10 mg  10 mg Rectal DAILY PRN    oxyCODONE IR (ROXICODONE) tablet 2.5 mg  2.5 mg Oral Q4H PRN    oxyCODONE IR (ROXICODONE) tablet 5 mg  5 mg Oral Q4H PRN    oxyCODONE IR (ROXICODONE) tablet 10 mg  10 mg Oral Q4H PRN    enoxaparin (LOVENOX) injection 30 mg  30 mg SubCUTAneous Q12H    fentaNYL citrate (PF) injection 50 mcg  50 mcg IntraVENous Q4H PRN    prochlorperazine (COMPAZINE) with saline injection 5 mg  5 mg IntraVENous Q6H PRN    sodium chloride (NS) flush 5-40 mL  5-40 mL IntraVENous Q8H    sodium chloride (NS) flush 5-40 mL  5-40 mL IntraVENous PRN    acetaminophen (TYLENOL) tablet 650 mg  650 mg Oral Q6H PRN    Or    acetaminophen (TYLENOL) suppository 650 mg  650 mg Rectal Q6H PRN    polyethylene glycol (MIRALAX) packet 17 g  17 g Oral DAILY PRN    ondansetron (ZOFRAN ODT) tablet 4 mg  4 mg Oral Q8H PRN    Or    ondansetron (ZOFRAN) injection 4 mg  4 mg IntraVENous Q6H PRN    atorvastatin (LIPITOR) tablet 40 mg  40 mg Oral DAILY    buPROPion SR (WELLBUTRIN SR) tablet 150 mg  150 mg Oral BID    finasteride (PROSCAR) tablet 5 mg  5 mg Oral QHS    tamsulosin (FLOMAX) capsule 0.4 mg  0.4 mg Oral DAILY    carvediloL (COREG) tablet 12.5 mg  12.5 mg Oral BID WITH MEALS    glucose chewable tablet 16 g  4 Tablet Oral PRN    dextrose 10 % infusion 0-250 mL  0-250 mL IntraVENous PRN    glucagon (GLUCAGEN) injection 1 mg  1 mg IntraMUSCular PRN    insulin lispro (HUMALOG) injection   SubCUTAneous AC&HS     ______________________________________________________________________  EXPECTED LENGTH OF STAY: 4d 2h  ACTUAL LENGTH OF STAY:          5                 Yvan Castaneda NP

## 2022-04-29 NOTE — PROGRESS NOTES
Received pt from night shift without travis order. Talked to Paulina Doe NP and stated may put in order under my name.

## 2022-04-29 NOTE — PROGRESS NOTES
Problem: Mobility Impaired (Adult and Pediatric)  Goal: *Acute Goals and Plan of Care (Insert Text)  Description: FUNCTIONAL STATUS PRIOR TO ADMISSION: Patient was modified independent using a single point cane for functional mobility. 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 HOME SUPPORT PRIOR TO ADMISSION: The patient lived with wife but did not require assist.    Physical Therapy Goals  Initiated 4/26/2022  1. Patient will move from supine to sit and sit to supine  in bed with moderate assistance  within 7 day(s). 2.  Patient will transfer from bed to chair and chair to bed with moderate assistance  using the least restrictive device within 7 day(s). 3.  Patient will perform sit to stand with maximal assistance within 7 day(s). 4.  Patient will ambulate with maximal assistance for 5 feet with the least restrictive device within 7 day(s). Outcome: Progressing Towards Goal   PHYSICAL THERAPY TREATMENT  Patient: Nani Adame (89 y.o. male)  Date: 4/29/2022  Diagnosis: Closed left hip fracture ([facility] [address] [S72.002A] Left displaced femoral neck fracture (HCC)  Procedure(s) (LRB):  LEFT HIP HEMIARTHROPLASTY (Left)  QUADRICEPS REPAIR (Right) 4 Days Post-Op  Precautions: Fall,WBAT (WBAT B LE, R LE locked in knee ext)  Chart, physical therapy assessment, plan of care and goals were reviewed. ASSESSMENT  Patient continues with skilled PT services and is progressing towards goals. He was up in the recliner chair on arrival and reports moderate soreness in the L hip and R knee. Worked on partial sit to stand from chair, but he was not able to get enough power from his upper body or LLE to be able to clear his hips more than a few inches. In addition, he tends to lean his trunk posteriorly, making clearing his hips more challenging. Then worked on lateral scooting from the drop arm recliner over to the bed, which required max assist of 2.   This elicited more pain in his mid-back and R chest described as spasms of pain. Note that he does have history of stenosis and compression fractures, but no imaging was done of his spine after this incident. He denies any sharp or shooting pain and reports it is relieved at rest.  Discussed with NP. Since standing is so challenging for him, given the pre-morbid weakness in the LLE due to previous CVA and the pain related to current fx combined with the inability to flex the R knee for assist with standing, he may benefit from focusing on lateral transfer skills and only sitting on elevated surfaces. Note that insurance has denied IPR even after peer to peer was done. Highly recommend SNF level rehab as he is not safe to return home with family support yet. Current Level of Function Impacting Discharge (mobility/balance): max assist for transfers    Other factors to consider for discharge: complex medical situation         PLAN :  Patient continues to benefit from skilled intervention to address the above impairments. Continue treatment per established plan of care. to address goals. Recommendation for discharge: (in order for the patient to meet his/her long term goals)  Therapy up to 5 days/week in SNF setting    This discharge recommendation:  Has been made in collaboration with the attending provider and/or case management    IF patient discharges home will need the following DME: to be determined (TBD)       SUBJECTIVE:   Patient stated I just wonder what else I hit on the way down.     OBJECTIVE DATA SUMMARY:   Critical Behavior:  Neurologic State: Alert  Orientation Level: Oriented X4  Cognition: Follows commands  Safety/Judgement: Awareness of environment  Functional Mobility Training:  Bed Mobility:     Supine to Sit: Maximum assistance;Assist x1;Additional time;Bed Modified  Sit to Supine: Maximum assistance; Additional time  Scooting: Maximum assistance; Additional time        Transfers:  Sit to Stand: Maximum assistance;Assist x1 (height of bed elevated; Max A>Mod A)  Stand to Sit: Moderate assistance;Assist x1        Bed to Chair: Minimum assistance;Assist x1  Lateral Transfers: Maximum assistance (lateral to the R from drop arm recliner to bed)                 Balance:  Sitting: Impaired  Sitting - Static: Fair (occasional) (tends toward posterior lean)  Sitting - Dynamic: Fair (occasional) (tends towards posterior lean)  Standing: Impaired; With support  Standing - Static: Constant support; Fair  Standing - Dynamic : Constant support; Fair  Ambulation/Gait Training:                                                        Stairs: Therapeutic Exercises:   Partial chair push up  Pain Rating: Moderate pain R chest/mid back, L hip, R knee    Activity Tolerance:   Fair and requires frequent rest breaks    After treatment patient left in no apparent distress:   Supine in bed, Call bell within reach, Caregiver / family present, and Side rails x 3    COMMUNICATION/COLLABORATION:   The patients plan of care was discussed with: Occupational therapist, Registered nurse, Physician, and Case management.      Sarthak Guadarrama PT   Time Calculation: 29 mins

## 2022-04-29 NOTE — PROGRESS NOTES
Spiritual Care Partner Volunteer visited patient in [address] on 4.29.22.     Documented by Maksim Uriarte, Staff , on [phone]  Please call JOSÉ MIGUEL ([phone]) to page  if needed

## 2022-04-29 NOTE — PROGRESS NOTES
Bedside and Verbal shift change report given to Ginette Martin RN (oncoming nurse) by Joce Moralez RN (offgoing nurse). Report included the following information SBAR, Kardex, Procedure Summary, Intake/Output, MAR, Recent Results and Med Rec Status. 0745 IV is leaking. Spoke to VANCE Yoon at bedside. Okay to wait on placing new IV until checking rehab placement. 0815 Bedside and Verbal shift change report given to Olu Mast RN and GIOVANNY Lance (oncoming nurse) by Ginette Martin RN (offgoing nurse). Report included the following information SBAR, Kardex, Procedure Summary, Intake/Output, MAR, Recent Results and Med Rec Status.

## 2022-04-29 NOTE — PROGRESS NOTES
Bedside and Verbal shift change report given to Yordan (oncoming nurse) by Sergio Martin (offgoing nurse). Report included the following information SBAR and Kardex.

## 2022-04-29 NOTE — PROGRESS NOTES
Transitions of Care: Insurance has denied IPR. SNF referrals pending with [facility] and [facility]. Jovanaubaldo Hind was initiated with Celeste Zhang today, reference #[id number]      Chart reviewed. CM received notification that insurance denied IPR. There is an option for peer to peer, contact number is #[phone] ext [phone]. P2P must be done by no later than 5:00 pm on 5/2/22. CM notified attending. Peer to peer was completed and insurance continues to deny IPR. CM met with patient and wife at bedside. They are now considering SNF and are reviewing options. CM will follow-up. CM obtained SNF choices: [place] and [facility]. CM sent referrals.         ALYSON RawlsW/CRM

## 2022-04-29 NOTE — PROGRESS NOTES
Problem: Self Care Deficits Care Plan (Adult)  Goal: *Acute Goals and Plan of Care (Insert Text)  Description: FUNCTIONAL STATUS PRIOR TO ADMISSION: Patient was modified independent using a single point cane for functional mobility and ADL completion. Reports standing to shower at walk-in shower. HOME SUPPORT: The patient lived with wife but did not require assist.    Occupational Therapy Goals  Initiated 4/27/2022  1. Patient will perform bathing with moderate assistance  within 7 day(s). 2.  Patient will perform lower body dressing with maximal assistance  within 7 day(s). 3.  Patient will perform RW grooming with minimal assistance within 7 day(s). 4.  Patient will perform RW toilet transfers, EOB to [facility], with moderate assistance within 7 day(s). 5.  Patient will perform all aspects of RW toileting with moderate assistance within 7 day(s). Outcome: Progressing Towards Goal     OCCUPATIONAL THERAPY TREATMENT  Patient: Di French (74 y.o. male)  Date: 4/29/2022  Diagnosis: Closed left hip fracture ([address] [S72.002A] Left displaced femoral neck fracture (HCC)  Procedure(s) (LRB):  LEFT HIP HEMIARTHROPLASTY (Left)  QUADRICEPS REPAIR (Right) 4 Days Post-Op  Precautions: Fall,WBAT (WBAT B LE, R LE locked in knee ext)  Chart, occupational therapy assessment, plan of care, and goals were reviewed. ASSESSMENT  Patient continues with skilled OT services and is progressing towards goals. Pt noted with progressive mobility/balance, as evidenced by successful engagement with bed mobility, sit to stand and standing pivot turn with assist of 1 rather than previously required 2. Pt continues to demonstrate fair seated balance, with R knee immobilizer noted to make trunk flexion difficult for patient. Pt continues to actively engagement in presented challenges and would benefit from inpatient rehab upon discharge, if inpatient not an option, recommended SNF rehab upon discharge.   Acute OT to continue to follow during acute hospitalization. Current Level of Function Impacting Discharge (ADLs): Total A LE ADLs    Other factors to consider for discharge: Total A LE ADLs         PLAN :  Patient continues to benefit from skilled intervention to address the above impairments. Continue treatment per established plan of care to address goals. Recommend with staff: Active ADL engagement    Recommend next OT session: POC progression    Recommendation for discharge: (in order for the patient to meet his/her long term goals)  To be determined: inpatient, if not an option, SNF rehab    This discharge recommendation:  Has been made in collaboration with the attending provider and/or case management    IF patient discharges home will need the following DME: TBD       SUBJECTIVE:   Patient stated I got some bad news, insurance won't let me go to inpatient rehab.     OBJECTIVE DATA SUMMARY:   Cognitive/Behavioral Status:  Neurologic State: Alert  Orientation Level: Oriented X4  Cognition: Follows commands  Perception: Cues to maintain midline in sitting;Cues to maintain midline in standing (secondary to initial posterior lean)  Perseveration: No perseveration noted  Safety/Judgement: Awareness of environment    Functional Mobility and Transfers for ADLs:  Bed Mobility:  Supine to Sit: Maximum assistance;Assist x1;Additional time;Bed Modified  Scooting: Maximum assistance (frequent posterior LOB during scooting to EOB)    Transfers:  Sit to Stand: Maximum assistance;Assist x1 (height of bed elevated; Max A>Mod A)     Bed to Chair: Minimum assistance;Assist x1    Pt educated on safe transfer techniques, with specific emphasis on proper hand placement to push up from seated surface rather than attempt to pull self up, fully positioning self in-front of desired seated location, feeling chair on back of legs and reaching back with 1-2 UE to slowly lower self to seated position.     Balance:  Sitting: Impaired  Sitting - Static: Fair (occasional)  Sitting - Dynamic: Fair (occasional)  Standing: Impaired; With support  Standing - Static: Constant support; Fair  Standing - Dynamic : Constant support; Fair    ADL Intervention:  Lower Body Dressing Assistance  Socks: Total assistance (dependent)  Shoes with Elastic Laces: Total assistance (dependent)  Position Performed: Seated edge of bed    Cognitive Retraining  Safety/Judgement: Awareness of environment    Pain:  Pt with c/o L thigh pain during movement, did not quantify; RN aware and following    Activity Tolerance:   Good, Fair, and requires rest breaks    After treatment patient left in no apparent distress:   Sitting in chair, Call bell within reach, and Caregiver / family present    COMMUNICATION/COLLABORATION:   The patients plan of care was discussed with: Physical therapist, Registered nurse, and Case management.      César Nj OT  Time Calculation: 29 mins

## 2022-04-30 LAB
ALBUMIN SERPL-MCNC: 2 G/DL (ref 3.5–5)
ALBUMIN/GLOB SERPL: 0.6 {RATIO} (ref 1.1–2.2)
ALP SERPL-CCNC: 64 U/L (ref 45–117)
ALT SERPL-CCNC: 56 U/L (ref 12–78)
ANION GAP SERPL CALC-SCNC: 7 MMOL/L (ref 5–15)
AST SERPL-CCNC: 35 U/L (ref 15–37)
BASOPHILS # BLD: 0.1 K/UL (ref 0–0.1)
BASOPHILS NFR BLD: 1 % (ref 0–1)
BILIRUB SERPL-MCNC: 0.7 MG/DL (ref 0.2–1)
BUN SERPL-MCNC: 16 MG/DL (ref 6–20)
BUN/CREAT SERPL: 16 (ref 12–20)
CALCIUM SERPL-MCNC: 8.1 MG/DL (ref 8.5–10.1)
CHLORIDE SERPL-SCNC: 107 MMOL/L (ref 97–108)
CO2 SERPL-SCNC: 26 MMOL/L (ref 21–32)
CREAT SERPL-MCNC: 0.97 MG/DL (ref 0.7–1.3)
DIFFERENTIAL METHOD BLD: ABNORMAL
EOSINOPHIL # BLD: 0.3 K/UL (ref 0–0.4)
EOSINOPHIL NFR BLD: 4 % (ref 0–7)
ERYTHROCYTE [DISTWIDTH] IN BLOOD BY AUTOMATED COUNT: 12.9 % (ref 11.5–14.5)
GLOBULIN SER CALC-MCNC: 3.5 G/DL (ref 2–4)
GLUCOSE BLD STRIP.AUTO-MCNC: 126 MG/DL (ref 65–117)
GLUCOSE BLD STRIP.AUTO-MCNC: 136 MG/DL (ref 65–117)
GLUCOSE BLD STRIP.AUTO-MCNC: 138 MG/DL (ref 65–117)
GLUCOSE BLD STRIP.AUTO-MCNC: 148 MG/DL (ref 65–117)
GLUCOSE SERPL-MCNC: 114 MG/DL (ref 65–100)
HCT VFR BLD AUTO: 24.1 % (ref 36.6–50.3)
HGB BLD-MCNC: 7.8 G/DL (ref 12.1–17)
IMM GRANULOCYTES # BLD AUTO: 0.1 K/UL (ref 0–0.04)
IMM GRANULOCYTES NFR BLD AUTO: 1 % (ref 0–0.5)
LYMPHOCYTES # BLD: 1.2 K/UL (ref 0.8–3.5)
LYMPHOCYTES NFR BLD: 13 % (ref 12–49)
MCH RBC QN AUTO: 31.7 PG (ref 26–34)
MCHC RBC AUTO-ENTMCNC: 32.4 G/DL (ref 30–36.5)
MCV RBC AUTO: 98 FL (ref 80–99)
MONOCYTES # BLD: 1.3 K/UL (ref 0–1)
MONOCYTES NFR BLD: 14 % (ref 5–13)
NEUTS SEG # BLD: 6.5 K/UL (ref 1.8–8)
NEUTS SEG NFR BLD: 67 % (ref 32–75)
NRBC # BLD: 0 K/UL (ref 0–0.01)
NRBC BLD-RTO: 0 PER 100 WBC
PLATELET # BLD AUTO: 211 K/UL (ref 150–400)
PMV BLD AUTO: 11.2 FL (ref 8.9–12.9)
POTASSIUM SERPL-SCNC: 4.2 MMOL/L (ref 3.5–5.1)
PROT SERPL-MCNC: 5.5 G/DL (ref 6.4–8.2)
RBC # BLD AUTO: 2.46 M/UL (ref 4.1–5.7)
SERVICE CMNT-IMP: ABNORMAL
SODIUM SERPL-SCNC: 140 MMOL/L (ref 136–145)
WBC # BLD AUTO: 9.4 K/UL (ref 4.1–11.1)

## 2022-04-30 PROCEDURE — 51798 US URINE CAPACITY MEASURE: CPT

## 2022-04-30 PROCEDURE — 74011250637 HC RX REV CODE- 250/637: Performed by: NURSE PRACTITIONER

## 2022-04-30 PROCEDURE — 97110 THERAPEUTIC EXERCISES: CPT

## 2022-04-30 PROCEDURE — 80053 COMPREHEN METABOLIC PANEL: CPT

## 2022-04-30 PROCEDURE — 82962 GLUCOSE BLOOD TEST: CPT

## 2022-04-30 PROCEDURE — 97530 THERAPEUTIC ACTIVITIES: CPT

## 2022-04-30 PROCEDURE — 65270000029 HC RM PRIVATE

## 2022-04-30 PROCEDURE — 97535 SELF CARE MNGMENT TRAINING: CPT

## 2022-04-30 PROCEDURE — 74011250637 HC RX REV CODE- 250/637: Performed by: PHYSICIAN ASSISTANT

## 2022-04-30 PROCEDURE — 77030037878 HC DRSG MEPILEX >48IN BORD MOLN -B

## 2022-04-30 PROCEDURE — 74011250636 HC RX REV CODE- 250/636: Performed by: PHYSICIAN ASSISTANT

## 2022-04-30 PROCEDURE — 74011636637 HC RX REV CODE- 636/637: Performed by: PHYSICIAN ASSISTANT

## 2022-04-30 PROCEDURE — 85025 COMPLETE CBC W/AUTO DIFF WBC: CPT

## 2022-04-30 RX ADMIN — OXYCODONE 5 MG: 5 TABLET ORAL at 06:19

## 2022-04-30 RX ADMIN — SENNOSIDES AND DOCUSATE SODIUM 1 TABLET: 50; 8.6 TABLET ORAL at 08:41

## 2022-04-30 RX ADMIN — BISACODYL 10 MG: 10 SUPPOSITORY RECTAL at 20:27

## 2022-04-30 RX ADMIN — TAMSULOSIN HYDROCHLORIDE 0.4 MG: 0.4 CAPSULE ORAL at 08:40

## 2022-04-30 RX ADMIN — Medication 2 UNITS: at 11:53

## 2022-04-30 RX ADMIN — BUPROPION HYDROCHLORIDE 150 MG: 150 TABLET, EXTENDED RELEASE ORAL at 08:41

## 2022-04-30 RX ADMIN — ATORVASTATIN CALCIUM 40 MG: 40 TABLET, FILM COATED ORAL at 08:41

## 2022-04-30 RX ADMIN — BUPROPION HYDROCHLORIDE 150 MG: 150 TABLET, EXTENDED RELEASE ORAL at 17:13

## 2022-04-30 RX ADMIN — ENOXAPARIN SODIUM 30 MG: 100 INJECTION SUBCUTANEOUS at 23:18

## 2022-04-30 RX ADMIN — FINASTERIDE 5 MG: 5 TABLET, FILM COATED ORAL at 21:57

## 2022-04-30 RX ADMIN — CARVEDILOL 12.5 MG: 6.25 TABLET, FILM COATED ORAL at 17:13

## 2022-04-30 RX ADMIN — CARVEDILOL 12.5 MG: 6.25 TABLET, FILM COATED ORAL at 08:41

## 2022-04-30 RX ADMIN — ACETAMINOPHEN 650 MG: 325 TABLET ORAL at 06:19

## 2022-04-30 RX ADMIN — SODIUM PHOSPHATE, DIBASIC AND SODIUM PHOSPHATE, MONOBASIC 1 ENEMA: 7; 19 ENEMA RECTAL at 10:34

## 2022-04-30 RX ADMIN — ENOXAPARIN SODIUM 30 MG: 100 INJECTION SUBCUTANEOUS at 11:53

## 2022-04-30 RX ADMIN — POLYETHYLENE GLYCOL 3350 17 G: 17 POWDER, FOR SOLUTION ORAL at 08:41

## 2022-04-30 RX ADMIN — SENNOSIDES AND DOCUSATE SODIUM 1 TABLET: 50; 8.6 TABLET ORAL at 17:13

## 2022-04-30 NOTE — PROGRESS NOTES
TOTAL HIP ARTHROPLASTY DAILY NOTE    POD  5 Days Post-Op s/p Procedure(s):  LEFT HIP HEMIARTHROPLASTY  QUADRICEPS REPAIR     ASSESSMENT / PLAN :   1. Pain Control : Patient reports pain is well controlled overnight. 2. Wound or incisional issue : Healing incision with no visible drainage  3. Physical therapy: Continue with working with PT.  4. Disposition : Per patient, is pending case management for skilled nursing facility. SUBJECTIVE :     DAVID overnight. Pain controlled. Working with PT. Voiding without difficulty. Tolerating a regular diet. Denies CP/SOB/Abd pain/or other complaints. OBJECTIVE :     Vitals:    04/29/22 1047 04/29/22 1541 04/29/22 2035 04/30/22 0136   BP: (!) 144/75 138/70 133/72 129/69   Pulse: 74 70 76 70   Resp: 16 17 18 16   Temp: 98 °F (36.7 °C) 98.8 °F (37.1 °C) 99.6 °F (37.6 °C) 98.5 °F (36.9 °C)   SpO2: 99% 96% 95% 97%       Alert and oriented x3. left exam of the hip reveals that the dressing is clean, dry and intact. The patient has + quadriceps, ankle DF/PF, EHL/FHL  Sensation is intact to light touch distally  No calf pain. Labs:  Recent Labs     04/29/22  0334   HGB 8.1*   HCT 24.8*      K 4.0      CO2 26   BUN 16   CREA 0.91   *        Patient mobility  Gait  Base of Support: Widened  Ambulation - Level of Assistance: Minimal assistance,Assist x2  Distance (ft):  (side stepped to [facility])  Assistive Device: Gait belt,Walker, rolling  Interventions: Safety awareness training,Verbal cues  Interventions: Safety awareness training,Verbal cues           Narciso Rodriguez [place], [place]  Supervising Physician: Dr. Terri Jackson.  Elgin Abreu

## 2022-04-30 NOTE — PROGRESS NOTES
[address] [facility] Adult  Hospitalist Group                                                                                          Hospitalist Progress Note  Anneliese Forbes NP  Answering service: [phone] from in house phone        Date of Service:  2022  NAME:  Isaac Uribe  :  1950  MRN:  [id number]      Admission Summary:   Per H&P, Isaac Uribe is a 66 y.o. male who presents with fall and right hip,  [age]Y male with a history of CAD, CVA with mild left side weakness diabetes, hypertension and BPH. Patient presented to the ED after a fall he said he tripped and fall backwards while vacuuming at home. The accident occurred 1 to 2 hours ago. He landed on hard floor. There was no blood loss. The point of impact was the left hip. The pain is moderate. He was not ambulatory at the scene. There was no entrapment after the fall. There was no alcohol use involved in the accident. Associated symptoms include extremity weakness (baseline LLE weakness). Pertinent negatives include no vomiting, no headaches, no loss of consciousness and no laceration. The risk factors include being elderly and recurrent falls (previous stroke).  Associated symptoms comments: +R knee pain. The symptoms are aggravated by activity, use of injured limb and pressure on injury. Interval history / Subjective:     Patient seen on morning rounds. Denies fever, chills. Pain currently controlled, lying in bed.        Assessment & Plan:     Left hip fracture  Right quadriceps tendon rupture  S/p hemiarthroplasty and quadriceps repair on   Orthopedics following, thank you for recommendations  To follow-up with Dr. Danna Rodriguez for hip and Dr. Speedy Aldana for quadricep  Weightbearing as tolerated  Knee immobilizer  Continuing pain control  Bowel regimen  Working with physical therapy      Acute urinary retention  Failed intermittent catheterization  Indwelling catheter placed   To remove catheter today for voiding trial  If fails voiding trial will replace Hamm and consult urology  Continuing tamsulosin  Continuing finasteride            Acute blood loss anemia  Likely due to OR losses  Hemoglobin was 12 at presentation   hemoglobin remains stable, 7.8 today  We will transfuse for hemoglobin less than 7  We will continue to monitor    CKd stage III  Did have mildly elevated creatinine, 1.38, baseline is somewhere around 1.2  Has resolved with IV hydration creatinine today 0.97    Type 2 diabetes  Blood sugar currently controlled   Holding oral hypoglycemics  SSI for now         Hypertension  Pressure currently controlled  Continue carvedilol        Code status: FULL  DVT prophylaxis: LMWH    Care Plan discussed with: Patient/Family and Nurse     Anticipated Disposition: SNF/LTC,     Anticipated Discharge: Greater than 48 hours     Hospital Problems  Date Reviewed: 4/25/2022          Codes Class Noted POA    Quadriceps tendon rupture, right, initial encounter ICD-10-CM: U12.570H  ICD-9-CM: 843.8  4/25/2022 Yes        * (Principal) Left displaced femoral neck fracture ([facility] [address] ICD-10-CM: I24.006W  ICD-9-CM: 820.8  4/24/2022 Yes                Review of Systems:   A comprehensive review of systems was negative except for that written in the HPI. Vital Signs:    Last 24hrs VS reviewed since prior progress note. Most recent are:  Visit Vitals  /72 (BP 1 Location: Right arm)   Pulse 70   Temp 98 °F (36.7 °C)   Resp 16   SpO2 96%         Intake/Output Summary (Last 24 hours) at 4/30/2022 1149  Last data filed at 4/30/2022 9777  Gross per 24 hour   Intake --   Output 1600 ml   Net -1600 ml        Physical Examination:     I had a face to face encounter with this patient and independently examined them on 4/30/2022 as outlined below:          Constitutional:  No acute distress, cooperative, pleasant    ENT:  Oral mucosa moist, oropharynx benign. Resp:  CTA bilaterally. No wheezing/rhonchi/rales.  No accessory muscle use CV:  Regular rhythm, normal rate, no murmurs, gallops, rubs    GI:  Soft, non distended, non tender. normoactive bowel sounds, no hepatosplenomegaly     Musculoskeletal:  No edema, warm, 2+ pulses throughout    Neurologic:  Moves all extremities. AAOx3, CN II-XII reviewed            Data Review:    Review and/or order of clinical lab test  Review and/or order of tests in the radiology section of [facility]      Labs:     Recent Labs     04/30/22 0631 04/29/22  0334   WBC 9.4 9.7   HGB 7.8* 8.1*   HCT 24.1* 24.8*    182     Recent Labs     04/30/22  0631 04/29/22  0334 04/28/22  0404    139 142   K 4.2 4.0 3.6    107 109*   CO2 26 26 27   BUN 16 16 14   CREA 0.97 0.91 0.95   * 120* 113*   CA 8.1* 8.3* 8.3*     Recent Labs     04/30/22 0631 04/29/22  0334   ALT 56 35   AP 64 59   TBILI 0.7 0.6   TP 5.5* 5.6*   ALB 2.0* 2.2*   GLOB 3.5 3.4     No results for input(s): INR, PTP, APTT, INREXT, INREXT in the last 72 hours. No results for input(s): FE, TIBC, PSAT, FERR in the last 72 hours. No results found for: FOL, RBCF   No results for input(s): PH, PCO2, PO2 in the last 72 hours. No results for input(s): CPK, CKNDX, TROIQ in the last 72 hours.     No lab exists for component: CPKMB  Lab Results   Component Value Date/Time    Cholesterol, total 142 01/12/2022 12:45 PM    HDL Cholesterol 64 01/12/2022 12:45 PM    LDL, calculated 60.6 01/12/2022 12:45 PM    Triglyceride 87 01/12/2022 12:45 PM    CHOL/HDL Ratio 2.2 01/12/2022 12:45 PM     Lab Results   Component Value Date/Time    Glucose (POC) 148 (H) 04/30/2022 11:33 AM    Glucose (POC) 126 (H) 04/30/2022 06:25 AM    Glucose (POC) 155 (H) 04/29/2022 09:58 PM    Glucose (POC) 121 (H) 04/29/2022 05:11 PM    Glucose (POC) 161 (H) 04/29/2022 11:46 AM     Lab Results   Component Value Date/Time    Color YELLOW/STRAW 07/18/2017 05:29 PM    Appearance CLEAR 07/18/2017 05:29 PM    Specific gravity 1.022 07/18/2017 05:29 PM    pH (UA) 5.5 07/18/2017 05:29 PM    Protein 30 (A) 07/18/2017 05:29 PM    Glucose NEGATIVE  07/18/2017 05:29 PM    Ketone NEGATIVE  07/18/2017 05:29 PM    Bilirubin NEGATIVE  07/18/2017 05:29 PM    Urobilinogen 0.2 07/18/2017 05:29 PM    Nitrites NEGATIVE  07/18/2017 05:29 PM    Leukocyte Esterase NEGATIVE  07/18/2017 05:29 PM    Epithelial cells FEW 07/18/2017 05:29 PM    Bacteria NEGATIVE  07/18/2017 05:29 PM    WBC 0-4 07/18/2017 05:29 PM    RBC 0-5 07/18/2017 05:29 PM         Medications Reviewed:     Current Facility-Administered Medications   Medication Dose Route Frequency    lactulose (CHRONULAC) 10 gram/15 mL solution 15 mL  15 mL Oral DAILY PRN    sodium chloride (NS) flush 5-40 mL  5-40 mL IntraVENous Q8H    sodium chloride (NS) flush 5-40 mL  5-40 mL IntraVENous PRN    naloxone (NARCAN) injection 0.4 mg  0.4 mg IntraVENous PRN    senna-docusate (PERICOLACE) 8.6-50 mg per tablet 1 Tablet  1 Tablet Oral BID    polyethylene glycol (MIRALAX) packet 17 g  17 g Oral DAILY    bisacodyL (DULCOLAX) suppository 10 mg  10 mg Rectal DAILY PRN    oxyCODONE IR (ROXICODONE) tablet 2.5 mg  2.5 mg Oral Q4H PRN    oxyCODONE IR (ROXICODONE) tablet 5 mg  5 mg Oral Q4H PRN    oxyCODONE IR (ROXICODONE) tablet 10 mg  10 mg Oral Q4H PRN    enoxaparin (LOVENOX) injection 30 mg  30 mg SubCUTAneous Q12H    fentaNYL citrate (PF) injection 50 mcg  50 mcg IntraVENous Q4H PRN    prochlorperazine (COMPAZINE) with saline injection 5 mg  5 mg IntraVENous Q6H PRN    sodium chloride (NS) flush 5-40 mL  5-40 mL IntraVENous Q8H    sodium chloride (NS) flush 5-40 mL  5-40 mL IntraVENous PRN    acetaminophen (TYLENOL) tablet 650 mg  650 mg Oral Q6H PRN    Or    acetaminophen (TYLENOL) suppository 650 mg  650 mg Rectal Q6H PRN    polyethylene glycol (MIRALAX) packet 17 g  17 g Oral DAILY PRN    ondansetron (ZOFRAN ODT) tablet 4 mg  4 mg Oral Q8H PRN    Or    ondansetron (ZOFRAN) injection 4 mg  4 mg IntraVENous Q6H PRN    atorvastatin (LIPITOR) tablet 40 mg  40 mg Oral DAILY    buPROPion SR (WELLBUTRIN SR) tablet 150 mg  150 mg Oral BID    finasteride (PROSCAR) tablet 5 mg  5 mg Oral QHS    tamsulosin (FLOMAX) capsule 0.4 mg  0.4 mg Oral DAILY    carvediloL (COREG) tablet 12.5 mg  12.5 mg Oral BID WITH MEALS    glucose chewable tablet 16 g  4 Tablet Oral PRN    dextrose 10 % infusion 0-250 mL  0-250 mL IntraVENous PRN    glucagon (GLUCAGEN) injection 1 mg  1 mg IntraMUSCular PRN    insulin lispro (HUMALOG) injection   SubCUTAneous AC&HS     ______________________________________________________________________  EXPECTED LENGTH OF STAY: 4d 2h  ACTUAL LENGTH OF STAY:          6                 Carter Bailey NP

## 2022-04-30 NOTE — PROGRESS NOTES
Problem: Self Care Deficits Care Plan (Adult)  Goal: *Acute Goals and Plan of Care (Insert Text)  Description: FUNCTIONAL STATUS PRIOR TO ADMISSION: Patient was modified independent using a single point cane for functional mobility and ADL completion. Reports standing to shower at walk-in shower. HOME SUPPORT: The patient lived with wife but did not require assist.    Occupational Therapy Goals  Initiated 4/27/2022  1. Patient will perform bathing with moderate assistance  within 7 day(s). 2.  Patient will perform lower body dressing with maximal assistance  within 7 day(s). 3.  Patient will perform RW grooming with minimal assistance within 7 day(s). 4.  Patient will perform RW toilet transfers, EOB to [facility], with moderate assistance within 7 day(s). 5.  Patient will perform all aspects of RW toileting with moderate assistance within 7 day(s). Outcome: Progressing Towards Goal   OCCUPATIONAL THERAPY TREATMENT  Patient: Maria Alejandra Winter (89 y.o. male)  Date: 4/30/2022  Diagnosis: Closed left hip fracture ([address] [S72.002A] Left displaced femoral neck fracture (HCC)  Procedure(s) (LRB):  LEFT HIP HEMIARTHROPLASTY (Left)  QUADRICEPS REPAIR (Right) 5 Days Post-Op  Precautions: Fall,WBAT (WBAT B LE, R LE locked in knee ext)  Chart, occupational therapy assessment, plan of care, and goals were reviewed. ASSESSMENT  Patient continues with skilled OT services and is progressing towards goals. Very good participation, much improved bed mobility despite ongoing B LE and back pain;      Current Level of Function Impacting Discharge (ADLs): S-max A UE ADLs, limited by decreased functional sitting balance; D LE ADLs and mod/max A bed mobility with sig overall improvement compared to last OT session per chart review    Other factors to consider for discharge: supportive family         PLAN :  Patient continues to benefit from skilled intervention to address the above impairments.   Continue treatment per established plan of care to address goals. Recommend with staff: support R LE if seated edge of bed for back pain relief; may need further assessment of back injury w/current fall? Recommend next OT session: UE dressing seated edge of bed    Recommendation for discharge: (in order for the patient to meet his/her long term goals)  Therapy 3 hours per day 5-7 days per week    This discharge recommendation:  Has been made in collaboration with the attending provider and/or case management    IF patient discharges home will need the following DME: bedside commode, hospital bed, walker: rolling, and wheelchair       SUBJECTIVE:   Patient stated I wonder if I hurt my back when I fell? Patience Oconnor    OBJECTIVE DATA SUMMARY:   Cognitive/Behavioral Status:  Neurologic State: Alert; Appropriate for age  Orientation Level: Oriented X4  Cognition: Follows commands (not formally assessed; able to follow commands)  Perception: Appears intact  Perseveration: No perseveration noted  Safety/Judgement: Awareness of environment; Fall prevention; Insight into deficits; Decreased insight into deficits (not effectively managing pain meds)    Functional Mobility and Transfers for ADLs:  Bed Mobility:  Rolling: Minimum assistance  Supine to Sit: Minimum assistance;Maximum assistance (min A-S for 75% of supine to sit, max A once fully upright)  Sit to Supine: Total assistance;Assist x2; Additional time  Scooting: Total assistance (unable to scoot at all)    Transfers:     Functional Transfers  Toilet Transfer : Total assistance;Assist x2 (inferred)       Balance:  Sitting: Impaired; With support  Sitting - Static: Supported sitting;Poor (constant support)  Standing: Impaired (NT due to 8/10 back pain in sitting, max A x 2, Back spasms)    ADL Intervention:                      Upper Body Dressing Assistance  Dressing Assistance: Maximum assistance (limited by poor sitting balance & 8/10 back pain)    Lower Body Dressing Assistance  Dressing Assistance: Total assistance(dependent)  Position Performed: Seated edge of bed    Toileting  Toileting Assistance: Total assistance(dependent)    Cognitive Retraining  Attention to Task: Single task  Maintains Attention For (Time): Greater than 10 minutes  Following Commands: Follows one step commands/directions; Follows two step commands/directions  Safety/Judgement: Awareness of environment; Fall prevention; Insight into deficits; Decreased insight into deficits (not effectively managing pain meds)        Pain:  3/10 at rest B LE and back; 8/10 B LEs, R worse than L in sitting and 8-9/10 back pain with \"spasms\" in sitting    Activity Tolerance:   Fair, requires frequent rest breaks, observed SOB with activity, and sats dropped to 89% on RA with supine to sit; 100% once returned to Supine    After treatment patient left in no apparent distress:   Supine in bed, Heels elevated for pressure relief, Call bell within reach, Side rails x 3, and R knee immobilizer and L SCID in place; ice on R knee and l hip    COMMUNICATION/COLLABORATION:   The patients plan of care was discussed with: Registered nurse and Rehabilitation technician.      Cira MARTÍNEZ/L  Time Calculation: 32 mins

## 2022-04-30 NOTE — PROGRESS NOTES
Problem: Mobility Impaired (Adult and Pediatric)  Goal: *Acute Goals and Plan of Care (Insert Text)  Description: FUNCTIONAL STATUS PRIOR TO ADMISSION: Patient was modified independent using a single point cane for functional mobility. 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 HOME SUPPORT PRIOR TO ADMISSION: The patient lived with wife but did not require assist.    Physical Therapy Goals  Initiated 4/26/2022  1. Patient will move from supine to sit and sit to supine  in bed with moderate assistance  within 7 day(s). 2.  Patient will transfer from bed to chair and chair to bed with moderate assistance  using the least restrictive device within 7 day(s). 3.  Patient will perform sit to stand with maximal assistance within 7 day(s). 4.  Patient will ambulate with maximal assistance for 5 feet with the least restrictive device within 7 day(s). Outcome: Progressing Towards Goal    PHYSICAL THERAPY TREATMENT  Patient: Juan Chavez (41 y.o. male)  Date: 4/30/2022  Diagnosis: Closed left hip fracture ([address] [S72.002A] Left displaced femoral neck fracture (HCC)  Procedure(s) (LRB):  LEFT HIP HEMIARTHROPLASTY (Left)  QUADRICEPS REPAIR (Right) 5 Days Post-Op  Precautions: Fall,WBAT (WBAT B LE, R LE locked in knee ext)  Chart, physical therapy assessment, plan of care and goals were reviewed. ASSESSMENT  Patient continues with skilled PT services and is progressing towards goals. Pt received supine, willing to work with therapy. Pt reports continued pain with BLE and back pain as well emotional troubles with current state. Pt able tolerate bed mobility with posterior lean at time along with L side weakness reported from previous stroke, able to hold sitting balance at EOB until donning shoes with continued balance impairments.  Pt continued with STS with max A with reported RLE with WBing, able to tolerate WBing with LLE with L sided weight shifts and noted decreased pain reported with NBOS while standing. Pt able to toelrate STS x2 with lateral steps to [facility] with min/CGA for balance. Pt assisted back to bed with max A, followed by LE ex with VC or form, all isometric to maintain strength with inactivity. Pt completed session supine in bed with call bell within reach and all needs met at the time. Recommend IPR pending auth, otherwise SNF with possible transition to IPR with improved activity tolerance. Rn notified of session. Current Level of Function Impacting Discharge (mobility/balance): mod/max A for bed mobiltiy, max A STS, min A lateral steps    Other factors to consider for discharge: fall risk, hx of CVA         PLAN :  Patient continues to benefit from skilled intervention to address the above impairments. Continue treatment per established plan of care. to address goals. Recommendation for discharge: (in order for the patient to meet his/her long term goals)  IPR   This discharge recommendation:  Has not yet been discussed the attending provider and/or case management    IF patient discharges home will need the following DME: to be determined (TBD)       SUBJECTIVE:   Patient stated I wish I would go back. Jagjit Grey    OBJECTIVE DATA SUMMARY:   Critical Behavior:  Neurologic State: Alert,Appropriate for age  Orientation Level: Oriented X4  Cognition: Follows commands (not formally assessed; able to follow commands)  Safety/Judgement: Awareness of environment,Fall prevention,Insight into deficits,Decreased insight into deficits (not effectively managing pain meds)  Functional Mobility Training:  Bed Mobility:  Rolling: Minimum assistance  Supine to Sit: Moderate assistance; Adaptive equipment (gait belt for LE support )  Sit to Supine: Maximum assistance  Scooting: Maximum assistance (posterior support)     Transfers:  Sit to Stand: Maximum assistance  Stand to Sit: Maximum assistance     Balance:  Sitting: Impaired  Sitting - Static: Fair (occasional)  Standing: Impaired; With support  Standing - Static: Constant support; Fair  Standing - Dynamic : Constant support; Fair    Ambulation/Gait Training:  Distance (ft): 2 Feet (ft) (lateral steps to [facility])  Assistive Device: Gait belt;Orthotic device; Walker, rolling  Ambulation - Level of Assistance: Minimal assistance  Base of Support: Widened        Therapeutic Exercises:   SUPINE  EXERCISES   Sets   Reps   Active Active Assist   Passive Self ROM   Comments   Ankle Pumps 1 10 []                                        []                                        []                                        []                                           Quad Sets 1 10 []                                        []                                        []                                        []                                           Heel Slides   []                                        []                                        []                                        []                                           Hip Abduction   []                                        []                                        []                                        []                                           Glut Sets 1 10 []                                        []                                        []                                        []                                              []                                        []                                        []                                        []                                              []                                        []                                        []                                        []                                             STANDING  EXERCISES   Sets   Reps   Active Active Assist   Passive Self ROM   Comments   Heel Raises   []                                        []                                        [] []                                           Hip Abduction   []                                        []                                        []                                        []                                              []                                        []                                        []                                        []                                              []                                        []                                        []                                        []                                             Pain Rating:  Pt reprots back, R knee, L hip pain    Activity Tolerance:   Fair      After treatment patient left in no apparent distress:   Supine in bed and Call bell within reach    COMMUNICATION/COLLABORATION:   The patients plan of care was discussed with: Registered nurse.      Christiano Winkler PTA   Time Calculation: 43 mins

## 2022-04-30 NOTE — PROGRESS NOTES
@04/30/22 on 1335 Notified Dr Zuleika Hung to see if we need to pursue imaging for pt's spine since pt was in pain during OT/PT     @4/30/2022 on 1340 Dr Zuleika Hung stated \"we don't need to pursue imaging, unless pt has numbness in lower extremities. \"

## 2022-04-30 NOTE — PROGRESS NOTES
Bedside and Verbal shift change report given to Santiago Vigil (oncoming nurse) by Juan Carlos Christy (offgoing nurse). Report included the following information SBAR.

## 2022-04-30 NOTE — PROGRESS NOTES
Problem: Falls - Risk of  Goal: *Absence of Falls  Description: Document Cristino Dietz Fall Risk and appropriate interventions in the flowsheet. Outcome: Progressing Towards Goal  Note: Fall Risk Interventions:  Mobility Interventions: Patient to call before getting OOB    Mentation Interventions: Door open when patient unattended    Medication Interventions: Patient to call before getting OOB,Teach patient to arise slowly    Elimination Interventions: Call light in reach,Bed/chair exit alarm    History of Falls Interventions: Door open when patient unattended,Investigate reason for fall,Room close to nurse's station,Bed/chair exit alarm         Problem: Patient Education: Go to Patient Education Activity  Goal: Patient/Family Education  Outcome: Progressing Towards Goal     Problem: Pressure Injury - Risk of  Goal: *Prevention of pressure injury  Description: Document Ilay Scale and appropriate interventions in the flowsheet.   Outcome: Progressing Towards Goal  Note: Pressure Injury Interventions:  Sensory Interventions: Assess changes in LOC    Moisture Interventions: Absorbent underpads    Activity Interventions: Increase time out of bed    Mobility Interventions: Pressure redistribution bed/mattress (bed type),PT/OT evaluation    Nutrition Interventions: Document food/fluid/supplement intake    Friction and Shear Interventions: Lift sheet                Problem: Patient Education: Go to Patient Education Activity  Goal: Patient/Family Education  Outcome: Progressing Towards Goal     Problem: Patient Education: Go to Patient Education Activity  Goal: Patient/Family Education  Outcome: Progressing Towards Goal     Problem: Patient Education: Go to Patient Education Activity  Goal: Patient/Family Education  Outcome: Progressing Towards Goal     Problem: Lower Extremity Fracture:Day of Admission  Goal: *Hemodynamically stable  Outcome: Progressing Towards Goal     Problem: Lower Extremity Fracture:Post-op Day 2  Goal: Off Pathway (Use only if patient is Off Pathway)  Outcome: Progressing Towards Goal  Goal: Activity/Safety  Outcome: Progressing Towards Goal  Goal: Diagnostic Test/Procedures  Outcome: Progressing Towards Goal  Goal: Nutrition/Diet  Outcome: Progressing Towards Goal  Goal: Discharge Planning  Outcome: Progressing Towards Goal  Goal: Medications  Outcome: Progressing Towards Goal  Goal: Respiratory  Outcome: Progressing Towards Goal  Goal: Treatments/Interventions/Procedures  Outcome: Progressing Towards Goal  Goal: Psychosocial  Outcome: Progressing Towards Goal  Goal: *Optimal pain control at patient's stated goal  Outcome: Progressing Towards Goal  Goal: *Hemodynamically stable  Outcome: Progressing Towards Goal  Goal: *Adequate oxygenation  Outcome: Progressing Towards Goal  Goal: *PT/INR within defined limits  Outcome: Progressing Towards Goal  Goal: *Demonstrates progressive activity  Outcome: Progressing Towards Goal  Goal: *Voiding  Outcome: Progressing Towards Goal  Goal: *Bowel movement  Outcome: Progressing Towards Goal  Goal: *Tolerating diet  Outcome: Progressing Towards Goal     Problem: Lower Extremity Fracture:Post-op Day 3  Goal: Off Pathway (Use only if patient is Off Pathway)  Outcome: Progressing Towards Goal  Goal: Activity/Safety  Outcome: Progressing Towards Goal  Goal: Diagnostic Test/Procedures  Outcome: Progressing Towards Goal  Goal: Nutrition/Diet  Outcome: Progressing Towards Goal  Goal: Discharge Planning  Outcome: Progressing Towards Goal  Goal: Medications  Outcome: Progressing Towards Goal  Goal: Respiratory  Outcome: Progressing Towards Goal  Goal: Treatments/Interventions/Procedures  Outcome: Progressing Towards Goal  Goal: Psychosocial  Outcome: Progressing Towards Goal  Goal: *Optimal pain control at patient's stated goal  Outcome: Progressing Towards Goal  Goal: *Hemodynamically stable  Outcome: Progressing Towards Goal  Goal: *Adequate oxygenation  Outcome: Progressing Towards Goal  Goal: *PT/INR within defined limits  Outcome: Progressing Towards Goal  Goal: *Demonstrates progressive activity  Outcome: Progressing Towards Goal  Goal: *Voiding  Outcome: Progressing Towards Goal  Goal: *Bowel movement  Outcome: Progressing Towards Goal  Goal: *Tolerating diet  Outcome: Progressing Towards Goal     Problem: Lower Extremity Fracture:Post-op Day 4  Goal: Off Pathway (Use only if patient is Off Pathway)  Outcome: Progressing Towards Goal  Goal: Activity/Safety  Outcome: Progressing Towards Goal  Goal: Diagnostic Test/Procedures  Outcome: Progressing Towards Goal  Goal: Nutrition/Diet  Outcome: Progressing Towards Goal  Goal: Discharge Planning  Outcome: Progressing Towards Goal  Goal: Medications  Outcome: Progressing Towards Goal  Goal: Respiratory  Outcome: Progressing Towards Goal  Goal: Treatments/Interventions/Procedures  Outcome: Progressing Towards Goal  Goal: Psychosocial  Outcome: Progressing Towards Goal     Problem: Lower Extremity Fracture:Discharge Outcomes  Goal: *Hemodynamically stable  Outcome: Progressing Towards Goal  Goal: *Modified independence with transfers, ambulation on levels with assistance devices, stair climbing, ADL's  Outcome: Progressing Towards Goal  Goal: *Independent with active exercises  Outcome: Progressing Towards Goal  Goal: *Describes follow-up/return visits to physicians  Outcome: Progressing Towards Goal  Goal: *Tolerating diet  Outcome: Progressing Towards Goal  Goal: *Optimal pain control at patient's stated goal  Outcome: Progressing Towards Goal  Goal: *Adequate air exchange  Outcome: Progressing Towards Goal  Goal: *Lungs clear or at baseline  Outcome: Progressing Towards Goal  Goal: *Afebrile  Outcome: Progressing Towards Goal  Goal: *Incision intact without signs of infection, redness, warmth  Outcome: Progressing Towards Goal  Goal: *Absence of deep venous thrombosis signs and symptoms(Stroke Metric)  Outcome: Progressing Towards Goal  Goal: *Active bowel function  Outcome: Progressing Towards Goal  Goal: *Adequate urinary output  Outcome: Progressing Towards Goal  Goal: *Discharge anxiety minimal  Outcome: Progressing Towards Goal  Goal: *Describes available resources and support systems  Outcome: Progressing Towards Goal

## 2022-05-01 LAB
ALBUMIN SERPL-MCNC: 2 G/DL (ref 3.5–5)
ALBUMIN/GLOB SERPL: 0.6 {RATIO} (ref 1.1–2.2)
ALP SERPL-CCNC: 79 U/L (ref 45–117)
ALT SERPL-CCNC: 103 U/L (ref 12–78)
ANION GAP SERPL CALC-SCNC: 5 MMOL/L (ref 5–15)
AST SERPL-CCNC: 68 U/L (ref 15–37)
BASOPHILS # BLD: 0.1 K/UL (ref 0–0.1)
BASOPHILS NFR BLD: 1 % (ref 0–1)
BILIRUB SERPL-MCNC: 0.9 MG/DL (ref 0.2–1)
BUN SERPL-MCNC: 14 MG/DL (ref 6–20)
BUN/CREAT SERPL: 16 (ref 12–20)
CALCIUM SERPL-MCNC: 8.2 MG/DL (ref 8.5–10.1)
CHLORIDE SERPL-SCNC: 109 MMOL/L (ref 97–108)
CO2 SERPL-SCNC: 26 MMOL/L (ref 21–32)
CREAT SERPL-MCNC: 0.9 MG/DL (ref 0.7–1.3)
DIFFERENTIAL METHOD BLD: ABNORMAL
EOSINOPHIL # BLD: 0.2 K/UL (ref 0–0.4)
EOSINOPHIL NFR BLD: 2 % (ref 0–7)
ERYTHROCYTE [DISTWIDTH] IN BLOOD BY AUTOMATED COUNT: 12.8 % (ref 11.5–14.5)
GLOBULIN SER CALC-MCNC: 3.6 G/DL (ref 2–4)
GLUCOSE BLD STRIP.AUTO-MCNC: 104 MG/DL (ref 65–117)
GLUCOSE BLD STRIP.AUTO-MCNC: 119 MG/DL (ref 65–117)
GLUCOSE BLD STRIP.AUTO-MCNC: 135 MG/DL (ref 65–117)
GLUCOSE BLD STRIP.AUTO-MCNC: 140 MG/DL (ref 65–117)
GLUCOSE SERPL-MCNC: 114 MG/DL (ref 65–100)
HCT VFR BLD AUTO: 24.5 % (ref 36.6–50.3)
HGB BLD-MCNC: 8 G/DL (ref 12.1–17)
IMM GRANULOCYTES # BLD AUTO: 0.1 K/UL (ref 0–0.04)
IMM GRANULOCYTES NFR BLD AUTO: 1 % (ref 0–0.5)
LYMPHOCYTES # BLD: 1.2 K/UL (ref 0.8–3.5)
LYMPHOCYTES NFR BLD: 11 % (ref 12–49)
MCH RBC QN AUTO: 31.7 PG (ref 26–34)
MCHC RBC AUTO-ENTMCNC: 32.7 G/DL (ref 30–36.5)
MCV RBC AUTO: 97.2 FL (ref 80–99)
MONOCYTES # BLD: 1.5 K/UL (ref 0–1)
MONOCYTES NFR BLD: 14 % (ref 5–13)
NEUTS SEG # BLD: 7.9 K/UL (ref 1.8–8)
NEUTS SEG NFR BLD: 71 % (ref 32–75)
NRBC # BLD: 0 K/UL (ref 0–0.01)
NRBC BLD-RTO: 0 PER 100 WBC
PLATELET # BLD AUTO: 263 K/UL (ref 150–400)
PMV BLD AUTO: 11.2 FL (ref 8.9–12.9)
POTASSIUM SERPL-SCNC: 4.1 MMOL/L (ref 3.5–5.1)
PROT SERPL-MCNC: 5.6 G/DL (ref 6.4–8.2)
RBC # BLD AUTO: 2.52 M/UL (ref 4.1–5.7)
SERVICE CMNT-IMP: ABNORMAL
SERVICE CMNT-IMP: NORMAL
SODIUM SERPL-SCNC: 140 MMOL/L (ref 136–145)
WBC # BLD AUTO: 10.9 K/UL (ref 4.1–11.1)

## 2022-05-01 PROCEDURE — 74011250636 HC RX REV CODE- 250/636: Performed by: PHYSICIAN ASSISTANT

## 2022-05-01 PROCEDURE — 85025 COMPLETE CBC W/AUTO DIFF WBC: CPT

## 2022-05-01 PROCEDURE — 65270000029 HC RM PRIVATE

## 2022-05-01 PROCEDURE — 36415 COLL VENOUS BLD VENIPUNCTURE: CPT

## 2022-05-01 PROCEDURE — 97530 THERAPEUTIC ACTIVITIES: CPT

## 2022-05-01 PROCEDURE — 97110 THERAPEUTIC EXERCISES: CPT

## 2022-05-01 PROCEDURE — 74011250637 HC RX REV CODE- 250/637: Performed by: PHYSICIAN ASSISTANT

## 2022-05-01 PROCEDURE — 82962 GLUCOSE BLOOD TEST: CPT

## 2022-05-01 PROCEDURE — 80053 COMPREHEN METABOLIC PANEL: CPT

## 2022-05-01 PROCEDURE — 77030037878 HC DRSG MEPILEX >48IN BORD MOLN -B

## 2022-05-01 PROCEDURE — 74011636637 HC RX REV CODE- 636/637: Performed by: PHYSICIAN ASSISTANT

## 2022-05-01 RX ADMIN — OXYCODONE 10 MG: 5 TABLET ORAL at 15:11

## 2022-05-01 RX ADMIN — OXYCODONE 5 MG: 5 TABLET ORAL at 09:10

## 2022-05-01 RX ADMIN — OXYCODONE 10 MG: 5 TABLET ORAL at 23:04

## 2022-05-01 RX ADMIN — SENNOSIDES AND DOCUSATE SODIUM 1 TABLET: 50; 8.6 TABLET ORAL at 17:03

## 2022-05-01 RX ADMIN — ATORVASTATIN CALCIUM 40 MG: 40 TABLET, FILM COATED ORAL at 09:10

## 2022-05-01 RX ADMIN — CARVEDILOL 12.5 MG: 6.25 TABLET, FILM COATED ORAL at 09:10

## 2022-05-01 RX ADMIN — SENNOSIDES AND DOCUSATE SODIUM 1 TABLET: 50; 8.6 TABLET ORAL at 09:10

## 2022-05-01 RX ADMIN — ENOXAPARIN SODIUM 30 MG: 100 INJECTION SUBCUTANEOUS at 23:00

## 2022-05-01 RX ADMIN — BUPROPION HYDROCHLORIDE 150 MG: 150 TABLET, EXTENDED RELEASE ORAL at 17:03

## 2022-05-01 RX ADMIN — TAMSULOSIN HYDROCHLORIDE 0.4 MG: 0.4 CAPSULE ORAL at 09:10

## 2022-05-01 RX ADMIN — CARVEDILOL 12.5 MG: 6.25 TABLET, FILM COATED ORAL at 17:03

## 2022-05-01 RX ADMIN — BUPROPION HYDROCHLORIDE 150 MG: 150 TABLET, EXTENDED RELEASE ORAL at 09:10

## 2022-05-01 RX ADMIN — Medication 2 UNITS: at 17:03

## 2022-05-01 RX ADMIN — ENOXAPARIN SODIUM 30 MG: 100 INJECTION SUBCUTANEOUS at 11:00

## 2022-05-01 RX ADMIN — FINASTERIDE 5 MG: 5 TABLET, FILM COATED ORAL at 23:00

## 2022-05-01 RX ADMIN — POLYETHYLENE GLYCOL 3350 17 G: 17 POWDER, FOR SOLUTION ORAL at 09:10

## 2022-05-01 NOTE — PROGRESS NOTES
TOTAL HIP ARTHROPLASTY DAILY NOTE    POD  6 Days Post-Op s/p Procedure(s):  LEFT HIP HEMIARTHROPLASTY  QUADRICEPS REPAIR     ASSESSMENT / PLAN :   1. Pain Control : Patient reports pain is well controlled overnight. 2. Wound or incisional issue : Healing incision with no visible drainage  3. Physical therapy: Continue with working with PT.  4. Disposition : Per patient, is pending case management for skilled nursing facility. 5. Asymptomatic anemia: We will continue to monitor. SUBJECTIVE :     Patient reports he had a painful bowel movement overnight but otherwise DAIVD. Pain controlled. Working with PT. Hamm cath in place. Tolerating a regular diet. Denies CP/SOB/Abd pain/or other complaints. OBJECTIVE :     Vitals:    04/30/22 1300 04/30/22 1501 04/30/22 2026 05/01/22 0146   BP: (!) 155/79 122/69 (!) 144/73 120/67   Pulse: (!) 101 81 74 78   Resp: 22 18 18 18   Temp:  98.3 °F (36.8 °C) 98.9 °F (37.2 °C) 99.3 °F (37.4 °C)   SpO2: (!) 89% 96% 96% 96%       Alert and oriented x3. left exam of the hip reveals that the dressing is clean, dry and intact. The patient has + quadriceps, ankle DF/PF, EHL/FHL  Sensation is intact to light touch distally  No calf pain. Labs:  Recent Labs     04/30/22  0631   HGB 7.8*   HCT 24.1*      K 4.2      CO2 26   BUN 16   CREA 0.97   *        Patient mobility  Gait  Base of Support: Widened  Ambulation - Level of Assistance: Minimal assistance  Distance (ft): 2 Feet (ft) (lateral steps to [facility])  Assistive Device: Gait belt,Orthotic device,Walker, rolling  Interventions: Safety awareness training,Verbal cues  Interventions: Safety awareness training,Verbal cues           Vishnu Kaur. Maribel Crocker [place]  Supervising Physician: Dr. Best Uriostegui.  Thelma Hunter

## 2022-05-01 NOTE — PROGRESS NOTES
Problem: Mobility Impaired (Adult and Pediatric)  Goal: *Acute Goals and Plan of Care (Insert Text)  Description: FUNCTIONAL STATUS PRIOR TO ADMISSION: Patient was modified independent using a single point cane for functional mobility. 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 HOME SUPPORT PRIOR TO ADMISSION: The patient lived with wife but did not require assist.    Physical Therapy Goals  Initiated 4/26/2022  1. Patient will move from supine to sit and sit to supine  in bed with moderate assistance  within 7 day(s). 2.  Patient will transfer from bed to chair and chair to bed with moderate assistance  using the least restrictive device within 7 day(s). 3.  Patient will perform sit to stand with maximal assistance within 7 day(s). 4.  Patient will ambulate with maximal assistance for 5 feet with the least restrictive device within 7 day(s). Outcome: Progressing Towards Goal     PHYSICAL THERAPY TREATMENT  Patient: Jennifer West (11 y.o. male)  Date: 5/1/2022  Diagnosis: Closed left hip fracture ([facility] [address] [S72.002A] Left displaced femoral neck fracture (HCC)  Procedure(s) (LRB):  LEFT HIP HEMIARTHROPLASTY (Left)  QUADRICEPS REPAIR (Right) 6 Days Post-Op  Precautions: Fall,WBAT (WBAT B LE, R LE locked in knee ext)  Chart, physical therapy assessment, plan of care and goals were reviewed. ASSESSMENT  Patient continues with skilled PT services and is progressing towards goals. Pt is POD 6 from L HEATHER with L quadriceps repair. Pt generally mobilized at a Min Ax2 to Max A x2 level during session. Pt received supine in bed with wife present and agreeable to work with therapy. Pt went supine>sit with use of belt for L LE advancement however required Mod A x2 for trunk control. Once seated EOB ~ pt required multiple VC to scoot to EOB to and place both feet on floor. Pt displayed posterior lean and displayed difficulty to correct despite multiple VC/TC.  Pt then went bed>chair with Max Ax2. Pt transferred stand>sit to bedside chair, positioned for comfort, educated on benefits of OOB to chair for meals. Recommend RN utilize lift team to transfer pt back to bed when appropriate. Current Level of Function Impacting Discharge (mobility/balance): Max A x2 bed>chair and sit>stand, Min Ax2 stand>sit, Mod Ax2 supine>sit    Other factors to consider for discharge: hx stroke, hx falls         PLAN :  Patient continues to benefit from skilled intervention to address the above impairments. Continue treatment per established plan of care. to address goals. Recommendation for discharge: (in order for the patient to meet his/her long term goals)  Therapy 3 hours per day 5-7 days per week    This discharge recommendation:  Has been made in collaboration with the attending provider and/or case management    IF patient discharges home will need the following DME: to be determined (TBD)       SUBJECTIVE:   Patient stated thanks.     OBJECTIVE DATA SUMMARY:   Critical Behavior:  Neurologic State: Alert  Orientation Level: Oriented X4  Cognition: Appropriate decision making,Appropriate safety awareness,Appropriate for age attention/concentration,Follows commands  Safety/Judgement: Awareness of environment,Insight into deficits  Functional Mobility Training:  Bed Mobility:     Supine to Sit: Moderate assistance;Assist x2              Transfers:  Sit to Stand: Maximum assistance;Assist x2  Stand to Sit: Minimum assistance;Assist x2        Bed to Chair: Maximum assistance;Assist x2                    Balance:  Sitting: Impaired  Sitting - Static: Fair (occasional)  Sitting - Dynamic: Fair (occasional)  Standing: Impaired  Standing - Static: Constant support; Fair  Standing - Dynamic : Poor    Pain Rating:  Pt did not relate pain during session    Activity Tolerance:   Good, tolerates ADLs without rest breaks, and SpO2 stable on RA    After treatment patient left in no apparent distress:   Sitting in chair, Call bell within reach, and Caregiver / family present    COMMUNICATION/COLLABORATION:   The patients plan of care was discussed with: Occupational therapist and Registered nurse.      Patrick Osei, PT   Time Calculation: 24 mins

## 2022-05-01 NOTE — PROGRESS NOTES
Transitions of Care: SNF referrals are pending with [facility], [facility], and [address]. Patient prefers a private room. Insurance [employer] [place] has been initiated with [employer] started on 4/29. Auth reference# [id number]. S transport at discharge. Chart reviewed. Noted Bandar Feliciano denied patient. CM met with patient and wife to obtain additional SNF choice. They prefer [employer] and [address]. CM explained that there is additional financial paperwork required by [facility] for admission. CM sent referrals. CM was later approached by wife who stated that they no longer want [employer]. They are reviewing options and will provide another choice. CM received call from wife, they would like referral sent to [place] [phone]. CM sent referral via [facility].     Vicki Trejo, ALYSONW/CRM

## 2022-05-01 NOTE — PROGRESS NOTES
[facility]  Hospitalist Group                                                                                          Hospitalist Progress Note  Yasmine Krause NP  Answering service: [phone] from in house phone        Date of Service:  2022  NAME:  Juan Chavez  :  1950  MRN:  [id number]      Admission Summary:   Per H&P, Juan Chavez is a 66 y.o. male who presents with fall and right hip,  [age]Y male with a history of CAD, CVA with mild left side weakness diabetes, hypertension and BPH. Patient presented to the ED after a fall he said he tripped and fall backwards while vacuuming at home. The accident occurred 1 to 2 hours ago. He landed on hard floor. There was no blood loss. The point of impact was the left hip. The pain is moderate. He was not ambulatory at the scene. There was no entrapment after the fall. There was no alcohol use involved in the accident. Associated symptoms include extremity weakness (baseline LLE weakness). Pertinent negatives include no vomiting, no headaches, no loss of consciousness and no laceration. The risk factors include being elderly and recurrent falls (previous stroke).  Associated symptoms comments: +R knee pain. The symptoms are aggravated by activity, use of injured limb and pressure on injury. Interval history / Subjective:     I saw the patient this morning on rounds.   Lying in bed, no complaints the moment of the encounter     Assessment & Plan:     Left hip fracture  Right quadriceps tendon rupture  S/p hemiarthroplasty and quadriceps repair on   Orthopedics following, thank you for recommendations  To follow-up with Dr. Pippa Hannah for hip and Dr. Falguni Soto for quadricep  WBAT  Continuing knee immobilizer  Continuing multimodal pain control  Continuing bowel regimen  Doing to work with physical therapy        Acute urinary retention  Failed intermittent catheterization  Indwelling catheter placed  which was removed 4/30  Failed subsequent voiding trial, Hamm catheter replaced  Urology consulted, for follow-up  Continuing finasteride  Continuing tamsulosin            Acute blood loss anemia  Likely due to OR losses  Hemoglobin was 12 at presentation  We will transfuse for hemoglobin less than 7  We will continue to monitor  Remains stable, 8.0 today hemoglobin    CKd stage III  Did have mildly elevated creatinine, 1.38, baseline is somewhere around 1.2  Has resolved with IV hydration creatinine today 0.90    Type 2 diabetes  Blood sugar currently controlled   Holding oral hypoglycemics  SSI for now         Hypertension  Pressure currently controlled  Continue carvedilol        Code status: FULL  DVT prophylaxis: LMWH    Care Plan discussed with: Patient/Family, Nurse and      Anticipated Disposition: SNF/LTC,     Anticipated Discharge: Greater than 48 hours     Hospital Problems  Date Reviewed: 4/25/2022          Codes Class Noted POA    Quadriceps tendon rupture, right, initial encounter ICD-10-CM: Q15.299K  ICD-9-CM: 843.8  4/25/2022 Yes        * (Principal) Left displaced femoral neck fracture ([facility] [address] ICD-10-CM: Z94.006C  ICD-9-CM: 820.8  4/24/2022 Yes                Review of Systems:   A comprehensive review of systems was negative except for that written in the HPI. Vital Signs:    Last 24hrs VS reviewed since prior progress note. Most recent are:  Visit Vitals  /67 (BP 1 Location: Right upper arm, BP Patient Position: At rest)   Pulse 79   Temp 97.7 °F (36.5 °C)   Resp 18   SpO2 96%         Intake/Output Summary (Last 24 hours) at 5/1/2022 1018  Last data filed at 5/1/2022 7692  Gross per 24 hour   Intake --   Output 1300 ml   Net -1300 ml        Physical Examination:     I had a face to face encounter with this patient and independently examined them on 5/1/2022 as outlined below:          Constitutional:  No acute distress, cooperative, pleasant    ENT:  Oral mucosa moist, oropharynx benign. Resp: CTA bilaterally. No wheezing/rhonchi/rales. No accessory muscle use   CV:  Regular rhythm, normal rate, no murmurs, gallops, rubs    GI:  Soft, non distended, non tender. normoactive bowel sounds, no hepatosplenomegaly     Musculoskeletal:  No edema, warm, 2+ pulses throughout    Neurologic:  Moves all extremities. AAOx3, CN II-XII reviewed            Data Review:    Review and/or order of clinical lab test  Review and/or order of tests in the radiology section of [facility]      Labs:     Recent Labs     05/01/22 0643 04/30/22 0631   WBC 10.9 9.4   HGB 8.0* 7.8*   HCT 24.5* 24.1*    211     Recent Labs     05/01/22 0643 04/30/22 0631 04/29/22  0334    140 139   K 4.1 4.2 4.0   * 107 107   CO2 26 26 26   BUN 14 16 16   CREA 0.90 0.97 0.91   * 114* 120*   CA 8.2* 8.1* 8.3*     Recent Labs     05/01/22 0643 04/30/22 0631 04/29/22  0334   * 56 35   AP 79 64 59   TBILI 0.9 0.7 0.6   TP 5.6* 5.5* 5.6*   ALB 2.0* 2.0* 2.2*   GLOB 3.6 3.5 3.4     No results for input(s): INR, PTP, APTT, INREXT, INREXT in the last 72 hours. No results for input(s): FE, TIBC, PSAT, FERR in the last 72 hours. No results found for: FOL, RBCF   No results for input(s): PH, PCO2, PO2 in the last 72 hours. No results for input(s): CPK, CKNDX, TROIQ in the last 72 hours.     No lab exists for component: CPKMB  Lab Results   Component Value Date/Time    Cholesterol, total 142 01/12/2022 12:45 PM    HDL Cholesterol 64 01/12/2022 12:45 PM    LDL, calculated 60.6 01/12/2022 12:45 PM    Triglyceride 87 01/12/2022 12:45 PM    CHOL/HDL Ratio 2.2 01/12/2022 12:45 PM     Lab Results   Component Value Date/Time    Glucose (POC) 119 (H) 05/01/2022 06:55 AM    Glucose (POC) 138 (H) 04/30/2022 09:34 PM    Glucose (POC) 136 (H) 04/30/2022 04:28 PM    Glucose (POC) 148 (H) 04/30/2022 11:33 AM    Glucose (POC) 126 (H) 04/30/2022 06:25 AM     Lab Results   Component Value Date/Time    Color YELLOW/STRAW 07/18/2017 05:29 PM    Appearance CLEAR 07/18/2017 05:29 PM    Specific gravity 1.022 07/18/2017 05:29 PM    pH (UA) 5.5 07/18/2017 05:29 PM    Protein 30 (A) 07/18/2017 05:29 PM    Glucose NEGATIVE  07/18/2017 05:29 PM    Ketone NEGATIVE  07/18/2017 05:29 PM    Bilirubin NEGATIVE  07/18/2017 05:29 PM    Urobilinogen 0.2 07/18/2017 05:29 PM    Nitrites NEGATIVE  07/18/2017 05:29 PM    Leukocyte Esterase NEGATIVE  07/18/2017 05:29 PM    Epithelial cells FEW 07/18/2017 05:29 PM    Bacteria NEGATIVE  07/18/2017 05:29 PM    WBC 0-4 07/18/2017 05:29 PM    RBC 0-5 07/18/2017 05:29 PM         Medications Reviewed:     Current Facility-Administered Medications   Medication Dose Route Frequency    lactulose (CHRONULAC) 10 gram/15 mL solution 15 mL  15 mL Oral DAILY PRN    sodium chloride (NS) flush 5-40 mL  5-40 mL IntraVENous Q8H    sodium chloride (NS) flush 5-40 mL  5-40 mL IntraVENous PRN    naloxone (NARCAN) injection 0.4 mg  0.4 mg IntraVENous PRN    senna-docusate (PERICOLACE) 8.6-50 mg per tablet 1 Tablet  1 Tablet Oral BID    polyethylene glycol (MIRALAX) packet 17 g  17 g Oral DAILY    bisacodyL (DULCOLAX) suppository 10 mg  10 mg Rectal DAILY PRN    oxyCODONE IR (ROXICODONE) tablet 2.5 mg  2.5 mg Oral Q4H PRN    oxyCODONE IR (ROXICODONE) tablet 5 mg  5 mg Oral Q4H PRN    oxyCODONE IR (ROXICODONE) tablet 10 mg  10 mg Oral Q4H PRN    enoxaparin (LOVENOX) injection 30 mg  30 mg SubCUTAneous Q12H    fentaNYL citrate (PF) injection 50 mcg  50 mcg IntraVENous Q4H PRN    prochlorperazine (COMPAZINE) with saline injection 5 mg  5 mg IntraVENous Q6H PRN    sodium chloride (NS) flush 5-40 mL  5-40 mL IntraVENous Q8H    sodium chloride (NS) flush 5-40 mL  5-40 mL IntraVENous PRN    acetaminophen (TYLENOL) tablet 650 mg  650 mg Oral Q6H PRN    Or    acetaminophen (TYLENOL) suppository 650 mg  650 mg Rectal Q6H PRN    polyethylene glycol (MIRALAX) packet 17 g  17 g Oral DAILY PRN    ondansetron (ZOFRAN ODT) tablet 4 mg 4 mg Oral Q8H PRN    Or    ondansetron (ZOFRAN) injection 4 mg  4 mg IntraVENous Q6H PRN    atorvastatin (LIPITOR) tablet 40 mg  40 mg Oral DAILY    buPROPion SR (WELLBUTRIN SR) tablet 150 mg  150 mg Oral BID    finasteride (PROSCAR) tablet 5 mg  5 mg Oral QHS    tamsulosin (FLOMAX) capsule 0.4 mg  0.4 mg Oral DAILY    carvediloL (COREG) tablet 12.5 mg  12.5 mg Oral BID WITH MEALS    glucose chewable tablet 16 g  4 Tablet Oral PRN    dextrose 10 % infusion 0-250 mL  0-250 mL IntraVENous PRN    glucagon (GLUCAGEN) injection 1 mg  1 mg IntraMUSCular PRN    insulin lispro (HUMALOG) injection   SubCUTAneous AC&HS     ______________________________________________________________________  EXPECTED LENGTH OF STAY: 4d 2h  ACTUAL LENGTH OF STAY:          7                 No Campbell NP

## 2022-05-01 NOTE — PROGRESS NOTES
H/o BPH on Flomax/Finasteride, h/o prostate biopsy and prior  Care [address]. Arrives after fall with hip fx s/p surgery  Post-op UR, I&O Caths now indwelling cath    Impression:    Post-op urinary Retention. Continue Flomax/Finasteride. Continue travis through discharge. Can have void trial at facility 1 week+ when normalizing. Will have office call for outpatient followup approximately 1 month.

## 2022-05-01 NOTE — PROGRESS NOTES
Problem: Self Care Deficits Care Plan (Adult)  Goal: *Acute Goals and Plan of Care (Insert Text)  Description: FUNCTIONAL STATUS PRIOR TO ADMISSION: Patient was modified independent using a single point cane for functional mobility and ADL completion. Reports standing to shower at walk-in shower. HOME SUPPORT: The patient lived with wife but did not require assist.    Occupational Therapy Goals  Initiated 4/27/2022  1. Patient will perform bathing with moderate assistance  within 7 day(s). 2.  Patient will perform lower body dressing with maximal assistance  within 7 day(s). 3.  Patient will perform RW grooming with minimal assistance within 7 day(s). 4.  Patient will perform RW toilet transfers, EOB to [facility], with moderate assistance within 7 day(s). 5.  Patient will perform all aspects of RW toileting with moderate assistance within 7 day(s). Outcome: Progressing Towards Goal     OCCUPATIONAL THERAPY TREATMENT  Patient: Andie Del Cid (37 y.o. male)  Date: 5/1/2022  Diagnosis: Closed left hip fracture ([address] [S72.002A] Left displaced femoral neck fracture (HCC)  Procedure(s) (LRB):  LEFT HIP HEMIARTHROPLASTY (Left)  QUADRICEPS REPAIR (Right) 6 Days Post-Op  Precautions: Fall,WBAT (WBAT B LE, R LE locked in knee ext)  Chart, occupational therapy assessment, plan of care, and goals were reviewed. ASSESSMENT  Patient continues with skilled OT services and is progressing towards goals, however, remains limited by impaired functional mobility and generalized weakness. Pt cleared for therapy by nursing and received supine in bed willing to work with therapist. Pt required Mod A for supine>sit and Max Ax2 for sit>stand with RW. Pt completed transfer to chair and participated in BUE AROM exercises. Would be interested in theraband exercises next session. Continue to recommend SNF at discharge to maximize pt outcomes. Current Level of Function Impacting Discharge (ADLs):  Max A x2 bed to chair, Max A for LB ADLs, Set up for UB ADLs     Other factors to consider for discharge: lives with wife          PLAN :  Patient continues to benefit from skilled intervention to address the above impairments. Continue treatment per established plan of care to address goals. Recommend with staff: Back to bed with mobility team    Recommend next OT session: willie     Recommendation for discharge: (in order for the patient to meet his/her long term goals)  Therapy up to 5 days/week in SNF setting    This discharge recommendation:  Has been made in collaboration with the attending provider and/or case management    IF patient discharges home will need the following DME: tbd       SUBJECTIVE:   Patient stated You think that was good?     OBJECTIVE DATA SUMMARY:   Cognitive/Behavioral Status:  Neurologic State: Alert  Orientation Level: Oriented X4  Cognition: Appropriate decision making; Appropriate safety awareness; Appropriate for age attention/concentration; Follows commands  Perception: Appears intact  Perseveration: No perseveration noted  Safety/Judgement: Awareness of environment; Insight into deficits    Functional Mobility and Transfers for ADLs:  Bed Mobility:  Supine to Sit: Moderate assistance;Assist x2    Transfers:  Sit to Stand: Maximum assistance;Assist x2     Bed to Chair: Maximum assistance;Assist x2    Balance:  Sitting: Impaired  Sitting - Static: Fair (occasional)  Sitting - Dynamic: Fair (occasional)  Standing: Impaired  Standing - Static: Constant support; Fair  Standing - Dynamic : Poor    ADL Intervention:     Flores Narrow shoes: Max A     Cognitive Retraining  Safety/Judgement: Awareness of environment; Insight into deficits    Therapeutic Exercises:   BUE ROM  (neck stretches, shoulder flexion/extension, abduction/adduction, bicep curls, wrist circles, finger extension/flexion)     Pain:  Reported in back and at hips     Activity Tolerance:   Fair    After treatment patient left in no apparent distress: Sitting in chair, Call bell within reach and Caregiver / family present    COMMUNICATION/COLLABORATION:   The patients plan of care was discussed with: Physical therapist and Registered nurse.      Ric Mckeon OT  Time Calculation: 24 mins

## 2022-05-01 NOTE — PROGRESS NOTES
Bedside shift change report given to Emelina (oncoming nurse) by Carole Conteh( offgoing nurse).  Report included the following information SBAR, Kardex, and Recent Results

## 2022-05-01 NOTE — PROGRESS NOTES
Bedside and Verbal shift change report given to Osvaldo Wesley (oncoming nurse) by Leisa Reeder (offgoing nurse). Report included the following information SBAR.

## 2022-05-02 VITALS
OXYGEN SATURATION: 97 % | TEMPERATURE: 98.4 F | RESPIRATION RATE: 16 BRPM | SYSTOLIC BLOOD PRESSURE: 122 MMHG | DIASTOLIC BLOOD PRESSURE: 64 MMHG | HEART RATE: 75 BPM

## 2022-05-02 LAB
ALBUMIN SERPL-MCNC: 2 G/DL (ref 3.5–5)
ALBUMIN/GLOB SERPL: 0.7 {RATIO} (ref 1.1–2.2)
ALP SERPL-CCNC: 78 U/L (ref 45–117)
ALT SERPL-CCNC: 83 U/L (ref 12–78)
ANION GAP SERPL CALC-SCNC: 6 MMOL/L (ref 5–15)
AST SERPL-CCNC: 42 U/L (ref 15–37)
BASOPHILS # BLD: 0.1 K/UL (ref 0–0.1)
BASOPHILS NFR BLD: 1 % (ref 0–1)
BILIRUB SERPL-MCNC: 0.8 MG/DL (ref 0.2–1)
BUN SERPL-MCNC: 16 MG/DL (ref 6–20)
BUN/CREAT SERPL: 19 (ref 12–20)
CALCIUM SERPL-MCNC: 7.9 MG/DL (ref 8.5–10.1)
CHLORIDE SERPL-SCNC: 108 MMOL/L (ref 97–108)
CO2 SERPL-SCNC: 26 MMOL/L (ref 21–32)
COVID-19 RAPID TEST, COVR: NOT DETECTED
CREAT SERPL-MCNC: 0.83 MG/DL (ref 0.7–1.3)
DIFFERENTIAL METHOD BLD: ABNORMAL
EOSINOPHIL # BLD: 0.3 K/UL (ref 0–0.4)
EOSINOPHIL NFR BLD: 3 % (ref 0–7)
ERYTHROCYTE [DISTWIDTH] IN BLOOD BY AUTOMATED COUNT: 12.9 % (ref 11.5–14.5)
GLOBULIN SER CALC-MCNC: 3 G/DL (ref 2–4)
GLUCOSE BLD STRIP.AUTO-MCNC: 149 MG/DL (ref 65–117)
GLUCOSE BLD STRIP.AUTO-MCNC: 98 MG/DL (ref 65–117)
GLUCOSE SERPL-MCNC: 95 MG/DL (ref 65–100)
HCT VFR BLD AUTO: 24.2 % (ref 36.6–50.3)
HGB BLD-MCNC: 7.8 G/DL (ref 12.1–17)
IMM GRANULOCYTES # BLD AUTO: 0.1 K/UL (ref 0–0.04)
IMM GRANULOCYTES NFR BLD AUTO: 1 % (ref 0–0.5)
LYMPHOCYTES # BLD: 1.9 K/UL (ref 0.8–3.5)
LYMPHOCYTES NFR BLD: 18 % (ref 12–49)
MCH RBC QN AUTO: 31.2 PG (ref 26–34)
MCHC RBC AUTO-ENTMCNC: 32.2 G/DL (ref 30–36.5)
MCV RBC AUTO: 96.8 FL (ref 80–99)
MONOCYTES # BLD: 1.5 K/UL (ref 0–1)
MONOCYTES NFR BLD: 14 % (ref 5–13)
NEUTS SEG # BLD: 6.8 K/UL (ref 1.8–8)
NEUTS SEG NFR BLD: 63 % (ref 32–75)
NRBC # BLD: 0 K/UL (ref 0–0.01)
NRBC BLD-RTO: 0 PER 100 WBC
PLATELET # BLD AUTO: 286 K/UL (ref 150–400)
PMV BLD AUTO: 10.9 FL (ref 8.9–12.9)
POTASSIUM SERPL-SCNC: 4 MMOL/L (ref 3.5–5.1)
PROT SERPL-MCNC: 5 G/DL (ref 6.4–8.2)
RBC # BLD AUTO: 2.5 M/UL (ref 4.1–5.7)
SERVICE CMNT-IMP: ABNORMAL
SERVICE CMNT-IMP: NORMAL
SODIUM SERPL-SCNC: 140 MMOL/L (ref 136–145)
SOURCE, COVRS: NORMAL
WBC # BLD AUTO: 10.7 K/UL (ref 4.1–11.1)

## 2022-05-02 PROCEDURE — 87635 SARS-COV-2 COVID-19 AMP PRB: CPT

## 2022-05-02 PROCEDURE — 85025 COMPLETE CBC W/AUTO DIFF WBC: CPT

## 2022-05-02 PROCEDURE — 97530 THERAPEUTIC ACTIVITIES: CPT

## 2022-05-02 PROCEDURE — 97110 THERAPEUTIC EXERCISES: CPT

## 2022-05-02 PROCEDURE — 36415 COLL VENOUS BLD VENIPUNCTURE: CPT

## 2022-05-02 PROCEDURE — 74011250637 HC RX REV CODE- 250/637: Performed by: PHYSICIAN ASSISTANT

## 2022-05-02 PROCEDURE — 74011636637 HC RX REV CODE- 636/637: Performed by: PHYSICIAN ASSISTANT

## 2022-05-02 PROCEDURE — 80053 COMPREHEN METABOLIC PANEL: CPT

## 2022-05-02 PROCEDURE — 74011250636 HC RX REV CODE- 250/636: Performed by: PHYSICIAN ASSISTANT

## 2022-05-02 PROCEDURE — 82962 GLUCOSE BLOOD TEST: CPT

## 2022-05-02 RX ORDER — OXYCODONE HYDROCHLORIDE 10 MG/1
10 TABLET ORAL
Qty: 20 TABLET | Refills: 0 | Status: SHIPPED | OUTPATIENT
Start: 2022-05-02 | End: 2022-05-09

## 2022-05-02 RX ADMIN — ATORVASTATIN CALCIUM 40 MG: 40 TABLET, FILM COATED ORAL at 08:40

## 2022-05-02 RX ADMIN — OXYCODONE 10 MG: 5 TABLET ORAL at 06:06

## 2022-05-02 RX ADMIN — OXYCODONE 5 MG: 5 TABLET ORAL at 11:55

## 2022-05-02 RX ADMIN — CARVEDILOL 12.5 MG: 6.25 TABLET, FILM COATED ORAL at 08:41

## 2022-05-02 RX ADMIN — ENOXAPARIN SODIUM 30 MG: 100 INJECTION SUBCUTANEOUS at 10:35

## 2022-05-02 RX ADMIN — Medication 2 UNITS: at 11:54

## 2022-05-02 RX ADMIN — SENNOSIDES AND DOCUSATE SODIUM 1 TABLET: 50; 8.6 TABLET ORAL at 08:40

## 2022-05-02 RX ADMIN — BUPROPION HYDROCHLORIDE 150 MG: 150 TABLET, EXTENDED RELEASE ORAL at 08:41

## 2022-05-02 RX ADMIN — TAMSULOSIN HYDROCHLORIDE 0.4 MG: 0.4 CAPSULE ORAL at 08:40

## 2022-05-02 NOTE — DISCHARGE SUMMARY
Discharge Summary       PATIENT ID: Scott Cao  MRN: [id number]   YOB: 1950    DATE OF ADMISSION: 4/24/2022  5:16 PM    DATE OF DISCHARGE: 5/2/2022   PRIMARY CARE PROVIDER: Mala Baker MD     ATTENDING PHYSICIAN: Mario Scott MD  DISCHARGING PROVIDER: Matthew Linares NP    To contact this individual call [phone] and ask the  to page. If unavailable ask to be transferred the Adult Hospitalist Department. CONSULTATIONS: IP CONSULT TO ORTHOPEDIC SURGERY  IP CONSULT TO UROLOGY    PROCEDURES/SURGERIES: Procedure(s):  LEFT HIP HEMIARTHROPLASTY  [facility] COURSE:   Per H&P, Shruthi Zan Wright is a 66 y. o. male who presents with fall and right hip,  [age]Y male with a history of CAD, CVA with mild left side weakness diabetes, hypertension and BPH.  Patient presented to the ED after a fall he said he tripped and fall backwards while vacuuming at home. The accident occurred 1 to 2 hours ago. Minehoracea Haley landed [facility] floor. There was no blood loss. The point of impact was the left hip. The pain is moderate. He was not ambulatory at the scene. There was no entrapment after the fall. There was no alcohol use involved in the accident. Associated symptoms include extremity weakness (baseline LLE weakness). Pertinent negatives include no vomiting, no headaches, no loss of consciousness and no laceration. The risk factors include being elderly and recurrent falls (previous stroke).  Associated symptoms comments: +R knee pain. The symptoms are aggravated by activity, use of injured limb and pressure on injury.         DISCHARGE DIAGNOSES / PLAN:      Left hip fracture  Right quadriceps tendon rupture  S/p hemiarthroplasty and quadriceps repair on 4/25  Orthopedics following, thank you for recommendations  To follow-up with Dr. Dilan Lala for hip and Dr. Dia Blas for quadricept  Continuing knee immobilizer locked knee extension  Continuing multimodal Pain control  We will continue with bowel regimen  Working with physical therapy, weightbearing as tolerated           Acute urinary retention  Failed intermittent catheterization  Indwelling catheter placed 4/25 which was removed 4/30  Failed subsequent voiding trial, Hamm catheter replaced  Has been evaluated by urology, appreciate recommendations  To continue Hamm catheter, attempt voiding trials after 1 week  Follow-up with urology, Dr Franchesca Cole outpatient in 1 month  In the meantime continuing finasteride and tamsulosin              Acute blood loss anemia  Likely due to OR losses  Hemoglobin was 12 at presentation  We will transfuse for hemoglobin less than 7  Remains stable, 7.8 today hemoglobin     CKd stage III  Did have mildly elevated creatinine, 1.38, baseline is somewhere around 1.2  Has resolved with IV hydration creatinine today 0.83     Type 2 diabetes  Blood sugar currently controlled   Continuing home medication reg                Hypertension  Pressure currently controlled  Continuing carvedilol             PENDING TEST RESULTS:   At the time of discharge the following test results are still pending:     FOLLOW UP APPOINTMENTS:    Follow-up Information     Follow up With Specialties Details Why Contact Info    Ravindra Marie MD Internal Medicine   [address]  [place] [address]  [address] [id number]      [address] [facility] [place] [facility] [place] [address]   [address] [address] [facility]    Ravindra Marie MD Internal Medicine In 1 week  [address]  [facility] [address]  [address] [address]  [phone]      Shun Headley MD Orthopedic Surgery In 1 week  [address] [address]      Nikolay Strickland MD Orthopedic Surgery In 1 week  [address] [facility]  [address]  [address] [facility]      Simon Bailey MD Urology In 1 month  [address] [facility] [address]  [address] [address]  [phone]             ADDITIONAL CARE RECOMMENDATIONS:     DIET: Regular Diet  ACTIVITY: Activity as tolerated    WOUND CARE: per ortho    EQUIPMENT needed: tbd      DISCHARGE MEDICATIONS:  Current Discharge Medication List      START taking these medications    Details   oxyCODONE IR (ROXICODONE) 10 mg tab immediate release tablet Take 1 Tablet by mouth every four (4) hours as needed for Pain for up to 7 days. Max Daily Amount: 60 mg.  Qty: 20 Tablet, Refills: 0  Start date: 5/2/2022, End date: 5/9/2022    Associated Diagnoses: Closed fracture of left hip, [facility]         CONTINUE these medications which have CHANGED    Details   aspirin delayed-release 81 mg tablet Take 1 Tablet by mouth daily. Take 1 tablet twice per day until 5/10/22 for prevention of blood clots. Then you may resume taking it once per day. Qty: 30 Tablet, Refills: 0  Start date: 4/29/2022         CONTINUE these medications which have NOT CHANGED    Details   amLODIPine-benazepril (LOTREL) 5-20 mg per capsule TAKE 1 CAPSULE EVERY DAY  Qty: 90 Capsule, Refills: 2    Associated Diagnoses: Essential hypertension;  Hypercholesterolemia      furosemide (LASIX) 40 mg tablet TAKE 1 TABLET EVERY DAY  Qty: 90 Tablet, Refills: 2    Associated Diagnoses: Localized edema      metFORMIN (GLUCOPHAGE) 500 mg tablet TAKE 1 TABLET EVERY DAY WITH DINNER  Qty: 90 Tablet, Refills: 2      tamsulosin (FLOMAX) 0.4 mg capsule TAKE 2 CAPSULES EVERY DAY  Qty: 180 Capsule, Refills: 2      carvediloL (COREG) 12.5 mg tablet TAKE 1 TABLET TWICE DAILY WITH MEALS  Qty: 180 Tablet, Refills: 2    Associated Diagnoses: Routine general medical examination at a health care facility      ezetimibe (ZETIA) 10 mg tablet TAKE 1 TABLET EVERY DAY  Qty: 90 Tablet, Refills: 2      finasteride (PROSCAR) 5 mg tablet TAKE 1 TABLET EVERY NIGHT  Qty: 90 Tablet, Refills: 2      cholecalciferol, vitamin D3, (Vitamin D3) 50 mcg (2,000 unit) tab Take 2,000 Units by mouth daily.      buPROPion SR (WELLBUTRIN SR) 150 mg SR tablet TAKE 1 TABLET TWICE DAILY  Qty: 180 Tablet, Refills: 2    Associated Diagnoses: Essential hypertension; Coronary artery disease involving native coronary artery of native heart without angina pectoris      potassium chloride (K-DUR, KLOR-CON M20) 20 mEq tablet TAKE 1 TABLET EVERY DAY  Qty: 90 Tablet, Refills: 3    Associated Diagnoses: Localized edema      atorvastatin (LIPITOR) 40 mg tablet TAKE 1 TABLET EVERY DAY  Qty: 90 Tablet, Refills: 1      therapeutic multivitamin (THERA) tablet Take 1 Tab by mouth daily. Qty: 90 Tab, Refills: 1               NOTIFY YOUR PHYSICIAN FOR ANY OF THE FOLLOWING:   Fever over 101 degrees for 24 hours. Chest pain, shortness of breath, fever, chills, nausea, vomiting, diarrhea, change in mentation, falling, weakness, bleeding. Severe pain or pain not relieved by medications. Or, any other signs or symptoms that you may have questions about.     DISPOSITION:    Home With:   OT  PT  HH  RN      x  SNF    Independent/assisted living    Hospice    Other:       PATIENT CONDITION AT DISCHARGE:     Functional status    Poor    x Deconditioned     Independent      Cognition    x Lucid     Forgetful     Dementia      Catheters/lines (plus indication)   x Hamm     PICC     PEG     None      Code status   x  Full code     DNR      PHYSICAL EXAMINATION AT DISCHARGE:   Refer to Progress Note      CHRONIC MEDICAL DIAGNOSES:  Problem List as of 5/2/2022 Date Reviewed: 4/25/2022          Codes Class Noted - Resolved    Left low back pain ICD-10-CM: M54.50  ICD-9-CM: 724.2  8/15/2016 - Present        Gross hematuria ICD-10-CM: R31.0  ICD-9-CM: 599.71  8/15/2016 - Present        Calculus of kidney ICD-10-CM: N20.0  ICD-9-CM: 592.0  4/18/2016 - Present        Benign prostatic hyperplasia with lower urinary tract symptoms ICD-10-CM: N40.1  ICD-9-CM: 600.01  6/22/2014 - Present        Nocturia ICD-10-CM: R35.1  ICD-9-CM: 788.43  7/30/2014 - Present        Slowing of urinary stream ICD-10-CM: R39.198  ICD-9-CM: 788.62  7/30/2014 - Present        Urinary frequency ICD-10-CM: R35.0  ICD-9-CM: 788.41  6/22/2014 - Present        Erectile dysfunction ICD-10-CM: N52.9  ICD-9-CM: 607.84  4/17/2016 - Present        Quadriceps tendon rupture, right, initial encounter ICD-10-CM: A26.042N  ICD-9-CM: 843.8  4/25/2022 - Present        * (Principal) Left displaced femoral neck fracture ([facility] ICD-10-CM: E92.040Q  ICD-9-CM: 820.8  4/24/2022 - Present        Opioid use, unspecified with unspecified opioid-induced disorder ICD-10-CM: F11.99  ICD-9-CM: 292.9, 305.50  8/18/2021 - Present        Compression fracture of L3 vertebra (HCC) ICD-10-CM: S32.030A  ICD-9-CM: 805.4  8/4/2021 - Present    Overview Signed 8/4/2021 10:37 PM by Denice Morrissey MD     S/p L3 kyphoplasty x 2             Lumbar stenosis ICD-10-CM: M48.061  ICD-9-CM: 724.02  8/4/2021 - Present        Moderate major depression ([facility] ICD-10-CM: F32.1  ICD-9-CM: 296.22  7/24/2018 - Present        Full code but no chest compressions ICD-10-CM: Z78.9  ICD-9-CM: V49.89  7/23/2018 - Present        Localized edema ICD-10-CM: R60.0  ICD-9-CM: 782.3  7/23/2018 - Present        Type 2 diabetes mellitus with nephropathy ([facility] ICD-10-CM: E11.21  ICD-9-CM: 250.40, 583.81  1/23/2018 - Present        Fracture of multiple ribs of right side ICD-10-CM: S22.41XA  ICD-9-CM: 807.09  7/26/2017 - Present        Essential hypertension ICD-10-CM: I10  ICD-9-CM: 401.9  3/28/2017 - Present        Hypercholesterolemia ICD-10-CM: E78.00  ICD-9-CM: 272.0  3/28/2017 - Present        Controlled type 2 diabetes mellitus without complication ([facility] DYO-11-GJ: E11.9  ICD-9-CM: 250.00  3/28/2017 - Present        History of stroke ICD-10-CM: Z86.73  ICD-9-CM: V12.54  3/28/2017 - Present        Coronary artery disease involving native coronary artery without angina pectoris ICD-10-CM: I25.10  ICD-9-CM: 414.01  3/28/2017 - Present        Obesity ICD-10-CM: E66.9  ICD-9-CM: 278.00  6/22/2014 - Present        RESOLVED: Chronic cerebral ischemia ICD-10-CM: I67.82  ICD-9-CM: 437.1  6/25/2017 - 6/27/2017        RESOLVED: Left leg weakness ICD-10-CM: R29.898  ICD-9-CM: 729.89  6/24/2017 - 6/27/2017        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

## 2022-05-02 NOTE — DISCHARGE INSTRUCTIONS
Dr. Logan Rather tendon repair Postoperative Instructions     Follow-Up Appointment:  o Follow up in the office 2 weeks after surgery. If this appointment is not already scheduled, please call our office at [phone].  Activity:  o Unless you have been specifically instructed otherwise, you can weight bear on the operative extremity while using the knee immobilizer. o Ambulate every hour. Use the incentive spirometer every half hour when resting. o Please refrain from any high-level activities until we see you back at your follow up appointment. This includes golfing, exercising, and other more intense activities. o Application of the ice packs will prevent and treat inflammation and reduce pain and swelling. You should ice the incisional area at least 3 times a day, 20-30 minutes at a time. o Prevent any falls. Clear your living environment of rugs or floor objects that may be tripping hazards. Use an assistive device as needed for balance and support.  o Its OK to open the knee immobilizer for comfort when resting, but must keep the leg straight without bending at all times.  Dressings / Wound Care:  o Keep dressing in place until the follow-up visit.   o Do not apply antibiotic ointment, creams or lotions to your incision.  o Once your waterproof dressing has been removed, you may change bandages daily if you like or leave them open to air. These can be purchased at your local pharmacy. o The sutures or staples will be removed at your 2 week follow up.  o If there is wound drainage, hold physical therapy, avoid vigorous activities, and contact our office [phone].  Showering / Bathing:  o If your incision is dry without drainage, you may shower following your discharge home.   The dressing is waterproof if well affixed to skin.  o You may shower with the waterproof dressing in place, but please be sure it is adhered to the skin and does not allow water to get underneath.   o It is fine to have water run over the incision after the waterproof dressing has been removed. o Do not vigorously scrub your incision. o Do not soak or submerge in a bath, pool, jacuzzi, ocean, river or lake for 6 weeks after surgery.  Diet:  o You may advance to your regular diet as tolerated. o Proper nutrition is crucial to healing. Make sure you are eating as healthily as possible and following a balanced. protein-rich diet after your surgery. o Avoid processed foods and eat fruits and vegetables.  Medications:  o It is our goal to keep you as comfortable as possible after your surgery. Please take your medications as prescribed without exceeding the recommended dosage. o It is also important to understand that pain has a cycle. It begins and increases until medication interrupts it. The aim of good pain control is to stop the pain before it becomes intolerable. The key is to stay ahead of the pain.  o We encourage patients to discontinue opioid pain medications as soon as possible after surgery. o The side effects of these medications can be substantial, and the narcotic medications are not mandatory. You may substitute a prescribed narcotic with over-the-counter Tylenol. o Pain medications may cause constipation. Over-the-counter Colace twice daily and Miralax while taking the narcotic medication should help prevent constipation. o Other side effects of pain medication include dizziness, headache, nausea, vomiting, and urinary retention. o Discontinue the pain medication if you develop itching, rash, shortness of breath, or difficulties swallowing. If these symptoms become severe or are not relieved by discontinuing the medication, you should seek immediate medical attention. o Refills of pain medication are authorized during office hours only (8 AM- 4 PM Monday through Friday). Our office will not prescribe narcotics beyond 6 weeks from the date of your surgery.   o Narcotics will not be called into the pharmacy, and, in most cases, you must be seen clinically.  Blood Thinners:  o You will be given a prescription for Lovenox injections, Aspirin or Xarelto based on your health history. If you are on a blood thinner prior to surgery, they will continue following surgery for prophylaxis.  o You should take these medications as prescribed for 6 weeks following your surgery.  Driving:  o You should not return to driving until you are off all narcotic pain medications and able to safely and quickly apply the brakes. This is normally 2-4 weeks for left sided surgery and 6-12 weeks for right-sided surgery.  Signs/Symptoms of Concern:   o Contact Dr. Sid Sicard office if any of the following signs or symptoms develop. Please be advised if a problem arises which you feel requires immediate medical attention you should seek medical attention at the closest ER.  - Temperature greater than 101.5 for more than 8 hrs  - Fever and/or chills that persist for greater than 8 hr.  - A sudden increase in pain/swelling/ or tenderness in the back of your calf or thigh that is not relieved with ice/elevation and pain medication. o Increased drainage from your incision, increased redness or warmth at the area of your incision or a sudden increase in swelling or redness that persists despite ice and elevation      If you have questions or concerns, please call Dr. Sid Sicard staff    Office: Phone [phone]  Fax [phone]    Office Address: Marisol Casey M.D.     [address] [place] [address]    Marisol Casey MD      05/02/22        Discharge Instructions:   Anterior Approach             Hip Replacement- Dr. Meeta Everett     Patient Yvonnie Estimable   Date of procedure:4/25/2022   Procedure:Procedure(s):  LEFT HIP HEMIARTHROPLASTY  QUADRICEPS REPAIR   Surgeon:Surgeon(s) and Role:     * Marshal Bosworth, MD - Primary   PCP: Lawson Deluca MD   Date of discharge: No discharge date for patient encounter. Follow up appointments    Follow up with Dr. Aimee Cruz in 2-3 weeks. Call [phone] to make an appointment   If home health has been arranged for you the agency will contact you to arrange dates/times for visits. Please call them if you do not hear from them within 24 hours after you are discharged       When to call your Orthopaedic Surgeon:   Call [phone]. If you call after 5pm or on a weekend; the on call physician will be contacted     Pain:  ? Increased or unrelieved hip pain  ? Inability or difficulty walking     Signs of infection:  ? Persistent fever over 100.4 degrees or shaking/chills  ? Increased redness, tenderness, swelling, or drainage from incision  o Drainage has a foul odor  ? Increased pain during activity or rest    Signs of a blood clot in your leg:  ? Increased calf pain, tenderness, or redness  ? Increased swelling of knee, calf, ankle or foot      When to call your Primary Care Physician:    Concerns about medical conditions such as diabetes, high blood pressure, asthma, congestive heart failure    Call if blood sugars are elevated, persistent headache or dizziness, coughing or congestion, constipation or diarrhea, burning with urination, abnormal heart rate      When to call 911 and go to the nearest emergency room:    Acute onset of chest pain, shortness of breath, difficulty breathing       Activity    Weight bearing as tolerated with walker or cane.  Refer to your patient handbook for instructions and pictures   Complete your Home Exercise Program daily as instructed by your therapist. Refer to your handbook for instructions and pictures    Get up every hour and walk (except at night when sleeping)    AVOID sudden and extreme movement of your hip (surgical leg)    Do not drive or operate heavy machinery       Incision Care    The Aquacel (brown, waterproof) surgical dressing is to remain over your incision for 7 days   On the 7th day, have someone gently peel the dressing off by carefully lifting the edge and stretching it slightly to break the adhesive seal.  You may then leave the incision open to air as it should no longer be draining. You have absorbable stitches so their removal is not necessary   If the Aquacel dressing becomes loose or begins to fall off before the 7th day, replace it with a sterile dry dressing that is to be changed daily. Once the incision is not draining, you may leave it open to air   Showering is allowed with the Aquacel dressing in place or beginning on the 3rd day after surgery if the Aquacel dressing came off prematurely. Gently wash the incision with soap and water   Do not submerge the incision under water until your incision is completely healed (bath tub, hot tub, swimming pool, etc.)      Preventing blood clots    Take aspirin 81mg twice daily for 2 weeks post-op until 5/10/22. Then you may resume your normal once per day dosing. Pain management    Keep ice wrap in place except when walking; changing gel packs every 4 hours   Get up and walk a short distance to relieve pain and stiffness   Lie down and elevate your leg on pillows for 15-30 minutes after walking/exercising to decrease swelling   Take Tylenol 1,000 mg every 6-8 hours as needed for 2 weeks  o Do not take over 4,000 mg in a 24 hour period   Take the narcotic pain pill as prescribed. Take with food; avoid alcohol while taking pain medication   As your pain decreases, take the narcotics less often or take ½ of a pill      Diet    Resume usual diet; include vegetables, fruit, whole grains, lean meats, and low-fat dairy products.   Eat foods high in fiber   Drink plenty of fluids, including 8 cups of water daily   Take over-the-counter stool softeners and laxatives to prevent constipation           Discharge SNF/Rehab Instructions/LTAC       PATIENT ID: Anna dEmond  MRN: [id number]   YOB: 1950 DATE OF ADMISSION: 4/24/2022  5:16 PM    DATE OF DISCHARGE: 5/2/2022    PRIMARY CARE PROVIDER: Mayra Urbina MD       ATTENDING PHYSICIAN: Maryann Mirza MD  DISCHARGING PROVIDER: Candy Hamm NP     To contact this individual call [phone] and ask the  to page. If unavailable ask to be transferred the Adult Hospitalist Department. CONSULTATIONS: IP CONSULT TO ORTHOPEDIC SURGERY  IP CONSULT TO UROLOGY    PROCEDURES/SURGERIES: Procedure(s):  LEFT HIP HEMIARTHROPLASTY  [facility] COURSE:   Per H&P, Maxim Wright is a 66 y. o. male who presents with fall and right hip,  [age]Y male with a history of CAD, CVA with mild left side weakness diabetes, hypertension and BPH.  Patient presented to the ED after a fall he said he tripped and fall backwards while vacuuming at home. The accident occurred 1 to 2 hours ago. [facility] landed [address] floor. There was no blood loss. The point of impact was the left hip. The pain is moderate. He was not ambulatory at the scene. There was no entrapment after the fall. There was no alcohol use involved in the accident. Associated symptoms include extremity weakness (baseline LLE weakness). Pertinent negatives include no vomiting, no headaches, no loss of consciousness and no laceration. The risk factors include being elderly and recurrent falls (previous stroke).  Associated symptoms comments: +R knee pain. The symptoms are aggravated by activity, use of injured limb and pressure on injury.           DISCHARGE DIAGNOSES / PLAN:      Left hip fracture  Right quadriceps tendon rupture  S/p hemiarthroplasty and quadriceps repair on 4/25  Orthopedics following, thank you for recommendations  To follow-up with Dr. Rojelio Mccormick for hip and Dr. Poncho Ambrocio for quadricept  Continuing knee immobilizer  Continuing multimodal Pain control  We will continue with bowel regimen  Working with physical therapy, weightbearing as tolerated           Acute urinary retention  Failed intermittent catheterization  Indwelling catheter placed 4/25 which was removed 4/30  Failed subsequent voiding trial, Travis catheter replaced  Has been evaluated by urology, appreciate recommendations  To continue Travis catheter, attempt voiding trials after 1 week  Follow-up with urology, Dr Yudi Crabtree outpatient in 1 month  In the meantime continuing finasteride and tamsulosin              Acute blood loss anemia  Likely due to OR losses  Hemoglobin was 12 at presentation  We will transfuse for hemoglobin less than 7  Remains stable, 7.8 today hemoglobin     CKd stage III  Did have mildly elevated creatinine, 1.38, baseline is somewhere around 1.2  Has resolved with IV hydration creatinine today 0.83     Type 2 diabetes  Blood sugar currently controlled   Continuing home medication reg                Hypertension  Pressure currently controlled  Continuing carvedilol                PENDING TEST RESULTS:   At the time of discharge the following test results are still pending:     FOLLOW UP APPOINTMENTS:    Follow-up Information     Follow up With Specialties Details Why Contact Info    Preeti French MD Internal Medicine   [address]  [place] [address]  [address] [phone]      [address] [facility] [address]   [address] [address] [place]    Preeti French MD Internal Medicine In 1 week  [address]  [facility] [address]  [address] [phone]      Sola Bauer MD Orthopedic Surgery In 1 week  [address] [facility] [address] [place] [address]      Faizan Garcias MD Orthopedic Surgery In 1 week  [address] [facility]  [address]  [facility] [address]       Ruy Gagnon MD Urology In 1 month  [address] [facility] [address]  [facility]  [phone]             ADDITIONAL CARE RECOMMENDATIONS: remove travis for voiding trial in 1 week    DIET: Regular Diet    TUBE FEEDING INSTRUCTIONS: na    OXYGEN / BiPAP SETTINGS: na    ACTIVITY: Activity as tolerated    WOUND CARE: per ortho    EQUIPMENT needed: tbd      DISCHARGE MEDICATIONS:   See Medication Reconciliation Form      NOTIFY YOUR PHYSICIAN FOR ANY OF THE FOLLOWING:   Fever over 101 degrees for 24 hours. Chest pain, shortness of breath, fever, chills, nausea, vomiting, diarrhea, change in mentation, falling, weakness, bleeding. Severe pain or pain not relieved by medications. Or, any other signs or symptoms that you may have questions about.     DISPOSITION:    Home With:   OT  PT  HH  RN      x SNF    Independent/assisted living    Hospice    Other:       PATIENT CONDITION AT DISCHARGE:     Functional status    Poor    x Deconditioned     Independent      Cognition    x Lucid     Forgetful     Dementia      Catheters/lines (plus indication)   x Hamm     PICC     PEG     None      Code status    x Full code     DNR      PHYSICAL EXAMINATION AT DISCHARGE:   Refer to Progress Note      CHRONIC MEDICAL DIAGNOSES:  Problem List as of 5/2/2022 Date Reviewed: 4/25/2022          Codes Class Noted - Resolved    Left low back pain ICD-10-CM: M54.50  ICD-9-CM: 724.2  8/15/2016 - Present        Gross hematuria ICD-10-CM: R31.0  ICD-9-CM: 599.71  8/15/2016 - Present        Calculus of kidney ICD-10-CM: N20.0  ICD-9-CM: 592.0  4/18/2016 - Present        Benign prostatic hyperplasia with lower urinary tract symptoms ICD-10-CM: N40.1  ICD-9-CM: 600.01  6/22/2014 - Present        Nocturia ICD-10-CM: R35.1  ICD-9-CM: 788.43  7/30/2014 - Present        Slowing of urinary stream ICD-10-CM: R39.198  ICD-9-CM: 788.62  7/30/2014 - Present        Urinary frequency ICD-10-CM: R35.0  ICD-9-CM: 788.41  6/22/2014 - Present        Erectile dysfunction ICD-10-CM: N52.9  ICD-9-CM: 607.84  4/17/2016 - Present        Quadriceps tendon rupture, right, initial encounter ICD-10-CM: H01.471H  ICD-9-CM: 843.8  4/25/2022 - Present        * (Principal) Left displaced femoral neck fracture ([facility] ICD-10-CM: P38.044N  ICD-9-CM: 820.8  4/24/2022 - Present        Opioid use, unspecified with unspecified opioid-induced disorder ICD-10-CM: F11.99  ICD-9-CM: 292.9, 305.50  8/18/2021 - Present        Compression fracture of L3 vertebra (HCC) ICD-10-CM: V56.124I  ICD-9-CM: 805.4  8/4/2021 - Present    Overview Signed 8/4/2021 10:37 PM by Cordelia Lambert MD     S/p L3 kyphoplasty x 2             Lumbar stenosis ICD-10-CM: M48.061  ICD-9-CM: 724.02  8/4/2021 - Present        Moderate major depression (HCC) ICD-10-CM: F32.1  ICD-9-CM: 296.22  7/24/2018 - Present        Full code but no chest compressions ICD-10-CM: Z78.9  ICD-9-CM: V49.89  7/23/2018 - Present        Localized edema ICD-10-CM: R60.0  ICD-9-CM: 782.3  7/23/2018 - Present        Type 2 diabetes mellitus with nephropathy ([facility] ICD-10-CM: E11.21  ICD-9-CM: 250.40, 583.81  1/23/2018 - Present        Fracture of multiple ribs of right side ICD-10-CM: S22.41XA  ICD-9-CM: 807.09  7/26/2017 - Present        Essential hypertension ICD-10-CM: I10  ICD-9-CM: 401.9  3/28/2017 - Present        Hypercholesterolemia ICD-10-CM: E78.00  ICD-9-CM: 272.0  3/28/2017 - Present        Controlled type 2 diabetes mellitus without complication ([facility] RSY-96-GO: E11.9  ICD-9-CM: 250.00  3/28/2017 - Present        History of stroke ICD-10-CM: Z86.73  ICD-9-CM: V12.54  3/28/2017 - Present        Coronary artery disease involving native coronary artery without angina pectoris ICD-10-CM: I25.10  ICD-9-CM: 414.01  3/28/2017 - Present        Obesity ICD-10-CM: E66.9  ICD-9-CM: 278.00  6/22/2014 - Present        RESOLVED: Chronic cerebral ischemia ICD-10-CM: I67.82  ICD-9-CM: 437.1  6/25/2017 - 6/27/2017        RESOLVED: Left leg weakness ICD-10-CM: R29.898  ICD-9-CM: 729.89  6/24/2017 - 6/27/2017        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

## 2022-05-02 NOTE — PROGRESS NOTES
Problem: Self Care Deficits Care Plan (Adult)  Goal: *Acute Goals and Plan of Care (Insert Text)  Description: FUNCTIONAL STATUS PRIOR TO ADMISSION: Patient was modified independent using a single point cane for functional mobility and ADL completion. Reports standing to shower at walk-in shower. HOME SUPPORT: The patient lived with wife but did not require assist.    Occupational Therapy Goals  Initiated 4/27/2022  1. Patient will perform bathing with moderate assistance  within 7 day(s). 2.  Patient will perform lower body dressing with maximal assistance  within 7 day(s). 3.  Patient will perform RW grooming with minimal assistance within 7 day(s). 4.  Patient will perform RW toilet transfers, EOB to [facility], with moderate assistance within 7 day(s). 5.  Patient will perform all aspects of RW toileting with moderate assistance within 7 day(s). Outcome: Progressing Towards Goal     OCCUPATIONAL THERAPY TREATMENT  Patient: Lesly Powell (14 y.o. male)  Date: 5/2/2022  Diagnosis: Closed left hip fracture ([address] [S72.002A] Left displaced femoral neck fracture (HCC)  Procedure(s) (LRB):  LEFT HIP HEMIARTHROPLASTY (Left)  QUADRICEPS REPAIR (Right) 7 Days Post-Op  Precautions: Fall,WBAT (WBAT B LE, R LE locked in knee ext)  Chart, occupational therapy assessment, plan of care, and goals were reviewed. ASSESSMENT  Patient continues with skilled OT services and is progressing towards goals, however, remains limited by impaired functional mobility, generalized weakness, low activity tolerance, and deficits from previous CVA affecting LLE. Pt cleared for therapy by nursing and received supine in bed with wife in room. Pt completed bed mobility to sit EOB with Mod A, sit>stand with Max Ax2, and transfer to chair with max verbal cues, RW assistance and Max A to correct lateral lean. Pt pale and sweating at chair and completed stand>sit with Mod A. BP measured and stable 131/65.  After rest break pt participated in UE with yellow theraband demo'ing good comprehension. Continue to recommend SNF at discharge to maximize pt outcomes. Current Level of Function Impacting Discharge (ADLs): Mod-Max Ax2 for mobility, Max A LB dressing     Other factors to consider for discharge: Mod I at baseline          PLAN :  Patient continues to benefit from skilled intervention to address the above impairments. Continue treatment per established plan of care to address goals. Recommend with staff: Pt participation in self care as able     Recommend next OT session: Progress POC     Recommendation for discharge: (in order for the patient to meet his/her long term goals)  Therapy up to 5 days/week in SNF setting    This discharge recommendation:  Has been made in collaboration with the attending provider and/or case management    IF patient discharges home will need the following DME: tbd       SUBJECTIVE:   Patient stated I think I'm going today.     OBJECTIVE DATA SUMMARY:   Cognitive/Behavioral Status:  Neurologic State: Alert  Orientation Level: Oriented X4  Cognition: Appropriate decision making             Functional Mobility and Transfers for ADLs:  Bed Mobility:  Supine to Sit: Moderate assistance;Bed Modified; Additional time;Assist x2 (use of gait belt to move leg)  Scooting: Moderate assistance; Additional time;Bed Modified (bed deflated for firmer surface)    Transfers:  Sit to Stand: Maximum assistance;Assist x2     Bed to Chair: Maximum assistance;Assist x2    Balance:  Sitting: Impaired; With support; Without support (leans backwards)  Sitting - Static: Fair (occasional); Supported sitting; Unsupported (requires min assist and verbal cues to lean forward)  Sitting - Dynamic: Poor (constant support) (mod assist 2nd posterior lean when moving forward in bed)  Standing: Impaired; With support  Standing - Static: Fair;Constant support (posterior lean)  Standing - Dynamic : Poor;Constant support (posterior lean)    ADL Intervention:          Therapeutic Exercises:   Yellow theraband   Shoulder abduction   PNF D2 Flexion   Punches   Shoulder flexion       Pain:  Pain managed at BLE and back     Activity Tolerance:   Fair and requires rest breaks    After treatment patient left in no apparent distress:   Sitting in chair and Caregiver / family present    COMMUNICATION/COLLABORATION:   The patients plan of care was discussed with: Physical therapist and Registered nurse.      Mina Benson OT  Time Calculation: 38 mins

## 2022-05-02 NOTE — PROGRESS NOTES
TRANSFER - OUT REPORT:    Verbal report given to Marylu(name) on Luann Driscoll  being transferred to [facility]) for routine progression of care       Report consisted of patients Situation, Background, Assessment and   Recommendations(SBAR). Information from the following report(s) SBAR, Kardex, Intake/Output, MAR and Recent Results was reviewed with the receiving nurse. Opportunity for questions and clarification was provided.

## 2022-05-02 NOTE — PROGRESS NOTES
Bedside and Verbal shift change report given to Missy Ta (oncoming nurse) by Trey Teixeira (offgoing nurse). Report included the following information SBAR, Kardex, Procedure Summary, Intake/Output and MAR.

## 2022-05-02 NOTE — PROGRESS NOTES
Problem: Falls - Risk of  Goal: *Absence of Falls  Description: Document Morene Hole Fall Risk and appropriate interventions in the flowsheet. Outcome: Progressing Towards Goal  Note: Fall Risk Interventions:  Mobility Interventions: Patient to call before getting OOB    Mentation Interventions: Adequate sleep, hydration, pain control    Medication Interventions: Patient to call before getting OOB    Elimination Interventions: Call light in reach    History of Falls Interventions: Door open when patient unattended         Problem: Patient Education: Go to Patient Education Activity  Goal: Patient/Family Education  Outcome: Progressing Towards Goal     Problem: Pressure Injury - Risk of  Goal: *Prevention of pressure injury  Description: Document Ilya Scale and appropriate interventions in the flowsheet.   Outcome: Progressing Towards Goal  Note: Pressure Injury Interventions:  Sensory Interventions: Assess changes in LOC    Moisture Interventions: Absorbent underpads    Activity Interventions: Increase time out of bed    Mobility Interventions: Pressure redistribution bed/mattress (bed type)    Nutrition Interventions: Offer support with meals,snacks and hydration    Friction and Shear Interventions: HOB 30 degrees or less                Problem: Patient Education: Go to Patient Education Activity  Goal: Patient/Family Education  Outcome: Progressing Towards Goal     Problem: Patient Education: Go to Patient Education Activity  Goal: Patient/Family Education  Outcome: Progressing Towards Goal     Problem: Patient Education: Go to Patient Education Activity  Goal: Patient/Family Education  Outcome: Progressing Towards Goal     Problem: Lower Extremity Fracture:Day of Admission  Goal: *Hemodynamically stable  Outcome: Progressing Towards Goal     Problem: Lower Extremity Fracture:Post-op Day 2  Goal: Off Pathway (Use only if patient is Off Pathway)  Outcome: Progressing Towards Goal  Goal: Activity/Safety  Outcome: Progressing Towards Goal  Goal: Diagnostic Test/Procedures  Outcome: Progressing Towards Goal  Goal: Nutrition/Diet  Outcome: Progressing Towards Goal  Goal: Discharge Planning  Outcome: Progressing Towards Goal  Goal: Medications  Outcome: Progressing Towards Goal  Goal: Respiratory  Outcome: Progressing Towards Goal  Goal: Treatments/Interventions/Procedures  Outcome: Progressing Towards Goal  Goal: Psychosocial  Outcome: Progressing Towards Goal  Goal: *Optimal pain control at patient's stated goal  Outcome: Progressing Towards Goal  Goal: *Hemodynamically stable  Outcome: Progressing Towards Goal  Goal: *Adequate oxygenation  Outcome: Progressing Towards Goal  Goal: *PT/INR within defined limits  Outcome: Progressing Towards Goal  Goal: *Demonstrates progressive activity  Outcome: Progressing Towards Goal  Goal: *Voiding  Outcome: Progressing Towards Goal  Goal: *Bowel movement  Outcome: Progressing Towards Goal  Goal: *Tolerating diet  Outcome: Progressing Towards Goal     Problem: Lower Extremity Fracture:Post-op Day 3  Goal: Off Pathway (Use only if patient is Off Pathway)  Outcome: Progressing Towards Goal  Goal: Activity/Safety  Outcome: Progressing Towards Goal  Goal: Diagnostic Test/Procedures  Outcome: Progressing Towards Goal  Goal: Nutrition/Diet  Outcome: Progressing Towards Goal  Goal: Discharge Planning  Outcome: Progressing Towards Goal  Goal: Medications  Outcome: Progressing Towards Goal  Goal: Respiratory  Outcome: Progressing Towards Goal  Goal: Treatments/Interventions/Procedures  Outcome: Progressing Towards Goal  Goal: Psychosocial  Outcome: Progressing Towards Goal  Goal: *Optimal pain control at patient's stated goal  Outcome: Progressing Towards Goal  Goal: *Hemodynamically stable  Outcome: Progressing Towards Goal  Goal: *Adequate oxygenation  Outcome: Progressing Towards Goal  Goal: *PT/INR within defined limits  Outcome: Progressing Towards Goal  Goal: *Demonstrates progressive activity  Outcome: Progressing Towards Goal  Goal: *Voiding  Outcome: Progressing Towards Goal  Goal: *Bowel movement  Outcome: Progressing Towards Goal  Goal: *Tolerating diet  Outcome: Progressing Towards Goal     Problem: Lower Extremity Fracture:Post-op Day 4  Goal: Off Pathway (Use only if patient is Off Pathway)  Outcome: Progressing Towards Goal  Goal: Activity/Safety  Outcome: Progressing Towards Goal  Goal: Diagnostic Test/Procedures  Outcome: Progressing Towards Goal  Goal: Nutrition/Diet  Outcome: Progressing Towards Goal  Goal: Discharge Planning  Outcome: Progressing Towards Goal  Goal: Medications  Outcome: Progressing Towards Goal  Goal: Respiratory  Outcome: Progressing Towards Goal  Goal: Treatments/Interventions/Procedures  Outcome: Progressing Towards Goal  Goal: Psychosocial  Outcome: Progressing Towards Goal     Problem: Lower Extremity Fracture:Discharge Outcomes  Goal: *Hemodynamically stable  Outcome: Progressing Towards Goal  Goal: *Modified independence with transfers, ambulation on levels with assistance devices, stair climbing, ADL's  Outcome: Progressing Towards Goal  Goal: *Independent with active exercises  Outcome: Progressing Towards Goal  Goal: *Describes follow-up/return visits to physicians  Outcome: Progressing Towards Goal  Goal: *Tolerating diet  Outcome: Progressing Towards Goal  Goal: *Optimal pain control at patient's stated goal  Outcome: Progressing Towards Goal  Goal: *Adequate air exchange  Outcome: Progressing Towards Goal  Goal: *Lungs clear or at baseline  Outcome: Progressing Towards Goal  Goal: *Afebrile  Outcome: Progressing Towards Goal  Goal: *Incision intact without signs of infection, redness, warmth  Outcome: Progressing Towards Goal  Goal: *Absence of deep venous thrombosis signs and symptoms(Stroke Metric)  Outcome: Progressing Towards Goal  Goal: *Active bowel function  Outcome: Progressing Towards Goal  Goal: *Adequate urinary output  Outcome: Progressing Towards Goal  Goal: *Discharge anxiety minimal  Outcome: Progressing Towards Goal  Goal: *Describes available resources and support systems  Outcome: Progressing Towards Goal

## 2022-05-02 NOTE — PROGRESS NOTES
[facility]                                                                                          Hospitalist Progress Note  Antonella Morales NP  Answering service: [phone] from in house phone        Date of Service:  2022  NAME:  Fide Lane  :  1950  MRN:  [id number]      Admission Summary:   Per H&P, Fide Lane is a 66 y.o. male who presents with fall and right hip,  [age]Y male with a history of CAD, CVA with mild left side weakness diabetes, hypertension and BPH. Patient presented to the ED after a fall he said he tripped and fall backwards while vacuuming at home. The accident occurred 1 to 2 hours ago. He landed on hard floor. There was no blood loss. The point of impact was the left hip. The pain is moderate. He was not ambulatory at the scene. There was no entrapment after the fall. There was no alcohol use involved in the accident. Associated symptoms include extremity weakness (baseline LLE weakness). Pertinent negatives include no vomiting, no headaches, no loss of consciousness and no laceration. The risk factors include being elderly and recurrent falls (previous stroke).  Associated symptoms comments: +R knee pain. The symptoms are aggravated by activity, use of injured limb and pressure on injury. Interval history / Subjective:     Patient seen on morning rounds. No acute events overnight.   In bed at the moment of the encounter       Assessment & Plan:     Left hip fracture  Right quadriceps tendon rupture  S/p hemiarthroplasty and quadriceps repair on   Orthopedics following, thank you for recommendations  To follow-up with Dr. Luis Antonio Felix for hip and Dr. Karlee Teixeira for quadricept  Continuing knee immobilizer  Continuing multimodal Pain control  We will continue with bowel regimen  Working with physical therapy, weightbearing as tolerated        Acute urinary retention  Failed intermittent catheterization  Indwelling catheter placed 4/25 which was removed 4/30  Failed subsequent voiding trial, Hamm catheter replaced  Has been evaluated by urology, appreciate recommendations  To continue Hamm catheter, attempt voiding trials after 1 week  Follow-up with urology outpatient in 1 month  In the meantime continuing finasteride and tamsulosin          Acute blood loss anemia  Likely due to OR losses  Hemoglobin was 12 at presentation  We will transfuse for hemoglobin less than 7  We will continue to monitor  Remains stable, 7.8 today hemoglobin    CKd stage III  Did have mildly elevated creatinine, 1.38, baseline is somewhere around 1.2  Has resolved with IV hydration creatinine today 0.83    Type 2 diabetes  Blood sugar currently controlled   Continuing SSI  Holding oral hypoglycemics          Hypertension  Pressure currently controlled  Continuing carvedilol        Code status: FULL    DVT prophylaxis: LMWH    Care Plan discussed with: Patient/Family, Nurse and      Anticipated Disposition: SNF/LTC,     Anticipated Discharge: Greater than 48 hours     Hospital Problems  Date Reviewed: 4/25/2022          Codes Class Noted POA    Quadriceps tendon rupture, right, initial encounter ICD-10-CM: N65.084S  ICD-9-CM: 843.8  4/25/2022 Yes        * (Principal) Left displaced femoral neck fracture ([facility] [address] ICD-10-CM: T17.328U  ICD-9-CM: 820.8  4/24/2022 Yes                Review of Systems:   A comprehensive review of systems was negative except for that written in the HPI. Vital Signs:    Last 24hrs VS reviewed since prior progress note.  Most recent are:  Visit Vitals  /69 (BP 1 Location: Left upper arm, BP Patient Position: Lying)   Pulse 70   Temp 97.9 °F (36.6 °C)   Resp 16   SpO2 97%         Intake/Output Summary (Last 24 hours) at 5/2/2022 0956  Last data filed at 5/2/2022 0593  Gross per 24 hour   Intake --   Output 750 ml   Net -750 ml        Physical Examination:     I had a face to face encounter with this patient and independently examined them on 5/2/2022 as outlined below:          Constitutional:  No acute distress, cooperative, pleasant    ENT:  Oral mucosa moist, oropharynx benign. Resp:  CTA bilaterally. No wheezing/rhonchi/rales. No accessory muscle use   CV:  Regular rhythm, normal rate, no murmurs, gallops, rubs    GI:  Soft, non distended, non tender. normoactive bowel sounds, no hepatosplenomegaly     Musculoskeletal:  No edema, warm, 2+ pulses throughout    Neurologic:  Moves all extremities. AAOx3, CN II-XII reviewed            Data Review:    Review and/or order of clinical lab test  Review and/or order of tests in the radiology section of [facility]      Labs:     Recent Labs     05/02/22  0111 05/01/22  0643   WBC 10.7 10.9   HGB 7.8* 8.0*   HCT 24.2* 24.5*    263     Recent Labs     05/02/22  0111 05/01/22  0643 04/30/22  0631    140 140   K 4.0 4.1 4.2    109* 107   CO2 26 26 26   BUN 16 14 16   CREA 0.83 0.90 0.97   GLU 95 114* 114*   CA 7.9* 8.2* 8.1*     Recent Labs     05/02/22  0111 05/01/22  0643 04/30/22  0631   ALT 83* 103* 56   AP 78 79 64   TBILI 0.8 0.9 0.7   TP 5.0* 5.6* 5.5*   ALB 2.0* 2.0* 2.0*   GLOB 3.0 3.6 3.5     No results for input(s): INR, PTP, APTT, INREXT, INREXT in the last 72 hours. No results for input(s): FE, TIBC, PSAT, FERR in the last 72 hours. No results found for: FOL, RBCF   No results for input(s): PH, PCO2, PO2 in the last 72 hours. No results for input(s): CPK, CKNDX, TROIQ in the last 72 hours.     No lab exists for component: CPKMB  Lab Results   Component Value Date/Time    Cholesterol, total 142 01/12/2022 12:45 PM    HDL Cholesterol 64 01/12/2022 12:45 PM    LDL, calculated 60.6 01/12/2022 12:45 PM    Triglyceride 87 01/12/2022 12:45 PM    CHOL/HDL Ratio 2.2 01/12/2022 12:45 PM     Lab Results   Component Value Date/Time    Glucose (POC) 98 05/02/2022 06:01 AM    Glucose (POC) 104 05/01/2022 11:10 PM    Glucose (POC) 140 (H) 05/01/2022 04:48 PM Glucose (POC) 135 (H) 05/01/2022 12:04 PM    Glucose (POC) 119 (H) 05/01/2022 06:55 AM     Lab Results   Component Value Date/Time    Color YELLOW/STRAW 07/18/2017 05:29 PM    Appearance CLEAR 07/18/2017 05:29 PM    Specific gravity 1.022 07/18/2017 05:29 PM    pH (UA) 5.5 07/18/2017 05:29 PM    Protein 30 (A) 07/18/2017 05:29 PM    Glucose NEGATIVE  07/18/2017 05:29 PM    Ketone NEGATIVE  07/18/2017 05:29 PM    Bilirubin NEGATIVE  07/18/2017 05:29 PM    Urobilinogen 0.2 07/18/2017 05:29 PM    Nitrites NEGATIVE  07/18/2017 05:29 PM    Leukocyte Esterase NEGATIVE  07/18/2017 05:29 PM    Epithelial cells FEW 07/18/2017 05:29 PM    Bacteria NEGATIVE  07/18/2017 05:29 PM    WBC 0-4 07/18/2017 05:29 PM    RBC 0-5 07/18/2017 05:29 PM         Medications Reviewed:     Current Facility-Administered Medications   Medication Dose Route Frequency    lactulose (CHRONULAC) 10 gram/15 mL solution 15 mL  15 mL Oral DAILY PRN    sodium chloride (NS) flush 5-40 mL  5-40 mL IntraVENous Q8H    sodium chloride (NS) flush 5-40 mL  5-40 mL IntraVENous PRN    naloxone (NARCAN) injection 0.4 mg  0.4 mg IntraVENous PRN    senna-docusate (PERICOLACE) 8.6-50 mg per tablet 1 Tablet  1 Tablet Oral BID    polyethylene glycol (MIRALAX) packet 17 g  17 g Oral DAILY    bisacodyL (DULCOLAX) suppository 10 mg  10 mg Rectal DAILY PRN    oxyCODONE IR (ROXICODONE) tablet 2.5 mg  2.5 mg Oral Q4H PRN    oxyCODONE IR (ROXICODONE) tablet 5 mg  5 mg Oral Q4H PRN    oxyCODONE IR (ROXICODONE) tablet 10 mg  10 mg Oral Q4H PRN    enoxaparin (LOVENOX) injection 30 mg  30 mg SubCUTAneous Q12H    fentaNYL citrate (PF) injection 50 mcg  50 mcg IntraVENous Q4H PRN    prochlorperazine (COMPAZINE) with saline injection 5 mg  5 mg IntraVENous Q6H PRN    sodium chloride (NS) flush 5-40 mL  5-40 mL IntraVENous Q8H    sodium chloride (NS) flush 5-40 mL  5-40 mL IntraVENous PRN    acetaminophen (TYLENOL) tablet 650 mg  650 mg Oral Q6H PRN    Or    acetaminophen (TYLENOL) suppository 650 mg  650 mg Rectal Q6H PRN    polyethylene glycol (MIRALAX) packet 17 g  17 g Oral DAILY PRN    ondansetron (ZOFRAN ODT) tablet 4 mg  4 mg Oral Q8H PRN    Or    ondansetron (ZOFRAN) injection 4 mg  4 mg IntraVENous Q6H PRN    atorvastatin (LIPITOR) tablet 40 mg  40 mg Oral DAILY    buPROPion SR (WELLBUTRIN SR) tablet 150 mg  150 mg Oral BID    finasteride (PROSCAR) tablet 5 mg  5 mg Oral QHS    tamsulosin (FLOMAX) capsule 0.4 mg  0.4 mg Oral DAILY    carvediloL (COREG) tablet 12.5 mg  12.5 mg Oral BID WITH MEALS    glucose chewable tablet 16 g  4 Tablet Oral PRN    dextrose 10 % infusion 0-250 mL  0-250 mL IntraVENous PRN    glucagon (GLUCAGEN) injection 1 mg  1 mg IntraMUSCular PRN    insulin lispro (HUMALOG) injection   SubCUTAneous AC&HS     ______________________________________________________________________  EXPECTED LENGTH OF STAY: 4d 2h  ACTUAL LENGTH OF STAY:          8                 Jacqueline Noel NP

## 2022-05-02 NOTE — PROGRESS NOTES
Transition of Care Plan  RUR 12%    COVID Negative 4/25/22  Rapid today --results in envelope    Disposition  Jarad Mg  [phone]  Room  116  Authorization   # [employer] [id number] (ricardo Cook [phone] ext [phone])     Transportation   [facility] ([employer] [facility]) phone [phone]  Confirmed for 3:30 pm    Medical follow up  PCP and specialist    Contact  Wife   Tami Raya [phone]    CM talked with Neris Jose in admissions at [facility] and confirmed that patient can be admitted today-- he will go to room 116 . Cm provided the auth number and this is good until  5/4/22. .       Nurse to call report to [phone]    Medicare pt has received, reviewed, and signed 2nd IM letter informing them of their right to appeal the discharge. Signed copy has been placed on pt bedside chart. Transition of Care Plan to SNF/Rehab    SNF/Rehab Transition:  Patient has been accepted to [facility] and meets criteria for admission. Patient will transported by [facility] and expected to leave at 3:30 pm.    Communication to Patient/Family:  Met with patient and wife (identified care giver) and they are agreeable to the transition plan. Communication to SNF/Rehab:  Bedside RN,, has been notified to update the transition plan to the facility and call report (phone number [phone]. Discharge information has been updated on the AVS.     Discharge instructions to be secure from [address] [place] [address] Place Please include all hard scripts for controlled substances, med rec and dc summary, and AVS in packet.      Reviewed and confirmed with facility, [facility], admissions  Solmon Headings,  can manage the patient care needs for the following:     Reviewed and confirmed with facility, [facility] they can manage the patient care needs for the following:     Priscila Perez with (X) only those applicable:    Medication:  [x]  Medications will be available at the facility  []  IV Antibiotics   []  Controlled Substance - hard copy to be sent with patient   []  Weekly Labs   Documents:  [x] Hard RX  [x] MAR  [x] Kardex  [x] AVS  [x]Transfer Summary  [x]Discharge   Equipment:  []  CPAP/BiPAP  []  Wound Vacuum  []  Hamm or Urinary Device  []  PICC/Central Line  []  Nebulizer  []  Ventilator   Treatment:  []Isolation (for MRSA, VRE, etc.)  []Surgical Drain Management  []Tracheostomy Care  []Dressing Changes  []Dialysis with transportation and chair time  []PEG Care  []Oxygen  []Daily Weights for Heart Failure   Dietary:  []Any diet limitations  []Tube Feedings   []Total Parenteral Management (TPN)   Eligible for Medicaid Long Term Services and Supports  Yes:  [] Eligible for medical assistance or will become eligible within 180 days and UAI completed. [] Provider/Patient and/or support system has requested screening. [] UAI copy provided to patient or responsible party,.  [] UAI unavailable at discharge will send once processed to SNF provider. [] UAI unavailable at discharged mailed to patient  No:   [x] Private pay and is not financially eligible for Medicaid within the next 180 days. [] Reside out-of-state.   [] A residents of a state owned/operated facility that is licensed  by Department [facility] or [facility]  [] Enrollment in [facility] hospice services  []  [address]  [] Patient /Family declines to have screening completed or provide financial information for screening     Financial Resources:  Medicaid    [] Initiated and application pending   [] Full coverage     Advanced Care Plan:  []Surrogate Decision Maker of Care  []POA  [x]Communicated Code Status  \"Full\")    Other

## 2022-05-02 NOTE — PROGRESS NOTES
Problem: Mobility Impaired (Adult and Pediatric)  Goal: *Acute Goals and Plan of Care (Insert Text)  Description: FUNCTIONAL STATUS PRIOR TO ADMISSION: Patient was modified independent using a single point cane for functional mobility. 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 HOME SUPPORT PRIOR TO ADMISSION: The patient lived with wife but did not require assist.    Physical Therapy Goals  Initiated 4/26/2022  1. Patient will move from supine to sit and sit to supine  in bed with moderate assistance  within 7 day(s). 2.  Patient will transfer from bed to chair and chair to bed with moderate assistance  using the least restrictive device within 7 day(s). 3.  Patient will perform sit to stand with maximal assistance within 7 day(s). 4.  Patient will ambulate with maximal assistance for 5 feet with the least restrictive device within 7 day(s). Outcome: Progressing Towards Goal   PHYSICAL THERAPY TREATMENT  Patient: Bridget Watters (42 y.o. male)  Date: 5/2/2022  Diagnosis: Closed left hip fracture ([facility] [address] [S72.002A] Left displaced femoral neck fracture (HCC)  Procedure(s) (LRB):  LEFT HIP HEMIARTHROPLASTY (Left)  QUADRICEPS REPAIR (Right) 7 Days Post-Op  Precautions: Fall,WBAT (WBAT B LE, R LE locked in knee ext)  Chart, physical therapy assessment, plan of care and goals were reviewed. ASSESSMENT  Patient continues with skilled PT services and is progressing towards goals. Pt continues to have difficulty maintaining upright sitting or standing secondary to posterior lean. Pt  anxious the right leg will buckle despite knee immobilizer with knee locked in extension.    Activity tolerance fair as pt requires rest breaks and deep breathing during mobility/transfers - c/o dizziness, pale and diaphoretic at end of transfer - per wife this is chronic response - wife denies hx of fainting - Bp in sitting 131/65, HR 75; symptoms improved with legs elevated in recliner. Pt motivated to participate and increase independence and mobility. Recommend SNF. Current Level of Function Impacting Discharge (mobility/balance): Mod assist x 2 for supine to sit; Max assist x 2 for sit to stand and stand pivot transfer    Other factors to consider for discharge: weakness and decreased coordination of left leg         PLAN :  Patient continues to benefit from skilled intervention to address the above impairments. Continue treatment per established plan of care. to address goals. Recommendation for discharge: (in order for the patient to meet his/her long term goals)  Therapy up to 5 days/week in SNF setting    This discharge recommendation:  Has been made in collaboration with the attending provider and/or case management    IF patient discharges home will need the following DME: n/a       SUBJECTIVE:   Patient stated I think it was a little easier today    OBJECTIVE DATA SUMMARY:   Critical Behavior:  Neurologic State: Alert  Orientation Level: Oriented X4  Cognition: Appropriate decision making  Safety/Judgement: Awareness of environment,Insight into deficits  Functional Mobility Training:  Bed Mobility:     Supine to Sit: Moderate assistance;Bed Modified; Additional time;Assist x2 (use of gait belt to move leg)     Scooting: Moderate assistance; Additional time;Bed Modified (bed deflated for firmer surface)        Transfers:  Sit to Stand: Maximum assistance;Assist x2  Stand to Sit: Assist x2;Maximum assistance        Bed to Chair: Maximum assistance;Assist x2                    Balance:  Sitting: Impaired; With support; Without support (leans backwards)  Sitting - Static: Fair (occasional); Supported sitting; Unsupported (requires min assist and verbal cues to lean forward)  Sitting - Dynamic: Poor (constant support) (mod assist 2nd posterior lean when moving forward in bed)  Standing: Impaired; With support  Standing - Static: Fair;Constant support (posterior lean)  Standing - Dynamic : Poor;Constant support (posterior lean)  Ambulation/Gait Training:  Distance (ft): 2 Feet (ft) (to bedside chair)  Assistive Device: Walker, rolling;Gait belt  Ambulation - Level of Assistance: Assist x1;Additional time; Adaptive equipment;Maximum assistance (d/t to posterior lean; 2nd person min assist vc and tactile )           Right Side Weight Bearing: As tolerated (with knee brace locked in extension)  Left Side Weight Bearing: As tolerated                    Interventions: Safety awareness training;Verbal cues; Visual/Demos (to lean forward in sitting and standing)     Pain Rating:  Left hip and right thigh 3/10    Activity Tolerance:   Fair - requires rest breaks and deep breathing during mobility/transfers - c/o dizziness, pale with diaphoresis - Bp in sitting 131/65, HR 75; symptoms improved with legs elevated in recliner     After treatment patient left in no apparent distress:   Call bell within reach, Caregiver / family present, and recliner legs elevated - ice applied left hip, right thigh    COMMUNICATION/COLLABORATION:   The patients plan of care was discussed with: Occupational therapist and Registered nurse.      Rhoda Segura, PT   Time Calculation: 20 mins

## 2022-05-03 ENCOUNTER — PATIENT OUTREACH (OUTPATIENT)
Dept: CASE MANAGEMENT | Age: 72
End: 2022-05-03

## 2022-05-12 ENCOUNTER — PATIENT OUTREACH (OUTPATIENT)
Dept: CASE MANAGEMENT | Age: 72
End: 2022-05-12

## 2022-05-12 NOTE — PROGRESS NOTES
Post Acute Facility Update     *The information contained in this note was received during a weekly care coordination call with the post acute facility*    Current Facility: [address] [place] [address] ([facility])  Anticipated Discharge Date: TBD    Facility Nursing Update: Medically stable   Facility Social Work Update: Plan to return home with spouse who will provide assistance if needed. Bundle Program Status: none  Upper Extremity Assistance: Stand by Assist - Presence and Cueing  Lower Extremity Assistance: Maximum Assistance  Bed Mobility: Minimal Assistance   Transfers: Moderate Assistance   Ambulation: Moderate Assistance   How far (in feet) is the patient ambulating?  20-74ft  Device Used: Walker  Barriers to Discharge: HUSAM Colon MSW  [facility]

## 2022-05-18 ENCOUNTER — PATIENT OUTREACH (OUTPATIENT)
Dept: CASE MANAGEMENT | Age: 72
End: 2022-05-18

## 2022-05-19 ENCOUNTER — PATIENT OUTREACH (OUTPATIENT)
Dept: CASE MANAGEMENT | Age: 72
End: 2022-05-19

## 2022-05-23 NOTE — PROGRESS NOTES
Post Acute Facility Update     *The information contained in this note was received during a weekly care coordination call with the post acute facility*    Current Facility: [address] [place] [address] ([facility])  Anticipated Discharge Date: 5/21/2022    Facility Nursing Update: no current updates  Facility Social Work Update: plan to discharge to home on 5/21/22  Bundle Program Status: none  No current PT updates.     Sandra SHIELDSN, RN  [employer]

## 2022-05-24 ENCOUNTER — TELEPHONE (OUTPATIENT)
Dept: INTERNAL MEDICINE CLINIC | Age: 72
End: 2022-05-24

## 2022-05-24 NOTE — TELEPHONE ENCOUNTER
----- Message from Anette Baltazar sent at 5/24/2022 12:17 PM EDT -----  Subject: Message to Provider    QUESTIONS  Information for Provider? Baljinder Carbajal from [facility] wanting to   know if pt is current pt of Dr Al Murray and will  follow for Home   health in the community. Caller recived an order for home health from a   skilled nursing facility [facility] so Please call [phone]  ---------------------------------------------------------------------------  --------------  CALL BACK INFO  What is the best way for the office to contact you? OK to leave message on   voicemail  Preferred Call Back Phone Number? [phone]  ---------------------------------------------------------------------------  --------------  SCRIPT ANSWERS  Relationship to Patient? Third Party  Third Party Type? Home Health Care?    Representative Name? madie blas

## 2022-05-24 NOTE — TELEPHONE ENCOUNTER
Writer spoke with Kvng Castellanos from [facility]. Informed Gladis Beaulieu that provider would follow patient for [facility] orders at this time. No further actions required at this time.

## 2022-05-27 ENCOUNTER — PATIENT OUTREACH (OUTPATIENT)
Dept: CASE MANAGEMENT | Age: 72
End: 2022-05-27

## 2022-05-27 NOTE — Clinical Note
Good morning,     Mr. Jeevan Trujillo discharged from [facility] ([facility]) on 5/21/2022 to Home with family support and [address] [place] [address].      Thanks,     Saji Vargas MSW

## 2022-05-27 NOTE — PROGRESS NOTES
[address] Discharge Note    *The information contained in this note was received during a weekly care coordination call with the post acute facility*      [facility] on 5/21/2022 to Home with family support. PCP: MD SCOTT Martin appointment:   Future Appointments   Date Time Provider Roberto Garibay   7/11/2022 11:30 AM Luis Jc MD [facility] BS AMB   2/13/2023  1:00 PM Edna Kidd MD [facility] BS AMB       Home Health/Outpatient orders at discharge: home health care  [address]: [address] ordered at discharge: None  [address] received prior to discharge: [place] Coordinator managing patient: PERI Riggs [facility]. [facility] Care Team will sign off at this time. Medications were not reconciled and general patient assessment was not completed during this skilled nursing facility outreach.      LAURA Burr  [facility] Team

## 2022-06-01 ENCOUNTER — PATIENT OUTREACH (OUTPATIENT)
Dept: CASE MANAGEMENT | Age: 72
End: 2022-06-01

## 2022-06-01 NOTE — PROGRESS NOTES
[address] [place] [address] Dr Discharge Follow-Up      Date/Time:  2022 6:11 PM     Patient was admitted to [facility] on 22 and discharged to [facility]  on 22. The patients discharge diagnosis was Left displaced femoral neck fracture. Patient was discharge on 22 from [facility] . The discharge summary from the post acute facilty was not available at the time of outreach. Patient was contacted within 8 business days of discharge from the post acute facility. LPN Care Coordinator contacted the patient by telephone to perform post hospital discharge follow up. Verified name and  with patient as identifiers. Provided introduction to self, and explanation of the LPN Care Coordinator role. Patient received post acute facility discharge instructions. LPN Care Coordinator reviewed discharge instructions and red flags with patient who verbalized understanding. Patient given an opportunity to ask questions and does not have any further questions or concerns at this time. The patient agrees to contact the PCP office for questions related to their healthcare. LPN Care Coordinator provided contact information for future reference. Advance Care Planning:   Does patient have an Advance Directive:  not on file; education provided     Home Health orders at discharge: PT, OT, [address]: [place]  Date of initial visit: 22    Durable Medical Equipment ordered at discharge: none  [address]: [address] [facility] received: NA    Medication(s):   The post acute facility medication discharge list was not available for this call. Tommy Keith     [facility] follow up appointment(s):   Future Appointments   Date Time Provider Roberto Garibay   2022 11:30 AM Freida Hernandez MD [facility] BS AMB   2023  1:00 PM Katina Yung MD [facility] BS AMB      Non-[facility] follow up appointment(s): Has been seen by urology and [facility]:  information provided as a resource

## 2022-06-02 ENCOUNTER — TELEPHONE (OUTPATIENT)
Dept: INTERNAL MEDICINE CLINIC | Age: 72
End: 2022-06-02

## 2022-06-02 NOTE — TELEPHONE ENCOUNTER
----- Message from April Nagle sent at 6/2/2022  8:30 AM EDT -----  Subject: Appointment Request    Reason for Call: Urgent (Patient Request) Hospital Follow Up    QUESTIONS  Type of Appointment? Established Patient  Reason for appointment request? No appointments available during search  Additional Information for Provider? Patient was discharged from the   hospital for a fall and left hip fracture this past weekend, and needs a   hospital follow up. None available with his physician when I looked. Patient okay with virtual. Patient concerned that he needs hospital follow   up so his pt/ot home care will continue. Please call patient or his wife   Will Kilpatrick back (his wife's number is [phone]). thank you  ---------------------------------------------------------------------------  --------------  CALL BACK INFO  What is the best way for the office to contact you? OK to leave message on   voicemail  Preferred Call Back Phone Number? [phone]  ---------------------------------------------------------------------------  --------------  SCRIPT ANSWERS  Relationship to Patient? Self  (Patient requests to see provider urgently. )? Yes  (Has the patient been discharged from the hospital within 2 business days   AND does not have a Telephone Encounter  Follow Up From [address] [place] [address]   documented in [address]?)? No  Do you have any questions for your primary care provider that need to be   answered prior to your appointment? (Use RN Triage if question pertains to   anything on the red flag list)? No  (Patient needs follow up visit after hospital discharge) Book first   available appointment within 7 days OF DISCHARGE, if no appt, proceed to   book the next available time slot within 14 days OF DISCHARGE AND Send   Message to Provider. [address] [place] [address] Follow Up appointment cannot be booked   beyond 14 Days and should result in a Message to Provider. ?  Yes   Have you been diagnosed with, awaiting test results for, or told that you   are suspected of having COVID-19 (Coronavirus)? (If patient has tested   negative or was tested as a requirement for work, school, or travel and   not based on symptoms, answer no)? No  Within the past 10 days have you developed any of the following symptoms   (answer no if symptoms have been present longer than 10 days or began   more than 10 days ago)? Fever or Chills, Cough, Shortness of breath or   difficulty breathing, Loss of taste or smell, Sore throat, Nasal   congestion, Sneezing or runny nose, Fatigue or generalized body aches   (answer no if pain is specific to a body part e.g. back pain), Diarrhea,   Headache? No  Have you had close contact with someone with COVID-19 in the last 7 days? No  (Service Expert  click yes below to proceed with PEAK-IT As Usual   Scheduling)?  Yes

## 2022-06-03 ENCOUNTER — VIRTUAL VISIT (OUTPATIENT)
Dept: INTERNAL MEDICINE CLINIC | Age: 72
End: 2022-06-03
Payer: MEDICARE

## 2022-06-03 DIAGNOSIS — I10 HYPERTENSION, UNSPECIFIED TYPE: ICD-10-CM

## 2022-06-03 DIAGNOSIS — E11.9 CONTROLLED TYPE 2 DIABETES MELLITUS WITHOUT COMPLICATION, WITHOUT LONG-TERM CURRENT USE OF INSULIN (HCC): ICD-10-CM

## 2022-06-03 DIAGNOSIS — S76.111D RUPTURE OF RIGHT QUADRICEPS MUSCLE, SUBSEQUENT ENCOUNTER: ICD-10-CM

## 2022-06-03 DIAGNOSIS — D64.9 ANEMIA, UNSPECIFIED TYPE: ICD-10-CM

## 2022-06-03 DIAGNOSIS — Z09 HOSPITAL DISCHARGE FOLLOW-UP: ICD-10-CM

## 2022-06-03 DIAGNOSIS — Z86.73 HISTORY OF CVA (CEREBROVASCULAR ACCIDENT): ICD-10-CM

## 2022-06-03 DIAGNOSIS — R33.9 URINE RETENTION: ICD-10-CM

## 2022-06-03 DIAGNOSIS — Z87.81 S/P LEFT HIP FRACTURE: Primary | ICD-10-CM

## 2022-06-03 PROBLEM — F11.99 OPIOID USE, UNSPECIFIED WITH UNSPECIFIED OPIOID-INDUCED DISORDER (HCC): Status: RESOLVED | Noted: 2021-08-18 | Resolved: 2022-06-03

## 2022-06-03 PROCEDURE — 1111F DSCHRG MED/CURRENT MED MERGE: CPT | Performed by: INTERNAL MEDICINE

## 2022-06-03 PROCEDURE — G8427 DOCREV CUR MEDS BY ELIG CLIN: HCPCS | Performed by: INTERNAL MEDICINE

## 2022-06-03 PROCEDURE — 99214 OFFICE O/P EST MOD 30 MIN: CPT | Performed by: INTERNAL MEDICINE

## 2022-06-03 NOTE — PATIENT INSTRUCTIONS
This is an established visit conducted via telemedicine. The patient has been instructed that this meets HIPAA criteria and acknowledges and agrees to this method of visitation.     Tayo Ochoa [facility]  36/84/10  62:57 AM

## 2022-06-03 NOTE — PROGRESS NOTES
HISTORY OF PRESENT ILLNESS  Fide Lane is a 66 y.o. male. HPI   Fide Lane, was evaluated through a synchronous (real-time) audio-video encounter. The patient (or guardian if applicable) is aware that this is a billable service, which includes applicable co-pays. This Virtual Visit was conducted with patient's (and/or legal guardian's) consent. The visit was conducted pursuant to the emergency declaration under the [facility], [address] [place] [address] authority and the [employer] General Act. Patient identification was verified, and a caregiver was present when appropriate. The patient was located at: Home: [address] [place] [address] [address] [facility]  The provider was located at: [facility] [place] [address]      --Sixto Anand MD on 6/3/2022 at 12:16 PM    An electronic signature was used to authenticate this note. Hx  Dm-1 with microalbuminuuria, HTN HLD hx cva with hemorrhage with left weakness, CKD3-Dr PRASHANT Leo MDD/anxiety bph mildPAD -Dr Villarreal Chick L3 kyphoplasty x 2, hx severe lumbar stenosis-Dr Jewel Lucio    Here for SCOTT LOPES admitted [facility] 4/24 for GLF with left hip fx and right quad tear-s/p left hemiarthroplasty and quad repair-Dr Luis Antonio Felix and Dr Karlee Teixeira  Has fu again next week  Still has osme left hip pain -wounds healed  Walking with walker   PT /OT [facility] seeing pt now-referral placed from our office  Had post op anemia hb 7.8 -tranfused  Had urine retention-travis but has been removed-saw URO MD recently 0no fu needed, voiding ok per pt    Went to [facility]-returned home last weekend-5 d ago    [address]: 4/24/2022  5:16 PM    DATE OF DISCHARGE: 5/2/2022   PRIMARY CARE PROVIDER: Fara Santacruz MD      ATTENDING PHYSICIAN: Chantale Seals MD  DISCHARGING PROVIDER: Antonella Morales NP    To contact this individual call [phone] and ask the  to page.   If unavailable ask to be transferred the Adult Hospitalist Department.     CONSULTATIONS: IP CONSULT TO ORTHOPEDIC SURGERY  IP CONSULT TO UROLOGY     PROCEDURES/SURGERIES: Procedure(s):  LEFT HIP HEMIARTHROPLASTY  QUADRICEPS REPAIR     ADMITTING [address]:   Per H&P, Lalit Wright is a 66 y. o. male who presents with fall and right hip,  [age]Y male with a history of CAD, CVA with mild left side weakness diabetes, hypertension and BPH.  Patient presented to the ED after a fall he said he tripped and fall backwards while vacuuming at home. The accident occurred 1 to 2 hours ago. [facility] landed [facility] floor. There was no blood loss. The point of impact was the left hip. The pain is moderate. He was not ambulatory at the scene. There was no entrapment after the fall. There was no alcohol use involved in the accident. Associated symptoms include extremity weakness (baseline LLE weakness). Pertinent negatives include no vomiting, no headaches, no loss of consciousness and no laceration. The risk factors include being elderly and recurrent falls (previous stroke).  Associated symptoms comments: +R knee pain.  The symptoms are aggravated by activity, use of injured limb and pressure on injury.           DISCHARGE DIAGNOSES / PLAN:       Left hip fracture  Right quadriceps tendon rupture  S/p hemiarthroplasty and quadriceps repair on 4/25  Orthopedics following, thank you for recommendations  To follow-up with Dr. Meeta Everett for hip and Dr. Lori Pérez for quadricept  Continuing knee immobilizer locked knee extension  Continuing multimodal Pain control  We will continue with bowel regimen  Working with physical therapy, weightbearing as tolerated           Acute urinary retention  Failed intermittent catheterization  Indwelling catheter placed 4/25 which was removed 4/30  Failed subsequent voiding trial, Hamm catheter replaced  Has been evaluated by urology, appreciate recommendations  To continue Hamm catheter, attempt voiding trials after 1 week  Follow-up with urology, Dr Price Drake outpatient in 1 month  In the meantime continuing finasteride and tamsulosin              Acute blood loss anemia  Likely due to OR losses  Hemoglobin was 12 at presentation  We will transfuse for hemoglobin less than 7  Remains stable, 7.8 today hemoglobin     CKd stage III  Did have mildly elevated creatinine, 1.38, baseline is somewhere around 1.2  Has resolved with IV hydration creatinine today 0.83     Type 2 diabetes  Blood sugar currently controlled   Continuing home medication reg                 Hypertension  Pressure currently controlled  Continuing carvedilol               PENDING TEST RESULTS:   At the time of discharge the following test results are still pending:      FOLLOW UP APPOINTMENTS:             Follow-up Information      Follow up With Specialties Details Why Contact Info     Lucero Tripathi MD Internal Medicine     [address]  [address] [phone]  [address] [facility]     [address]  [phone]     Lucero Tripathi MD Internal Medicine In 1 week   [address]  [address] [phone]        Adonay Crump MD Orthopedic Surgery In 1 week   [address] [facility] [address] [facility] [id number]  [address] [place] [address] Daniel Hudson MD Orthopedic Surgery In 1 week   [address] [facility]  [address]  [address]  [phone]        Shyla Roper MD Urology I           Patient Active Problem List    Diagnosis Date Noted    Left low back pain 08/15/2016    Gross hematuria 08/15/2016    Calculus of kidney 04/18/2016    Benign prostatic hyperplasia with lower urinary tract symptoms 06/22/2014    Nocturia 07/30/2014    Slowing of urinary stream 07/30/2014    Urinary frequency 06/22/2014    Erectile dysfunction 04/17/2016    Quadriceps tendon rupture, right, initial encounter 04/25/2022    Left displaced femoral neck fracture ([facility] 04/24/2022    Opioid use, unspecified with unspecified opioid-induced disorder 08/18/2021    Compression fracture of L3 vertebra ([facility] 08/04/2021    Lumbar stenosis 08/04/2021    Moderate major depression [facility] 07/24/2018    Full code but no chest compressions 07/23/2018    Localized edema 07/23/2018    Type 2 diabetes mellitus with nephropathy ([facility] 01/23/2018    Fracture of multiple ribs of right side 07/26/2017    Essential hypertension 03/28/2017    Hypercholesterolemia 03/28/2017    Controlled type 2 diabetes mellitus without complication ([facility] 50/23/7927    History of stroke 03/28/2017    Coronary artery disease involving native coronary artery without angina pectoris 03/28/2017    Obesity 06/22/2014     Current Outpatient Medications   Medication Sig Dispense Refill    aspirin delayed-release 81 mg tablet Take 1 Tablet by mouth daily. Take 1 tablet twice per day until 5/10/22 for prevention of blood clots. Then you may resume taking it once per day. 30 Tablet 0    amLODIPine-benazepril (LOTREL) 5-20 mg per capsule TAKE 1 CAPSULE EVERY DAY 90 Capsule 2    furosemide (LASIX) 40 mg tablet TAKE 1 TABLET EVERY DAY 90 Tablet 2    metFORMIN (GLUCOPHAGE) 500 mg tablet TAKE 1 TABLET EVERY DAY WITH DINNER 90 Tablet 2    tamsulosin (FLOMAX) 0.4 mg capsule TAKE 2 CAPSULES EVERY  Capsule 2    carvediloL (COREG) 12.5 mg tablet TAKE 1 TABLET TWICE DAILY WITH MEALS 180 Tablet 2    ezetimibe (ZETIA) 10 mg tablet TAKE 1 TABLET EVERY DAY 90 Tablet 2    finasteride (PROSCAR) 5 mg tablet TAKE 1 TABLET EVERY NIGHT 90 Tablet 2    cholecalciferol, vitamin D3, (Vitamin D3) 50 mcg (2,000 unit) tab Take 2,000 Units by mouth daily.       buPROPion SR (WELLBUTRIN SR) 150 mg SR tablet TAKE 1 TABLET TWICE DAILY 180 Tablet 2    potassium chloride (K-DUR, KLOR-CON M20) 20 mEq tablet TAKE 1 TABLET EVERY DAY 90 Tablet 3    atorvastatin (LIPITOR) 40 mg tablet TAKE 1 TABLET EVERY DAY 90 Tablet 1    therapeutic multivitamin (THERA) tablet Take 1 Tab by mouth daily. 90 Tab 1     No Known Allergies   Lab Results   Component Value Date/Time    WBC 10.7 05/02/2022 01:11 AM    HGB 7.8 (L) 05/02/2022 01:11 AM    HCT 24.2 (L) 05/02/2022 01:11 AM    PLATELET 244 23/42/4407 01:11 AM    MCV 96.8 05/02/2022 01:11 AM     Lab Results   Component Value Date/Time    Hemoglobin A1c 5.5 01/12/2022 12:45 PM    Hemoglobin A1c 6.3 (H) 07/08/2021 10:32 AM    Hemoglobin A1c 6.4 (H) 01/11/2021 01:03 PM    Hemoglobin A1c, External 6.2 09/25/2020 12:00 AM    Glucose 95 05/02/2022 01:11 AM    Glucose (POC) 149 (H) 05/02/2022 11:41 AM    Microalbumin/Creat ratio (mg/g creat) 70 (H) 01/12/2022 12:45 PM    Microalbumin,urine random 1.29 01/12/2022 12:45 PM    LDL, calculated 60.6 01/12/2022 12:45 PM    Creatinine 0.83 05/02/2022 01:11 AM      Lab Results   Component Value Date/Time    GFR est non-AA >60 05/02/2022 01:11 AM    GFR est AA >60 05/02/2022 01:11 AM    Creatinine 0.83 05/02/2022 01:11 AM    BUN 16 05/02/2022 01:11 AM    Sodium 140 05/02/2022 01:11 AM    Potassium 4.0 05/02/2022 01:11 AM    Chloride 108 05/02/2022 01:11 AM    CO2 26 05/02/2022 01:11 AM        ROS    Physical Exam  Constitutional:       Appearance: Normal appearance. Pulmonary:      Effort: Pulmonary effort is normal.   Neurological:      Mental Status: He is alert. ASSESSMENT and PLAN  Diagnoses and all orders for this visit:    1. S/p left hip fracture   S/p hemiarthroplasy   Fu ortho MD next week   Has pain med to take prn   [facility] PT/OT seeing pt  2. Rupture of right quadriceps muscle, subsequent encounter   As per above  3. Urine retention   resolved  4. Controlled type 2 diabetes mellitus without complication, without long-term current use of insulin (HCC)   fsbs 100 range per pt on metformin  5. Hypertension, unspecified type   controlled  6. Anemia, unspecified type   Labs next ov-post op anemia  7. History of CVA (cerebrovascular accident)   Fall precaution-left sided weakness      rtc next month

## 2022-07-11 ENCOUNTER — OFFICE VISIT (OUTPATIENT)
Dept: INTERNAL MEDICINE CLINIC | Age: 72
End: 2022-07-11
Payer: MEDICARE

## 2022-07-11 VITALS
RESPIRATION RATE: 16 BRPM | SYSTOLIC BLOOD PRESSURE: 100 MMHG | OXYGEN SATURATION: 99 % | HEART RATE: 73 BPM | DIASTOLIC BLOOD PRESSURE: 60 MMHG | TEMPERATURE: 97.2 F

## 2022-07-11 DIAGNOSIS — Z12.11 COLON CANCER SCREENING: Primary | ICD-10-CM

## 2022-07-11 DIAGNOSIS — E11.9 CONTROLLED TYPE 2 DIABETES MELLITUS WITHOUT COMPLICATION, WITHOUT LONG-TERM CURRENT USE OF INSULIN (HCC): ICD-10-CM

## 2022-07-11 DIAGNOSIS — R63.4 WEIGHT LOSS: ICD-10-CM

## 2022-07-11 DIAGNOSIS — I10 HYPERTENSION, UNSPECIFIED TYPE: ICD-10-CM

## 2022-07-11 DIAGNOSIS — E78.00 PURE HYPERCHOLESTEROLEMIA: ICD-10-CM

## 2022-07-11 PROCEDURE — G8427 DOCREV CUR MEDS BY ELIG CLIN: HCPCS | Performed by: INTERNAL MEDICINE

## 2022-07-11 PROCEDURE — G8536 NO DOC ELDER MAL SCRN: HCPCS | Performed by: INTERNAL MEDICINE

## 2022-07-11 PROCEDURE — G9717 DOC PT DX DEP/BP F/U NT REQ: HCPCS | Performed by: INTERNAL MEDICINE

## 2022-07-11 PROCEDURE — 3017F COLORECTAL CA SCREEN DOC REV: CPT | Performed by: INTERNAL MEDICINE

## 2022-07-11 PROCEDURE — 99214 OFFICE O/P EST MOD 30 MIN: CPT | Performed by: INTERNAL MEDICINE

## 2022-07-11 PROCEDURE — 3044F HG A1C LEVEL LT 7.0%: CPT | Performed by: INTERNAL MEDICINE

## 2022-07-11 PROCEDURE — 1101F PT FALLS ASSESS-DOCD LE1/YR: CPT | Performed by: INTERNAL MEDICINE

## 2022-07-11 PROCEDURE — G8752 SYS BP LESS 140: HCPCS | Performed by: INTERNAL MEDICINE

## 2022-07-11 PROCEDURE — G8417 CALC BMI ABV UP PARAM F/U: HCPCS | Performed by: INTERNAL MEDICINE

## 2022-07-11 PROCEDURE — 2022F DILAT RTA XM EVC RTNOPTHY: CPT | Performed by: INTERNAL MEDICINE

## 2022-07-11 PROCEDURE — G8754 DIAS BP LESS 90: HCPCS | Performed by: INTERNAL MEDICINE

## 2022-07-11 RX ORDER — BENAZEPRIL HYDROCHLORIDE 20 MG/1
20 TABLET ORAL DAILY
Qty: 90 TABLET | Refills: 3 | Status: SHIPPED | OUTPATIENT
Start: 2022-07-11

## 2022-07-11 NOTE — PROGRESS NOTES
1. \"Have you been to the ER, urgent care clinic since your last visit? Hospitalized since your last visit? \" Hospitalized recently    2. \"Have you seen or consulted any other health care providers outside of the [address] [place] [address] since your last visit? \" no    3. For patients aged 39-70: Has the patient had a colonoscopy / FIT/ Cologuard?  no

## 2022-07-11 NOTE — PROGRESS NOTES
HISTORY OF PRESENT ILLNESS  Erica Gentile is a 66 y.o. male. HPI     Hx  Dm-2 with microalbuminuuria, HTN HLD hx cva with hemorrhage with left weakness, CKD3-Dr PRASHANT Leo MDD/anxiety bph mildPAD -Dr Adina Davidson kyphoplasty x 2, hx severe lumbar stenosis-Dr Melvin Sales recovering from left hip fx and right quad tear-HH PT OT.  Able to use walker short distances  Will start outpt PT in 1-2 weeks  Has fu ortho MD soon-he feels that the left hip moves in too much and will hit his right leg when walking  Last a1c 5.5 LDL 60 on metformin  Lost 40 lbs prior to fall and hip and quad injuries with diet purposefully  Appetite is ok  bp at home has been low around 100/60 with PT -occasssionally feels light headed when he stands up    Last OV  Here for [facility] admitted [facility] 4/24 for GLF with left hip fx and right quad tear-s/p left hemiarthroplasty and quad repair-Dr Ilia Fatima and Dr Monique Martel  Has fu again next week  Still has osme left hip pain -wounds healed  Walking with walker   PT /OT [place] seeing pt now-referral placed from our office  Had post op anemia hb 7.8 -tranfused  Had urine retention-travis but has been removed-saw URO MD recently 0no fu needed, voiding ok per pt     Went to [facility]-returned home last weekend-5 d ago       Patient Active Problem List    Diagnosis Date Noted    Left low back pain 08/15/2016    Gross hematuria 08/15/2016    Calculus of kidney 04/18/2016    Benign prostatic hyperplasia with lower urinary tract symptoms 06/22/2014    Nocturia 07/30/2014    Slowing of urinary stream 07/30/2014    Urinary frequency 06/22/2014    Erectile dysfunction 04/17/2016    Quadriceps tendon rupture, right, initial encounter 04/25/2022    Left displaced femoral neck fracture ([address] 04/24/2022    Compression fracture of L3 vertebra ([address] 08/04/2021    Lumbar stenosis 08/04/2021    Moderate major depression ([address] 07/24/2018    Full code but no chest compressions 07/23/2018    Localized edema 07/23/2018  Type 2 diabetes mellitus with nephropathy ([facility] 01/23/2018    Fracture of multiple ribs of right side 07/26/2017    Essential hypertension 03/28/2017    Hypercholesterolemia 03/28/2017    Controlled type 2 diabetes mellitus without complication ([facility] 51/03/5549    History of stroke 03/28/2017    Coronary artery disease involving native coronary artery without angina pectoris 03/28/2017    Obesity 06/22/2014     Current Outpatient Medications   Medication Sig Dispense Refill    benazepriL (LOTENSIN) 20 mg tablet Take 1 Tablet by mouth daily. 90 Tablet 3    aspirin delayed-release 81 mg tablet Take 1 Tablet by mouth daily. Take 1 tablet twice per day until 5/10/22 for prevention of blood clots. Then you may resume taking it once per day. 30 Tablet 0    furosemide (LASIX) 40 mg tablet TAKE 1 TABLET EVERY DAY 90 Tablet 2    metFORMIN (GLUCOPHAGE) 500 mg tablet TAKE 1 TABLET EVERY DAY WITH DINNER 90 Tablet 2    tamsulosin (FLOMAX) 0.4 mg capsule TAKE 2 CAPSULES EVERY  Capsule 2    carvediloL (COREG) 12.5 mg tablet TAKE 1 TABLET TWICE DAILY WITH MEALS 180 Tablet 2    ezetimibe (ZETIA) 10 mg tablet TAKE 1 TABLET EVERY DAY 90 Tablet 2    finasteride (PROSCAR) 5 mg tablet TAKE 1 TABLET EVERY NIGHT 90 Tablet 2    cholecalciferol, vitamin D3, (Vitamin D3) 50 mcg (2,000 unit) tab Take 2,000 Units by mouth daily.  buPROPion SR (WELLBUTRIN SR) 150 mg SR tablet TAKE 1 TABLET TWICE DAILY 180 Tablet 2    potassium chloride (K-DUR, KLOR-CON M20) 20 mEq tablet TAKE 1 TABLET EVERY DAY 90 Tablet 3    atorvastatin (LIPITOR) 40 mg tablet TAKE 1 TABLET EVERY DAY 90 Tablet 1    therapeutic multivitamin (THERA) tablet Take 1 Tab by mouth daily.  90 Tab 1     No Known Allergies   Lab Results   Component Value Date/Time    WBC 10.7 05/02/2022 01:11 AM    HGB 7.8 (L) 05/02/2022 01:11 AM    HCT 24.2 (L) 05/02/2022 01:11 AM    PLATELET 727 01/59/7091 01:11 AM    MCV 96.8 05/02/2022 01:11 AM     Lab Results Component Value Date/Time    Hemoglobin A1c 5.5 01/12/2022 12:45 PM    Hemoglobin A1c 6.3 (H) 07/08/2021 10:32 AM    Hemoglobin A1c 6.4 (H) 01/11/2021 01:03 PM    Hemoglobin A1c, External 6.2 09/25/2020 12:00 AM    Glucose 95 05/02/2022 01:11 AM    Glucose (POC) 149 (H) 05/02/2022 11:41 AM    Microalbumin/Creat ratio (mg/g creat) 70 (H) 01/12/2022 12:45 PM    Microalbumin,urine random 1.29 01/12/2022 12:45 PM    LDL, calculated 60.6 01/12/2022 12:45 PM    Creatinine 0.83 05/02/2022 01:11 AM      Lab Results   Component Value Date/Time    Cholesterol, total 142 01/12/2022 12:45 PM    HDL Cholesterol 64 01/12/2022 12:45 PM    LDL, calculated 60.6 01/12/2022 12:45 PM    Triglyceride 87 01/12/2022 12:45 PM    CHOL/HDL Ratio 2.2 01/12/2022 12:45 PM     Lab Results   Component Value Date/Time    GFR est non-AA >60 05/02/2022 01:11 AM    GFR est AA >60 05/02/2022 01:11 AM    Creatinine 0.83 05/02/2022 01:11 AM    BUN 16 05/02/2022 01:11 AM    Sodium 140 05/02/2022 01:11 AM    Potassium 4.0 05/02/2022 01:11 AM    Chloride 108 05/02/2022 01:11 AM    CO2 26 05/02/2022 01:11 AM        ROS    Physical Exam  Vitals and nursing note reviewed. Constitutional:       General: He is not in acute distress. Appearance: Normal appearance. He is well-developed. He is obese. Comments: Appears stated age   HENT:      Head: Normocephalic. Cardiovascular:      Rate and Rhythm: Normal rate and regular rhythm. Heart sounds: Normal heart sounds. Pulmonary:      Effort: Pulmonary effort is normal.      Breath sounds: Normal breath sounds. Abdominal:      Palpations: Abdomen is soft. Musculoskeletal:      Cervical back: Normal range of motion. Comments: Right thigh in brace   Skin:     General: Skin is warm. Neurological:      Mental Status: He is alert. ASSESSMENT and PLAN  Diagnoses and all orders for this visit:    1. Colon cancer screening  -     REFERRAL TO GASTROENTEROLOGY    2.  Controlled type 2 diabetes mellitus without complication, without long-term current use of insulin (HCC)  -     HEMOGLOBIN A1C WITH EAG; Future   Can probably d/c metformin pending a1c  3. Hypertension, unspecified type   Low bp -mild orthostatic symptoms   Lower lasix dose to 20 mg every day and change lotrel to lotensin 20 gm every day   bp monitoring every day at home  4. Pure hypercholesterolemia  -     LIPID PANEL; Future    5. Weight loss  -     TSH 3RD GENERATION; Future  -     CBC W/O DIFF; Future  6. Left hip fx repair/ right quad tear   PT    Fu Ortho MD's  Other orders  -     benazepriL (LOTENSIN) 20 mg tablet; Take 1 Tablet by mouth daily. bp check here in 1 months    Follow-up and Dispositions    · Return in about 6 months (around 1/11/2023) for CPE.

## 2022-08-18 NOTE — PROGRESS NOTES
RUR 12%     Trans[facility]:  [facility]  is being [facility] today. BLS to transport at discharge. *Patient has [employer], and family is requesting IPR. If insurance does not approve IPR, family prefers home with [facility], they do not want SNF placement. CM spoke with attending MD yesterday who is in agreement with referral being sent to IPR. Chart reviewed. CM spoke with Jeremy Valiente with [facility], they can accept patient and will start auth. CM updated patient and wife and bedside. CM will continue to follow.     Hiral Ferreira, ALYSONW/CRM 144

## 2022-11-02 DIAGNOSIS — Z00.00 ROUTINE GENERAL MEDICAL EXAMINATION AT A HEALTH CARE FACILITY: ICD-10-CM

## 2022-11-02 DIAGNOSIS — R60.0 LOCALIZED EDEMA: ICD-10-CM

## 2022-11-02 RX ORDER — TAMSULOSIN HYDROCHLORIDE 0.4 MG/1
CAPSULE ORAL
Qty: 180 CAPSULE | Refills: 2 | Status: SHIPPED | OUTPATIENT
Start: 2022-11-02

## 2022-11-02 RX ORDER — POTASSIUM CHLORIDE 20 MEQ/1
TABLET, EXTENDED RELEASE ORAL
Qty: 90 TABLET | Refills: 3 | Status: SHIPPED | OUTPATIENT
Start: 2022-11-02

## 2022-11-02 RX ORDER — FUROSEMIDE 40 MG/1
TABLET ORAL
Qty: 90 TABLET | Refills: 2 | Status: SHIPPED | OUTPATIENT
Start: 2022-11-02

## 2022-11-02 RX ORDER — CARVEDILOL 12.5 MG/1
TABLET ORAL
Qty: 180 TABLET | Refills: 2 | Status: SHIPPED | OUTPATIENT
Start: 2022-11-02

## 2022-11-02 RX ORDER — FINASTERIDE 5 MG/1
TABLET, FILM COATED ORAL
Qty: 90 TABLET | Refills: 2 | Status: SHIPPED | OUTPATIENT
Start: 2022-11-02

## 2022-11-02 RX ORDER — METFORMIN HYDROCHLORIDE 500 MG/1
TABLET ORAL
Qty: 90 TABLET | Refills: 2 | Status: SHIPPED | OUTPATIENT
Start: 2022-11-02

## 2022-11-02 RX ORDER — EZETIMIBE 10 MG/1
TABLET ORAL
Qty: 90 TABLET | Refills: 2 | Status: SHIPPED | OUTPATIENT
Start: 2022-11-02

## 2022-12-09 RX ORDER — ATORVASTATIN CALCIUM 40 MG/1
40 TABLET, FILM COATED ORAL DAILY
Qty: 90 TABLET | Refills: 1 | Status: SHIPPED | OUTPATIENT
Start: 2022-12-09

## 2023-02-06 DIAGNOSIS — I25.10 CORONARY ARTERY DISEASE INVOLVING NATIVE CORONARY ARTERY OF NATIVE HEART WITHOUT ANGINA PECTORIS: ICD-10-CM

## 2023-02-06 DIAGNOSIS — I10 ESSENTIAL HYPERTENSION: ICD-10-CM

## 2023-02-06 RX ORDER — BUPROPION HYDROCHLORIDE 150 MG/1
TABLET, EXTENDED RELEASE ORAL
Qty: 180 TABLET | Refills: 2 | Status: SHIPPED | OUTPATIENT
Start: 2023-02-06

## 2023-05-19 RX ORDER — TAMSULOSIN HYDROCHLORIDE 0.4 MG/1
CAPSULE ORAL
COMMUNITY
Start: 2022-11-02

## 2023-05-19 RX ORDER — FINASTERIDE 5 MG/1
1 TABLET, FILM COATED ORAL NIGHTLY
COMMUNITY
Start: 2022-11-02

## 2023-05-19 RX ORDER — ATORVASTATIN CALCIUM 40 MG/1
1 TABLET, FILM COATED ORAL DAILY
COMMUNITY
Start: 2023-04-12

## 2023-05-19 RX ORDER — FUROSEMIDE 40 MG/1
1 TABLET ORAL DAILY
COMMUNITY
Start: 2022-11-02

## 2023-05-19 RX ORDER — BENAZEPRIL HYDROCHLORIDE 20 MG/1
20 TABLET ORAL DAILY
COMMUNITY
Start: 2022-07-11 | End: 2023-07-11

## 2023-05-19 RX ORDER — ASPIRIN 81 MG/1
81 TABLET ORAL DAILY
COMMUNITY
Start: 2022-04-29

## 2023-05-19 RX ORDER — EZETIMIBE 10 MG/1
1 TABLET ORAL DAILY
COMMUNITY
Start: 2022-11-02

## 2023-05-19 RX ORDER — CARVEDILOL 12.5 MG/1
1 TABLET ORAL 2 TIMES DAILY WITH MEALS
COMMUNITY
Start: 2022-11-02

## 2023-05-19 RX ORDER — POTASSIUM CHLORIDE 20 MEQ/1
1 TABLET, EXTENDED RELEASE ORAL DAILY
COMMUNITY
Start: 2022-11-02

## 2023-05-19 RX ORDER — BUPROPION HYDROCHLORIDE 150 MG/1
1 TABLET, EXTENDED RELEASE ORAL 2 TIMES DAILY
COMMUNITY
Start: 2023-02-06

## 2023-06-23 ENCOUNTER — TELEPHONE (OUTPATIENT)
Age: 73
End: 2023-06-23

## 2023-06-23 NOTE — TELEPHONE ENCOUNTER
----- Message from Reyna Lui sent at 6/22/2023  4:57 PM EDT -----  Subject: Appointment Request    Reason for Call: Established Patient Appointment needed: Routine Pre-Op    QUESTIONS    Reason for appointment request? No appointments available during search     Additional Information for Provider? pt needs physical and pre op appt   before his surgery on July 24th and 31st   ---------------------------------------------------------------------------  --------------  [id number]  [phone]; OK to leave message on voicemail  ---------------------------------------------------------------------------  --------------  SCRIPT ANSWERS

## 2023-06-23 NOTE — TELEPHONE ENCOUNTER
----- Message from Abimael Cheung sent at 6/23/2023  9:43 AM EDT -----  Subject: Message to Provider    QUESTIONS  Information for Provider? Second call requesting an appt for a pre-op. Please call pt to schedule.  ---------------------------------------------------------------------------  --------------  Andria Bhatia INFO  [phone]; OK to leave message on voicemail  ---------------------------------------------------------------------------  --------------  SCRIPT ANSWERS  Relationship to Patient?  Self

## 2023-07-08 SDOH — ECONOMIC STABILITY: FOOD INSECURITY: WITHIN THE PAST 12 MONTHS, YOU WORRIED THAT YOUR FOOD WOULD RUN OUT BEFORE YOU GOT MONEY TO BUY MORE.: NEVER TRUE

## 2023-07-08 SDOH — ECONOMIC STABILITY: HOUSING INSECURITY
IN THE LAST 12 MONTHS, WAS THERE A TIME WHEN YOU DID NOT HAVE A STEADY PLACE TO SLEEP OR SLEPT IN A SHELTER (INCLUDING NOW)?: NO

## 2023-07-08 SDOH — ECONOMIC STABILITY: TRANSPORTATION INSECURITY
IN THE PAST 12 MONTHS, HAS LACK OF TRANSPORTATION KEPT YOU FROM MEETINGS, WORK, OR FROM GETTING THINGS NEEDED FOR DAILY LIVING?: NO

## 2023-07-08 SDOH — HEALTH STABILITY: PHYSICAL HEALTH: ON AVERAGE, HOW MANY MINUTES DO YOU ENGAGE IN EXERCISE AT THIS LEVEL?: 0 MIN

## 2023-07-08 SDOH — ECONOMIC STABILITY: INCOME INSECURITY: HOW HARD IS IT FOR YOU TO PAY FOR THE VERY BASICS LIKE FOOD, HOUSING, MEDICAL CARE, AND HEATING?: NOT HARD AT ALL

## 2023-07-08 SDOH — ECONOMIC STABILITY: FOOD INSECURITY: WITHIN THE PAST 12 MONTHS, THE FOOD YOU BOUGHT JUST DIDN'T LAST AND YOU DIDN'T HAVE MONEY TO GET MORE.: NEVER TRUE

## 2023-07-08 SDOH — HEALTH STABILITY: PHYSICAL HEALTH
ON AVERAGE, HOW MANY DAYS PER WEEK DO YOU ENGAGE IN MODERATE TO STRENUOUS EXERCISE (LIKE A BRISK WALK)?: PATIENT DECLINED

## 2023-07-08 ASSESSMENT — LIFESTYLE VARIABLES
HOW OFTEN DO YOU HAVE A DRINK CONTAINING ALCOHOL: 4
HOW OFTEN DO YOU HAVE SIX OR MORE DRINKS ON ONE OCCASION: 1
HOW OFTEN DO YOU HAVE A DRINK CONTAINING ALCOHOL: 2-3 TIMES A WEEK
HOW MANY STANDARD DRINKS CONTAINING ALCOHOL DO YOU HAVE ON A TYPICAL DAY: 1 OR 2
HOW MANY STANDARD DRINKS CONTAINING ALCOHOL DO YOU HAVE ON A TYPICAL DAY: 1

## 2023-07-08 ASSESSMENT — PATIENT HEALTH QUESTIONNAIRE - PHQ9
SUM OF ALL RESPONSES TO PHQ QUESTIONS 1-9: 0
2. FEELING DOWN, DEPRESSED OR HOPELESS: 0
SUM OF ALL RESPONSES TO PHQ QUESTIONS 1-9: 0
1. LITTLE INTEREST OR PLEASURE IN DOING THINGS: 0
SUM OF ALL RESPONSES TO PHQ QUESTIONS 1-9: 0
SUM OF ALL RESPONSES TO PHQ QUESTIONS 1-9: 0
SUM OF ALL RESPONSES TO PHQ9 QUESTIONS 1 & 2: 0

## 2023-07-11 ENCOUNTER — OFFICE VISIT (OUTPATIENT)
Age: 73
End: 2023-07-11
Payer: MEDICARE

## 2023-07-11 VITALS
HEIGHT: 68 IN | HEART RATE: 78 BPM | WEIGHT: 197.6 LBS | OXYGEN SATURATION: 97 % | BODY MASS INDEX: 29.95 KG/M2 | SYSTOLIC BLOOD PRESSURE: 136 MMHG | DIASTOLIC BLOOD PRESSURE: 70 MMHG | TEMPERATURE: 97.3 F | RESPIRATION RATE: 16 BRPM

## 2023-07-11 DIAGNOSIS — Z86.73 HISTORY OF CEREBROVASCULAR ACCIDENT: ICD-10-CM

## 2023-07-11 DIAGNOSIS — Z12.5 PROSTATE CANCER SCREENING: ICD-10-CM

## 2023-07-11 DIAGNOSIS — I69.344 MONOPLEGIA OF LOWER EXTREMITY FOLLOWING CEREBRAL INFARCTION AFFECTING LEFT NON-DOMINANT SIDE (HCC): ICD-10-CM

## 2023-07-11 DIAGNOSIS — R80.9 TYPE 2 DIABETES MELLITUS WITH MICROALBUMINURIA, WITHOUT LONG-TERM CURRENT USE OF INSULIN (HCC): Primary | ICD-10-CM

## 2023-07-11 DIAGNOSIS — I10 HYPERTENSION, UNSPECIFIED TYPE: ICD-10-CM

## 2023-07-11 DIAGNOSIS — Z12.11 COLON CANCER SCREENING: ICD-10-CM

## 2023-07-11 DIAGNOSIS — E78.00 PURE HYPERCHOLESTEROLEMIA: ICD-10-CM

## 2023-07-11 DIAGNOSIS — F32.1 MAJOR DEPRESSIVE DISORDER, SINGLE EPISODE, MODERATE (HCC): ICD-10-CM

## 2023-07-11 DIAGNOSIS — E11.29 TYPE 2 DIABETES MELLITUS WITH MICROALBUMINURIA, WITHOUT LONG-TERM CURRENT USE OF INSULIN (HCC): Primary | ICD-10-CM

## 2023-07-11 DIAGNOSIS — E11.29 TYPE 2 DIABETES MELLITUS WITH OTHER DIABETIC KIDNEY COMPLICATION (HCC): ICD-10-CM

## 2023-07-11 DIAGNOSIS — Z00.00 MEDICARE ANNUAL WELLNESS VISIT, SUBSEQUENT: ICD-10-CM

## 2023-07-11 DIAGNOSIS — I73.9 PERIPHERAL VASCULAR DISEASE, UNSPECIFIED (HCC): ICD-10-CM

## 2023-07-11 DIAGNOSIS — N18.30 STAGE 3 CHRONIC KIDNEY DISEASE, UNSPECIFIED WHETHER STAGE 3A OR 3B CKD (HCC): ICD-10-CM

## 2023-07-11 PROCEDURE — G0439 PPPS, SUBSEQ VISIT: HCPCS | Performed by: INTERNAL MEDICINE

## 2023-07-11 PROCEDURE — 3046F HEMOGLOBIN A1C LEVEL >9.0%: CPT | Performed by: INTERNAL MEDICINE

## 2023-07-11 PROCEDURE — 4004F PT TOBACCO SCREEN RCVD TLK: CPT | Performed by: INTERNAL MEDICINE

## 2023-07-11 PROCEDURE — 1123F ACP DISCUSS/DSCN MKR DOCD: CPT | Performed by: INTERNAL MEDICINE

## 2023-07-11 PROCEDURE — 99213 OFFICE O/P EST LOW 20 MIN: CPT | Performed by: INTERNAL MEDICINE

## 2023-07-11 PROCEDURE — G8427 DOCREV CUR MEDS BY ELIG CLIN: HCPCS | Performed by: INTERNAL MEDICINE

## 2023-07-11 PROCEDURE — 2022F DILAT RTA XM EVC RTNOPTHY: CPT | Performed by: INTERNAL MEDICINE

## 2023-07-11 PROCEDURE — 3017F COLORECTAL CA SCREEN DOC REV: CPT | Performed by: INTERNAL MEDICINE

## 2023-07-11 PROCEDURE — 3075F SYST BP GE 130 - 139MM HG: CPT | Performed by: INTERNAL MEDICINE

## 2023-07-11 PROCEDURE — 3078F DIAST BP <80 MM HG: CPT | Performed by: INTERNAL MEDICINE

## 2023-07-11 PROCEDURE — G8417 CALC BMI ABV UP PARAM F/U: HCPCS | Performed by: INTERNAL MEDICINE

## 2023-07-11 RX ORDER — HYDROCHLOROTHIAZIDE 25 MG/1
TABLET ORAL
COMMUNITY
Start: 2016-12-05

## 2023-07-11 RX ORDER — AMLODIPINE BESYLATE AND BENAZEPRIL HYDROCHLORIDE 5; 20 MG/1; MG/1
CAPSULE ORAL
COMMUNITY
Start: 2016-12-05

## 2023-07-11 ASSESSMENT — PATIENT HEALTH QUESTIONNAIRE - PHQ9
SUM OF ALL RESPONSES TO PHQ QUESTIONS 1-9: 1
9. THOUGHTS THAT YOU WOULD BE BETTER OFF DEAD, OR OF HURTING YOURSELF: 0
SUM OF ALL RESPONSES TO PHQ9 QUESTIONS 1 & 2: 0
SUM OF ALL RESPONSES TO PHQ QUESTIONS 1-9: 0
1. LITTLE INTEREST OR PLEASURE IN DOING THINGS: 0
4. FEELING TIRED OR HAVING LITTLE ENERGY: 0
3. TROUBLE FALLING OR STAYING ASLEEP: 0
SUM OF ALL RESPONSES TO PHQ QUESTIONS 1-9: 0
10. IF YOU CHECKED OFF ANY PROBLEMS, HOW DIFFICULT HAVE THESE PROBLEMS MADE IT FOR YOU TO DO YOUR WORK, TAKE CARE OF THINGS AT HOME, OR GET ALONG WITH OTHER PEOPLE: 0
8. MOVING OR SPEAKING SO SLOWLY THAT OTHER PEOPLE COULD HAVE NOTICED. OR THE OPPOSITE, BEING SO FIGETY OR RESTLESS THAT YOU HAVE BEEN MOVING AROUND A LOT MORE THAN USUAL: 0
SUM OF ALL RESPONSES TO PHQ QUESTIONS 1-9: 0
1. LITTLE INTEREST OR PLEASURE IN DOING THINGS: 0
6. FEELING BAD ABOUT YOURSELF - OR THAT YOU ARE A FAILURE OR HAVE LET YOURSELF OR YOUR FAMILY DOWN: 0
5. POOR APPETITE OR OVEREATING: 1
SUM OF ALL RESPONSES TO PHQ QUESTIONS 1-9: 1
SUM OF ALL RESPONSES TO PHQ QUESTIONS 1-9: 1
7. TROUBLE CONCENTRATING ON THINGS, SUCH AS READING THE NEWSPAPER OR WATCHING TELEVISION: 0
SUM OF ALL RESPONSES TO PHQ QUESTIONS 1-9: 1
2. FEELING DOWN, DEPRESSED OR HOPELESS: 0
SUM OF ALL RESPONSES TO PHQ9 QUESTIONS 1 & 2: 0
SUM OF ALL RESPONSES TO PHQ QUESTIONS 1-9: 0
2. FEELING DOWN, DEPRESSED OR HOPELESS: 0

## 2023-07-11 NOTE — PROGRESS NOTES
1. \"Have you been to the ER, urgent care clinic since your last visit? Hospitalized since your last visit? \" No    2. \"Have you seen or consulted any other health care providers outside of the [address] [place] [address] since your last visit? \" No     3. For patients aged 43-73: Has the patient had a colonoscopy / FIT/ Cologuard? No      If the patient is female:    4. For patients aged 43-66: Has the patient had a mammogram within the past 2 years? No      5. For patients aged 21-65: Has the patient had a pap smear?   No
12.5 MG tablet Take 1 tablet by mouth 2 times daily (with meals) Yes Ar Automatic Reconciliation   Cholecalciferol 50 MCG (2000 UT) TABS Take 1 tablet by mouth daily Yes Ar Automatic Reconciliation   ezetimibe (ZETIA) 10 MG tablet Take 1 tablet by mouth daily Yes Ar Automatic Reconciliation   finasteride (PROSCAR) 5 MG tablet Take 1 tablet by mouth nightly Yes Ar Automatic Reconciliation   furosemide (LASIX) 40 MG tablet Take 1 tablet by mouth daily Yes Ar Automatic Reconciliation   metFORMIN (GLUCOPHAGE) 500 MG tablet TAKE 1 TABLET EVERY DAY WITH DINNER Yes Ar Automatic Reconciliation   potassium chloride (KLOR-CON M) 20 MEQ extended release tablet Take 1 tablet by mouth daily Yes Ar Automatic Reconciliation   tamsulosin (FLOMAX) 0.4 MG capsule TAKE 2 CAPSULES EVERY DAY Yes Ar Automatic Reconciliation       CareTeam (Including outside providers/suppliers regularly involved in providing care):   Patient Care Team:  Justin Venegas MD as PCP - Abdulaziz Montiel MD as PCP - Empaneled Provider  Liz Zhou MD as Physician     Reviewed and updated this visit:  Allergies  Meds

## 2023-07-12 LAB
CHOLEST SERPL-MCNC: 182 MG/DL
EST. AVERAGE GLUCOSE BLD GHB EST-MCNC: 114 MG/DL
HBA1C MFR BLD: 5.6 % (ref 4–5.6)
HDLC SERPL-MCNC: 67 MG/DL
HDLC SERPL: 2.7 (ref 0–5)
LDLC SERPL CALC-MCNC: 69.8 MG/DL (ref 0–100)
TRIGL SERPL-MCNC: 226 MG/DL
VLDLC SERPL CALC-MCNC: 45.2 MG/DL

## 2023-07-17 ENCOUNTER — TELEPHONE (OUTPATIENT)
Age: 73
End: 2023-07-17

## 2023-07-17 NOTE — TELEPHONE ENCOUNTER
Patient called in stating his pre op forms were faxed incomplete.  Please review past medical history portion

## 2023-08-31 RX ORDER — FUROSEMIDE 40 MG/1
TABLET ORAL
Qty: 90 TABLET | Refills: 3 | Status: SHIPPED | OUTPATIENT
Start: 2023-08-31

## 2023-08-31 RX ORDER — FINASTERIDE 5 MG/1
TABLET, FILM COATED ORAL
Qty: 90 TABLET | Refills: 3 | Status: SHIPPED | OUTPATIENT
Start: 2023-08-31

## 2023-08-31 RX ORDER — EZETIMIBE 10 MG/1
TABLET ORAL
Qty: 90 TABLET | Refills: 3 | Status: SHIPPED | OUTPATIENT
Start: 2023-08-31

## 2023-08-31 RX ORDER — TAMSULOSIN HYDROCHLORIDE 0.4 MG/1
CAPSULE ORAL
Qty: 180 CAPSULE | Refills: 3 | Status: SHIPPED | OUTPATIENT
Start: 2023-08-31

## 2023-08-31 RX ORDER — CARVEDILOL 12.5 MG/1
TABLET ORAL
Qty: 180 TABLET | Refills: 3 | Status: SHIPPED | OUTPATIENT
Start: 2023-08-31

## 2023-09-06 ENCOUNTER — TELEPHONE (OUTPATIENT)
Age: 73
End: 2023-09-06

## 2023-09-06 NOTE — TELEPHONE ENCOUNTER
Patient states he needs a call back in reference to Medications Not refilled for AmLODIPine-benazepril (LOTREL) 5-20 MG per capsule & also HydroCHLOROthiazide (HYDRODIURIL) 25 MG tablet. Patient reports he does not have Bottle. Please call to discuss & advise if Dr. Rosibel Reno is Prescribing Doctor or if patient still needs to take. Patient states a detailed message can be left on voice mail.  Thank you

## 2023-09-07 NOTE — TELEPHONE ENCOUNTER
Pt called. 2 identifiers confirmed. Advised pt to contact cardiologist for advising on blood pressure medications. Dr. Tarah Thurman has not filled these medications since 2016. Verbalized understanding. No further concerns voiced.

## 2023-09-20 NOTE — ED NOTES
Discharge instructions reviewed with pt by provider. pt verbalized understanding of discharge instructions. Copy of discharge paperwork given. Patient condition stable, respiratory status within normal limits, neuro status intact.  Discharged via wheelchair out of er, accompanied by wife
difficulty swallowing/persistent constipation/persistent lack of appetite/persistent nausea/vomiting

## 2023-10-31 RX ORDER — SODIUM CHLORIDE 9 MG/ML
25 INJECTION, SOLUTION INTRAVENOUS PRN
Status: DISCONTINUED | OUTPATIENT
Start: 2023-10-31 | End: 2023-11-01 | Stop reason: HOSPADM

## 2023-10-31 RX ORDER — SODIUM CHLORIDE 0.9 % (FLUSH) 0.9 %
5-40 SYRINGE (ML) INJECTION EVERY 12 HOURS SCHEDULED
Status: DISCONTINUED | OUTPATIENT
Start: 2023-10-31 | End: 2023-11-01 | Stop reason: HOSPADM

## 2023-10-31 RX ORDER — SODIUM CHLORIDE 0.9 % (FLUSH) 0.9 %
5-40 SYRINGE (ML) INJECTION PRN
Status: DISCONTINUED | OUTPATIENT
Start: 2023-10-31 | End: 2023-11-01 | Stop reason: HOSPADM

## 2023-11-01 ENCOUNTER — ANESTHESIA (OUTPATIENT)
Facility: HOSPITAL | Age: 73
End: 2023-11-01
Payer: MEDICARE

## 2023-11-01 ENCOUNTER — HOSPITAL ENCOUNTER (OUTPATIENT)
Facility: HOSPITAL | Age: 73
Setting detail: OUTPATIENT SURGERY
Discharge: HOME | End: 2023-11-01
Attending: SPECIALIST | Admitting: SPECIALIST
Payer: MEDICARE

## 2023-11-01 ENCOUNTER — ANESTHESIA EVENT (OUTPATIENT)
Facility: HOSPITAL | Age: 73
End: 2023-11-01
Payer: MEDICARE

## 2023-11-01 VITALS
WEIGHT: 192.69 LBS | BODY MASS INDEX: 27.58 KG/M2 | HEART RATE: 72 BPM | SYSTOLIC BLOOD PRESSURE: 103 MMHG | RESPIRATION RATE: 14 BRPM | OXYGEN SATURATION: 96 % | DIASTOLIC BLOOD PRESSURE: 43 MMHG | HEIGHT: 70 IN

## 2023-11-01 PROCEDURE — 3600007512: Performed by: SPECIALIST

## 2023-11-01 PROCEDURE — 2580000003 HC RX 258: Performed by: SPECIALIST

## 2023-11-01 PROCEDURE — 2500000003 HC RX 250 WO HCPCS: Performed by: NURSE ANESTHETIST, CERTIFIED REGISTERED

## 2023-11-01 PROCEDURE — 3700000000 HC ANESTHESIA ATTENDED CARE: Performed by: SPECIALIST

## 2023-11-01 PROCEDURE — 2720000010 HC SURG SUPPLY STERILE: Performed by: SPECIALIST

## 2023-11-01 PROCEDURE — 7100000010 HC PHASE II RECOVERY - FIRST 15 MIN: Performed by: SPECIALIST

## 2023-11-01 PROCEDURE — 88305 TISSUE EXAM BY PATHOLOGIST: CPT

## 2023-11-01 PROCEDURE — 6360000002 HC RX W HCPCS: Performed by: NURSE ANESTHETIST, CERTIFIED REGISTERED

## 2023-11-01 PROCEDURE — 3700000001 HC ADD 15 MINUTES (ANESTHESIA): Performed by: SPECIALIST

## 2023-11-01 PROCEDURE — 3600007502: Performed by: SPECIALIST

## 2023-11-01 PROCEDURE — 2709999900 HC NON-CHARGEABLE SUPPLY: Performed by: SPECIALIST

## 2023-11-01 RX ORDER — LIDOCAINE HYDROCHLORIDE 20 MG/ML
INJECTION, SOLUTION EPIDURAL; INFILTRATION; INTRACAUDAL; PERINEURAL PRN
Status: DISCONTINUED | OUTPATIENT
Start: 2023-11-01 | End: 2023-11-01 | Stop reason: SDUPTHER

## 2023-11-01 RX ADMIN — PROPOFOL 50 MG: 10 INJECTION, EMULSION INTRAVENOUS at 12:51

## 2023-11-01 RX ADMIN — PROPOFOL 50 MG: 10 INJECTION, EMULSION INTRAVENOUS at 13:03

## 2023-11-01 RX ADMIN — LIDOCAINE HYDROCHLORIDE 40 MG: 20 INJECTION, SOLUTION EPIDURAL; INFILTRATION; INTRACAUDAL; PERINEURAL at 12:49

## 2023-11-01 RX ADMIN — SODIUM CHLORIDE: 9 INJECTION, SOLUTION INTRAVENOUS at 12:45

## 2023-11-01 RX ADMIN — PROPOFOL 50 MG: 10 INJECTION, EMULSION INTRAVENOUS at 12:49

## 2023-11-01 RX ADMIN — PROPOFOL 50 MG: 10 INJECTION, EMULSION INTRAVENOUS at 12:57

## 2023-11-01 ASSESSMENT — PAIN - FUNCTIONAL ASSESSMENT: PAIN_FUNCTIONAL_ASSESSMENT: NONE - DENIES PAIN

## 2023-11-01 NOTE — PROGRESS NOTES
Endoscopy Case End Note:    1308:  Procedure scope was pre-cleaned, per protocol, at bedside by HILARIO Sotelo      1312:  Report received from anesthesia - Yeimy Vegas CRNA. See anesthesia flowsheet for intra-procedure vital signs and events.

## 2023-11-01 NOTE — OP NOTE
Colonoscopy Procedure Note    Indications:   Personal history of colon polyps (screening only)    Referring Physician: Alissa Kearns MD  Anesthesia/Sedation: MAC anesthesia Propofol  Endoscopist:  Dr. Leta Cervantes    Procedure in Detail:  Informed consent was obtained for the procedure, including sedation. Risks of perforation, hemorrhage, adverse drug reaction, and aspiration were discussed. The patient was placed in the left lateral decubitus position. Based on the pre-procedure assessment, including review of the patient's medical history, medications, allergies, and review of systems, he had been deemed to be an appropriate candidate for moderate sedation; he was therefore sedated with the medications listed above. The patient was monitored continuously with ECG tracing, pulse oximetry, blood pressure monitoring, and direct observations. A rectal examination was performed. The [id number] was inserted into the rectum and advanced under direct vision to the cecum, which was identified by the ileocecal valve and appendiceal orifice. The quality of the colonic preparation was adequate. A careful inspection was made as the colonoscope was withdrawn, including a retroflexed view of the rectum; findings and interventions are described below. Appropriate photodocumentation was obtained. Findings:    Scope advanced to the cecum. Preparation was adequate. (1) sessile polyp 4 mm in ascending colon s/p cold snare removal   (1) sessile polyp 3 mm in transverse colon s/p cold snare removal   Internal hemorrhoid with anal skin tag. Therapies:  see above    Specimen: Specimens were collected as described above and sent to pathology. Complications: None were encountered during the procedure. EBL: < 10 ml. Recommendations:     - Repeat colonoscopy in 5 years.     Signed By: Leta Cervantes MD                        November 1, 2023

## 2023-11-01 NOTE — DISCHARGE INSTRUCTIONS
physician  Resume your medications unless otherwise instructed    For 24 hours after general anesthesia or intravenous analgesia / sedation  you may experience:  Drowsiness, dizziness, sleepiness, or confusion  Difficulty remembering or delayed reaction times  Difficulty with your balance, especially while walking, move slowly and carefully, do not make sudden position changes  Difficulty focusing or blurred vision    You may not be aware of slight changes in your behavior and/or your reaction time because of the medication used during and after your procedure.     Report the following to your physician:  Excessive pain, swelling, redness or odor of or around the surgical area  Temperature over 100.5  Nausea and vomiting lasting longer than 4 hours or if unable to take medications  Any signs of decreased circulation or nerve impairment to extremity: change in color, persistent numbness, tingling, coldness or increase pain  Any questions or concerns    IF YOU REPORT TO AN EMERGENCY ROOM, DOCTOR'S OFFICE OR HOSPITAL WITHIN 24 HOURS AFTER YOUR PROCEDURE, BRING THIS SHEET AND YOUR AFTER VISIT SUMMARY WITH YOU AND GIVE IT TO THE PHYSICIAN OR NURSE ATTENDING YOU.

## 2023-11-01 NOTE — PROGRESS NOTES
TRANSFER - IN REPORT:    Verbal report received from [facility] on Ranjana Sanchez  being received from [facility] for routine progression of patient care      Report consisted of patient's Situation, Background, Assessment and   Recommendations(SBAR). Information from the following report(s) Nurse Handoff Report was reviewed with the receiving nurse. Opportunity for questions and clarification was provided. Assessment completed upon patient's arrival to unit and care assumed.

## 2023-11-01 NOTE — H&P
Gastroenterology Outpatient History and Physical    Patient: Funmilayo Oreilly    Physician: Lisha Hi MD    Vital Signs: There were no vitals taken for this visit.     Allergies: No Known Allergies    Chief Complaint: H/O Colon Polyps    History of Present Illness: above    Justification for Procedure: above    History:  Past Medical History:   Diagnosis Date    Agoraphobia with panic disorder     Anxiety     Arthritis     spine    BPH (benign prostatic hypertrophy) with urinary obstruction     CAD (coronary artery disease)     no previous MI or stents    Chronic edema     CVD (cardiovascular disease)     Depression     Diabetes ([address])     no longer medicated    Erectile dysfunction     Falls 2022    Fracture of multiple ribs of right side 7/26/2017    GERD (gastroesophageal reflux disease)     Hypercholesterolemia 3/28/2017    Hypertension     Joint pain     Localized edema 7/23/2018    Muscle weakness     Nocturia     Panic disorder     Seizures ([address])     at age 3, facial    Stroke ([address]) 1998    left sided weakness      Past Surgical History:   Procedure Laterality Date    COLONOSCOPY      COLONOSCOPY N/A 5/4/2018    COLONOSCOPY performed by Dom Cartwright MD at [facility] ENDOSCOPY    IR INJ INTERLAMINAR LUMBAR/SAC  2/4/2020    IR KYPHOPLASTY LUMBAR FIRST LEVEL  12/20/2019    IR KYPHOPLASTY LUMBAR FIRST LEVEL 12/20/2019 [facility] RAD ANGIO IR    IR KYPHOPLASTY LUMBAR FIRST LEVEL  01/24/2020    IR KYPHOPLASTY LUMBAR FIRST LEVEL 1/24/2020 [facility] RAD ANGIO IR    LUMBAR DISCECTOMY  1970's    ORTHOPEDIC SURGERY      right finger repair    ORTHOPEDIC SURGERY  12/2019    spinal surgery    ORTHOPEDIC SURGERY Right     quadriceps tendon repair    ORTHOPEDIC SURGERY Left     hip hemiarthroplasty    [place] UNLISTED PROCEDURE ABDOMEN PERITONEUM & OMENTUM  2008    Lap band      Social History     Socioeconomic History    Marital status:      Spouse name: None    Number of children: None    Years of education: None    Highest

## 2023-11-01 NOTE — PROGRESS NOTES
ARRIVAL INFORMATION:  Verified patient name and date of birth, scheduled procedure, and informed consent. : Paul Franks, wife, contact number: [phone]  Physician and staff can share information with the . Belongings with patient include:  Clothing, (rollator)    GI FOCUSED ASSESSMENT:  Neuro: Awake, alert, oriented x4  Respiratory: even and unlabored   GI: soft and non-distended  EKG Rhythm: normal sinus rhythm and BBB    Education:Reviewed general discharge instructions and  information.

## 2023-11-02 NOTE — ANESTHESIA POSTPROCEDURE EVALUATION
Department of Anesthesiology  Postprocedure Note    Patient: Lizzy Barton  MRN: [id number]  YOB: 1950  Date of evaluation: 11/2/2023      Procedure Summary     Date: 11/01/23 Room / Location: [facility] ENDO 01 / [facility] ENDOSCOPY    Anesthesia Start: 1244 Anesthesia Stop: 5829    Procedure: COLONOSCOPY DIAGNOSTIC (Lower GI Region) Diagnosis: Personal history of colonic polyps    Surgeons: Jay Valle MD Responsible Provider: Huyen Ross MD    Anesthesia Type: MAC ASA Status: 3          Anesthesia Type: No value filed.     Paulie Phase I: Paulie Score: 10    Paulie Phase II:        Anesthesia Post Evaluation    Patient location during evaluation: PACU  Patient participation: complete - patient participated  Level of consciousness: sleepy but conscious and responsive to verbal stimuli  Airway patency: patent  Nausea & Vomiting: no vomiting and no nausea  Complications: no  Cardiovascular status: blood pressure returned to baseline and hemodynamically stable  Respiratory status: acceptable  Hydration status: stable

## 2023-11-13 RX ORDER — BUPROPION HYDROCHLORIDE 150 MG/1
150 TABLET, EXTENDED RELEASE ORAL 2 TIMES DAILY
Qty: 180 TABLET | Refills: 3 | Status: SHIPPED | OUTPATIENT
Start: 2023-11-13

## 2023-11-13 RX ORDER — POTASSIUM CHLORIDE 20 MEQ/1
20 TABLET, EXTENDED RELEASE ORAL DAILY
Qty: 90 TABLET | Refills: 3 | Status: SHIPPED | OUTPATIENT
Start: 2023-11-13

## 2023-12-15 RX ORDER — ATORVASTATIN CALCIUM 40 MG/1
40 TABLET, FILM COATED ORAL DAILY
Qty: 90 TABLET | Refills: 3 | Status: SHIPPED | OUTPATIENT
Start: 2023-12-15

## 2024-01-17 ENCOUNTER — OFFICE VISIT (OUTPATIENT)
Age: 74
End: 2024-01-17
Payer: MEDICARE

## 2024-01-17 VITALS
DIASTOLIC BLOOD PRESSURE: 90 MMHG | HEART RATE: 82 BPM | TEMPERATURE: 97.1 F | RESPIRATION RATE: 16 BRPM | SYSTOLIC BLOOD PRESSURE: 152 MMHG | WEIGHT: 200.2 LBS | BODY MASS INDEX: 28.66 KG/M2 | HEIGHT: 70 IN

## 2024-01-17 DIAGNOSIS — E11.29 TYPE 2 DIABETES MELLITUS WITH OTHER DIABETIC KIDNEY COMPLICATION (HCC): ICD-10-CM

## 2024-01-17 DIAGNOSIS — Z12.5 PROSTATE CANCER SCREENING: ICD-10-CM

## 2024-01-17 DIAGNOSIS — N18.30 STAGE 3 CHRONIC KIDNEY DISEASE, UNSPECIFIED WHETHER STAGE 3A OR 3B CKD (HCC): ICD-10-CM

## 2024-01-17 DIAGNOSIS — E08.29 DIABETES MELLITUS DUE TO UNDERLYING CONDITION WITH MICROALBUMINURIA, WITHOUT LONG-TERM CURRENT USE OF INSULIN (HCC): Primary | ICD-10-CM

## 2024-01-17 DIAGNOSIS — C43.30 MALIGNANT MELANOMA OF FACE (HCC): ICD-10-CM

## 2024-01-17 DIAGNOSIS — I10 HYPERTENSION, UNSPECIFIED TYPE: ICD-10-CM

## 2024-01-17 DIAGNOSIS — E78.5 HYPERLIPIDEMIA, UNSPECIFIED HYPERLIPIDEMIA TYPE: ICD-10-CM

## 2024-01-17 DIAGNOSIS — R80.9 DIABETES MELLITUS DUE TO UNDERLYING CONDITION WITH MICROALBUMINURIA, WITHOUT LONG-TERM CURRENT USE OF INSULIN (HCC): Primary | ICD-10-CM

## 2024-01-17 DIAGNOSIS — Z86.73 HISTORY OF CVA (CEREBROVASCULAR ACCIDENT): ICD-10-CM

## 2024-01-17 DIAGNOSIS — I73.9 PERIPHERAL VASCULAR DISEASE, UNSPECIFIED (HCC): ICD-10-CM

## 2024-01-17 DIAGNOSIS — F32.1 MAJOR DEPRESSIVE DISORDER, SINGLE EPISODE, MODERATE (HCC): ICD-10-CM

## 2024-01-17 DIAGNOSIS — I69.344 MONOPLEGIA OF LOWER EXTREMITY FOLLOWING CEREBRAL INFARCTION AFFECTING LEFT NON-DOMINANT SIDE (HCC): ICD-10-CM

## 2024-01-17 LAB
ALBUMIN SERPL-MCNC: 3.8 G/DL (ref 3.5–5)
ALBUMIN/GLOB SERPL: 1.1 (ref 1.1–2.2)
ALP SERPL-CCNC: 92 U/L (ref 45–117)
ALT SERPL-CCNC: 28 U/L (ref 12–78)
ANION GAP SERPL CALC-SCNC: 8 MMOL/L (ref 5–15)
AST SERPL-CCNC: 20 U/L (ref 15–37)
BILIRUB SERPL-MCNC: 0.7 MG/DL (ref 0.2–1)
BUN SERPL-MCNC: 25 MG/DL (ref 6–20)
BUN/CREAT SERPL: 16 (ref 12–20)
CALCIUM SERPL-MCNC: 9.5 MG/DL (ref 8.5–10.1)
CHLORIDE SERPL-SCNC: 103 MMOL/L (ref 97–108)
CO2 SERPL-SCNC: 32 MMOL/L (ref 21–32)
CREAT SERPL-MCNC: 1.54 MG/DL (ref 0.7–1.3)
EST. AVERAGE GLUCOSE BLD GHB EST-MCNC: 123 MG/DL
GLOBULIN SER CALC-MCNC: 3.6 G/DL (ref 2–4)
GLUCOSE SERPL-MCNC: 134 MG/DL (ref 65–100)
HBA1C MFR BLD: 5.9 % (ref 4–5.6)
POTASSIUM SERPL-SCNC: 3.6 MMOL/L (ref 3.5–5.1)
PROT SERPL-MCNC: 7.4 G/DL (ref 6.4–8.2)
PSA SERPL-MCNC: 0.9 NG/ML (ref 0.01–4)
SODIUM SERPL-SCNC: 143 MMOL/L (ref 136–145)
TSH SERPL DL<=0.05 MIU/L-ACNC: 1.19 UIU/ML (ref 0.36–3.74)

## 2024-01-17 PROCEDURE — 2022F DILAT RTA XM EVC RTNOPTHY: CPT | Performed by: INTERNAL MEDICINE

## 2024-01-17 PROCEDURE — 99214 OFFICE O/P EST MOD 30 MIN: CPT | Performed by: INTERNAL MEDICINE

## 2024-01-17 PROCEDURE — 3077F SYST BP >= 140 MM HG: CPT | Performed by: INTERNAL MEDICINE

## 2024-01-17 PROCEDURE — 1123F ACP DISCUSS/DSCN MKR DOCD: CPT | Performed by: INTERNAL MEDICINE

## 2024-01-17 PROCEDURE — G8417 CALC BMI ABV UP PARAM F/U: HCPCS | Performed by: INTERNAL MEDICINE

## 2024-01-17 PROCEDURE — 3080F DIAST BP >= 90 MM HG: CPT | Performed by: INTERNAL MEDICINE

## 2024-01-17 PROCEDURE — G8484 FLU IMMUNIZE NO ADMIN: HCPCS | Performed by: INTERNAL MEDICINE

## 2024-01-17 PROCEDURE — 3046F HEMOGLOBIN A1C LEVEL >9.0%: CPT | Performed by: INTERNAL MEDICINE

## 2024-01-17 PROCEDURE — G8427 DOCREV CUR MEDS BY ELIG CLIN: HCPCS | Performed by: INTERNAL MEDICINE

## 2024-01-17 PROCEDURE — 1036F TOBACCO NON-USER: CPT | Performed by: INTERNAL MEDICINE

## 2024-01-17 PROCEDURE — 3017F COLORECTAL CA SCREEN DOC REV: CPT | Performed by: INTERNAL MEDICINE

## 2024-01-17 RX ORDER — AMLODIPINE BESYLATE AND BENAZEPRIL HYDROCHLORIDE 5; 20 MG/1; MG/1
CAPSULE ORAL
Qty: 90 CAPSULE | Refills: 3 | Status: SHIPPED | OUTPATIENT
Start: 2024-01-17

## 2024-01-17 ASSESSMENT — PATIENT HEALTH QUESTIONNAIRE - PHQ9
SUM OF ALL RESPONSES TO PHQ QUESTIONS 1-9: 0
10. IF YOU CHECKED OFF ANY PROBLEMS, HOW DIFFICULT HAVE THESE PROBLEMS MADE IT FOR YOU TO DO YOUR WORK, TAKE CARE OF THINGS AT HOME, OR GET ALONG WITH OTHER PEOPLE: 0
3. TROUBLE FALLING OR STAYING ASLEEP: 0
SUM OF ALL RESPONSES TO PHQ9 QUESTIONS 1 & 2: 0
SUM OF ALL RESPONSES TO PHQ QUESTIONS 1-9: 0
7. TROUBLE CONCENTRATING ON THINGS, SUCH AS READING THE NEWSPAPER OR WATCHING TELEVISION: 0
SUM OF ALL RESPONSES TO PHQ QUESTIONS 1-9: 0
8. MOVING OR SPEAKING SO SLOWLY THAT OTHER PEOPLE COULD HAVE NOTICED. OR THE OPPOSITE, BEING SO FIGETY OR RESTLESS THAT YOU HAVE BEEN MOVING AROUND A LOT MORE THAN USUAL: 0
6. FEELING BAD ABOUT YOURSELF - OR THAT YOU ARE A FAILURE OR HAVE LET YOURSELF OR YOUR FAMILY DOWN: 0
1. LITTLE INTEREST OR PLEASURE IN DOING THINGS: 0
9. THOUGHTS THAT YOU WOULD BE BETTER OFF DEAD, OR OF HURTING YOURSELF: 0
SUM OF ALL RESPONSES TO PHQ QUESTIONS 1-9: 0
4. FEELING TIRED OR HAVING LITTLE ENERGY: 0
2. FEELING DOWN, DEPRESSED OR HOPELESS: 0
5. POOR APPETITE OR OVEREATING: 0

## 2024-01-17 NOTE — PROGRESS NOTES
1. \"Have you been to the ER, urgent care clinic since your last visit?  Hospitalized since your last visit?\" No    2. \"Have you seen or consulted any other health care providers outside of the [facility] since your last visit?\" No     3. For patients aged 45-75: Has the patient had a colonoscopy / FIT/ Cologuard? 2023 kathrine      
CO2 26 05/02/2022 01:11 AM    BUN 16 05/02/2022 01:11 AM    CREATININE 0.83 05/02/2022 01:11 AM    GLUCOSE 95 05/02/2022 01:11 AM    CALCIUM 7.9 05/02/2022 01:11 AM      CBC:   Lab Results   Component Value Date/Time    WBC 7.9 07/11/2022 12:47 PM    RBC 3.38 07/11/2022 12:47 PM    HGB 12.2 07/11/2022 12:47 PM    HCT 33.7 07/11/2022 12:47 PM    MCV 99.7 07/11/2022 12:47 PM    MCH 36.1 07/11/2022 12:47 PM    MCHC 36.2 07/11/2022 12:47 PM    RDW 14.6 07/11/2022 12:47 PM     07/11/2022 12:47 PM    MPV 11.1 07/11/2022 12:47 PM      Lipids   Lab Results   Component Value Date/Time    CHOL 182 07/11/2023 02:33 PM    TRIG 226 07/11/2023 02:33 PM    HDL 67 07/11/2023 02:33 PM    LDLCALC 69.8 07/11/2023 02:33 PM    LABVLDL 45.2 07/11/2023 02:33 PM    CHOLHDLRATIO 2.7 07/11/2023 02:33 PM     Hemoglobin A1C:   Hemoglobin A1C   Date Value Ref Range Status   07/11/2023 5.6 4.0 - 5.6 % Final     Comment:     NEW METHOD  PLEASE NOTE NEW REFERENCE RANGE  (NOTE)  HbA1C Interpretive Ranges  <5.7              Normal  5.7 - 6.4         Consider Prediabetes  >6.5              Consider Diabetes          Review of Systems     Physical Exam  Constitutional:       Appearance: Normal appearance.   HENT:      Head: Normocephalic.      Right Ear: Tympanic membrane normal.      Left Ear: Tympanic membrane normal.      Nose: Nose normal.   Cardiovascular:      Rate and Rhythm: Normal rate and regular rhythm.      Pulses: Normal pulses.   Pulmonary:      Effort: Pulmonary effort is normal.   Abdominal:      General: Abdomen is flat. Bowel sounds are normal.   Musculoskeletal:         General: Normal range of motion.      Cervical back: Normal range of motion.      Right lower leg: No edema.   Neurological:      General: No focal deficit present.      Mental Status: He is alert.   Psychiatric:         Mood and Affect: Mood normal.         Behavior: Behavior normal.         Thought Content: Thought content normal.         Judgment: Judgment

## 2024-01-18 DIAGNOSIS — N18.30 STAGE 3 CHRONIC KIDNEY DISEASE, UNSPECIFIED WHETHER STAGE 3A OR 3B CKD (HCC): ICD-10-CM

## 2024-01-18 DIAGNOSIS — E78.5 HYPERLIPIDEMIA, UNSPECIFIED HYPERLIPIDEMIA TYPE: ICD-10-CM

## 2024-01-18 DIAGNOSIS — E08.29: Primary | ICD-10-CM

## 2024-01-18 DIAGNOSIS — E46: Primary | ICD-10-CM

## 2024-01-18 DIAGNOSIS — R80.9 PROTEINURIA, UNSPECIFIED TYPE: ICD-10-CM

## 2024-02-02 ENCOUNTER — TELEPHONE (OUTPATIENT)
Age: 74
End: 2024-02-02

## 2024-02-02 NOTE — TELEPHONE ENCOUNTER
Faxed [employer] form over to Nirmala Spear office. Her office will need to do the PA they were the ones who ordered the test.

## 2024-04-26 ENCOUNTER — TELEMEDICINE (OUTPATIENT)
Age: 74
End: 2024-04-26
Payer: MEDICARE

## 2024-04-26 DIAGNOSIS — I10 HYPERTENSION, UNSPECIFIED TYPE: ICD-10-CM

## 2024-04-26 DIAGNOSIS — N18.30 TYPE 2 DIABETES MELLITUS WITH STAGE 3 CHRONIC KIDNEY DISEASE, WITHOUT LONG-TERM CURRENT USE OF INSULIN, UNSPECIFIED WHETHER STAGE 3A OR 3B CKD (HCC): ICD-10-CM

## 2024-04-26 DIAGNOSIS — E11.9 TYPE 2 DIABETES MELLITUS WITHOUT COMPLICATION, WITHOUT LONG-TERM CURRENT USE OF INSULIN (HCC): ICD-10-CM

## 2024-04-26 DIAGNOSIS — E11.22 TYPE 2 DIABETES MELLITUS WITH STAGE 3 CHRONIC KIDNEY DISEASE, WITHOUT LONG-TERM CURRENT USE OF INSULIN, UNSPECIFIED WHETHER STAGE 3A OR 3B CKD (HCC): ICD-10-CM

## 2024-04-26 DIAGNOSIS — C43.30 MALIGNANT MELANOMA OF FACE (HCC): ICD-10-CM

## 2024-04-26 DIAGNOSIS — Z01.818 PREOP EXAMINATION: Primary | ICD-10-CM

## 2024-04-26 PROCEDURE — 1123F ACP DISCUSS/DSCN MKR DOCD: CPT | Performed by: INTERNAL MEDICINE

## 2024-04-26 PROCEDURE — 3017F COLORECTAL CA SCREEN DOC REV: CPT | Performed by: INTERNAL MEDICINE

## 2024-04-26 PROCEDURE — G8427 DOCREV CUR MEDS BY ELIG CLIN: HCPCS | Performed by: INTERNAL MEDICINE

## 2024-04-26 PROCEDURE — 99214 OFFICE O/P EST MOD 30 MIN: CPT | Performed by: INTERNAL MEDICINE

## 2024-04-26 PROCEDURE — 2022F DILAT RTA XM EVC RTNOPTHY: CPT | Performed by: INTERNAL MEDICINE

## 2024-04-26 PROCEDURE — 3044F HG A1C LEVEL LT 7.0%: CPT | Performed by: INTERNAL MEDICINE

## 2024-04-26 NOTE — PROGRESS NOTES
HISTORY OF PRESENT ILLNESS   Darwin Ross   is a 73 y.o.  male.    Darwin Ross, was evaluated through a synchronous (real-time) audio-video encounter. The patient (or guardian if applicable) is aware that this is a billable service, which includes applicable co-pays. This Virtual Visit was conducted with patient's (and/or legal guardian's) consent. Patient identification was verified, and a caregiver was present when appropriate.   The patient was located at Home: [address]  [place] [address]  Provider was located at Facility (Appt Dept): [address] [facility]  [address]  [place] [address]  Confirm you are appropriately licensed, registered, or certified to deliver care in the state where the patient is located as indicated above. If you are not or unsure, please re-schedule the visit: Yes, I confirm.        Total time spent for this encounter: Not billed by time    --Horace Laughlin MD on 4/26/2024 at 11:05 AM    An electronic signature was used to authenticate this note.  Hx Dm-2 with microalbuminuuria, HTN HLD hx cva with hemorrhage with left weakness, CKD3-Dr JOANA Silvestre MDD/anxiety bph mildPAD -Dr Cullen ,Hx L3 kyphoplasty x 2, hx severe lumbar stenosis-Dr Christian , BPH, hx melanoma  CARD DR Curtis  Neph-Dr JOANA Silvestre    Preop excision left face melanoma scheduled 5/3/24 with general anesthesia  Had left chhek melanoma surgery in Feb 2024 at [facility] but needs wider excision  LN negative per pt  Did not have any difficulty with surgery or general anesthesia last surgery  No cp or sob  Had visit s last month with CARD Dr Curtis and Neprho Dr JOANA Silvestre  Stable labs per pt    No med changes  No fsbs but last A1c 5.9 January 2024  Home bp today 134/74        Last OV  Had recent colonoscopy--polyps removed by Dr Awad-repeat 5 yrs     Last LDL 68  A1c 5.6  Fsbs on metformin  Home BP recently elevated--pt states taking amlodipine and not lotrel. He states lotrel was stoppped due to low bp  Depression on

## 2024-04-26 NOTE — PROGRESS NOTES
\"Have you been to the ER, urgent care clinic since your last visit?  Hospitalized since your last visit?\"    NO    “Have you seen or consulted any other health care providers outside of [facility] since your last visit?”    NO          Click Here for Release of Records Request

## 2024-07-20 NOTE — PROGRESS NOTES
HISTORY OF PRESENT ILLNESS   Darwin Ross   is a 73 y.o.  male.  FU Dm-2 with microalbuminuuria, HTN HLD hx cva with hemorrhage with left weakness, CKD3-Dr JOANA Silvestre MDD/anxiety bph mildPAD -Dr Cullen ,Hx L3 kyphoplasty x 2, hx severe lumbar stenosis-Dr Christian , BPH, hx melanoma and medicare wellness---here with his wife  CARD DR Curtis  Neph-Dr JOANA Silvestre      S/p reexcision of left cheek melanoma last month at [facility]  Last A1c 5.9 LDL 69  bun/cr 25/1.54  Fsbs on metformin--none      No falls -uses rollator  Last OV     Preop excision left face melanoma scheduled 5/3/24 with general anesthesia  Had left chhek [facility] in [facility] 20[facility] but needs wider excision  LN negative per pt  Did not have any difficulty with surgery or general anesthesia last surgery  No cp or sob  Had visit s last month with CARD Dr uCrtis and Neprho Dr JOANA Silvestre  Stable labs per pt     No med changes  No fsbs but last A1c 5.9 January 2024  Home bp today 134/74     Patient Active Problem List    Diagnosis Date Noted    Left low back pain 08/15/2016    Gross hematuria 08/15/2016    Calculus of kidney 04/18/2016    Benign prostatic hyperplasia with lower urinary tract symptoms 06/22/2014    Slowing of urinary stream 07/30/2014    Nocturia 07/30/2014    Urinary frequency 06/22/2014    Erectile dysfunction 04/17/2016    Type 2 diabetes mellitus with chronic kidney disease 04/26/2024    Quadriceps tendon rupture, right, initial encounter 04/25/2022    Left displaced femoral neck fracture (HCC) 04/24/2022    Lumbar stenosis 08/04/2021    Compression fracture of L3 vertebra (HCC) 08/04/2021    Moderate major depression (HCC) 07/24/2018    Localized edema 07/23/2018    Type 2 diabetes mellitus with nephropathy (HCC) 01/23/2018    Fracture of multiple ribs of right side 07/26/2017    Controlled type 2 diabetes mellitus without complication (HCC) 03/28/2017    History of stroke 03/28/2017    Essential hypertension 03/28/2017

## 2024-07-21 ASSESSMENT — LIFESTYLE VARIABLES
HOW OFTEN DO YOU HAVE SIX OR MORE DRINKS ON ONE OCCASION: 1
HOW MANY STANDARD DRINKS CONTAINING ALCOHOL DO YOU HAVE ON A TYPICAL DAY: 1
HOW OFTEN DO YOU HAVE A DRINK CONTAINING ALCOHOL: 4

## 2024-07-22 ENCOUNTER — OFFICE VISIT (OUTPATIENT)
Age: 74
End: 2024-07-22
Payer: MEDICARE

## 2024-07-22 VITALS
RESPIRATION RATE: 16 BRPM | SYSTOLIC BLOOD PRESSURE: 136 MMHG | BODY MASS INDEX: 28.69 KG/M2 | WEIGHT: 200.4 LBS | HEIGHT: 70 IN | DIASTOLIC BLOOD PRESSURE: 70 MMHG | OXYGEN SATURATION: 98 % | HEART RATE: 62 BPM | TEMPERATURE: 97.1 F

## 2024-07-22 DIAGNOSIS — I10 HYPERTENSION, UNSPECIFIED TYPE: ICD-10-CM

## 2024-07-22 DIAGNOSIS — E78.5 HYPERLIPIDEMIA, UNSPECIFIED HYPERLIPIDEMIA TYPE: ICD-10-CM

## 2024-07-22 DIAGNOSIS — R80.9 TYPE 2 DIABETES MELLITUS WITH DIABETIC MICROALBUMINURIA, WITHOUT LONG-TERM CURRENT USE OF INSULIN (HCC): Primary | ICD-10-CM

## 2024-07-22 DIAGNOSIS — N18.30 STAGE 3 CHRONIC KIDNEY DISEASE, UNSPECIFIED WHETHER STAGE 3A OR 3B CKD (HCC): ICD-10-CM

## 2024-07-22 DIAGNOSIS — Z00.00 MEDICARE ANNUAL WELLNESS VISIT, SUBSEQUENT: ICD-10-CM

## 2024-07-22 DIAGNOSIS — E11.29 TYPE 2 DIABETES MELLITUS WITH DIABETIC MICROALBUMINURIA, WITHOUT LONG-TERM CURRENT USE OF INSULIN (HCC): Primary | ICD-10-CM

## 2024-07-22 DIAGNOSIS — Z85.820 HX OF MELANOMA OF SKIN: ICD-10-CM

## 2024-07-22 LAB
COMMENT:: NORMAL
SPECIMEN HOLD: NORMAL

## 2024-07-22 PROCEDURE — 99214 OFFICE O/P EST MOD 30 MIN: CPT | Performed by: INTERNAL MEDICINE

## 2024-07-22 NOTE — PROGRESS NOTES
Identified pt with two pt identifiers(name and ). Reviewed record in preparation for visit and have obtained necessary documentation.  Chief Complaint   Patient presents with    Medicare AWV        Health Maintenance Due   Topic    Respiratory Syncytial Virus (RSV) Pregnant or age 60 yrs+ (1 - 1-dose 60+ series)    Diabetic retinal exam     Diabetic foot exam     DTaP/Tdap/Td vaccine (2 - Td or Tdap)    COVID-19 Vaccine ( season)    Annual Wellness Visit (Medicare Advantage)     Lipids      Vitals:    24 1440   BP: (!) 142/86   Site: Left Upper Arm   Position: Sitting   Cuff Size: Large Adult   Pulse: 62   Resp: 16   Temp: 97.1 °F (36.2 °C)   TempSrc: Temporal   SpO2: 98%   Weight: 90.9 kg (200 lb 6.4 oz)   Height: 1.778 m (5' 10\")      Pain Scale: 0 - No pain/10    Coordination of Care Questionnaire:  :   1. Have you been to the ER, urgent care clinic since your last visit?  Hospitalized since your last visit?No    2. Have you seen or consulted any other health care providers outside of the [facility] since your last visit?  Include any pap smears or colon screening.  [facility] for malignant melanoma removal 2024

## 2024-07-22 NOTE — PATIENT INSTRUCTIONS
may want to swim, bike, or do other activities. Bit by bit, increase the time you're active every day. Try for at least 30 minutes on most days of the week.     Try to quit or cut back on using tobacco and other nicotine products. This includes smoking and vaping. If you need help quitting, talk to your doctor about stop-smoking programs and medicines. These can increase your chances of quitting for good. Quitting is one of the most important things you can do to protect your heart. It is never too late to quit. Try to avoid secondhand smoke too.     Stay at a weight that's healthy for you. Talk to your doctor if you need help losing weight.     Try to get 7 to 9 hours of sleep each night.     Limit alcohol to 2 drinks a day for men and 1 drink a day for women. Too much alcohol can cause health problems.     Manage other health problems such as diabetes, high blood pressure, and high cholesterol. If you think you may have a problem with alcohol or drug use, talk to your doctor.   Medicines    Take your medicines exactly as prescribed. Call your doctor if you think you are having a problem with your medicine.     If your doctor recommends aspirin, take the amount directed each day. Make sure you take aspirin and not another kind of pain reliever, such as acetaminophen (Tylenol).   When should you call for help?   Call 911 if you have symptoms of a heart attack. These may include:    Chest pain or pressure, or a strange feeling in the chest.     Sweating.     Shortness of breath.     Pain, pressure, or a strange feeling in the back, neck, jaw, or upper belly or in one or both shoulders or arms.     Lightheadedness or sudden weakness.     A fast or irregular heartbeat.   After you call 911, the  may tell you to chew 1 adult-strength or 2 to 4 low-dose aspirin. Wait for an ambulance. Do not try to drive yourself.  Watch closely for changes in your health, and be sure to contact your doctor if you have any

## 2024-07-23 LAB
ALT SERPL-CCNC: 48 U/L (ref 12–78)
ANION GAP SERPL CALC-SCNC: 5 MMOL/L (ref 5–15)
AST SERPL-CCNC: 23 U/L (ref 15–37)
BUN SERPL-MCNC: 13 MG/DL (ref 6–20)
BUN/CREAT SERPL: 11 (ref 12–20)
CALCIUM SERPL-MCNC: 9.5 MG/DL (ref 8.5–10.1)
CHLORIDE SERPL-SCNC: 107 MMOL/L (ref 97–108)
CHOLEST SERPL-MCNC: 160 MG/DL
CO2 SERPL-SCNC: 30 MMOL/L (ref 21–32)
CREAT SERPL-MCNC: 1.23 MG/DL (ref 0.7–1.3)
ERYTHROCYTE [DISTWIDTH] IN BLOOD BY AUTOMATED COUNT: 13 % (ref 11.5–14.5)
EST. AVERAGE GLUCOSE BLD GHB EST-MCNC: 117 MG/DL
GLUCOSE SERPL-MCNC: 103 MG/DL (ref 65–100)
HBA1C MFR BLD: 5.7 % (ref 4–5.6)
HCT VFR BLD AUTO: 44.4 % (ref 36.6–50.3)
HDLC SERPL-MCNC: 71 MG/DL
HDLC SERPL: 2.3 (ref 0–5)
HGB BLD-MCNC: 14.5 G/DL (ref 12.1–17)
LDLC SERPL CALC-MCNC: 69.8 MG/DL (ref 0–100)
MCH RBC QN AUTO: 30.9 PG (ref 26–34)
MCHC RBC AUTO-ENTMCNC: 32.7 G/DL (ref 30–36.5)
MCV RBC AUTO: 94.7 FL (ref 80–99)
NRBC # BLD: 0 K/UL (ref 0–0.01)
NRBC BLD-RTO: 0 PER 100 WBC
PLATELET # BLD AUTO: 251 K/UL (ref 150–400)
PMV BLD AUTO: 12.6 FL (ref 8.9–12.9)
POTASSIUM SERPL-SCNC: 4.2 MMOL/L (ref 3.5–5.1)
RBC # BLD AUTO: 4.69 M/UL (ref 4.1–5.7)
SODIUM SERPL-SCNC: 142 MMOL/L (ref 136–145)
TRIGL SERPL-MCNC: 96 MG/DL
VLDLC SERPL CALC-MCNC: 19.2 MG/DL
WBC # BLD AUTO: 8.5 K/UL (ref 4.1–11.1)

## 2024-08-26 RX ORDER — POTASSIUM CHLORIDE 1500 MG/1
20 TABLET, EXTENDED RELEASE ORAL DAILY
Qty: 90 TABLET | Refills: 3 | Status: SHIPPED | OUTPATIENT
Start: 2024-08-26

## 2024-08-26 RX ORDER — BUPROPION HYDROCHLORIDE 150 MG/1
150 TABLET, EXTENDED RELEASE ORAL 2 TIMES DAILY
Qty: 180 TABLET | Refills: 3 | Status: SHIPPED | OUTPATIENT
Start: 2024-08-26

## 2024-08-29 NOTE — TELEPHONE ENCOUNTER
Wound Clinic Physician Orders and Discharge Instructions  [facility]  [address], [address]  [place] [address]  Telephone: [phone]     FAX [phone]    NAME:  Yessy Puckett  YOB: 1955  MEDICAL RECORD NUMBER:  [id number]  DATE:  8/29/2024      Return Appointment:  [] Dressing Supply Provider: Jelani  [] Home Healthcare:  [] Facility:   [x] Return Appointment: 2 Week(s)  [x] Nurse Visit:  1 Week(s) on Wednesday    [] Discharge from [facility]:   [] Healed          [] Refer to Provider:           [] Consult    Follow-up Information:  [] Ordered Tests:   [x] Referrals: Dr. Albarado   [] Rx:   [] Culture:  [] Wound Vac:  [] Skin Sub:   [] OR:  [] Other:     Wound Cleansing:   Do not scrub or use excessive force.  Cleanse wound prior to applying a clean dressing with:  [] Normal Saline   [x] Keep Wound Dry in Shower     [] Wound Cleanser   [x] Cleanse wound with Mild Soap & Water on dressing change days   [] Other:       Topical Treatments:  Do not apply lotions, creams, or ointments to wound bed unless directed.   [x] Apply moisturizing lotion to skin surrounding the wound prior to dressing change.  [] Apply antifungal ointment to skin surrounding the wound prior to dressing change.  [] Apply thin film of moisture barrier ointment to skin immediately around wound.  [] Apply Betadine to skin immediately around wound   [] Other:      Dressings:           Wound Location: Left leg and Right Leg   [x] Apply Primary Dressing:       [x] MediHoney Gel [] MediHoney Alginate  [] Calcium Alginate with Silver -   [] Calcium Alginate without silver   [] Collagen with silver  [] Collagen without Silver    [] Santyl with moist saline gauze     [] Hydrofera Blue (cut to size and moistened with normal saline)  [] Hydrofera Blue Ready (cut to size)      [] Normal Saline wet to dry  [] Betadine wet to dry [] Betadine to patricia wound   [] Hydrogel  [] Mepitel  [] Xeroform    []  ----- Message from Anabella Jang sent at 2/12/2020  4:10 PM EST -----  Regarding: Dr. Shelah Gilford  Pt request for a call back in regards to scheduling an appt for a Med Refill, referral and back issues. The first available is not until March and he states that he needs something sooner. Best contact number is [phone]. Adaptic   [] Iodoform Packing Strip [] Plain Packing Strip   [] Skin Sub:   [] Other:      [x] Cover and Secure with:     [x] Gauze             [x] Marysol  [] Kerlix   [] Foam Piece [] Foam Heel Cup     [] Super Absorbant   [] ABD  [] Ace Wrap             [] Bordered Gauze Dressing      [] Mepilex Foam Dressing      [] Surgilast     [] Other:    Limit contact of tape with skin.    [x] Change dressing: [] Daily    [] Every Other Day   [] Twice per week   [] Three times per week   [x] Once a week [] Do Not Change Dressing   [] Other:       Skin Sub:           Wound Location:   [] Implanted on:    [] Only change outer dressing  [] Do not change dressing until:    [] Other:      Pressure Relief:  [] When sitting, shift position or do seat lifts every 15 minutes.  [] Wheelchair cushion [] Specialty Bed/Mattress  [x]  Avoid direct pressure on wound site.    [] Other:     Edema Control:  Apply: [] Compression Stocking:  mmHg  []Right Leg []Left Leg   [x] Tubigrip [x]Right Leg Double Layer [x]Left Leg Double Layer      []Right Leg Single Layer []Left Leg Single Layer   [] Ace Wrap []Right Leg  []Left Leg      [x] Elevate leg(s) above the level of the heart when sitting.    [x] Avoid prolonged standing in one place.     Compression:  Apply: [] Compression Wrap to []RightLeg []Left Leg    []  Coflex [] Coflex Lite  [] Unna with Calamine Lite     [] Elevate leg(s) above the level of the heart when sitting.    [] Avoid prolonged standing in one place.   [] Do not get leg(s) with wrap wet. If wrap becomes too tight, call the wound center and remove wrap from leg by unrolling each layer.    Dietary:  [] Diet as tolerated: [x] Calorie Diabetic Diet: [x] No Added Salt:  [x] Increase Protein: [] Other:     Activity:  [x] Activity as tolerated: Elevate lower extremities with ulcers  [] Patient has no activity restrictions      [] Strict Bedrest   [] Remain off Work      [] May return to full duty work

## 2024-10-09 RX ORDER — ATORVASTATIN CALCIUM 40 MG/1
40 TABLET, FILM COATED ORAL DAILY
Qty: 90 TABLET | Refills: 3 | Status: SHIPPED | OUTPATIENT
Start: 2024-10-09

## 2024-11-07 DIAGNOSIS — I10 HYPERTENSION, UNSPECIFIED TYPE: ICD-10-CM

## 2024-11-07 RX ORDER — AMLODIPINE AND BENAZEPRIL HYDROCHLORIDE 5; 20 MG/1; MG/1
CAPSULE ORAL
Qty: 90 CAPSULE | Refills: 3 | Status: SHIPPED | OUTPATIENT
Start: 2024-11-07

## 2025-02-06 ENCOUNTER — TELEPHONE (OUTPATIENT)
Age: 75
End: 2025-02-06

## 2025-02-06 NOTE — TELEPHONE ENCOUNTER
Pt called in states experiencing severe cough for about two weeks, state cough is worse at night while laying down.     Pt states would like to be seen by pcp this week or next week if possible. Please call to schedule.

## 2025-02-12 NOTE — PROGRESS NOTES
HISTORY OF PRESENT ILLNESS   Darwin Ross   is a 74 y.o.  male.  Hx  Dm-2 with microalbuminuuria, HTN HLD hx cva with hemorrhage with left weakness, CKD 2-Dr JOANA Silvestre MDD/anxiety bph mildPAD -Dr Cullen ,Hx L3 kyphoplasty x 2, hx severe lumbar stenosis-Dr Christian , BPH, hx melanoma s/p left hip hemiarthroplasty s/p fall, and medicare wellness---here with his wife  CARD DR Curtis-yearly fu ? CAD on coreg  Neph-Dr JOANA Silvestre-yearly stage 2 ckd   DERM -[facility] for hx melanoma  Hx bph with retention--voiding ok .  C/o severe cough x 2 weeks-worse at night when in bed. Occassional cough during the day. No phlegm.  Feels ok overall. Slight runny nose. Slight BERNARD    Last LDL 69 A1c 5.7  Fsbs none  Home BP < 140/90 per pt    Falls none this year or last year. Uses rollator even in the house  Unable to fully extned rightleg lower leg due to pror quad tear and surgery-Dr Tapia  Balance is poor    Depression is controlled on wellbutrin per pt      Last OV        S/p reexcision of left cheek melanoma last month at [facility]  Last A1c 5.9 LDL 69  bun/cr 25/1.54  Fsbs on metformin--none        No falls -uses rollator  Patient Active Problem List    Diagnosis Date Noted    Left low back pain 08/15/2016    Gross hematuria 08/15/2016    Calculus of kidney 04/18/2016    Benign prostatic hyperplasia with lower urinary tract symptoms 06/22/2014    Slowing of urinary stream 07/30/2014    Nocturia 07/30/2014    Urinary frequency 06/22/2014    Erectile dysfunction 04/17/2016    Type 2 diabetes mellitus with chronic kidney disease 04/26/2024    Quadriceps tendon rupture, right, initial encounter 04/25/2022    Left displaced femoral neck fracture (HCC) 04/24/2022    Lumbar stenosis 08/04/2021    Compression fracture of L3 vertebra (HCC) 08/04/2021    Moderate major depression (HCC) 07/24/2018    Localized edema 07/23/2018    Type 2 diabetes mellitus with nephropathy (HCC) 01/23/2018    Fracture of multiple ribs of right side 07/26/2017    Controlled

## 2025-02-13 ENCOUNTER — OFFICE VISIT (OUTPATIENT)
Age: 75
End: 2025-02-13
Payer: MEDICARE

## 2025-02-13 VITALS
BODY MASS INDEX: 29.43 KG/M2 | SYSTOLIC BLOOD PRESSURE: 132 MMHG | OXYGEN SATURATION: 98 % | HEIGHT: 70 IN | TEMPERATURE: 97.1 F | WEIGHT: 205.6 LBS | HEART RATE: 79 BPM | RESPIRATION RATE: 16 BRPM | DIASTOLIC BLOOD PRESSURE: 72 MMHG

## 2025-02-13 DIAGNOSIS — R80.9 TYPE 2 DIABETES MELLITUS WITH DIABETIC MICROALBUMINURIA, WITHOUT LONG-TERM CURRENT USE OF INSULIN (HCC): ICD-10-CM

## 2025-02-13 DIAGNOSIS — Z86.73 HISTORY OF CEREBROVASCULAR ACCIDENT: ICD-10-CM

## 2025-02-13 DIAGNOSIS — F32.1 MAJOR DEPRESSIVE DISORDER, SINGLE EPISODE, MODERATE (HCC): ICD-10-CM

## 2025-02-13 DIAGNOSIS — C43.30 MALIGNANT MELANOMA OF FACE (HCC): ICD-10-CM

## 2025-02-13 DIAGNOSIS — Z12.5 PROSTATE CANCER SCREENING: Primary | ICD-10-CM

## 2025-02-13 DIAGNOSIS — E11.29 TYPE 2 DIABETES MELLITUS WITH DIABETIC MICROALBUMINURIA, WITHOUT LONG-TERM CURRENT USE OF INSULIN (HCC): ICD-10-CM

## 2025-02-13 DIAGNOSIS — I69.344 MONOPLEGIA OF LOWER EXTREMITY FOLLOWING CEREBRAL INFARCTION AFFECTING LEFT NON-DOMINANT SIDE (HCC): ICD-10-CM

## 2025-02-13 DIAGNOSIS — E78.5 HYPERLIPIDEMIA, UNSPECIFIED HYPERLIPIDEMIA TYPE: ICD-10-CM

## 2025-02-13 DIAGNOSIS — N18.30 STAGE 3 CHRONIC KIDNEY DISEASE, UNSPECIFIED WHETHER STAGE 3A OR 3B CKD (HCC): ICD-10-CM

## 2025-02-13 DIAGNOSIS — R05.1 ACUTE COUGH: ICD-10-CM

## 2025-02-13 PROCEDURE — 99214 OFFICE O/P EST MOD 30 MIN: CPT | Performed by: INTERNAL MEDICINE

## 2025-02-13 RX ORDER — BENZONATATE 100 MG/1
100 CAPSULE ORAL 3 TIMES DAILY PRN
Qty: 30 CAPSULE | Refills: 0 | Status: SHIPPED | OUTPATIENT
Start: 2025-02-13 | End: 2025-02-23

## 2025-02-13 SDOH — ECONOMIC STABILITY: FOOD INSECURITY: WITHIN THE PAST 12 MONTHS, YOU WORRIED THAT YOUR FOOD WOULD RUN OUT BEFORE YOU GOT MONEY TO BUY MORE.: NEVER TRUE

## 2025-02-13 SDOH — ECONOMIC STABILITY: FOOD INSECURITY: WITHIN THE PAST 12 MONTHS, THE FOOD YOU BOUGHT JUST DIDN'T LAST AND YOU DIDN'T HAVE MONEY TO GET MORE.: NEVER TRUE

## 2025-02-13 ASSESSMENT — PATIENT HEALTH QUESTIONNAIRE - PHQ9
2. FEELING DOWN, DEPRESSED OR HOPELESS: NOT AT ALL
6. FEELING BAD ABOUT YOURSELF - OR THAT YOU ARE A FAILURE OR HAVE LET YOURSELF OR YOUR FAMILY DOWN: NOT AT ALL
1. LITTLE INTEREST OR PLEASURE IN DOING THINGS: NOT AT ALL
SUM OF ALL RESPONSES TO PHQ QUESTIONS 1-9: 1
9. THOUGHTS THAT YOU WOULD BE BETTER OFF DEAD, OR OF HURTING YOURSELF: NOT AT ALL
3. TROUBLE FALLING OR STAYING ASLEEP: SEVERAL DAYS
10. IF YOU CHECKED OFF ANY PROBLEMS, HOW DIFFICULT HAVE THESE PROBLEMS MADE IT FOR YOU TO DO YOUR WORK, TAKE CARE OF THINGS AT HOME, OR GET ALONG WITH OTHER PEOPLE: NOT DIFFICULT AT ALL
SUM OF ALL RESPONSES TO PHQ9 QUESTIONS 1 & 2: 0
5. POOR APPETITE OR OVEREATING: NOT AT ALL
SUM OF ALL RESPONSES TO PHQ QUESTIONS 1-9: 1
8. MOVING OR SPEAKING SO SLOWLY THAT OTHER PEOPLE COULD HAVE NOTICED. OR THE OPPOSITE, BEING SO FIGETY OR RESTLESS THAT YOU HAVE BEEN MOVING AROUND A LOT MORE THAN USUAL: NOT AT ALL
SUM OF ALL RESPONSES TO PHQ QUESTIONS 1-9: 1
SUM OF ALL RESPONSES TO PHQ QUESTIONS 1-9: 1
7. TROUBLE CONCENTRATING ON THINGS, SUCH AS READING THE NEWSPAPER OR WATCHING TELEVISION: NOT AT ALL
4. FEELING TIRED OR HAVING LITTLE ENERGY: NOT AT ALL

## 2025-02-14 LAB
ALT SERPL-CCNC: 29 U/L (ref 12–78)
ANION GAP SERPL CALC-SCNC: 5 MMOL/L (ref 2–12)
AST SERPL-CCNC: 13 U/L (ref 15–37)
BUN SERPL-MCNC: 18 MG/DL (ref 6–20)
BUN/CREAT SERPL: 13 (ref 12–20)
CALCIUM SERPL-MCNC: 9.9 MG/DL (ref 8.5–10.1)
CHLORIDE SERPL-SCNC: 107 MMOL/L (ref 97–108)
CO2 SERPL-SCNC: 31 MMOL/L (ref 21–32)
CREAT SERPL-MCNC: 1.44 MG/DL (ref 0.7–1.3)
EST. AVERAGE GLUCOSE BLD GHB EST-MCNC: 137 MG/DL
GLUCOSE SERPL-MCNC: 110 MG/DL (ref 65–100)
HBA1C MFR BLD: 6.4 % (ref 4–5.6)
POTASSIUM SERPL-SCNC: 4.3 MMOL/L (ref 3.5–5.1)
PSA SERPL-MCNC: 1 NG/ML (ref 0.01–4)
SODIUM SERPL-SCNC: 143 MMOL/L (ref 136–145)

## 2025-06-16 DIAGNOSIS — N40.0 BENIGN PROSTATIC HYPERPLASIA, UNSPECIFIED WHETHER LOWER URINARY TRACT SYMPTOMS PRESENT: Primary | ICD-10-CM

## 2025-06-16 DIAGNOSIS — E78.5 HYPERLIPIDEMIA, UNSPECIFIED HYPERLIPIDEMIA TYPE: ICD-10-CM

## 2025-06-16 RX ORDER — FINASTERIDE 5 MG/1
5 TABLET, FILM COATED ORAL DAILY
Qty: 90 TABLET | Refills: 3 | Status: SHIPPED | OUTPATIENT
Start: 2025-06-16

## 2025-06-16 RX ORDER — FUROSEMIDE 40 MG/1
40 TABLET ORAL DAILY
Qty: 90 TABLET | Refills: 3 | Status: SHIPPED | OUTPATIENT
Start: 2025-06-16

## 2025-06-16 RX ORDER — TAMSULOSIN HYDROCHLORIDE 0.4 MG/1
0.4 CAPSULE ORAL DAILY
Qty: 90 CAPSULE | Refills: 3 | Status: SHIPPED | OUTPATIENT
Start: 2025-06-16

## 2025-06-16 RX ORDER — CARVEDILOL 12.5 MG/1
12.5 TABLET ORAL 2 TIMES DAILY
Qty: 180 TABLET | Refills: 3 | Status: SHIPPED | OUTPATIENT
Start: 2025-06-16

## 2025-06-16 RX ORDER — EZETIMIBE 10 MG/1
10 TABLET ORAL DAILY
Qty: 90 TABLET | Refills: 3 | Status: SHIPPED | OUTPATIENT
Start: 2025-06-16

## 2025-06-17 DIAGNOSIS — I10 HYPERTENSION, UNSPECIFIED TYPE: ICD-10-CM

## 2025-06-17 RX ORDER — AMLODIPINE AND BENAZEPRIL HYDROCHLORIDE 5; 20 MG/1; MG/1
CAPSULE ORAL
Qty: 90 CAPSULE | Refills: 3 | Status: SHIPPED | OUTPATIENT
Start: 2025-06-17

## 2025-06-17 RX ORDER — AMLODIPINE AND BENAZEPRIL HYDROCHLORIDE 5; 20 MG/1; MG/1
1 CAPSULE ORAL DAILY
Qty: 10 CAPSULE | Refills: 0 | Status: SHIPPED | OUTPATIENT
Start: 2025-06-17 | End: 2026-06-17

## 2025-06-17 NOTE — TELEPHONE ENCOUNTER
PCP: Horace Laughlin MD    Last appt: 2/13/2025  Future Appointments   Date Time Provider Department Center   8/14/2025  2:00 PM Horace Laughlin MD [facility] DEP       Requested Prescriptions     Pending Prescriptions Disp Refills    amLODIPine-benazepril (LOTREL) 5-20 MG per capsule 90 capsule 3     Sig: TAKE 1 CAPSULE ONE TIME DAILY    amLODIPine-benazepril (LOTREL) 5-20 MG per capsule 10 capsule 0     Sig: Take 1 capsule by mouth daily Short supply pending mail order delivery

## 2025-06-17 NOTE — TELEPHONE ENCOUNTER
Pt is out of med and will need 10 pills to go local waiting on mail order.    amLODIPine-benazepril (LOTREL) 5-20 MG per capsule    Refill /10 pills to [facility] #[id number]    I DON'T see where this was ordered through [facility] though?? Thanks.

## 2025-07-30 RX ORDER — ATORVASTATIN CALCIUM 40 MG/1
40 TABLET, FILM COATED ORAL DAILY
Qty: 90 TABLET | Refills: 3 | Status: SHIPPED | OUTPATIENT
Start: 2025-07-30

## 2025-08-11 SDOH — HEALTH STABILITY: PHYSICAL HEALTH: ON AVERAGE, HOW MANY MINUTES DO YOU ENGAGE IN EXERCISE AT THIS LEVEL?: 0 MIN

## 2025-08-11 SDOH — HEALTH STABILITY: PHYSICAL HEALTH: ON AVERAGE, HOW MANY DAYS PER WEEK DO YOU ENGAGE IN MODERATE TO STRENUOUS EXERCISE (LIKE A BRISK WALK)?: 0 DAYS

## 2025-08-11 ASSESSMENT — PATIENT HEALTH QUESTIONNAIRE - PHQ9
SUM OF ALL RESPONSES TO PHQ QUESTIONS 1-9: 4
2. FEELING DOWN, DEPRESSED OR HOPELESS: NOT AT ALL
3. TROUBLE FALLING OR STAYING ASLEEP: NOT AT ALL
1. LITTLE INTEREST OR PLEASURE IN DOING THINGS: NEARLY EVERY DAY
8. MOVING OR SPEAKING SO SLOWLY THAT OTHER PEOPLE COULD HAVE NOTICED. OR THE OPPOSITE, BEING SO FIGETY OR RESTLESS THAT YOU HAVE BEEN MOVING AROUND A LOT MORE THAN USUAL: NOT AT ALL
SUM OF ALL RESPONSES TO PHQ QUESTIONS 1-9: 4
9. THOUGHTS THAT YOU WOULD BE BETTER OFF DEAD, OR OF HURTING YOURSELF: NOT AT ALL
SUM OF ALL RESPONSES TO PHQ QUESTIONS 1-9: 4
SUM OF ALL RESPONSES TO PHQ QUESTIONS 1-9: 4
6. FEELING BAD ABOUT YOURSELF - OR THAT YOU ARE A FAILURE OR HAVE LET YOURSELF OR YOUR FAMILY DOWN: NOT AT ALL
4. FEELING TIRED OR HAVING LITTLE ENERGY: SEVERAL DAYS
10. IF YOU CHECKED OFF ANY PROBLEMS, HOW DIFFICULT HAVE THESE PROBLEMS MADE IT FOR YOU TO DO YOUR WORK, TAKE CARE OF THINGS AT HOME, OR GET ALONG WITH OTHER PEOPLE: NOT DIFFICULT AT ALL
7. TROUBLE CONCENTRATING ON THINGS, SUCH AS READING THE NEWSPAPER OR WATCHING TELEVISION: NOT AT ALL
5. POOR APPETITE OR OVEREATING: NOT AT ALL

## 2025-08-11 ASSESSMENT — LIFESTYLE VARIABLES
HOW OFTEN DO YOU HAVE A DRINK CONTAINING ALCOHOL: 4
HOW MANY STANDARD DRINKS CONTAINING ALCOHOL DO YOU HAVE ON A TYPICAL DAY: 1
HOW OFTEN DO YOU HAVE A DRINK CONTAINING ALCOHOL: 2-3 TIMES A WEEK
HOW OFTEN DO YOU HAVE SIX OR MORE DRINKS ON ONE OCCASION: 1
HOW MANY STANDARD DRINKS CONTAINING ALCOHOL DO YOU HAVE ON A TYPICAL DAY: 1 OR 2

## 2025-08-14 ENCOUNTER — OFFICE VISIT (OUTPATIENT)
Age: 75
End: 2025-08-14
Payer: MEDICARE

## 2025-08-14 VITALS
BODY MASS INDEX: 29.43 KG/M2 | SYSTOLIC BLOOD PRESSURE: 116 MMHG | DIASTOLIC BLOOD PRESSURE: 72 MMHG | TEMPERATURE: 97.5 F | OXYGEN SATURATION: 97 % | WEIGHT: 205.6 LBS | HEIGHT: 70 IN | RESPIRATION RATE: 20 BRPM | HEART RATE: 74 BPM

## 2025-08-14 DIAGNOSIS — M48.061 SPINAL STENOSIS OF LUMBAR REGION, UNSPECIFIED WHETHER NEUROGENIC CLAUDICATION PRESENT: ICD-10-CM

## 2025-08-14 DIAGNOSIS — Z00.00 MEDICARE ANNUAL WELLNESS VISIT, SUBSEQUENT: ICD-10-CM

## 2025-08-14 DIAGNOSIS — I73.9 PAD (PERIPHERAL ARTERY DISEASE): ICD-10-CM

## 2025-08-14 DIAGNOSIS — R80.9 TYPE 2 DIABETES MELLITUS WITH DIABETIC MICROALBUMINURIA, WITHOUT LONG-TERM CURRENT USE OF INSULIN (HCC): Primary | ICD-10-CM

## 2025-08-14 DIAGNOSIS — E11.29 TYPE 2 DIABETES MELLITUS WITH DIABETIC MICROALBUMINURIA, WITHOUT LONG-TERM CURRENT USE OF INSULIN (HCC): Primary | ICD-10-CM

## 2025-08-14 DIAGNOSIS — Z86.73 HISTORY OF CEREBROVASCULAR ACCIDENT: ICD-10-CM

## 2025-08-14 DIAGNOSIS — E78.5 HYPERLIPIDEMIA, UNSPECIFIED HYPERLIPIDEMIA TYPE: ICD-10-CM

## 2025-08-14 DIAGNOSIS — I10 HYPERTENSION, UNSPECIFIED TYPE: ICD-10-CM

## 2025-08-14 PROCEDURE — 1159F MED LIST DOCD IN RCRD: CPT | Performed by: INTERNAL MEDICINE

## 2025-08-14 PROCEDURE — 99214 OFFICE O/P EST MOD 30 MIN: CPT | Performed by: INTERNAL MEDICINE

## 2025-08-14 PROCEDURE — 1123F ACP DISCUSS/DSCN MKR DOCD: CPT | Performed by: INTERNAL MEDICINE

## 2025-08-14 PROCEDURE — 3044F HG A1C LEVEL LT 7.0%: CPT | Performed by: INTERNAL MEDICINE

## 2025-08-14 PROCEDURE — 1036F TOBACCO NON-USER: CPT | Performed by: INTERNAL MEDICINE

## 2025-08-14 PROCEDURE — G0439 PPPS, SUBSEQ VISIT: HCPCS | Performed by: INTERNAL MEDICINE

## 2025-08-14 PROCEDURE — G8417 CALC BMI ABV UP PARAM F/U: HCPCS | Performed by: INTERNAL MEDICINE

## 2025-08-14 PROCEDURE — 3078F DIAST BP <80 MM HG: CPT | Performed by: INTERNAL MEDICINE

## 2025-08-14 PROCEDURE — G8427 DOCREV CUR MEDS BY ELIG CLIN: HCPCS | Performed by: INTERNAL MEDICINE

## 2025-08-14 PROCEDURE — 3074F SYST BP LT 130 MM HG: CPT | Performed by: INTERNAL MEDICINE

## 2025-08-14 RX ORDER — POTASSIUM CHLORIDE 750 MG/1
10 TABLET, EXTENDED RELEASE ORAL DAILY
Qty: 90 TABLET | Refills: 3 | Status: SHIPPED | OUTPATIENT
Start: 2025-08-14

## 2025-08-15 LAB
ANION GAP SERPL CALC-SCNC: 11 MMOL/L (ref 2–14)
BUN SERPL-MCNC: 18 MG/DL (ref 8–23)
BUN/CREAT SERPL: 13 (ref 12–20)
CALCIUM SERPL-MCNC: 9.4 MG/DL (ref 8.8–10.2)
CHLORIDE SERPL-SCNC: 106 MMOL/L (ref 98–107)
CHOLEST SERPL-MCNC: 153 MG/DL (ref 0–200)
CO2 SERPL-SCNC: 27 MMOL/L (ref 20–29)
CREAT SERPL-MCNC: 1.43 MG/DL (ref 0.7–1.2)
CREAT UR-MCNC: 26.1 MG/DL (ref 39–259)
ERYTHROCYTE [DISTWIDTH] IN BLOOD BY AUTOMATED COUNT: 13.2 % (ref 11.5–14.5)
EST. AVERAGE GLUCOSE BLD GHB EST-MCNC: 119 MG/DL
GLUCOSE SERPL-MCNC: 114 MG/DL (ref 65–100)
HBA1C MFR BLD: 5.8 % (ref 4–5.6)
HCT VFR BLD AUTO: 40.4 % (ref 36.6–50.3)
HDLC SERPL-MCNC: 64 MG/DL (ref 40–60)
HDLC SERPL: 2.4 (ref 0–5)
HGB BLD-MCNC: 13.5 G/DL (ref 12.1–17)
LDLC SERPL CALC-MCNC: 63 MG/DL
MCH RBC QN AUTO: 31.9 PG (ref 26–34)
MCHC RBC AUTO-ENTMCNC: 33.4 G/DL (ref 30–36.5)
MCV RBC AUTO: 95.5 FL (ref 80–99)
MICROALBUMIN UR-MCNC: 8.48 MG/DL
MICROALBUMIN/CREAT UR-RTO: 325 MG/G
NRBC # BLD: 0 K/UL (ref 0–0.01)
NRBC BLD-RTO: 0 PER 100 WBC
PLATELET # BLD AUTO: 243 K/UL (ref 150–400)
PMV BLD AUTO: 11.9 FL (ref 8.9–12.9)
POTASSIUM SERPL-SCNC: 4.9 MMOL/L (ref 3.5–5.1)
RBC # BLD AUTO: 4.23 M/UL (ref 4.1–5.7)
SODIUM SERPL-SCNC: 144 MMOL/L (ref 136–145)
TRIGL SERPL-MCNC: 129 MG/DL (ref 0–150)
TSH, 3RD GENERATION: 1.4 UIU/ML (ref 0.27–4.2)
VLDLC SERPL CALC-MCNC: 26 MG/DL
WBC # BLD AUTO: 7.9 K/UL (ref 4.1–11.1)

## (undated) DEVICE — SOLUTION IRRIG 1000ML 0.9% SOD CHL USP POUR PLAS BTL

## (undated) DEVICE — Device

## (undated) DEVICE — CATH IV AUTOGRD BC PNK 20GA 25 -- INSYTE

## (undated) DEVICE — SUTURE MCRYL SZ 3-0 L27IN ABSRB UD L19MM PS-2 3/8 CIR PRIM Y427H

## (undated) DEVICE — SUTURE STRATAFIX SYMMETRIC PDS + SZ 1 L18IN ABSRB VLT L48MM SXPP1A400

## (undated) DEVICE — NON-REM POLYHESIVE PATIENT RETURN ELECTRODE: Brand: VALLEYLAB

## (undated) DEVICE — NEONATAL-ADULT SPO2 SENSOR: Brand: NELLCOR

## (undated) DEVICE — PAD BD MATTRESS 73X32 IN STD CONVOLUTED FOAM LTX FREE

## (undated) DEVICE — 1200 GUARD II KIT W/5MM TUBE W/O VAC TUBE: Brand: GUARDIAN

## (undated) DEVICE — IMMOBILIZER PREMIER PRO KNEE CUTAWAY CNTOUR 22INCH COOL BLU

## (undated) DEVICE — TRAP SUC MUCOUS 70ML -- MEDICHOICE MEDLINE

## (undated) DEVICE — SUTURE FIBERWIRE SZ 5 L38IN NONABSORBABLE BLU L48MM 1/2 AR7211

## (undated) DEVICE — 6619 2 PTNT ISO SYS INCISE AREA&LT;(&GT;&&LT;)&GT;P: Brand: STERI-DRAPE™ IOBAN™ 2

## (undated) DEVICE — KENDALL RADIOLUCENT FOAM MONITORING ELECTRODE RECTANGULAR SHAPE: Brand: KENDALL

## (undated) DEVICE — DRESSING HYDROCOLLOID BORDER 35X10 IN ALUM PRIMASEAL

## (undated) DEVICE — 4-PORT MANIFOLD: Brand: NEPTUNE 2

## (undated) DEVICE — DERMABOND SKIN ADH 0.7ML -- DERMABOND ADVANCED 12/BX

## (undated) DEVICE — DRAPE SURG W41XL74IN CLR FULL SZ C ARM 3 ADH POLY STRP E

## (undated) DEVICE — GLOVE ORANGE PI 7   MSG9070

## (undated) DEVICE — CONTAINER SPEC 20 ML LID NEUT BUFF FORMALIN 10 % POLYPR STS

## (undated) DEVICE — PREP SKN CHLRAPRP APL 26ML STR --

## (undated) DEVICE — SET ADMIN 16ML TBNG L100IN 2 Y INJ SITE IV PIGGY BK DISP

## (undated) DEVICE — SCRUB DRY SURG EZ SCRUB BRUSH PREOPERATIVE GRN

## (undated) DEVICE — TOWEL 4 PLY TISS 19X30 SUE WHT

## (undated) DEVICE — SOLIDIFIER MEDC 1200ML -- CONVERT TO 356117

## (undated) DEVICE — SUTURE VCRL 2-0 L27IN ABSRB CT BRAID COAT UD J275H

## (undated) DEVICE — Z DISCONTINUED PER MEDLINE LINE GAS SAMPLING O2/CO2 LNG AD 13 FT NSL W/ TBNG FILTERLINE

## (undated) DEVICE — SUTURE FIBERWIRE SZ 2 W/ TAPERED NEEDLE BLUE L38IN NONABSORB BLU L26.5MM 1/2 CIRCLE AR7200

## (undated) DEVICE — SOLUTION IRRIG 1000ML STRL H2O USP PLAS POUR BTL

## (undated) DEVICE — STRAINER URIN CALC RNL MSH -- CONVERT TO ITEM 357634

## (undated) DEVICE — SYR 10ML LUER LOK 1/5ML GRAD --

## (undated) DEVICE — COVER,MAYO STAND,STERILE: Brand: MEDLINE

## (undated) DEVICE — NEEDLE HYPO 18GA L1.5IN PNK S STL HUB POLYPR SHLD REG BVL

## (undated) DEVICE — SUTURE VCRL SZ 2 L54IN ABSRB UD L65MM TP-1 1/2 CIR J880T

## (undated) DEVICE — GLOVE SURG SZ 65 THK91MIL LTX FREE SYN POLYISOPRENE

## (undated) DEVICE — SYR 3ML LL TIP 1/10ML GRAD --

## (undated) DEVICE — SNARE ENDOSCP M L240CM W27MM SHTH DIA2.4MM CHN 2.8MM OVL

## (undated) DEVICE — TOTAL JOINT - SMH: Brand: MEDLINE INDUSTRIES, INC.

## (undated) DEVICE — GARMENT,MEDLINE,DVT,INT,CALF,MED, GEN2: Brand: MEDLINE

## (undated) DEVICE — GLOVE ORANGE PI 7 1/2   MSG9075

## (undated) DEVICE — BLADE SURG SAW SAG S STL AGG 90MM LEN 80MM CUT DEPTH 25.4MM

## (undated) DEVICE — BASIN EMSIS 16OZ GRAPHITE PLAS KID SHP MOLD GRAD FOR ORAL

## (undated) DEVICE — TOTAL TRAY, 16FR 10ML SIL FOLEY, URN: Brand: MEDLINE